# Patient Record
Sex: FEMALE | Race: WHITE | NOT HISPANIC OR LATINO | Employment: OTHER | URBAN - METROPOLITAN AREA
[De-identification: names, ages, dates, MRNs, and addresses within clinical notes are randomized per-mention and may not be internally consistent; named-entity substitution may affect disease eponyms.]

---

## 2023-04-24 ENCOUNTER — NURSING HOME VISIT (OUTPATIENT)
Dept: POST ACUTE CARE | Facility: EXTERNAL LOCATION | Age: 88
End: 2023-04-24
Payer: MEDICARE

## 2023-04-24 VITALS — TEMPERATURE: 98 F | SYSTOLIC BLOOD PRESSURE: 132 MMHG | DIASTOLIC BLOOD PRESSURE: 79 MMHG | HEART RATE: 80 BPM

## 2023-04-24 DIAGNOSIS — I10 PRIMARY HYPERTENSION: ICD-10-CM

## 2023-04-24 DIAGNOSIS — I26.99 OTHER ACUTE PULMONARY EMBOLISM WITHOUT ACUTE COR PULMONALE (MULTI): ICD-10-CM

## 2023-04-24 DIAGNOSIS — D50.0 IRON DEFICIENCY ANEMIA DUE TO CHRONIC BLOOD LOSS: Primary | ICD-10-CM

## 2023-04-24 DIAGNOSIS — R26.2 DIFFICULTY WALKING: ICD-10-CM

## 2023-04-24 DIAGNOSIS — S72.91XD UNSPECIFIED FRACTURE OF RIGHT FEMUR, SUBSEQUENT ENCOUNTER FOR CLOSED FRACTURE WITH ROUTINE HEALING: ICD-10-CM
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
## 2023-04-24 PROBLEM — H35.3131 EARLY DRY STAGE NONEXUDATIVE AGE-RELATED MACULAR DEGENERATION OF BOTH EYES: Status: ACTIVE | Noted: 2023-04-24

## 2023-04-24 PROBLEM — M62.81 MUSCLE WEAKNESS (GENERALIZED): Status: ACTIVE | Noted: 2023-04-24

## 2023-04-24 PROBLEM — K21.9 GASTROESOPHAGEAL REFLUX DISEASE WITHOUT ESOPHAGITIS: Status: ACTIVE | Noted: 2023-04-24

## 2023-04-24 PROCEDURE — 99342 HOME/RES VST NEW LOW MDM 30: CPT | Performed by: NURSE PRACTITIONER

## 2023-04-24 NOTE — PROGRESS NOTES
Emelyn Lundberg is a 87 y.o. female who is assisted living/ home patient being seen and evaluated for multiple medical problems.    50% of time was spent on preparing to see the patient, performing exam or evaluation, coordination of care, ordering medications,tests and labs, referring and communicating with other health care providers , interpreting results, obtaining and reviewing history, counseling and educating the family/patient explanation of tests, medications and documenting clinical information  EMR. Time spent >35 minutes    87y old female from [place] moved to [facility] with her  to be closer to family. She fell this past February and suffered a fractured femur, she had surgery for repair of fracture, post op suffered a pulmonary embolism, PEA/Arrest and acute metabolic encephalopathy. She is on Eliquis now, she used to take labetalol but was not discharged on the medication. Medications discussed with her, she would like the nurse to give her the medication because of her macular degeneration.  Current medications are Eliquis, omeprazole,tylenol PM and Vitamin D             Objective   /79   Pulse 80   Temp 36.7 °C (98 °F)     Physical Exam  Vitals and nursing note reviewed.   Constitutional:       General: She is not in acute distress.  HENT:      Head: Normocephalic and atraumatic.   Cardiovascular:      Rate and Rhythm: Normal rate and regular rhythm.   Pulmonary:      Effort: Pulmonary effort is normal.      Breath sounds: Normal breath sounds.   Musculoskeletal:      Right lower leg: No edema.      Left lower leg: No edema.   Skin:     General: Skin is warm and dry.   Neurological:      General: No focal deficit present.      Mental Status: She is alert and oriented to person, place, and time.   Psychiatric:         Mood and Affect: Mood normal.         Assessment/Plan   Problem List Items Addressed This Visit       Iron deficiency anemia due to chronic  blood loss - Primary     Check CBC 1 week         Other pulmonary embolism without acute cor pulmonale (CMS/HCC)     Acute PE following surgery for hip repair. She is on Eliquis 5mg po BID         Unspecified fracture of right femur, subsequent encounter for closed fracture with routine healing     Fractured femur after a accidental fall on 2/22/23  PWS on ly for transfers  Follow up orhtopedics  Referred to [facility]opedic associates for further orders  PT/OT evaluate and treat         Difficulty walking     Current only partial weight bearing status for transfers only  Using a wheelchair         Primary hypertension     Controlled  Was on labetalol but was not discharged from hospital on medication  Monitor VS daily x 1 month        Check CBC,BMP,Vit D, B12 and Mag level 1 week

## 2023-04-24 NOTE — ASSESSMENT & PLAN NOTE
Controlled  Was on labetalol but was not discharged from hospital on medication  Monitor VS daily x 1 month

## 2023-04-24 NOTE — ASSESSMENT & PLAN NOTE
Fractured femur after a accidental fall on 2/22/23  PWS on ly for transfers  Follow up orhtopedics  Referred to [facility]opedic associates for further orders  PT/OT evaluate and treat

## 2023-08-21 ASSESSMENT — ENCOUNTER SYMPTOMS
CARDIOVASCULAR NEGATIVE: 1
CONSTITUTIONAL NEGATIVE: 1
PSYCHIATRIC NEGATIVE: 1
MUSCULOSKELETAL NEGATIVE: 1
RESPIRATORY NEGATIVE: 1
GASTROINTESTINAL NEGATIVE: 1
NEUROLOGICAL NEGATIVE: 1

## 2023-08-22 NOTE — PROGRESS NOTES
Subjective   Patient ID: Natasha Lundberg is a 87 y.o. female.    Pt is an 87 year old female with afib, GERD who resides in [place] at [facility]. Patient states that she is overall doing well without any complaints. She regards that she tolerates his medications and states no concerns. States no issues or concerns at this time. Feels overall well without any concerns        Review of Systems   Constitutional: Negative.    HENT: Negative.     Respiratory: Negative.     Cardiovascular: Negative.    Gastrointestinal: Negative.    Musculoskeletal: Negative.    Neurological: Negative.    Psychiatric/Behavioral: Negative.         Objective Vitals Reviewed via facility EMR   Physical Exam  Constitutional:       General: She is not in acute distress.     Appearance: She is not ill-appearing.      Comments: Elderly female   HENT:      Mouth/Throat:      Mouth: Mucous membranes are moist.      Pharynx: Oropharynx is clear. No oropharyngeal exudate or posterior oropharyngeal erythema.   Eyes:      Pupils: Pupils are equal, round, and reactive to light.   Cardiovascular:      Rate and Rhythm: Normal rate and regular rhythm.      Heart sounds: No murmur heard.  Pulmonary:      Effort: Pulmonary effort is normal. No respiratory distress.      Breath sounds: No wheezing.   Abdominal:      General: Abdomen is flat. Bowel sounds are normal. There is no distension.      Palpations: Abdomen is soft.      Tenderness: There is no abdominal tenderness.   Musculoskeletal:         General: No tenderness. Normal range of motion.   Skin:     General: Skin is warm and dry.   Neurological:      General: No focal deficit present.      Mental Status: She is alert and oriented to person, place, and time. Mental status is at baseline.   Psychiatric:         Mood and Affect: Mood normal.         Assessment/Plan   Diagnoses and all orders for this visit:  Primary hypertension  Gastroesophageal reflux disease without esophagitis  Iron deficiency  anemia due to chronic blood loss  Muscle weakness (generalized)  Fall, sequela  Unspecified fracture of right femur, subsequent encounter for closed fracture with routine healing    Pt seen and examined, overall medically stable. Medications reviewed and no issues noted. Continue supportive care, labs at appropriate time

## 2023-08-23 ENCOUNTER — NURSING HOME VISIT (OUTPATIENT)
Dept: POST ACUTE CARE | Facility: EXTERNAL LOCATION | Age: 88
End: 2023-08-23
Payer: MEDICARE

## 2023-08-23 DIAGNOSIS — M62.81 MUSCLE WEAKNESS (GENERALIZED): ICD-10-CM
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
## 2023-08-23 DIAGNOSIS — S72.91XD UNSPECIFIED FRACTURE OF RIGHT FEMUR, SUBSEQUENT ENCOUNTER FOR CLOSED FRACTURE WITH ROUTINE HEALING: ICD-10-CM

## 2023-08-23 DIAGNOSIS — I10 PRIMARY HYPERTENSION: Primary | ICD-10-CM

## 2023-08-23 DIAGNOSIS — K21.9 GASTROESOPHAGEAL REFLUX DISEASE WITHOUT ESOPHAGITIS: ICD-10-CM

## 2023-08-23 DIAGNOSIS — D50.0 IRON DEFICIENCY ANEMIA DUE TO CHRONIC BLOOD LOSS: ICD-10-CM

## 2023-08-23 PROCEDURE — 99342 HOME/RES VST NEW LOW MDM 30: CPT | Performed by: STUDENT IN AN ORGANIZED HEALTH CARE EDUCATION/TRAINING PROGRAM

## 2023-09-06 ENCOUNTER — NURSING HOME VISIT (OUTPATIENT)
Dept: POST ACUTE CARE | Facility: EXTERNAL LOCATION | Age: 88
End: 2023-09-06
Payer: MEDICARE

## 2023-09-06 DIAGNOSIS — R26.2 DIFFICULTY WALKING: ICD-10-CM

## 2023-09-06 DIAGNOSIS — S42.296A OTHER CLOSED NONDISPLACED FRACTURE OF PROXIMAL END OF HUMERUS, UNSPECIFIED LATERALITY, INITIAL ENCOUNTER: ICD-10-CM

## 2023-09-06 DIAGNOSIS — I10 PRIMARY HYPERTENSION: Primary | ICD-10-CM

## 2023-09-06 DIAGNOSIS — K21.9 GASTROESOPHAGEAL REFLUX DISEASE WITHOUT ESOPHAGITIS: ICD-10-CM

## 2023-09-06 DIAGNOSIS — M62.81 MUSCLE WEAKNESS (GENERALIZED): ICD-10-CM

## 2023-09-06 PROCEDURE — 99349 HOME/RES VST EST MOD MDM 40: CPT | Performed by: STUDENT IN AN ORGANIZED HEALTH CARE EDUCATION/TRAINING PROGRAM

## 2023-09-06 NOTE — LETTER
September 10, 2023     RAMONA baca    Patient: Natasha Lundberg   YOB: 1935   Date of Visit: 9/6/2023       Dear Dr. RAMONA baca:    Thank you for referring Natasha Lundberg to me for evaluation. Below are my notes for this consultation.  If you have questions, please do not hesitate to call me. I look forward to following your patient along with you.       Sincerely,     Matthew Jones,       CC: No Recipients  ______________________________________________________________________________________    Subjective  Patient ID: Natasha Lundberg is a 87 y.o. female.    Pt seen in patients apartment. Patient recently tested positive for covid 19. In addition, during a recent trip, patient also reports that she while she was traveling she banged her shoulder on the side of the bus. Was found to have a new onset fracture. She regards that she is having some arthalgias and low grade fever but is overall feeling well otherwise. States no complaints.        Review of Systems   Constitutional: Negative.    HENT: Negative.     Respiratory:  Positive for cough.    Cardiovascular: Negative.    Gastrointestinal: Negative.    Musculoskeletal:  Positive for arthralgias.   Neurological: Negative.    Psychiatric/Behavioral: Negative.         ObjectiveVitals Reviewed via facility EMR   Physical Exam  Constitutional:       General: She is not in acute distress.     Appearance: She is not ill-appearing.   HENT:      Mouth/Throat:      Mouth: Mucous membranes are moist.      Pharynx: Oropharynx is clear. No oropharyngeal exudate or posterior oropharyngeal erythema.   Eyes:      Pupils: Pupils are equal, round, and reactive to light.   Cardiovascular:      Rate and Rhythm: Normal rate and regular rhythm.      Heart sounds: No murmur heard.  Pulmonary:      Effort: Pulmonary effort is normal. No respiratory distress.      Breath sounds: No wheezing.   Abdominal:      General: Abdomen is flat. Bowel sounds are normal. There is no distension.       Palpations: Abdomen is soft.      Tenderness: There is no abdominal tenderness.   Musculoskeletal:         General: No tenderness. Normal range of motion.   Skin:     General: Skin is warm and dry.   Neurological:      General: No focal deficit present.      Mental Status: She is alert and oriented to person, place, and time. Mental status is at baseline.   Psychiatric:         Mood and Affect: Mood normal.         Assessment/Plan  Diagnoses and all orders for this visit:  Primary hypertension  Gastroesophageal reflux disease without esophagitis  Muscle weakness (generalized)  Difficulty walking  Other closed nondisplaced fracture of proximal end of humerus, unspecified laterality, initial encounter      Pt seen and examined, within window for paxlovid. Start anti-virals. Continue supportive care, breathing treatments prn. Following with ortho for her shoulder, appreciate recommendations. Continue supportive care.    >40 minutes spent in managemnt of pt

## 2023-09-10 PROBLEM — U07.1 COVID-19: Status: ACTIVE | Noted: 2023-09-10

## 2023-09-10 PROBLEM — S42.309A HUMERAL FRACTURE: Status: ACTIVE | Noted: 2023-09-10

## 2023-09-10 ASSESSMENT — ENCOUNTER SYMPTOMS
GASTROINTESTINAL NEGATIVE: 1
NEUROLOGICAL NEGATIVE: 1
CONSTITUTIONAL NEGATIVE: 1
ARTHRALGIAS: 1
COUGH: 1
PSYCHIATRIC NEGATIVE: 1
CARDIOVASCULAR NEGATIVE: 1

## 2023-09-10 NOTE — PROGRESS NOTES
Subjective   Patient ID: Natasha Lundberg is a 87 y.o. female.    Pt seen in patients apartment. Patient recently tested positive for covid 19. In addition, during a recent trip, patient also reports that she while she was traveling she banged her shoulder on the side of the bus. Was found to have a new onset fracture. She regards that she is having some arthalgias and low grade fever but is overall feeling well otherwise. States no complaints.        Review of Systems   Constitutional: Negative.    HENT: Negative.     Respiratory:  Positive for cough.    Cardiovascular: Negative.    Gastrointestinal: Negative.    Musculoskeletal:  Positive for arthralgias.   Neurological: Negative.    Psychiatric/Behavioral: Negative.         Objective Vitals Reviewed via facility EMR   Physical Exam  Constitutional:       General: She is not in acute distress.     Appearance: She is not ill-appearing.   HENT:      Mouth/Throat:      Mouth: Mucous membranes are moist.      Pharynx: Oropharynx is clear. No oropharyngeal exudate or posterior oropharyngeal erythema.   Eyes:      Pupils: Pupils are equal, round, and reactive to light.   Cardiovascular:      Rate and Rhythm: Normal rate and regular rhythm.      Heart sounds: No murmur heard.  Pulmonary:      Effort: Pulmonary effort is normal. No respiratory distress.      Breath sounds: No wheezing.   Abdominal:      General: Abdomen is flat. Bowel sounds are normal. There is no distension.      Palpations: Abdomen is soft.      Tenderness: There is no abdominal tenderness.   Musculoskeletal:         General: No tenderness. Normal range of motion.   Skin:     General: Skin is warm and dry.   Neurological:      General: No focal deficit present.      Mental Status: She is alert and oriented to person, place, and time. Mental status is at baseline.   Psychiatric:         Mood and Affect: Mood normal.         Assessment/Plan   Diagnoses and all orders for this visit:  Primary  hypertension  Gastroesophageal reflux disease without esophagitis  Muscle weakness (generalized)  Difficulty walking  Other closed nondisplaced fracture of proximal end of humerus, unspecified laterality, initial encounter      Pt seen and examined, within window for paxlovid. Start anti-virals. Continue supportive care, breathing treatments prn. Following with ortho for her shoulder, appreciate recommendations. Continue supportive care.    >40 minutes spent in managemnt of pt

## 2023-09-21 ENCOUNTER — HOSPITAL ENCOUNTER (INPATIENT)
Dept: DATA CONVERSION | Facility: HOSPITAL | Age: 88
LOS: 8 days | Discharge: HOME | End: 2023-09-29
Attending: INTERNAL MEDICINE | Admitting: INTERNAL MEDICINE
Payer: MEDICARE

## 2023-09-21 DIAGNOSIS — K92.2 GASTROINTESTINAL HEMORRHAGE, UNSPECIFIED: ICD-10-CM

## 2023-09-21 LAB
ABO GROUP (TYPE) IN BLOOD: NORMAL
ABO GROUP (TYPE) IN BLOOD: NORMAL
ACTIVATED PARTIAL THROMBOPLASTIN TIME IN PPP BY COAGULATION ASSAY: 52 SEC (ref 27–38)
ALANINE AMINOTRANSFERASE (SGPT) (U/L) IN SER/PLAS: 6 U/L (ref 7–45)
ALBUMIN (G/DL) IN SER/PLAS: 3.7 G/DL (ref 3.4–5)
ALKALINE PHOSPHATASE (U/L) IN SER/PLAS: 85 U/L (ref 33–136)
ANION GAP IN SER/PLAS: 15 MMOL/L (ref 10–20)
ANTIBODY SCREEN: NORMAL
ASPARTATE AMINOTRANSFERASE (SGOT) (U/L) IN SER/PLAS: 10 U/L (ref 9–39)
BASOPHILS (10*3/UL) IN BLOOD BY AUTOMATED COUNT: 0.15 X10E9/L (ref 0–0.1)
BASOPHILS/100 LEUKOCYTES IN BLOOD BY AUTOMATED COUNT: 0.7 % (ref 0–2)
BILIRUBIN TOTAL (MG/DL) IN SER/PLAS: 0.4 MG/DL (ref 0–1.2)
CALCIUM (MG/DL) IN SER/PLAS: 9 MG/DL (ref 8.6–10.3)
CARBON DIOXIDE, TOTAL (MMOL/L) IN SER/PLAS: 21 MMOL/L (ref 21–32)
CHLORIDE (MMOL/L) IN SER/PLAS: 99 MMOL/L (ref 98–107)
CREATININE (MG/DL) IN SER/PLAS: 1.01 MG/DL (ref 0.5–1.05)
EOSINOPHILS (10*3/UL) IN BLOOD BY AUTOMATED COUNT: 0.37 X10E9/L (ref 0–0.4)
EOSINOPHILS/100 LEUKOCYTES IN BLOOD BY AUTOMATED COUNT: 1.8 % (ref 0–6)
ERYTHROCYTE DISTRIBUTION WIDTH (RATIO) BY AUTOMATED COUNT: 13.3 % (ref 11.5–14.5)
ERYTHROCYTE MEAN CORPUSCULAR HEMOGLOBIN CONCENTRATION (G/DL) BY AUTOMATED: 31.2 G/DL (ref 32–36)
ERYTHROCYTE MEAN CORPUSCULAR VOLUME (FL) BY AUTOMATED COUNT: 92 FL (ref 80–100)
ERYTHROCYTES (10*6/UL) IN BLOOD BY AUTOMATED COUNT: 3.05 X10E12/L (ref 4–5.2)
GFR FEMALE: 54 ML/MIN/1.73M2
GLUCOSE (MG/DL) IN SER/PLAS: 134 MG/DL (ref 74–99)
HEMATOCRIT (%) IN BLOOD BY AUTOMATED COUNT: 21.9 % (ref 36–46)
HEMATOCRIT (%) IN BLOOD BY AUTOMATED COUNT: 28.2 % (ref 36–46)
HEMOGLOBIN (G/DL) IN BLOOD: 7 G/DL (ref 12–16)
HEMOGLOBIN (G/DL) IN BLOOD: 8.8 G/DL (ref 12–16)
IMMATURE GRANULOCYTES/100 LEUKOCYTES IN BLOOD BY AUTOMATED COUNT: 0.8 % (ref 0–0.9)
INR IN PPP BY COAGULATION ASSAY: 2.1 (ref 0.9–1.1)
LACTATE (MMOL/L) IN SER/PLAS: 1.1 MMOL/L (ref 0.4–2)
LEUKOCYTES (10*3/UL) IN BLOOD BY AUTOMATED COUNT: 20.3 X10E9/L (ref 4.4–11.3)
LYMPHOCYTES (10*3/UL) IN BLOOD BY AUTOMATED COUNT: 1.91 X10E9/L (ref 0.8–3)
LYMPHOCYTES/100 LEUKOCYTES IN BLOOD BY AUTOMATED COUNT: 9.4 % (ref 13–44)
MONOCYTES (10*3/UL) IN BLOOD BY AUTOMATED COUNT: 1.49 X10E9/L (ref 0.05–0.8)
MONOCYTES/100 LEUKOCYTES IN BLOOD BY AUTOMATED COUNT: 7.4 % (ref 2–10)
NEUTROPHILS (10*3/UL) IN BLOOD BY AUTOMATED COUNT: 16.18 X10E9/L (ref 1.6–5.5)
NEUTROPHILS/100 LEUKOCYTES IN BLOOD BY AUTOMATED COUNT: 79.9 % (ref 40–80)
NRBC (PER 100 WBCS) BY AUTOMATED COUNT: 0 /100 WBC (ref 0–0)
PLATELETS (10*3/UL) IN BLOOD AUTOMATED COUNT: 522 X10E9/L (ref 150–450)
POTASSIUM (MMOL/L) IN SER/PLAS: 4.1 MMOL/L (ref 3.5–5.3)
PROTEIN TOTAL: 7.5 G/DL (ref 6.4–8.2)
PROTHROMBIN TIME (PT) IN PPP BY COAGULATION ASSAY: 23.3 SEC (ref 9.8–12.8)
RH FACTOR: NORMAL
RH FACTOR: NORMAL
SODIUM (MMOL/L) IN SER/PLAS: 131 MMOL/L (ref 136–145)
TROPONIN I, HIGH SENSITIVITY: 6 NG/L (ref 0–13)
UREA NITROGEN (MG/DL) IN SER/PLAS: 23 MG/DL (ref 6–23)

## 2023-09-22 LAB
ACTIVATED PARTIAL THROMBOPLASTIN TIME IN PPP BY COAGULATION ASSAY: 39 SEC (ref 27–38)
ALANINE AMINOTRANSFERASE (SGPT) (U/L) IN SER/PLAS: 4 U/L (ref 7–45)
ALANINE AMINOTRANSFERASE (SGPT) (U/L) IN SER/PLAS: NORMAL
ALBUMIN (G/DL) IN SER/PLAS: 2.6 G/DL (ref 3.4–5)
ALBUMIN (G/DL) IN SER/PLAS: NORMAL
ALKALINE PHOSPHATASE (U/L) IN SER/PLAS: 55 U/L (ref 33–136)
ALKALINE PHOSPHATASE (U/L) IN SER/PLAS: NORMAL
ANION GAP IN SER/PLAS: 11 MMOL/L (ref 10–20)
ANION GAP IN SER/PLAS: NORMAL
APPEARANCE, URINE: NORMAL
ASPARTATE AMINOTRANSFERASE (SGOT) (U/L) IN SER/PLAS: 7 U/L (ref 9–39)
ASPARTATE AMINOTRANSFERASE (SGOT) (U/L) IN SER/PLAS: NORMAL
BILIRUBIN TOTAL (MG/DL) IN SER/PLAS: 1 MG/DL (ref 0–1.2)
BILIRUBIN TOTAL (MG/DL) IN SER/PLAS: NORMAL
BILIRUBIN, URINE: NEGATIVE
BLOOD CULTURE: NORMAL
BLOOD CULTURE: NORMAL
BLOOD, URINE: NEGATIVE
C. DIFFICILE TOXIN, PCR: NOT DETECTED
CALCIUM (MG/DL) IN SER/PLAS: 7.9 MG/DL (ref 8.6–10.3)
CALCIUM (MG/DL) IN SER/PLAS: NORMAL
CAMPYLOBACTER GP: NOT DETECTED
CARBON DIOXIDE, TOTAL (MMOL/L) IN SER/PLAS: 22 MMOL/L (ref 21–32)
CARBON DIOXIDE, TOTAL (MMOL/L) IN SER/PLAS: NORMAL
CHLORIDE (MMOL/L) IN SER/PLAS: 107 MMOL/L (ref 98–107)
CHLORIDE (MMOL/L) IN SER/PLAS: NORMAL
COLOR, URINE: YELLOW
CREATININE (MG/DL) IN SER/PLAS: 0.93 MG/DL (ref 0.5–1.05)
CREATININE (MG/DL) IN SER/PLAS: NORMAL
ERYTHROCYTE DISTRIBUTION WIDTH (RATIO) BY AUTOMATED COUNT: 14.4 % (ref 11.5–14.5)
ERYTHROCYTE DISTRIBUTION WIDTH (RATIO) BY AUTOMATED COUNT: NORMAL
ERYTHROCYTE DISTRIBUTION WIDTH (RATIO) BY AUTOMATED COUNT: NORMAL
ERYTHROCYTE MEAN CORPUSCULAR HEMOGLOBIN CONCENTRATION (G/DL) BY AUTOMATED: 34.3 G/DL (ref 32–36)
ERYTHROCYTE MEAN CORPUSCULAR HEMOGLOBIN CONCENTRATION (G/DL) BY AUTOMATED: NORMAL
ERYTHROCYTE MEAN CORPUSCULAR HEMOGLOBIN CONCENTRATION (G/DL) BY AUTOMATED: NORMAL
ERYTHROCYTE MEAN CORPUSCULAR VOLUME (FL) BY AUTOMATED COUNT: 87 FL (ref 80–100)
ERYTHROCYTE MEAN CORPUSCULAR VOLUME (FL) BY AUTOMATED COUNT: NORMAL
ERYTHROCYTE MEAN CORPUSCULAR VOLUME (FL) BY AUTOMATED COUNT: NORMAL
ERYTHROCYTES (10*6/UL) IN BLOOD BY AUTOMATED COUNT: 3.17 X10E12/L (ref 4–5.2)
ERYTHROCYTES (10*6/UL) IN BLOOD BY AUTOMATED COUNT: NORMAL
ERYTHROCYTES (10*6/UL) IN BLOOD BY AUTOMATED COUNT: NORMAL
GFR FEMALE: 60 ML/MIN/1.73M2
GFR FEMALE: NORMAL
GFR MALE: NORMAL
GLUCOSE (MG/DL) IN SER/PLAS: 136 MG/DL (ref 74–99)
GLUCOSE (MG/DL) IN SER/PLAS: NORMAL
GLUCOSE, URINE: NEGATIVE MG/DL
HEMATOCRIT (%) IN BLOOD BY AUTOMATED COUNT: 25.5 % (ref 36–46)
HEMATOCRIT (%) IN BLOOD BY AUTOMATED COUNT: 27.7 % (ref 36–46)
HEMATOCRIT (%) IN BLOOD BY AUTOMATED COUNT: NORMAL
HEMATOCRIT (%) IN BLOOD BY AUTOMATED COUNT: NORMAL
HEMOGLOBIN (G/DL) IN BLOOD: 8.2 G/DL (ref 12–16)
HEMOGLOBIN (G/DL) IN BLOOD: 9.5 G/DL (ref 12–16)
HEMOGLOBIN (G/DL) IN BLOOD: NORMAL
HEMOGLOBIN (G/DL) IN BLOOD: NORMAL
INR IN PPP BY COAGULATION ASSAY: 1.6 (ref 0.9–1.1)
INR IN PPP BY COAGULATION ASSAY: NORMAL
KETONES, URINE: NEGATIVE MG/DL
LACTATE (MMOL/L) IN SER/PLAS: 0.6 MMOL/L (ref 0.4–2)
LEUKOCYTE ESTERASE, URINE: NEGATIVE
LEUKOCYTES (10*3/UL) IN BLOOD BY AUTOMATED COUNT: 13.4 X10E9/L (ref 4.4–11.3)
LEUKOCYTES (10*3/UL) IN BLOOD BY AUTOMATED COUNT: NORMAL
LEUKOCYTES (10*3/UL) IN BLOOD BY AUTOMATED COUNT: NORMAL
MAGNESIUM (MG/DL) IN SER/PLAS: NORMAL
NATRIURETIC PEPTIDE B (PG/ML) IN SER/PLAS: 33 PG/ML (ref 0–99)
NITRITE, URINE: NEGATIVE
NOROVIRUS GI/GII: NOT DETECTED
NRBC (PER 100 WBCS) BY AUTOMATED COUNT: 0 /100 WBC (ref 0–0)
NRBC (PER 100 WBCS) BY AUTOMATED COUNT: NORMAL
NRBC (PER 100 WBCS) BY AUTOMATED COUNT: NORMAL
PH, URINE: 5 (ref 5–8)
PLATELETS (10*3/UL) IN BLOOD AUTOMATED COUNT: 363 X10E9/L (ref 150–450)
PLATELETS (10*3/UL) IN BLOOD AUTOMATED COUNT: NORMAL
PLATELETS (10*3/UL) IN BLOOD AUTOMATED COUNT: NORMAL
POTASSIUM (MMOL/L) IN SER/PLAS: 4.5 MMOL/L (ref 3.5–5.3)
POTASSIUM (MMOL/L) IN SER/PLAS: NORMAL
PROTEIN TOTAL: 5.2 G/DL (ref 6.4–8.2)
PROTEIN TOTAL: NORMAL
PROTEIN, URINE: NEGATIVE MG/DL
PROTHROMBIN TIME (PT) IN PPP BY COAGULATION ASSAY: 17.9 SEC (ref 9.8–12.8)
PROTHROMBIN TIME (PT) IN PPP BY COAGULATION ASSAY: NORMAL
ROTAVIRUS A: NOT DETECTED
SALMONELLA SP.: NOT DETECTED
SHIGA TOXIN 1: NOT DETECTED
SHIGA TOXIN 2: NOT DETECTED
SHIGELLA SP.: NOT DETECTED
SODIUM (MMOL/L) IN SER/PLAS: 135 MMOL/L (ref 136–145)
SODIUM (MMOL/L) IN SER/PLAS: NORMAL
SPECIFIC GRAVITY, URINE: 1.02 (ref 1–1.03)
STOOL CULTURE, TEST OF CURE: NORMAL
UREA NITROGEN (MG/DL) IN SER/PLAS: 21 MG/DL (ref 6–23)
UREA NITROGEN (MG/DL) IN SER/PLAS: NORMAL
UROBILINOGEN, URINE: <2 MG/DL (ref 0–1.9)
VIBRIO GRP.: NOT DETECTED
YERSINIA ENTEROCOLITICA: NOT DETECTED

## 2023-09-23 LAB
ANION GAP IN SER/PLAS: 11 MMOL/L (ref 10–20)
CALCIUM (MG/DL) IN SER/PLAS: 7.6 MG/DL (ref 8.6–10.3)
CARBON DIOXIDE, TOTAL (MMOL/L) IN SER/PLAS: 21 MMOL/L (ref 21–32)
CHLORIDE (MMOL/L) IN SER/PLAS: 109 MMOL/L (ref 98–107)
CREATININE (MG/DL) IN SER/PLAS: 0.96 MG/DL (ref 0.5–1.05)
ERYTHROCYTE DISTRIBUTION WIDTH (RATIO) BY AUTOMATED COUNT: 15 % (ref 11.5–14.5)
ERYTHROCYTE MEAN CORPUSCULAR HEMOGLOBIN CONCENTRATION (G/DL) BY AUTOMATED: 33.2 G/DL (ref 32–36)
ERYTHROCYTE MEAN CORPUSCULAR VOLUME (FL) BY AUTOMATED COUNT: 90 FL (ref 80–100)
ERYTHROCYTES (10*6/UL) IN BLOOD BY AUTOMATED COUNT: 2.69 X10E12/L (ref 4–5.2)
GFR FEMALE: 58 ML/MIN/1.73M2
GLUCOSE (MG/DL) IN SER/PLAS: 117 MG/DL (ref 74–99)
HEMATOCRIT (%) IN BLOOD BY AUTOMATED COUNT: 22.9 % (ref 36–46)
HEMATOCRIT (%) IN BLOOD BY AUTOMATED COUNT: 24.1 % (ref 36–46)
HEMATOCRIT (%) IN BLOOD BY AUTOMATED COUNT: 30.3 % (ref 36–46)
HEMOGLOBIN (G/DL) IN BLOOD: 10.2 G/DL (ref 12–16)
HEMOGLOBIN (G/DL) IN BLOOD: 7.6 G/DL (ref 12–16)
HEMOGLOBIN (G/DL) IN BLOOD: 8 G/DL (ref 12–16)
LEUKOCYTES (10*3/UL) IN BLOOD BY AUTOMATED COUNT: 12.1 X10E9/L (ref 4.4–11.3)
MAGNESIUM (MG/DL) IN SER/PLAS: 1.51 MG/DL (ref 1.6–2.4)
NRBC (PER 100 WBCS) BY AUTOMATED COUNT: 0 /100 WBC (ref 0–0)
PHOSPHATE (MG/DL) IN SER/PLAS: 3.5 MG/DL (ref 2.5–4.9)
PLATELETS (10*3/UL) IN BLOOD AUTOMATED COUNT: 327 X10E9/L (ref 150–450)
POTASSIUM (MMOL/L) IN SER/PLAS: 4.2 MMOL/L (ref 3.5–5.3)
SODIUM (MMOL/L) IN SER/PLAS: 137 MMOL/L (ref 136–145)
UREA NITROGEN (MG/DL) IN SER/PLAS: 25 MG/DL (ref 6–23)

## 2023-09-24 LAB
ALANINE AMINOTRANSFERASE (SGPT) (U/L) IN SER/PLAS: 3 U/L (ref 7–45)
ALBUMIN (G/DL) IN SER/PLAS: 2.6 G/DL (ref 3.4–5)
ALKALINE PHOSPHATASE (U/L) IN SER/PLAS: 48 U/L (ref 33–136)
ANION GAP IN SER/PLAS: 14 MMOL/L (ref 10–20)
ASPARTATE AMINOTRANSFERASE (SGOT) (U/L) IN SER/PLAS: 8 U/L (ref 9–39)
BILIRUBIN TOTAL (MG/DL) IN SER/PLAS: 0.7 MG/DL (ref 0–1.2)
CALCIUM (MG/DL) IN SER/PLAS: 7.6 MG/DL (ref 8.6–10.3)
CARBON DIOXIDE, TOTAL (MMOL/L) IN SER/PLAS: 20 MMOL/L (ref 21–32)
CHLORIDE (MMOL/L) IN SER/PLAS: 108 MMOL/L (ref 98–107)
CREATININE (MG/DL) IN SER/PLAS: 0.95 MG/DL (ref 0.5–1.05)
ERYTHROCYTE DISTRIBUTION WIDTH (RATIO) BY AUTOMATED COUNT: 16.9 % (ref 11.5–14.5)
ERYTHROCYTE MEAN CORPUSCULAR HEMOGLOBIN CONCENTRATION (G/DL) BY AUTOMATED: 32.8 G/DL (ref 32–36)
ERYTHROCYTE MEAN CORPUSCULAR VOLUME (FL) BY AUTOMATED COUNT: 88 FL (ref 80–100)
ERYTHROCYTES (10*6/UL) IN BLOOD BY AUTOMATED COUNT: 2.91 X10E12/L (ref 4–5.2)
GFR FEMALE: 58 ML/MIN/1.73M2
GLUCOSE (MG/DL) IN SER/PLAS: 97 MG/DL (ref 74–99)
HEMATOCRIT (%) IN BLOOD BY AUTOMATED COUNT: 23.4 % (ref 36–46)
HEMATOCRIT (%) IN BLOOD BY AUTOMATED COUNT: 25.6 % (ref 36–46)
HEMOGLOBIN (G/DL) IN BLOOD: 7.7 G/DL (ref 12–16)
HEMOGLOBIN (G/DL) IN BLOOD: 8.4 G/DL (ref 12–16)
INR IN PPP BY COAGULATION ASSAY: 1.2 (ref 0.9–1.1)
LEUKOCYTES (10*3/UL) IN BLOOD BY AUTOMATED COUNT: 16.1 X10E9/L (ref 4.4–11.3)
MAGNESIUM (MG/DL) IN SER/PLAS: 2.25 MG/DL (ref 1.6–2.4)
NRBC (PER 100 WBCS) BY AUTOMATED COUNT: 0 /100 WBC (ref 0–0)
PHOSPHATE (MG/DL) IN SER/PLAS: 3.7 MG/DL (ref 2.5–4.9)
PLATELETS (10*3/UL) IN BLOOD AUTOMATED COUNT: 323 X10E9/L (ref 150–450)
POTASSIUM (MMOL/L) IN SER/PLAS: 4.1 MMOL/L (ref 3.5–5.3)
PROTEIN TOTAL: 5 G/DL (ref 6.4–8.2)
PROTHROMBIN TIME (PT) IN PPP BY COAGULATION ASSAY: 13.9 SEC (ref 9.8–12.8)
SODIUM (MMOL/L) IN SER/PLAS: 138 MMOL/L (ref 136–145)
UREA NITROGEN (MG/DL) IN SER/PLAS: 21 MG/DL (ref 6–23)

## 2023-09-25 LAB
ABO GROUP (TYPE) IN BLOOD: NORMAL
ALANINE AMINOTRANSFERASE (SGPT) (U/L) IN SER/PLAS: 3 U/L (ref 7–45)
ALBUMIN (G/DL) IN SER/PLAS: 2.5 G/DL (ref 3.4–5)
ALKALINE PHOSPHATASE (U/L) IN SER/PLAS: 45 U/L (ref 33–136)
ANION GAP IN SER/PLAS: 13 MMOL/L (ref 10–20)
ANTIBODY SCREEN: NORMAL
ASPARTATE AMINOTRANSFERASE (SGOT) (U/L) IN SER/PLAS: 7 U/L (ref 9–39)
BILIRUBIN TOTAL (MG/DL) IN SER/PLAS: 0.5 MG/DL (ref 0–1.2)
CALCIUM (MG/DL) IN SER/PLAS: 7.5 MG/DL (ref 8.6–10.3)
CARBON DIOXIDE, TOTAL (MMOL/L) IN SER/PLAS: 22 MMOL/L (ref 21–32)
CHLORIDE (MMOL/L) IN SER/PLAS: 105 MMOL/L (ref 98–107)
CREATININE (MG/DL) IN SER/PLAS: 0.82 MG/DL (ref 0.5–1.05)
ERYTHROCYTE DISTRIBUTION WIDTH (RATIO) BY AUTOMATED COUNT: 15.8 % (ref 11.5–14.5)
ERYTHROCYTE DISTRIBUTION WIDTH (RATIO) BY AUTOMATED COUNT: 15.8 % (ref 11.5–14.5)
ERYTHROCYTE DISTRIBUTION WIDTH (RATIO) BY AUTOMATED COUNT: 16.4 % (ref 11.5–14.5)
ERYTHROCYTE MEAN CORPUSCULAR HEMOGLOBIN CONCENTRATION (G/DL) BY AUTOMATED: 31.9 G/DL (ref 32–36)
ERYTHROCYTE MEAN CORPUSCULAR HEMOGLOBIN CONCENTRATION (G/DL) BY AUTOMATED: 32.1 G/DL (ref 32–36)
ERYTHROCYTE MEAN CORPUSCULAR HEMOGLOBIN CONCENTRATION (G/DL) BY AUTOMATED: 32.3 G/DL (ref 32–36)
ERYTHROCYTE MEAN CORPUSCULAR VOLUME (FL) BY AUTOMATED COUNT: 89 FL (ref 80–100)
ERYTHROCYTE MEAN CORPUSCULAR VOLUME (FL) BY AUTOMATED COUNT: 90 FL (ref 80–100)
ERYTHROCYTE MEAN CORPUSCULAR VOLUME (FL) BY AUTOMATED COUNT: 91 FL (ref 80–100)
ERYTHROCYTES (10*6/UL) IN BLOOD BY AUTOMATED COUNT: 2.4 X10E12/L (ref 4–5.2)
ERYTHROCYTES (10*6/UL) IN BLOOD BY AUTOMATED COUNT: 2.84 X10E12/L (ref 4–5.2)
ERYTHROCYTES (10*6/UL) IN BLOOD BY AUTOMATED COUNT: 2.87 X10E12/L (ref 4–5.2)
GFR FEMALE: 70 ML/MIN/1.73M2
GLUCOSE (MG/DL) IN SER/PLAS: 77 MG/DL (ref 74–99)
HEMATOCRIT (%) IN BLOOD BY AUTOMATED COUNT: 21.5 % (ref 36–46)
HEMATOCRIT (%) IN BLOOD BY AUTOMATED COUNT: 25.4 % (ref 36–46)
HEMATOCRIT (%) IN BLOOD BY AUTOMATED COUNT: 26 % (ref 36–46)
HEMATOCRIT (%) IN BLOOD BY AUTOMATED COUNT: NORMAL
HEMOGLOBIN (G/DL) IN BLOOD: 6.9 G/DL (ref 12–16)
HEMOGLOBIN (G/DL) IN BLOOD: 8.2 G/DL (ref 12–16)
HEMOGLOBIN (G/DL) IN BLOOD: 8.3 G/DL (ref 12–16)
HEMOGLOBIN (G/DL) IN BLOOD: NORMAL
INR IN PPP BY COAGULATION ASSAY: 1.2 (ref 0.9–1.1)
LEUKOCYTES (10*3/UL) IN BLOOD BY AUTOMATED COUNT: 10.9 X10E9/L (ref 4.4–11.3)
LEUKOCYTES (10*3/UL) IN BLOOD BY AUTOMATED COUNT: 12.7 X10E9/L (ref 4.4–11.3)
LEUKOCYTES (10*3/UL) IN BLOOD BY AUTOMATED COUNT: 9.4 X10E9/L (ref 4.4–11.3)
MAGNESIUM (MG/DL) IN SER/PLAS: 1.66 MG/DL (ref 1.6–2.4)
NRBC (PER 100 WBCS) BY AUTOMATED COUNT: 0 /100 WBC (ref 0–0)
PHOSPHATE (MG/DL) IN SER/PLAS: 3.6 MG/DL (ref 2.5–4.9)
PLATELETS (10*3/UL) IN BLOOD AUTOMATED COUNT: 251 X10E9/L (ref 150–450)
PLATELETS (10*3/UL) IN BLOOD AUTOMATED COUNT: 277 X10E9/L (ref 150–450)
PLATELETS (10*3/UL) IN BLOOD AUTOMATED COUNT: 285 X10E9/L (ref 150–450)
POTASSIUM (MMOL/L) IN SER/PLAS: 3.6 MMOL/L (ref 3.5–5.3)
PROTEIN TOTAL: 4.7 G/DL (ref 6.4–8.2)
PROTHROMBIN TIME (PT) IN PPP BY COAGULATION ASSAY: 13.4 SEC (ref 9.8–12.8)
RH FACTOR: NORMAL
SODIUM (MMOL/L) IN SER/PLAS: 136 MMOL/L (ref 136–145)
UREA NITROGEN (MG/DL) IN SER/PLAS: 13 MG/DL (ref 6–23)

## 2023-09-26 LAB
ALBUMIN (G/DL) IN SER/PLAS: 2.6 G/DL (ref 3.4–5)
ANION GAP IN SER/PLAS: 10 MMOL/L (ref 10–20)
BLOOD CULTURE: NORMAL
BLOOD CULTURE: NORMAL
CALCIUM (MG/DL) IN SER/PLAS: 7.7 MG/DL (ref 8.6–10.3)
CARBON DIOXIDE, TOTAL (MMOL/L) IN SER/PLAS: 26 MMOL/L (ref 21–32)
CHLORIDE (MMOL/L) IN SER/PLAS: 104 MMOL/L (ref 98–107)
CREATININE (MG/DL) IN SER/PLAS: 0.81 MG/DL (ref 0.5–1.05)
ERYTHROCYTE DISTRIBUTION WIDTH (RATIO) BY AUTOMATED COUNT: 15.8 % (ref 11.5–14.5)
ERYTHROCYTE DISTRIBUTION WIDTH (RATIO) BY AUTOMATED COUNT: 16 % (ref 11.5–14.5)
ERYTHROCYTE MEAN CORPUSCULAR HEMOGLOBIN CONCENTRATION (G/DL) BY AUTOMATED: 31.7 G/DL (ref 32–36)
ERYTHROCYTE MEAN CORPUSCULAR HEMOGLOBIN CONCENTRATION (G/DL) BY AUTOMATED: 33.5 G/DL (ref 32–36)
ERYTHROCYTE MEAN CORPUSCULAR VOLUME (FL) BY AUTOMATED COUNT: 88 FL (ref 80–100)
ERYTHROCYTE MEAN CORPUSCULAR VOLUME (FL) BY AUTOMATED COUNT: 91 FL (ref 80–100)
ERYTHROCYTES (10*6/UL) IN BLOOD BY AUTOMATED COUNT: 2.77 X10E12/L (ref 4–5.2)
ERYTHROCYTES (10*6/UL) IN BLOOD BY AUTOMATED COUNT: 3.14 X10E12/L (ref 4–5.2)
GFR FEMALE: 71 ML/MIN/1.73M2
GLUCOSE (MG/DL) IN SER/PLAS: 99 MG/DL (ref 74–99)
HEMATOCRIT (%) IN BLOOD BY AUTOMATED COUNT: 24.5 % (ref 36–46)
HEMATOCRIT (%) IN BLOOD BY AUTOMATED COUNT: 28.7 % (ref 36–46)
HEMOGLOBIN (G/DL) IN BLOOD: 8.2 G/DL (ref 12–16)
HEMOGLOBIN (G/DL) IN BLOOD: 9.1 G/DL (ref 12–16)
LEUKOCYTES (10*3/UL) IN BLOOD BY AUTOMATED COUNT: 10.2 X10E9/L (ref 4.4–11.3)
LEUKOCYTES (10*3/UL) IN BLOOD BY AUTOMATED COUNT: 13.2 X10E9/L (ref 4.4–11.3)
MAGNESIUM (MG/DL) IN SER/PLAS: 1.49 MG/DL (ref 1.6–2.4)
NRBC (PER 100 WBCS) BY AUTOMATED COUNT: 0 /100 WBC (ref 0–0)
NRBC (PER 100 WBCS) BY AUTOMATED COUNT: 0 /100 WBC (ref 0–0)
PHOSPHATE (MG/DL) IN SER/PLAS: 3.4 MG/DL (ref 2.5–4.9)
PLATELETS (10*3/UL) IN BLOOD AUTOMATED COUNT: 291 X10E9/L (ref 150–450)
PLATELETS (10*3/UL) IN BLOOD AUTOMATED COUNT: 311 X10E9/L (ref 150–450)
POTASSIUM (MMOL/L) IN SER/PLAS: 3.5 MMOL/L (ref 3.5–5.3)
SODIUM (MMOL/L) IN SER/PLAS: 136 MMOL/L (ref 136–145)
UREA NITROGEN (MG/DL) IN SER/PLAS: 10 MG/DL (ref 6–23)

## 2023-09-27 LAB
ANION GAP IN SER/PLAS: 12 MMOL/L (ref 10–20)
CALCIUM (MG/DL) IN SER/PLAS: 7.8 MG/DL (ref 8.6–10.3)
CARBON DIOXIDE, TOTAL (MMOL/L) IN SER/PLAS: 25 MMOL/L (ref 21–32)
CHLORIDE (MMOL/L) IN SER/PLAS: 103 MMOL/L (ref 98–107)
CREATININE (MG/DL) IN SER/PLAS: 0.88 MG/DL (ref 0.5–1.05)
ERYTHROCYTE DISTRIBUTION WIDTH (RATIO) BY AUTOMATED COUNT: 15.8 % (ref 11.5–14.5)
ERYTHROCYTE MEAN CORPUSCULAR HEMOGLOBIN CONCENTRATION (G/DL) BY AUTOMATED: 31.3 G/DL (ref 32–36)
ERYTHROCYTE MEAN CORPUSCULAR VOLUME (FL) BY AUTOMATED COUNT: 94 FL (ref 80–100)
ERYTHROCYTES (10*6/UL) IN BLOOD BY AUTOMATED COUNT: 2.8 X10E12/L (ref 4–5.2)
GFR FEMALE: 64 ML/MIN/1.73M2
GLUCOSE (MG/DL) IN SER/PLAS: 103 MG/DL (ref 74–99)
HEMATOCRIT (%) IN BLOOD BY AUTOMATED COUNT: 26.2 % (ref 36–46)
HEMOGLOBIN (G/DL) IN BLOOD: 8.2 G/DL (ref 12–16)
LEUKOCYTES (10*3/UL) IN BLOOD BY AUTOMATED COUNT: 9.7 X10E9/L (ref 4.4–11.3)
MAGNESIUM (MG/DL) IN SER/PLAS: 3.27 MG/DL (ref 1.6–2.4)
NRBC (PER 100 WBCS) BY AUTOMATED COUNT: 0 /100 WBC (ref 0–0)
PHOSPHATE (MG/DL) IN SER/PLAS: 3.7 MG/DL (ref 2.5–4.9)
PLATELETS (10*3/UL) IN BLOOD AUTOMATED COUNT: 297 X10E9/L (ref 150–450)
POTASSIUM (MMOL/L) IN SER/PLAS: 3.7 MMOL/L (ref 3.5–5.3)
SODIUM (MMOL/L) IN SER/PLAS: 136 MMOL/L (ref 136–145)
UREA NITROGEN (MG/DL) IN SER/PLAS: 8 MG/DL (ref 6–23)

## 2023-09-28 VITALS — HEIGHT: 62 IN | BODY MASS INDEX: 26.98 KG/M2 | WEIGHT: 146.61 LBS

## 2023-09-28 LAB
ANION GAP IN SER/PLAS: 10 MMOL/L (ref 10–20)
CALCIUM (MG/DL) IN SER/PLAS: 7.7 MG/DL (ref 8.6–10.3)
CARBON DIOXIDE, TOTAL (MMOL/L) IN SER/PLAS: 27 MMOL/L (ref 21–32)
CHLORIDE (MMOL/L) IN SER/PLAS: 103 MMOL/L (ref 98–107)
CREATININE (MG/DL) IN SER/PLAS: 0.93 MG/DL (ref 0.5–1.05)
ERYTHROCYTE DISTRIBUTION WIDTH (RATIO) BY AUTOMATED COUNT: 15.7 % (ref 11.5–14.5)
ERYTHROCYTE MEAN CORPUSCULAR HEMOGLOBIN CONCENTRATION (G/DL) BY AUTOMATED: 31.3 G/DL (ref 32–36)
ERYTHROCYTE MEAN CORPUSCULAR VOLUME (FL) BY AUTOMATED COUNT: 92 FL (ref 80–100)
ERYTHROCYTES (10*6/UL) IN BLOOD BY AUTOMATED COUNT: 2.75 X10E12/L (ref 4–5.2)
GFR FEMALE: 60 ML/MIN/1.73M2
GLUCOSE (MG/DL) IN SER/PLAS: 102 MG/DL (ref 74–99)
HEMATOCRIT (%) IN BLOOD BY AUTOMATED COUNT: 25.2 % (ref 36–46)
HEMOGLOBIN (G/DL) IN BLOOD: 7.9 G/DL (ref 12–16)
LEUKOCYTES (10*3/UL) IN BLOOD BY AUTOMATED COUNT: 10.9 X10E9/L (ref 4.4–11.3)
MAGNESIUM (MG/DL) IN SER/PLAS: 2.2 MG/DL (ref 1.6–2.4)
NRBC (PER 100 WBCS) BY AUTOMATED COUNT: 0 /100 WBC (ref 0–0)
PHOSPHATE (MG/DL) IN SER/PLAS: 3.4 MG/DL (ref 2.5–4.9)
PLATELETS (10*3/UL) IN BLOOD AUTOMATED COUNT: 375 X10E9/L (ref 150–450)
POTASSIUM (MMOL/L) IN SER/PLAS: 3.6 MMOL/L (ref 3.5–5.3)
SODIUM (MMOL/L) IN SER/PLAS: 136 MMOL/L (ref 136–145)
UREA NITROGEN (MG/DL) IN SER/PLAS: 7 MG/DL (ref 6–23)

## 2023-09-28 RX ORDER — SODIUM CHLORIDE 0.9 % (FLUSH) 0.9 %
10 SYRINGE (ML) INJECTION EVERY 8 HOURS PRN
Status: DISCONTINUED | OUTPATIENT
Start: 2023-09-30 | End: 2023-09-29 | Stop reason: HOSPADM

## 2023-09-28 RX ORDER — PANTOPRAZOLE SODIUM 40 MG/1
40 TABLET, DELAYED RELEASE ORAL
Status: DISCONTINUED | OUTPATIENT
Start: 2023-09-30 | End: 2023-09-29 | Stop reason: HOSPADM

## 2023-09-28 RX ORDER — ACETAMINOPHEN 325 MG/1
650 TABLET ORAL EVERY 6 HOURS PRN
Status: DISCONTINUED | OUTPATIENT
Start: 2023-09-30 | End: 2023-09-29 | Stop reason: HOSPADM

## 2023-09-28 RX ORDER — AMMONIUM LACTATE 12 G/100G
CREAM TOPICAL 2 TIMES DAILY PRN
Status: DISCONTINUED | OUTPATIENT
Start: 2023-09-30 | End: 2023-09-29 | Stop reason: HOSPADM

## 2023-09-28 RX ORDER — HYDROXYZINE HYDROCHLORIDE 50 MG/1
50 TABLET, FILM COATED ORAL EVERY 6 HOURS PRN
Status: DISCONTINUED | OUTPATIENT
Start: 2023-09-30 | End: 2023-09-29 | Stop reason: HOSPADM

## 2023-09-28 RX ORDER — ALBUTEROL SULFATE 0.83 MG/ML
3 SOLUTION RESPIRATORY (INHALATION) EVERY 4 HOURS PRN
Status: DISCONTINUED | OUTPATIENT
Start: 2023-09-30 | End: 2023-09-29 | Stop reason: HOSPADM

## 2023-09-29 LAB
ANION GAP IN SER/PLAS: NORMAL
CALCIUM (MG/DL) IN SER/PLAS: NORMAL
CARBON DIOXIDE, TOTAL (MMOL/L) IN SER/PLAS: NORMAL
CHLORIDE (MMOL/L) IN SER/PLAS: NORMAL
COBALAMIN (VITAMIN B12) (PG/ML) IN SER/PLAS: NORMAL
CREATININE (MG/DL) IN SER/PLAS: NORMAL
ERYTHROCYTE DISTRIBUTION WIDTH (RATIO) BY AUTOMATED COUNT: NORMAL
ERYTHROCYTE DISTRIBUTION WIDTH (RATIO) BY AUTOMATED COUNT: NORMAL
ERYTHROCYTE MEAN CORPUSCULAR HEMOGLOBIN CONCENTRATION (G/DL) BY AUTOMATED: NORMAL
ERYTHROCYTE MEAN CORPUSCULAR HEMOGLOBIN CONCENTRATION (G/DL) BY AUTOMATED: NORMAL
ERYTHROCYTE MEAN CORPUSCULAR VOLUME (FL) BY AUTOMATED COUNT: NORMAL
ERYTHROCYTE MEAN CORPUSCULAR VOLUME (FL) BY AUTOMATED COUNT: NORMAL
ERYTHROCYTES (10*6/UL) IN BLOOD BY AUTOMATED COUNT: NORMAL
ERYTHROCYTES (10*6/UL) IN BLOOD BY AUTOMATED COUNT: NORMAL
FERRITIN (UG/LL) IN SER/PLAS: NORMAL
FOLATE (NG/ML) IN SER/PLAS: NORMAL
GFR FEMALE: NORMAL
GFR MALE: NORMAL
GLUCOSE (MG/DL) IN SER/PLAS: NORMAL
HEMATOCRIT (%) IN BLOOD BY AUTOMATED COUNT: NORMAL
HEMATOCRIT (%) IN BLOOD BY AUTOMATED COUNT: NORMAL
HEMOGLOBIN (G/DL) IN BLOOD: NORMAL
HEMOGLOBIN (G/DL) IN BLOOD: NORMAL
IRON (UG/DL) IN SER/PLAS: NORMAL
IRON BINDING CAPACITY (UG/DL) IN SER/PLAS: NORMAL
IRON SATURATION (%) IN SER/PLAS: NORMAL
LEUKOCYTES (10*3/UL) IN BLOOD BY AUTOMATED COUNT: NORMAL
LEUKOCYTES (10*3/UL) IN BLOOD BY AUTOMATED COUNT: NORMAL
MAGNESIUM (MG/DL) IN SER/PLAS: NORMAL
NRBC (PER 100 WBCS) BY AUTOMATED COUNT: NORMAL
NRBC (PER 100 WBCS) BY AUTOMATED COUNT: NORMAL
PHOSPHATE (MG/DL) IN SER/PLAS: NORMAL
PLATELETS (10*3/UL) IN BLOOD AUTOMATED COUNT: NORMAL
PLATELETS (10*3/UL) IN BLOOD AUTOMATED COUNT: NORMAL
POTASSIUM (MMOL/L) IN SER/PLAS: NORMAL
SODIUM (MMOL/L) IN SER/PLAS: NORMAL
UREA NITROGEN (MG/DL) IN SER/PLAS: NORMAL

## 2023-09-30 NOTE — PROGRESS NOTES
Service: Medicine     Subjective Data:   MORGAN FERREIRA is a 87 year old Female who is Hospital Day # 3.     This the patient is resting comfortably.  No significant changes.  She is transferred to the ICU and GI is on board.  We will check her H&H today.    Objective Data:     Objective Information:      T   P  R  BP   MAP  SpO2   Value  37  82  19  95/60   73  97%  Date/Time 9/23 8:00 9/23 9:00 9/23 9:00 9/22 10:15  9/22 10:15 9/23 9:00  Range  (36.1C - 37C )  (70 - 111 )  (15 - 27 )  (81 - 109 )/ (41 - 64 )  (63 - 81 )  (92% - 99% )   As of 22-Sep-2023 04:03:00, patient is on 2 L/min of oxygen via room air.  Highest temp of 37 C was recorded at 9/23 8:00      Pain reported at 9/23 8:00: 0 = None    ---- Intake and Output  -----  Mn/Dy/Year Time  Intake   Output  Net  Sep 23, 2023 6:00 am  525   350  175  Sep 22, 2023 10:00 pm  525   50  475  Sep 22, 2023 2:00 pm  1220   0  1220    The Intake and Output Totals for the last 24 hours are:      Intake   Output  Net      2270   400  1870      T   P  R  BP   MAP  SpO2   Value  37  82  19  95/60   73  97%  Date/Time 9/23 8:00 9/23 9:00 9/23 9:00 9/22 10:15  9/22 10:15 9/23 9:00  Range  (36.1C - 37C )  (70 - 111 )  (15 - 27 )  (81 - 109 )/ (41 - 64 )  (63 - 81 )  (92% - 99% )   As of 22-Sep-2023 04:03:00, patient is on 2 L/min of oxygen via room air.  Highest temp of 37 C was recorded at 9/23 8:00    Physical Exam by System:    Constitutional: Well developed, awake/alert/oriented  x3, no distress, cooperative   Eyes: PERRL, EOMI, clear sclera   ENMT: Mucous membranes moist, no apparent injury,  no lesions seen   Head/Neck: Neck supple, no JVD, trachea midline   Respiratory/Thorax: Patent airways, normal breath  sounds with good chest expansion, thorax symmetric   Cardiovascular: Regular, rate and rhythm, no murmurs,  2+ equal pulses of the extremities, normal S 1and S 2   Gastrointestinal: Nondistended, soft, non-tender,  no rebound tenderness or guarding, +BS    Musculoskeletal: ROM intact, no joint swelling, normal  strength   Extremities: Normal extremities, no cyanosis, edema  or wounds   Neurological: Alert and oriented x3, no focal deficits,  sensation intact   Psychological: Appropriate mood and behavior   Skin: Warm and dry, no lesions, no rashes     Medication:    Medications:          Continuous Medications       --------------------------------    1. Lactated Ringers Infusion:  1000  mL  IntraVenous  <Continuous>         Scheduled Medications       --------------------------------    1. Ammonium Lactate 12% Topical:  1  application(s)  Topical  2 Times a Day    2. Iohexol (Omnipaque 350-Radiology Contrast):  96.75  mL  IntraVenous Push  Once    3. Pantoprazole Injectable:  40  mg  IntraVenous Push  Every 12 Hours    4. Pneumococcal 23-Valent (PNEUMOVAX) Vaccine:  0.5  mL  IntraMuscular  Once         PRN Medications       --------------------------------    1. Albuterol 2.5 mg/ 3 mL Nebulizer Soln:  3  mL  Inhalation  Every 4 Hours    2. diphenhydrAMINE Injectable:  25  mg  IntraVenous Push  Every 4 Hours    3. Magnesium Sulfate 2 gram/Sterile Water 50 mL Premix Soln:  2  gram(s)  IntraVenous Piggyback  Every 6 Hours    4. Magnesium Sulfate 4 gram/Sterile Water 100 mL Premix Soln:  4  gram(s)  IntraVenous Piggyback  Every 6 Hours    5. Potassium Chloride 20 mEq/Sterile Water 100 mL Premix IVPB:  20  mEq  IntraVenous Piggyback  Every 6 Hours    6. Potassium Chloride Powder Packet:  20  mEq  Oral  Every 6 Hours    7. Potassium Chloride Powder Packet:  40  mEq  Oral  Every 6 Hours    8. Sodium Chloride 0.9% Injectable Flush:  10  mL  IntraVenous Flush  Every 8 Hours and as Needed        Recent Lab Results:    Results:    CBC: 9/23/2023 04:00              \     Hgb     /                              \     8.0 L    /  WBC  ----------------  Plt               12.1 H    ----------------    327              /     Hct     \                              /     24.1 L    \             RBC: 2.69 L    MCV: 90           BMP: 9/23/2023 04:00  NA+        Cl-     BUN  /                         137    109 H   25 H  /  --------------------------------  Glucose                ---------------------------  117 H    K+     HCO3-   Creat \                         4.2  21    0.96  \  Calcium : 7.6 L    Anion Gap : 11      CMP: 9/22/2023 11:00  NA+        Cl-     BUN  /                         135 L   107    21  /  --------------------------------  Glucose                ---------------------------  136 H    K+     HCO3-   Creat \                         4.5    22    0.93  \           \  T Bili  /                    \  1.0  /  AST  x ---- x ALT        7 Lx ---- x 4 L         /  Alk P   \               /  55  \  Calcium : 7.9 L    Anion Gap : 11     Albumin : 2.6 L     T Protein : 5.2 L          Coagulation: 9/22/2023 11:00  PT  /                    17.9 H /  -------<    INR          ----------<      1.6 H  PTT\                    39 H \                       Assessment and Plan:        Admitting Dx:   GI bleed: Entered Date: 21-Sep-2023 22:38       Additional Dx:   Leukocytosis: Entered Date: 21-Sep-2023 22:38       Medical History:   Fracture of left shoulder: Entered Date: 21-Sep-2023 22:22   History of COVID-19: Onset Date: 04-Sep-2023, Entered Date:  21-Sep-2023 22:21   Hypertension: Entered Date: 21-Sep-2023 22:21   GERD (gastroesophageal reflux disease): Entered Date: 21-Sep-2023  21:22   Encephalopathy: Entered Date: 21-Sep-2023 21:22   Iron deficiency anemia: Entered Date: 21-Sep-2023 21:21   Pulmonary embolism: Onset Date: Feb 2023, Entered Date: 21-Sep-2023  21:21   History of femur fracture: Entered Date: 21-Sep-2023 21:20   History of fall: Entered Date: 21-Sep-2023 21:20    Comorbidities:  ·  Comorbidity anemia   ·  Anemia chronic blood loss anemia     Code Status:  ·  Code Status Full Code     Advance Care Planning:  Advance Care Planning: I evaluated the patient  and  determined the patient's capacity to understand the risks, benefits and alternatives to treatment. I elicited the patient's goals for treatment and reviewed advance directives and medical orders for life sustaining treatment. The patient was given  an opportunity to review a blank advance directive as appropriate.     Assessment:    GI bleed  Weakness  Deconditioning  Anemia    Continue ICU care  Multiple transfusions  Plan for endoscopic evaluation from GI  Recheck electrolytes  Look for any evidence of active bleeding      Electronic Signatures:  Cedrick Mehta)  (Signed 23-Sep-2023 09:48)   Authored: Service, Subjective Data, Objective Data, Assessment  and Plan, Note Completion      Last Updated: 23-Sep-2023 09:48 by Cedrick Mehta)

## 2023-09-30 NOTE — PROGRESS NOTES
Service: Medicine     Subjective Data:   MORGAN FERREIRA is a 87 year old Female who is Hospital Day # 7.     Patient seen today resting comfortably still continues to have some swelling of her right hand.    Objective Data:     Objective Information:      T   P  R  BP   MAP  SpO2   Value  36  85  20  154/80   111  99%  Date/Time 9/27 0:00 9/27 0:00 9/27 0:00 9/27 0:00  9/27 0:00 9/27 0:00  Range  (36C - 37.5C )  (76 - 101 )  (17 - 28 )  (98 - 154 )/ (50 - 80 )  (64 - 111 )  (90% - 99% )  Highest temp of 37.5 C was recorded at 9/26 20:00      Pain reported at 9/26 20:22: 0 = None    ---- Intake and Output  -----  Mn/Dy/Year Time  Intake   Output  Net  Sep 26, 2023 10:00 pm  0   500  -500  Sep 26, 2023 2:00 pm  420   300  120    The Intake and Output Totals for the last 24 hours are:      Intake   Output  Net      420   800  -380    Physical Exam by System:    Constitutional: Well developed, awake/alert/oriented  x3, no distress, cooperative   Eyes: PERRL, EOMI, clear sclera   ENMT: Mucous membranes moist, no apparent injury,  no lesions seen   Head/Neck: Neck supple, no JVD, trachea midline   Respiratory/Thorax: Patent airways, normal breath  sounds with good chest expansion, thorax symmetric   Cardiovascular: Regular, rate and rhythm, no murmurs,  2+ equal pulses of the extremities, normal S 1and S 2   Gastrointestinal: Nondistended, soft, non-tender,  no rebound tenderness or guarding, +BS   Musculoskeletal: ROM intact, no joint swelling, normal  strength   Extremities: Normal extremities, no cyanosis, edema  or wounds   Neurological: Alert and oriented x3, no focal deficits,  sensation intact   Psychological: Appropriate mood and behavior   Skin: Warm and dry, no lesions, no rashes     Medication:    Medications:          Continuous Medications       --------------------------------  No continuous medications are active       Scheduled Medications       --------------------------------    1. Ammonium Lactate 12%  Topical:  1  application(s)  Topical  2 Times a Day    2. Pantoprazole:  40  mg  Oral  Every 12 Hours    3. Technetium Tc 99m Labeled Red Blood Cells (ULTRATAG - Radiology Contrast):  25  milliCurie  IntraVenous Push  Once         PRN Medications       --------------------------------    1. Acetaminophen:  650  mg  Oral  Every 6 Hours    2. Albuterol 2.5 mg/ 3 mL Nebulizer Soln:  3  mL  Inhalation  Every 4 Hours    3. hydrOXYzine Hydrochloride (ATARAX):  50  mg  Oral  Every 6 Hours    4. Magnesium Sulfate 2 gram/Sterile Water 50 mL Premix Soln:  2  gram(s)  IntraVenous Piggyback  Every 6 Hours    5. Magnesium Sulfate 4 gram/Sterile Water 100 mL Premix Soln:  4  gram(s)  IntraVenous Piggyback  Every 6 Hours    6. Potassium Chloride 20 mEq/Sterile Water 100 mL Premix IVPB:  20  mEq  IntraVenous Piggyback  Every 6 Hours    7. Potassium Chloride Powder Packet:  20  mEq  Oral  Every 6 Hours    8. Potassium Chloride Powder Packet:  40  mEq  Oral  Every 6 Hours    9. Sodium Chloride 0.9% Injectable Flush:  10  mL  IntraVenous Flush  Every 8 Hours and as Needed        Recent Lab Results:    Results:    CBC: 9/26/2023 17:14              \     Hgb     /                              \     9.1 L    /  WBC  ----------------  Plt               13.2 H    ----------------    311              /     Hct     \                              /     28.7 L    \            RBC: 3.14 L    MCV: 91         Assessment and Plan:        Admitting Dx:   GI bleed: Entered Date: 21-Sep-2023 22:38       Additional Dx:   Hemodynamic instability: Entered Date: 24-Sep-2023 20:26   Acute anemia: Entered Date: 24-Sep-2023 20:26   Iron deficiency anemia due to chronic blood loss: Entered  Date: 23-Sep-2023 09:48   Leukocytosis: Entered Date: 21-Sep-2023 22:38       Medical History:   Swelling of right extremity: Onset Date: 26-Sep-2023, Entered  Date: 26-Sep-2023 11:29   Right arm pain: Onset Date: 26-Sep-2023, Entered Date: 26-Sep-2023   11:29   Anticoagulation management encounter: Onset Date: 25-Sep-2023,  Entered Date: 25-Sep-2023 12:02   Fracture of left shoulder: Entered Date: 21-Sep-2023 22:22   History of COVID-19: Onset Date: 04-Sep-2023, Entered Date:  21-Sep-2023 22:21   Hypertension: Entered Date: 21-Sep-2023 22:21   GERD (gastroesophageal reflux disease): Entered Date: 21-Sep-2023  21:22   Encephalopathy: Entered Date: 21-Sep-2023 21:22   Iron deficiency anemia: Entered Date: 21-Sep-2023 21:21   Pulmonary embolism: Onset Date: Feb 2023, Entered Date: 21-Sep-2023  21:21   History of femur fracture: Entered Date: 21-Sep-2023 21:20   History of fall: Entered Date: 21-Sep-2023 21:20    Code Status:  ·  Code Status Full Code     Assessment:    Continue current medication management  Recheck blood work  Monitor H&H  Transfuse as necessary      Electronic Signatures:  Cedrick Mehta)  (Signed 27-Sep-2023 09:22)   Authored: Service, Subjective Data, Objective Data, Assessment  and Plan, Note Completion      Last Updated: 27-Sep-2023 09:22 by Cedrick Mehta)

## 2023-09-30 NOTE — PROGRESS NOTES
Service: Medicine     Subjective Data:   MORGAN FERREIRA is a 87 year old Female who is Hospital Day # 8.     Patient seen today resting comfortably was seen by endovascular.  She is continued on her DVT prophylaxis.  Full anticoagulation can be discontinued.    Objective Data:     Objective Information:      T   P  R  BP   MAP  SpO2   Value  36.6  82  20  118/58   95  97%  Date/Time 9/28 8:00 9/28 8:00 9/28 8:00 9/28 8:00  9/27 16:00 9/28 8:00  Range  (36.5C - 36.8C )  (82 - 89 )  (16 - 20 )  (118 - 151 )/ (58 - 66 )  (91 - 95 )  (96% - 98% )      Pain reported at 9/27 8:00: 0 = None    Physical Exam by System:    Constitutional: Well developed, awake/alert/oriented  x3, no distress, cooperative   Eyes: PERRL, EOMI, clear sclera   ENMT: Mucous membranes moist, no apparent injury,  no lesions seen   Head/Neck: Neck supple, no JVD, trachea midline   Respiratory/Thorax: Patent airways, normal breath  sounds with good chest expansion, thorax symmetric   Cardiovascular: Regular, rate and rhythm, no murmurs,  2+ equal pulses of the extremities, normal S 1and S 2   Gastrointestinal: Nondistended, soft, non-tender,  no rebound tenderness or guarding, +BS   Musculoskeletal: ROM intact, no joint swelling, normal  strength   Extremities: Normal extremities, no cyanosis, edema  or wounds   Neurological: Alert and oriented x3, no focal deficits,  sensation intact   Psychological: Appropriate mood and behavior   Skin: Warm and dry, no lesions, no rashes     Medication:    Medications:          Continuous Medications       --------------------------------  No continuous medications are active       Scheduled Medications       --------------------------------    1. Ammonium Lactate 12% Topical:  1  application(s)  Topical  2 Times a Day    2. Pantoprazole:  40  mg  Oral  Every 12 Hours         PRN Medications       --------------------------------    1. Acetaminophen:  650  mg  Oral  Every 6 Hours    2. Albuterol 2.5 mg/ 3 mL  Nebulizer Soln:  3  mL  Inhalation  Every 4 Hours    3. hydrOXYzine Hydrochloride (ATARAX):  50  mg  Oral  Every 6 Hours    4. Sodium Chloride 0.9% Injectable Flush:  10  mL  IntraVenous Flush  Every 8 Hours and as Needed        Recent Lab Results:    Results:    CBC: 9/28/2023 08:12              \     Hgb     /                              \     7.9 L    /  WBC  ----------------  Plt               10.9       ----------------    375              /     Hct     \                              /     25.2 L    \            RBC: 2.75 L    MCV: 92           BMP: 9/28/2023 08:12  NA+        Cl-     BUN  /                         136    103    7  /  --------------------------------  Glucose                ---------------------------  102 H    K+     HCO3-   Creat \                         3.6  27    0.93  \  Calcium : 7.7 L    Anion Gap : 10      Assessment and Plan:        Admitting Dx:   GI bleed: Entered Date: 21-Sep-2023 22:38       Additional Dx:   Superficial thrombophlebitis of upper extremity: Entered  Date: 27-Sep-2023 20:42   Superficial thrombophlebitis: Entered Date: 27-Sep-2023 20:42   Hemodynamic instability: Entered Date: 24-Sep-2023 20:26   Acute anemia: Entered Date: 24-Sep-2023 20:26   Iron deficiency anemia due to chronic blood loss: Entered  Date: 23-Sep-2023 09:48   Leukocytosis: Entered Date: 21-Sep-2023 22:38       Medical History:   Swelling of right extremity: Onset Date: 26-Sep-2023, Entered  Date: 26-Sep-2023 11:29   Right arm pain: Onset Date: 26-Sep-2023, Entered Date: 26-Sep-2023  11:29   Anticoagulation management encounter: Onset Date: 25-Sep-2023,  Entered Date: 25-Sep-2023 12:02   Fracture of left shoulder: Entered Date: 21-Sep-2023 22:22   History of COVID-19: Onset Date: 04-Sep-2023, Entered Date:  21-Sep-2023 22:21   Hypertension: Entered Date: 21-Sep-2023 22:21   GERD (gastroesophageal reflux disease): Entered Date: 21-Sep-2023  21:22   Encephalopathy: Entered Date: 21-Sep-2023  21:22   Iron deficiency anemia: Entered Date: 21-Sep-2023 21:21   Pulmonary embolism: Onset Date: Feb 2023, Entered Date: 21-Sep-2023  21:21   History of femur fracture: Entered Date: 21-Sep-2023 21:20   History of fall: Entered Date: 21-Sep-2023 21:20    Code Status:  ·  Code Status Full Code     Assessment:         Continue current medications  History of provoked PE on DOAC  Femur fracture  History of GI bleed  Superficial thrombophlebitis    Continue current medications  Monitor H&H  Increase activity as tolerated  Should be able to discharge soon      Electronic Signatures:  Cedrick Mehta)  (Signed 28-Sep-2023 09:48)   Authored: Service, Subjective Data, Objective Data, Assessment  and Plan, Note Completion      Last Updated: 28-Sep-2023 09:48 by Cedrick Mehta)

## 2023-09-30 NOTE — PROGRESS NOTES
Subjective Data:   ID Statement:  MORGAN FERREIRA is a 87 year old Female who is Hospital Day # 6 and ICU Day #5.     Patient has been downgraded.  Patient is excepted by medicine, at this time currently waiting placement   on telemetry floor.  Medicine has agreed  To accept patient.  Patient had magnesium of 1.49,  hypomagnesemic, replaced per ICU protocol.Patient does have a complaint of pain in her right hand, she states this is from having too many needle pokes.  She states that a ultrasound is to be performed.  Medicine will follow.    Objective Data:     ·  Objective Information      T   P  R  BP   MAP  SpO2   Value  36.6  81  18  129/64   99  98%  Date/Time 9/26 8:00 9/26 8:00 9/26 8:00 9/26 8:00  9/26 8:00 9/26 8:00  Range  (36.6C - 37.4C )  (76 - 115 )  (17 - 38 )  (98 - 142 )/ (50 - 79 )  (64 - 111 )  (96% - 99% )  Highest temp of 37.4 C was recorded at 9/25 13:30      Pain reported at 9/26 8:00: 4 = Moderate      Direct Arterial Blood Pressure  Systolic 116 (94 - 116) 9/25 5:00  Diastolic (mm Hg) 48 (38 - 48) 9/25 5:00  Mean (mm Hg) 76 (58 - 76) 9/25 5:00  Pulse Pressure (mm Hg) 68 (56 - 68) 9/25 5:00      ---- Intake and Output  -----  Mn/Dy/Year Time  Intake   Output  Net  Sep 26, 2023 6:00 am  240   500  -260  Sep 25, 2023 10:00 pm  240   901  -661  Sep 25, 2023 2:00 pm  240   0  240    The Intake and Output Totals for the last 24 hours are:      Intake   Output  Net      [phone]    Date:            Weight/Scale Type:  25-Sep-2023 20:00  66.5  kg / bed      T   P  R  BP   MAP  SpO2   Value  36.6  81  18  129/64   99  98%  Date/Time 9/26 8:00 9/26 8:00 9/26 8:00 9/26 8:00 9/26 8:00 9/26 8:00  Range  (36.6C - 37.4C )  (76 - 115 )  (17 - 38 )  (98 - 142 )/ (50 - 79 )  (64 - 111 )  (96% - 99% )  Highest temp of 37.4 C was recorded at 9/25 13:30    Physical Exam Narrative:  ·  Physical Exam:    Constitutional: Well appearing, no acute distress, oriented to person/place/time  Psych: Age appropriate  insight, judgement, no psychomotor agitation  Neuro: GCS 15, spontaneously moves all extremities  Head: Atraumatic, no silva sign, no raccoon eyes  Eyes: Spontaneously open, PERRL, EOMI, no scleral icterus or conjunctival injection  ENT: No gross deformation, mucous membranes moist, trachea midline, no uvular deviation  Neck:   Trachea midline, no JVD  Respiratory:  respirations nonlabored, equal chest rise, no wheezes rales or rhonchi  Cardiovascular: Regular rate and rhythm,   distal pulses 2+ symmetric  Gastrointestinal: No gross deformation, normal bowel sounds, soft, nontender, nondistended  Musculoskeletal: Normal Appearance, Full ROM of extremities, no tenderness, no edema  Integumentary: Warm, dry, rash is still in place, no bullae or sloughing of skin      Allergies:       Allergies:  ·  No Known Allergies :     Medications:    Medications:          Continuous Medications       --------------------------------  No continuous medications are active       Scheduled Medications       --------------------------------    1. Ammonium Lactate 12% Topical:  1  application(s)  Topical  2 Times a Day    2. Pantoprazole:  40  mg  Oral  Every 12 Hours    3. Pneumococcal 23-Valent (PNEUMOVAX) Vaccine:  0.5  mL  IntraMuscular  Once    4. Technetium Tc 99m Labeled Red Blood Cells (ULTRATAG - Radiology Contrast):  25  milliCurie  IntraVenous Push  Once         PRN Medications       --------------------------------    1. Albuterol 2.5 mg/ 3 mL Nebulizer Soln:  3  mL  Inhalation  Every 4 Hours    2. hydrOXYzine Hydrochloride (ATARAX):  50  mg  Oral  Every 6 Hours    3. Magnesium Sulfate 2 gram/Sterile Water 50 mL Premix Soln:  2  gram(s)  IntraVenous Piggyback  Every 6 Hours    4. Magnesium Sulfate 4 gram/Sterile Water 100 mL Premix Soln:  4  gram(s)  IntraVenous Piggyback  Every 6 Hours    5. Potassium Chloride 20 mEq/Sterile Water 100 mL Premix IVPB:  20  mEq  IntraVenous Piggyback  Every 6 Hours    6. Potassium Chloride  Powder Packet:  20  mEq  Oral  Every 6 Hours    7. Potassium Chloride Powder Packet:  40  mEq  Oral  Every 6 Hours    8. Sodium Chloride 0.9% Injectable Flush:  10  mL  IntraVenous Flush  Every 8 Hours and as Needed        Recent Lab Results:    Results:    CBC: 9/25/2023 23:05              \     Hgb     /                              \     8.2 L    /  WBC  ----------------  Plt               10.9       ----------------    277              /     Hct     \                              /     25.4 L    \            RBC: 2.84 L    MCV: 89           RFP: 9/26/2023 04:20  NA+        Cl-     BUN  /                         136    104    10  /  --------------------------------  Glucose                ---------------------------  99    K+     HCO3-   Creat \                         3.5    26    0.81  \  Calcium : 7.7 LAnion Gap : 10          Albumin : 2.6 L     Phos : 3.4          Assessment and Plan:   Daily Risk Screen:  ·  Does patient have a central line? no   ·  Does patient have an indwelling urinary catheter? no   ·  Is the patient intubated? no     Assessment/Plan:  ·  Assessment/Plan:    Ms. Lundberg is a 87F PMH PE on DOAC, HTN, KORI, admitted for GIB. colonoscopy was performed 3 days ago, patient was downgraded to telemetry floor.  Patient awaiting bed  placement.    - Lower GI bleed  - Diarrhea  - Hemorrhagic shock, resolved  - blood loss anemia  - maculopapular Rash    Assessment and plan  Neurology  -ICU Cam protocol  -ABCDEF bundle    Cardiology  -Monitor blood pressure MAP greater than or equal to 65  -Continuous cardiovascular monitoring    Respiratory  -No concerns, on room air    GI  #GIB, suspect diverticular  -Monitor I's and O's and bowel movements check for blood in the stool and urine  -Continue with Protonix 40 mg BID, PO.  -Continue supportive care  -Clear liquid diet- advance if hg increments appropriately  -Continue to trend hemoglobin  -Transfuse for hemoglobin less than 7  -Consider stopping  anticoagulation indefinitely- further information about PE needed from 7 months ago  -No repeat colonoscopy per GI, signed off (9/24/2023)  -Fluid replenishment if more bouts of diarrhea.  - Diarrhea PCR panel negative for infectious invasive agent  - No active hemorrhage previously on  CTA, consider NM scan in future if needed.    Heme  #History of PE  -No anticoagulation  -Patient has refused use of SCDs  -seek out PE history and etiology  -Monitor hemoglobin possible transfusion of another unit of blood.  - 1U PRBC 9/25 after Hg 6.9.  -  GI is performed patient, cleared patient, stated no signs of active bleeding on colonoscopy    Renal  -Monitor I's and O's  -Monitor electrolytes     Skin  #Blanching erythematous rash  -Monitor rash, hydroxyzine PRN  - lac-hydrin cream    Infectious disease  -No concerns  - continue to monitor for signs of infection    Dispo:  patient is downgraded, awaiting bed on telemetry floor     reviewed and discussed with attending physician      Sánchez Everett DO  PGY-2 Emergency Medicine        Code Status:  ·  Code Status Full Code     Attestation:   Note Completion:  I am a:  Resident/Fellow   Attending Attestation I saw and evaluated the patient.  I personally obtained the key and critical portions of the history and physical exam or was physically present for key and  critical portions performed by the resident/fellow. I reviewed the resident/fellow?s documentation and discussed the patient with the resident/fellow.  I agree with the resident/fellow?s medical decision making as documented in the note.     I personally evaluated the patient on 26-Sep-2023   Comments/ Additional Findings    Patient stable for floor with stable hemoglobin          Electronic Signatures:  Renee Henry)  (Signed 27-Sep-2023 00:28)   Authored: Note Completion   Co-Signer: Subjective Data, Objective Data, Assessment and Plan, Note Completion  Sánchez Everett ( (Resident))  (Signed  26-Sep-2023 09:03)   Authored: Subjective Data, Objective Data, Assessment  and Plan, Note Completion      Last Updated: 27-Sep-2023 00:28 by Renee Henry)

## 2023-09-30 NOTE — PROGRESS NOTES
Service:   Critical Care Service:  ·  Service MICU     Subjective Data:   ID Statement:  MORGAN FERREIRA is a 87 year old Female who is Hospital Day # 5 and ICU Day #4.     Patient seen and evaluated.  No acute complaints at this time.  No bowel movement overnight.  Last bowel movement yesterday was still maroon in color.  Hemodynamics remained stable.  No acute overnight  events otherwise.  Morning hemoglobin of 6.9 with 1 unit PRBC ordered by the overnight team.  GI performed colonoscopy yesterday and is signed off with no active sign of bleeding but there was bright red blood in the transverse colon.    Objective Data:     ·  Objective Information      T   P  R  BP   MAP  SpO2   Value  37.2  80  17  142/62   101  97%  Date/Time 9/25 4:00 9/25 5:00 9/25 5:00 9/25 5:00  9/25 5:00 9/25 5:00  Range  (36.6C - 37.4C )  (80 - 104 )  (15 - 29 )  (115 - 142 )/ (55 - 70 )  (84 - 109 )  (91% - 98% )  Highest temp of 37.4 C was recorded at 9/25 0:00      Pain reported at 9/25 0:00: 0 = None      Direct Arterial Blood Pressure  Systolic 116 (84 - 136) 9/25 5:00  Diastolic (mm Hg) 48 (28 - 60) 9/25 5:00  Mean (mm Hg) 76 (50 - 88) 9/25 5:00  Pulse Pressure (mm Hg) 68 (50 - 78) 9/25 5:00      ---- Intake and Output  -----  Mn/Dy/Year Time  Intake   Output  Net  Sep 25, 2023 6:00 am  360   0  360  Sep 24, 2023 10:00 pm  480   200  280  Sep 24, 2023 2:00 pm  480   0  480    The Intake and Output Totals for the last 24 hours are:      Intake   Output  Net      1320   200  1120    Date:            Weight/Scale Type:  23-Sep-2023 06:00  64.5  kg             T   P  R  BP   MAP  SpO2   Value  37.2  80  17  142/62   101  97%  Date/Time 9/25 4:00 9/25 5:00 9/25 5:00 9/25 5:00  9/25 5:00 9/25 5:00  Range  (36.6C - 37.4C )  (80 - 104 )  (15 - 29 )  (115 - 142 )/ (55 - 70 )  (84 - 109 )  (91% - 98% )  Highest temp of 37.4 C was recorded at 9/25 0:00    Physical Exam Narrative:  ·  Physical Exam:    Constitutional: Well appearing, no acute  distress, oriented to person/place/time  Psych: Age appropriate insight, judgement, no psychomotor agitation  Neuro: GCS 15, spontaneously moves all extremities  Head: Atraumatic, no silva sign, no raccoon eyes  Eyes: Spontaneously open, PERRL, EOMI, no scleral icterus or conjunctival injection  ENT: No gross deformation, mucous membranes moist, trachea midline, no uvular deviation  Neck: Supple, nontender, no ROM restriction, no JVD  Respiratory:  respirations nonlabored, equal chest rise, no wheezes rales or rhonchi  Cardiovascular: Regular rate and rhythm,   distal pulses 2+ symmetric  Gastrointestinal: No gross deformation, normal bowel sounds, soft, nontender, nondistended  Musculoskeletal: Normal Appearance, Full ROM of extremities, no tenderness, no edema  Integumentary: Warm, dry, rash is still in place, no bullae or sloughing of skin      Allergies:       Allergies:  ·  No Known Allergies :     Medications:    Medications:          Continuous Medications       --------------------------------    1. Lactated Ringers Infusion:  1000  mL  IntraVenous  <Continuous>    2. Phenylephrine 10 mg/ NaCL 0.9% 250 mL Infusion:  0.5  mcg/kg/min  IntraVenous  <Continuous>         Scheduled Medications       --------------------------------    1. Ammonium Lactate 12% Topical:  1  application(s)  Topical  2 Times a Day    2. Iohexol (Omnipaque 350-Radiology Contrast):  96.75  mL  IntraVenous Push  Once    3. Pantoprazole Injectable:  40  mg  IntraVenous Push  Every 12 Hours    4. Pneumococcal 23-Valent (PNEUMOVAX) Vaccine:  0.5  mL  IntraMuscular  Once         PRN Medications       --------------------------------    1. Albuterol 2.5 mg/ 3 mL Nebulizer Soln:  3  mL  Inhalation  Every 4 Hours    2. hydrOXYzine Hydrochloride (ATARAX):  50  mg  Oral  Every 6 Hours    3. Magnesium Sulfate 2 gram/Sterile Water 50 mL Premix Soln:  2  gram(s)  IntraVenous Piggyback  Every 6 Hours    4. Magnesium Sulfate 4 gram/Sterile Water 100  mL Premix Soln:  4  gram(s)  IntraVenous Piggyback  Every 6 Hours    5. Potassium Chloride 20 mEq/Sterile Water 100 mL Premix IVPB:  20  mEq  IntraVenous Piggyback  Every 6 Hours    6. Potassium Chloride Powder Packet:  20  mEq  Oral  Every 6 Hours    7. Potassium Chloride Powder Packet:  40  mEq  Oral  Every 6 Hours    8. Sodium Chloride 0.9% Injectable Flush:  10  mL  IntraVenous Flush  Every 8 Hours and as Needed        Recent Lab Results:    Results:    CBC: 9/25/2023 04:58              \     Hgb     /                              \     6.9 L    /  WBC  ----------------  Plt               9.4       ----------------    251              /     Hct     \                              /     21.5 L    \            RBC: 2.40 L    MCV: 90           CMP: 9/25/2023 04:58  NA+        Cl-     BUN  /                         136    105    13  /  --------------------------------  Glucose                ---------------------------  77    K+     HCO3-   Creat \                         3.6    22    0.82  \           \  T Bili  /                    \  0.5  /  AST  x ---- x ALT        7 Lx ---- x 3 L         /  Alk P   \               /  45  \  Calcium : 7.5 L    Anion Gap : 13     Albumin : 2.5 L     T Protein : 4.7 L          Coagulation: 9/25/2023 04:58  PT  /                    13.4 H /  -------<    INR          ----------<      1.2 H  PTT\                              \                       Assessment and Plan:   Daily Risk Screen:  ·  Does patient have a central line? no   ·  Does patient have an indwelling urinary catheter? no   ·  Is the patient intubated? no     Assessment/Plan:  ·  Assessment/Plan:    Ms. Lundberg is a 87F PMH PE on DOAC, HTN, KORI, admitted for GIB.    - Lower GI bleed  - Diarrhea  - Hemorrhagic shock, resolved  - blood loss anemia  - maculopapular Rash    Assessment and plan  Neurology  -ICU Cam protocol  -ABCDEF bundle    Cardiology  -Monitor blood pressure MAP greater than or equal to  65  -Continuous cardiovascular monitoring    Respiratory  -No concerns, on room air    GI  #GIB, suspect diverticular  -Monitor I's and O's and bowel movements check for blood in the stool and urine  -Continue with Protonix 40 mg BID, PO.  -Continue supportive care  -Clear liquid diet- advance if hg increments appropriately  -Continue to trend hemoglobin  -Transfuse for hemoglobin less than 7  -Consider stopping anticoagulation indefinitely- further information about PE needed from 7 months ago  -No repeat colonoscopy per GI, signed off (9/24/2023)  -Fluid replenishment if more bouts of diarrhea.  - Diarrhea PCR panel negative for infectious invasive agent  - No active hemorrhage previously on  CTA, consider NM scan in future if needed.    Heme  #History of PE  -No anticoagulation  -Patient has refused use of SCDs  -seek out PE history and etiology  -Monitor hemoglobin possible transfusion of another unit of blood.  - 1U PRBC 9/25 after Hg 6.9.    Renal  -Monitor I's and O's  -Monitor electrolytes     Skin  #Blanching erythematous rash  -Monitor rash, hydroxyzine PRN  - lac-hydrin cream    Infectious disease  -No concerns  - continue to monitor for signs of infection    Dispo: plan for downgrade out of ICU today if Hg increments and vitals remain stable.    Guanako Vega DO, PGY4  Emergency Medicine Resident        Code Status:  ·  Code Status Full Code     Attestation:   Note Completion:  I am a:  Resident/Fellow   Attending Attestation I saw and evaluated the patient.  I personally obtained the key and critical portions of the history and physical exam or was physically present for key and  critical portions performed by the resident/fellow. I reviewed the resident/fellow?s documentation and discussed the patient with the resident/fellow.  I agree with the resident/fellow?s medical decision making as documented in their note  with the exception/addition of the following:   I personally evaluated the patient on  25-Sep-2023   Comments/ Additional Findings    Ms. Lundberg is a 87F PMH PE on DOAC, HTN, KORI admitted for GIB suspected to be from diverticular bleed.   Colonoscopy 9/24 showed blood in transverse colon without noted area of active bleed. Work up has shown diverticulosis, hepatic steatosis, fibrosis in lungs?    Patient HDS overnight  Gen: alert, cooperative  HENT: no rhinorrhea  CV: RRR, no murmur  Pulm: CTAB, no wheeze  GI: soft, nontender  Ext: no edema  Neuro: axox3, moving all extremities    Patient being treated for lower GIB. Patient is not in shock.   N: JUAN F, delirium precautions  CV: Hemorrhage/GIB - MAP>65, holding home meds  P: JUAN F  GI: LGIB, diverticular bleed - q8 CBC, PO PPI but likely can transition off, hold DOAC, 1 u pRBC today (total 6)  H/O: PE history - need to find circumstances around this PE to find if needs chronic DOAC or can stop, Leukocytosis down trending  R: JUAN F  ID: JUAN F  Critical Care Patient I have reviewed and evaluated the most recent data and results, personally examined the patient, and formulated the plan of care as presented above.  This patient  was critically ill and required continued critical care treatment. Teaching and any separately billable procedures are not included in the time calculation.   Billing Provider Critical Care Time 50 minute(s)   Primary Critical Care Issue/Treatment (See Assessment and Plan for greater detail) -- For the nature of the critical condition and treatment, this documentation has been prepared by the attending physician/JAJA- billing provider of these critical  care services.; -- This patient is believed to have, or have the potential for, a life-threatening gastrointestinal hemorrhage. We are treating with appropriate blood products, fluids and/or pressors, as indicated, as well as intensive monitoring. Please  see assessment and plan above for greater detail.         Electronic Signatures:  Guanako Vega (Resident))   (Signed 25-Sep-2023  14:15)   Authored: Service, Subjective Data, Objective Data, Assessment and Plan, Note Completion  Renee Henry)   (Signed 25-Sep-2023 21:40)   Authored: Assessment and Plan, Note Completion   Co-Signer: Service, Subjective Data, Objective Data, Assessment and Plan, Note Completion    Last Updated: 29-Sep-2023 09:58 by Kenna Sauceda (C [facility])

## 2023-09-30 NOTE — H&P
History of Present Illness:   HPI:    MORGAN FERREIRA is a 87 year old FemaleHPI:  this is a patient who lives in an assisted living facility.  Patient has a history of multiple medical problems.  Patient apparently  did have a femur fracture repair a few weeks ago.  She also did have recent left shoulder fracture.  Patient is admitted with appears to be get a GI bleed patient was in her normal state of health and then she had some diarrhea.  Then she started end  of going to the bathroom and did have some dizziness fatigue.  She called for help brought into the hospital and found to have a GI bleed.  She has been on apixaban for pulmonary embolism.       Comorbidities:   Comorbidites:  ·  Comorbid Conditions As above     Past Medical/Surgical History:        Medical History:   Fracture of left shoulder:    History of COVID-19: Onset Date: 04-Sep-2023   Hypertension:    GERD (gastroesophageal reflux disease):    Encephalopathy:    Iron deficiency anemia:    Pulmonary embolism: Description: s/p femur fracture, Onset  Date: Feb 2023   History of femur fracture:    History of fall:        Surg History:   History of colonoscopy with polypectomy: Description: 2020  in [facility]   History of parathyroidectomy:    History of repair of hip fracture:    History of total knee replacement:     Family History:   ·   Family history of chronic obstructive pulmonary disease: Relationship to Patient: Mother (Age at Diagnosis: Age Unknown), Living: No, Adopted: No, Twin/Multiple: No  ·   Family history of malignant neoplasm of esophagus: Relationship to Patient: Sister (Age at Diagnosis: Age Unknown), Living: No, Adopted: No, Twin/Multiple: No  ·   Family history of lung cancer: Relationship to Patient: Father (Age at Diagnosis: Age Unknown), Living: No, Adopted: No, Twin/Multiple: No    Family History: reviewed and not pertinent to presenting  problem     Social History:   Social History:  Smoking Status never smoker (1)   Alcohol  Use denies(1)   Drug Use denies (1)              Allergies:  ·  No Known Allergies :     Medications Prior to Admission:     diphenhydrAMINE-acetaminophen 25 mg-500 mg oral tablet: 2 tab(s) orally once a day (at bedtime)  apixaban 5 mg oral tablet: 1 tab(s) orally 2 times a day  calcium citrate 950 mg (200 mg elemental calcium) oral tablet: 1 tab(s) orally 2 times a day  cholecalciferol 50 mcg (2000 intl units) oral tablet: 1 tab(s) orally once a day  MiraLax oral powder for reconstitution: 17 gram(s) orally every 24 hours, As Needed - for constipation  oxyCODONE 5 mg oral tablet: 1 tab(s) orally every 6 hours, As Needed - for breakthrough pain  ProAir HFA 90 mcg/inh inhalation aerosol: 2 puff(s) inhaled every 6 hours, As Needed - for shortness of breath  Protonix 40 mg oral delayed release tablet: 1 tab(s) orally once a day  Tessalon Perles 100 mg oral capsule: 1 cap(s) orally 3 times a day, As Needed - for cough  Tylenol Extra Strength 500 mg oral tablet: 2 tab(s) orally every 8 hours, As Needed - for pain.    Objective:     Objective Information:      T   P  R  BP   MAP  SpO2   Value  36.4  84  18  103/51   63  95%  Date/Time 9/22 9:08 9/22 9:08 9/22 6:50 9/22 9:08  9/22 9:08 9/22 9:08  Range  (36C - 36.8C )  (84 - 120 )  (16 - 24 )  (81 - 120 )/ (41 - 82 )  (63 - 97 )  (93% - 100% )   As of 22-Sep-2023 04:03:00, patient is on 2 L/min of oxygen via nasal cannula.      Pain reported at 9/22 4:03: 0 = None    Physical Exam by System:    Constitutional: Well developed, awake/alert/oriented  x3, no distress, cooperative   Eyes: PERRL, EOMI, clear sclera   ENMT: Mucous membranes moist, no apparent injury,  no lesions seen   Head/Neck: Neck supple, no JVD, trachea midline   Respiratory/Thorax: Patent airways, normal breath  sounds with good chest expansion, thorax symmetric   Cardiovascular: Regular, rate and rhythm, no murmurs,  2+ equal pulses of the extremities, normal S 1and S 2   Gastrointestinal: Nondistended,  soft, non-tender,  no rebound tenderness or guarding, +BS   Musculoskeletal: ROM intact, no joint swelling, normal  strength   Extremities: Normal extremities, no cyanosis, edema  or wounds   Neurological: Alert and oriented x3, no focal deficits,  sensation intact   Psychological: Appropriate mood and behavior   Skin: Warm and dry, no lesions, no rashes     Medications:    Medications:          Continuous Medications       --------------------------------    1. Octreotide 500 microgram/ NaCL 0.9% 100 mL Infusion:  50  mcg/hr  IntraVenous  <Continuous>         Scheduled Medications       --------------------------------    1. Iohexol (Omnipaque 350-Radiology Contrast):  96.75  mL  IntraVenous Push  Once    2. Pantoprazole Injectable:  40  mg  IntraVenous Push  Every 12 Hours    3. Pneumococcal 23-Valent (PNEUMOVAX) Vaccine:  0.5  mL  IntraMuscular  Once         PRN Medications       --------------------------------    1. Albuterol 2.5 mg/ 3 mL Nebulizer Soln:  3  mL  Inhalation  Every 4 Hours    2. Sodium Chloride 0.9% Injectable Flush:  10  mL  IntraVenous Flush  Every 8 Hours and as Needed        Recent Lab Results:    Results:    CBC: 9/21/2023 18:25              \     Hgb     /                              \     8.8 L    /  WBC  ----------------  Plt               20.3 H    ----------------    522 H            /     Hct     \                              /     28.2 L    \            RBC: 3.05 L    MCV: 92     Neutrophil %: 79.9      CMP: 9/21/2023 18:25  NA+        Cl-     BUN  /                         131 L   99    23  /  --------------------------------  Glucose                ---------------------------  134 H    K+     HCO3-   Creat \                         4.1    21    1.01  \           \  T Bili  /                    \  0.4  /  AST  x ---- x ALT        10 x ---- x 6 L         /  Alk P   \               /  85  \  Calcium : 9.0     Anion Gap : 15     Albumin : 3.7     T Protein : 7.5      "      Coagulation: 9/21/2023 18:25  PT  /                    23.3 H /  -------<    INR          ----------<      2.1 H  PTT\                    52 H \                       Assessment and Plan:   Problem List:       Admitting Dx:   GI bleed:        Additional Dx:   Leukocytosis:        Medical History:   Fracture of left shoulder:    History of COVID-19: Onset Date: 04-Sep-2023   Hypertension:    GERD (gastroesophageal reflux disease):    Encephalopathy:    Iron deficiency anemia:    Pulmonary embolism: Description: s/p femur fracture, Onset  Date: Feb 2023   History of femur fracture:    History of fall:     Assessment:      Continue current medications  Hold anticoagulants  Transfuse necessary  Transfer to the ICU  Continue current care      Electronic Signatures:  Cedrick Mehta)  (Signed 22-Sep-2023 09:12)   Authored: History of Present Illness, Comorbidities,  Past Medical/Surgical History, Family History, Social History, Allergies, Medications Prior to Admission, Objective, Assessment and Plan, Note Completion      Last Updated: 22-Sep-2023 09:12 by Cedrick Mehta)    References:  1.  Data Referenced From \"Consult-Gastroenterology\" 22-Sep-2023 08:28   "

## 2023-09-30 NOTE — PROGRESS NOTES
Service: Medicine     Subjective Data:   MORGAN FERREIRA is a 87 year old Female who is Hospital Day # 5.     Patient seen today resting comfortably.  He still continues to have a drop in her hemoglobin.  Hemodynamically stable but I think she will require endoscopic evaluation.    Objective Data:     Objective Information:      T   P  R  BP   MAP  SpO2   Value  36.6  80  17  116/61   87  96%  Date/Time 9/25 8:00 9/25 9:00 9/25 9:00 9/25 9:00 9/25 9:00 9/25 9:00  Range  (36.6C - 37.4C )  (80 - 104 )  (15 - 29 )  (115 - 142 )/ (55 - 70 )  (84 - 109 )  (91% - 99% )  Highest temp of 37.4 C was recorded at 9/25 0:00      Pain reported at 9/25 8:00: 0 = None    ---- Intake and Output  -----  Mn/Dy/Year Time  Intake   Output  Net  Sep 25, 2023 6:00 am  360   750  -390  Sep 24, 2023 10:00 pm  480   200  280  Sep 24, 2023 2:00 pm  480   0  480    The Intake and Output Totals for the last 24 hours are:      Intake   Output  Net      1320   950  370    Physical Exam by System:    Constitutional: Well developed, awake/alert/oriented  x3, no distress, cooperative   Eyes: PERRL, EOMI, clear sclera   ENMT: Mucous membranes moist, no apparent injury,  no lesions seen   Head/Neck: Neck supple, no JVD, trachea midline   Respiratory/Thorax: Patent airways, normal breath  sounds with good chest expansion, thorax symmetric   Cardiovascular: Regular, rate and rhythm, no murmurs,  2+ equal pulses of the extremities, normal S 1and S 2   Gastrointestinal: Nondistended, soft, non-tender,  no rebound tenderness or guarding, +BS   Musculoskeletal: ROM intact, no joint swelling, normal  strength   Extremities: Normal extremities, no cyanosis, edema  or wounds   Neurological: Alert and oriented x3, no focal deficits,  sensation intact   Psychological: Appropriate mood and behavior   Skin: Warm and dry, no lesions, no rashes     Medication:    Medications:          Continuous Medications       --------------------------------    1. Lactated  Ringers Infusion:  1000  mL  IntraVenous  <Continuous>    2. Phenylephrine 10 mg/ NaCL 0.9% 250 mL Infusion:  0.5  mcg/kg/min  IntraVenous  <Continuous>         Scheduled Medications       --------------------------------    1. Ammonium Lactate 12% Topical:  1  application(s)  Topical  2 Times a Day    2. Iohexol (Omnipaque 350-Radiology Contrast):  96.75  mL  IntraVenous Push  Once    3. Pantoprazole Injectable:  40  mg  IntraVenous Push  Every 12 Hours    4. Pneumococcal 23-Valent (PNEUMOVAX) Vaccine:  0.5  mL  IntraMuscular  Once    5. Technetium Tc 99m Labeled Red Blood Cells (ULTRATAG - Radiology Contrast):  25  milliCurie  IntraVenous Push  Once         PRN Medications       --------------------------------    1. Albuterol 2.5 mg/ 3 mL Nebulizer Soln:  3  mL  Inhalation  Every 4 Hours    2. hydrOXYzine Hydrochloride (ATARAX):  50  mg  Oral  Every 6 Hours    3. Magnesium Sulfate 2 gram/Sterile Water 50 mL Premix Soln:  2  gram(s)  IntraVenous Piggyback  Every 6 Hours    4. Magnesium Sulfate 4 gram/Sterile Water 100 mL Premix Soln:  4  gram(s)  IntraVenous Piggyback  Every 6 Hours    5. Potassium Chloride 20 mEq/Sterile Water 100 mL Premix IVPB:  20  mEq  IntraVenous Piggyback  Every 6 Hours    6. Potassium Chloride Powder Packet:  20  mEq  Oral  Every 6 Hours    7. Potassium Chloride Powder Packet:  40  mEq  Oral  Every 6 Hours    8. Sodium Chloride 0.9% Injectable Flush:  10  mL  IntraVenous Flush  Every 8 Hours and as Needed        Recent Lab Results:    Results:    CBC: 9/25/2023 04:58              \     Hgb     /                              \     6.9 L    /  WBC  ----------------  Plt               9.4       ----------------    251              /     Hct     \                              /     21.5 L    \            RBC: 2.40 L    MCV: 90           CMP: 9/25/2023 04:58  NA+        Cl-     BUN  /                         136    105    13  /  --------------------------------  Glucose                 ---------------------------  77    K+     HCO3-   Creat \                         3.6    22    0.82  \           \  T Bili  /                    \  0.5  /  AST  x ---- x ALT        7 Lx ---- x 3 L         /  Alk P   \               /  45  \  Calcium : 7.5 L    Anion Gap : 13     Albumin : 2.5 L     T Protein : 4.7 L          Coagulation: 9/25/2023 04:58  PT  /                    13.4 H /  -------<    INR          ----------<      1.2 H  PTT\                              \                       Assessment and Plan:        Admitting Dx:   GI bleed: Entered Date: 21-Sep-2023 22:38       Additional Dx:   Hemodynamic instability: Entered Date: 24-Sep-2023 20:26   Acute anemia: Entered Date: 24-Sep-2023 20:26   Iron deficiency anemia due to chronic blood loss: Entered  Date: 23-Sep-2023 09:48   Leukocytosis: Entered Date: 21-Sep-2023 22:38       Medical History:   Fracture of left shoulder: Entered Date: 21-Sep-2023 22:22   History of COVID-19: Onset Date: 04-Sep-2023, Entered Date:  21-Sep-2023 22:21   Hypertension: Entered Date: 21-Sep-2023 22:21   GERD (gastroesophageal reflux disease): Entered Date: 21-Sep-2023  21:22   Encephalopathy: Entered Date: 21-Sep-2023 21:22   Iron deficiency anemia: Entered Date: 21-Sep-2023 21:21   Pulmonary embolism: Onset Date: Feb 2023, Entered Date: 21-Sep-2023  21:21   History of femur fracture: Entered Date: 21-Sep-2023 21:20   History of fall: Entered Date: 21-Sep-2023 21:20    Code Status:  ·  Code Status Full Code     Assessment:    Continue current medications  May require 1 more transfusion  I think at this point time she will need endoscopic evaluation to rule out some type of bleeding  Check BUN and creatinine ratio  Continue current management      Electronic Signatures:  Cedrick Mehta)  (Signed 25-Sep-2023 09:15)   Authored: Service, Subjective Data, Objective Data, Assessment  and Plan, Note Completion      Last Updated: 25-Sep-2023 09:15 by Cedrick Mehta)

## 2023-09-30 NOTE — H&P
History of Present Illness:   Admission Reason: GI Bleed   HPI:    MORGAN FERREIRA is a 87 year old Female resident of [facility] with history of pulmonary embolism on apixaban since February 2023 s/p femur fracture,  daughter reports cardiac arrest during first femur fracture repair surgery so surgery was abandoned and repair was completed 2 weeks later, iron deficiency anemia, GERD, hypertension and recent left shoulder fracture without surgical repair presented  to the emergency department today with complaints of GI bleed.  Patient reports she was in her normal state of health this morning other than having an episode of diarrhea.  She did not notice any blood in her stool at that time.  After dinner she reports  having another episode of diarrhea and thought she was done however had to quickly go back to the bathroom and have another episode of diarrhea and began having dizziness, weakness, fatigue, feeling like she was going to pass into both eyest.  At that  time she called for help at her assisted living and they noticed maroon color diarrhea.  Patient has never had a GI bleed before.  She had a colonoscopy with polypectomy approximately 3 years ago in [facility].  Patient has been on apixaban for 7 months  for pulmonary embolism with no other previous history of thromboembolism.  Patient denies fever, chills, cough, cold symptoms, chest pain, shortness of breath, abdominal pain, nausea or vomiting.    Evaluation in the ED  Vitals: 36.6, 111, 20, 101/64, 96% room air  Labs: WBC 20.3, H&H 8.8/28.2, plt 522, neutrophil 16.18, Na 131, BUN 23, creatinine 1.01, GFR 54, liver function WNL, INR 2.1/PT 23.3/aPTT 52 on apixaban, lactate 1.1, high sensitivity troponin 6  Imaging:  Medications: 1 unit PRBC, Protonix 80mg IV x 1  Disposition: Admit to stepdown with GI on consult for further evaluation and management of acute GIB    PMHx: PE on apixaban (Feb 2023 after femur fracture), KORI, GERD,  hypertension, encephalopathy, dysphagia, right femur fracture s/p fall  PSHx: Femur fracture repair, knee replacement, parathyroidectomy  SHx: Denies tobacco, alcohol or illicit drug use  FMHx: Mother with history of COPD, sister with history of esophageal cancer, father with history of lung cancer    Code status: Full code      Comorbidities:   Comorbidites:  ·  Comorbid Conditions hypertension     Past Medical/Surgical History:           Medical History:   Fracture of left shoulder:    History of COVID-19: Onset Date: 04-Sep-2023   Hypertension:    GERD (gastroesophageal reflux disease):    Encephalopathy:    Iron deficiency anemia:    Pulmonary embolism: Description: s/p femur fracture, Onset  Date: Feb 2023   History of femur fracture:    History of fall:        Surg History:   History of colonoscopy with polypectomy: Description: 2020  in [facility]   History of parathyroidectomy:    History of repair of hip fracture:    History of total knee replacement:     Family History:      ·   Family history of chronic obstructive pulmonary disease: Relationship to Patient: Mother (Age at Diagnosis: Age Unknown), Living: No, Adopted: No, Twin/Multiple: No  ·   Family history of malignant neoplasm of esophagus: Relationship to Patient: Sister (Age at Diagnosis: Age Unknown), Living: No, Adopted: No, Twin/Multiple: No  ·   Family history of lung cancer: Relationship to Patient: Father (Age at Diagnosis: Age Unknown), Living: No, Adopted: No, Twin/Multiple: No    Social History:   Social History:  Smoking Status never smoker (1)   Alcohol Use denies(1)   Drug Use denies (1)              Allergies:  ·  No Known Allergies :     Medications Prior to Admission:     diphenhydrAMINE-acetaminophen 25 mg-500 mg oral tablet: 2 tab(s) orally once a day (at bedtime)  apixaban 5 mg oral tablet: 1 tab(s) orally 2 times a day  calcium citrate 950 mg (200 mg elemental calcium) oral tablet: 1 tab(s) orally 2 times a day  cholecalciferol 50  mcg (2000 intl units) oral tablet: 1 tab(s) orally once a day  MiraLax oral powder for reconstitution: 17 gram(s) orally every 24 hours, As Needed - for constipation  oxyCODONE 5 mg oral tablet: 1 tab(s) orally every 6 hours, As Needed - for breakthrough pain  ProAir HFA 90 mcg/inh inhalation aerosol: 2 puff(s) inhaled every 6 hours, As Needed - for shortness of breath  Protonix 40 mg oral delayed release tablet: 1 tab(s) orally once a day  Tessalon Perles 100 mg oral capsule: 1 cap(s) orally 3 times a day, As Needed - for cough  Tylenol Extra Strength 500 mg oral tablet: 2 tab(s) orally every 8 hours, As Needed - for pain.     Review of Systems:   Constitutional: NEGATIVE: Fever, Chills, Anorexia,  Malaise     Eyes: NEGATIVE: Blurry Vision, Diploplia, Vision  Loss/ Change     ENMT: NEGATIVE: Nasal Discharge, Nasal Congestion,  Ear Pain, Mouth Pain, Throat Pain     Respiratory: NEGATIVE: Dry Cough, Productive Cough,  Hemoptysis, Wheezing, Shortness of Breath     Cardiac: NEGATIVE: Chest Pain, Dyspnea on Exertion,  Palpitations, Syncope     Gastrointestinal: POSITIVE: Diarrhea; NEGATIVE: Nausea,  Vomiting, Constipation, Abdominal Pain     Genitourinary: NEGATIVE: Dysuria, Flank Pain, Frequency     Musculoskeletal: POSITIVE: Decreased ROM, Pain, Weakness ; COMMENTS: Recent left shoulder fracture     Neurological: POSITIVE: Dizziness; NEGATIVE: Confusion,  Headache, Syncope     Skin: NEGATIVE: Pain, Pruritus, Rash     Hematologic/Lymph: NEGATIVE: Anemia, Bruising, Easy  Bleeding       Objective:     Objective Information:      T   P  R  BP   MAP  SpO2   Value  36.6  111  20  101/64   79  96%  Date/Time 9/21 18:00 9/21 20:44 9/21 18:00 9/21 20:44  9/21 20:44 9/21 20:44  Range  (36.6C - 36.6C )  (103 - 120 )  (20 - 20 )  (101 - 116 )/ (57 - 64 )  (79 - 82 )  (96% - 98% )      Pain reported at 9/21 18:31: 0 = None       Weights   9/21 18:31: Weight in lbs ((lbs))  142.1  9/21 18:31: Weight in kg (Weight (kg))  64.5  9/21  18:31: BMI (kg/m2) (BMI (kg/m2))  26.034      EKG:   ST, ventricular rate 120, , QRS 92, , no acute ischemic changes     Physical Exam by System:    Constitutional: Well developed, awake/alert/oriented  x3, no distress, cooperative   Eyes: PERRL, EOMI, clear sclera   ENMT: Mucous membranes moist, no apparent injury,  no lesions seen   Head/Neck: Neck supple, no JVD, trachea midline   Respiratory/Thorax: Patent airways, normal breath  sounds with good chest expansion, thorax symmetric   Cardiovascular: Regular, rate and rhythm, no murmurs,  2+ equal pulses of the extremities, normal S 1and S 2   Gastrointestinal: Nondistended, soft, non-tender,  no rebound tenderness or guarding, +BS   Musculoskeletal: ROM intact, no joint swelling, normal  strength   Extremities: Normal extremities, no cyanosis, edema  or wounds   Neurological: Alert and oriented x3, no focal deficits,  sensation intact   Psychological: Appropriate mood and behavior   Skin: Warm and dry, no lesions, no rashes     Medications:    Medications:          Continuous Medications       --------------------------------  No continuous medications are active       Scheduled Medications       --------------------------------    1. Iohexol (Omnipaque 350-Radiology Contrast):  96.75  mL  IntraVenous Push  Once    2. Pantoprazole Injectable:  40  mg  IntraVenous Push  Every 12 Hours         PRN Medications       --------------------------------    1. Albuterol 2.5 mg/ 3 mL Nebulizer Soln:  3  mL  Inhalation  Every 4 Hours    2. Sodium Chloride 0.9% Injectable Flush:  10  mL  IntraVenous Flush  Every 8 Hours and as Needed          Recent Lab Results:    Results:        I have reviewed these laboratory results:    Complete Blood Count + Differential  21-Sep-2023 18:25:00      Result Value    White Blood Cell Count  20.3   H   Nucleated Erythrocyte Count  0.0    Red Blood Cell Count  3.05   L   HGB  8.8   L   HCT  28.2   L   MCV  92    MCHC  31.2   L    PLT  522   H   RDW-CV  13.3    Neutrophil %  79.9    Immature Granulocytes %  0.8    Lymphocyte %  9.4    Monocyte %  7.4    Eosinophil %  1.8    Basophil %  0.7    Neutrophil Count  16.18   H   Lymphocyte Count  1.91    Monocyte Count  1.49   H   Eosinophil Count  0.37    Basophil Count  0.15   H     Comprehensive Metabolic Panel  21-Sep-2023 18:25:00      Result Value    Glucose, Serum  134   H   NA  131   L   K  4.1    CL  99    Bicarbonate, Serum  21    Anion Gap, Serum  15    BUN  23    CREAT  1.01    GFR Female  54   A   Calcium, Serum  9.0    ALB  3.7    ALKP  85    T Pro  7.5    T Bili  0.4    Alanine Aminotransferase, Serum  6   L   Aspartate Transaminase, Serum  10      PT + INR, Plasma  21-Sep-2023 18:25:00      Result Value    Prothrombin Time, Plasma  23.3   H   International Normalized Ratio, Plasma  2.1   H     Lactate, Level  21-Sep-2023 18:25:00      Result Value    Lactate, Level  1.1      Activated Partial Thromboplastin Time  21-Sep-2023 18:25:00      Result Value    Activated Partial Thromboplastin Time  52   H     Troponin I, High Sensitivity  21-Sep-2023 18:25:00      Result Value    Troponin I, High Sensitivity  6        Radiology Results:    Results:        Impression:    1. Colonic diverticulosis is present without evidence of acute  diverticulitis.     2. Hepatic steatosis is present.  No suspicious hepatic lesion is  demonstrated.  No biliary dilatation is demonstrated.  The gallbladder  appears within normal limits.     3. Fibrotic changes in the lungs.     4. Mild cardiomegaly.  Atherosclerosis of the coronary arteries is  present.     5. Mild to moderate size sliding-type hiatal hernia is present.     6. Severe scoliosis of the thoracolumbar spine.  Multilevel  degenerative change of the lumbar spine is present.  Severe  degenerative change of the hips demonstrated bilaterally.       Signed by Ambrosio Donaldson II, MD     CT Abdomen and Pelvis with IV Contrast [Sep 21 2023 10:19PM]     "    Assessment and Plan:   Assessment:    GIB  Diarrhea  Leukocytosis  -Sudden onset of diarrhea and maroon stool today  -Last colonoscopy with polypectomy 3 years ago in [facility]  -H&H on arrival 8.8/20.3 down to 7.0/21.9 after 3 hours  -Monitor H&H closely  -WBC 20.3, likely reactive  -CT abdomen reporting colonic diverticulosis without acute diverticulitis, hepatic steatosis, mild to moderate hiatal hernia with no other acute findings  -NPO  -Hold apixaban  -1 unit PRBC now  -Protonix 80 mg IV x1 given in ED, continue 40 mg IV twice daily  -GI consult  -Stool for cdiff and pathogens  -UA    Recent L shoulder fracture  -Sling to arm for comfort while out of bed    Further evaluation and management per attending and consulting physicians.              Plan of Care Reviewed With:  Plan of Care Reviewed With: patient; daughter       Electronic Signatures:  Fern Arguelles (APRN-CNP)  (Signed 21-Sep-2023 22:55)   Authored: History of Present Illness, Comorbidities,  Past Medical/Surgical History, Family History, Social History, Allergies, Medications Prior to Admission, Review of Systems, Objective, Assessment and Plan, Note Completion      Last Updated: 21-Sep-2023 22:55 by Fern Arguelles (APRN-CNP)    References:  1.  Data Referenced From \"Risk Screen - Adult Emergency\" 21-Sep-2023 18:33   "

## 2023-09-30 NOTE — PROGRESS NOTES
Service: Gastroenterology     Subjective Data:   MORGAN FERREIRA is a 87 year old Female who is Hospital Day # 3.     Pt sitting up in bed this am with 2 more burgundy stools since yesterday am with drop in hgb again to 7.6.  CTA noting diverticulosis, mainly of sigmoid colon with no definitive extravasation or hemorrhage.   Plan to prep for colonoscopy tomorrow am.    Objective Data:     Objective Information:      T   P  R  BP   MAP  SpO2   Value  36.9  84  20  95/60   73  96%  Date/Time 9/23 12:00 9/23 12:00 9/23 12:00 9/22 10:15  9/22 10:15 9/23 12:00  Range  (36.1C - 37C )  (70 - 111 )  (15 - 28 )  (81 - 109 )/ (41 - 64 )  (63 - 81 )  (92% - 99% )   As of 22-Sep-2023 04:03:00, patient is on 2 L/min of oxygen via room air.  Highest temp of 37 C was recorded at 9/23 8:00      Pain reported at 9/23 12:00: 0 = None    ---- Intake and Output  -----  Mn/Dy/Year Time  Intake   Output  Net  Sep 23, 2023 6:00 am  525   350  175  Sep 22, 2023 10:00 pm  525   50  475  Sep 22, 2023 2:00 pm  1220   0  1220    The Intake and Output Totals for the last 24 hours are:      Intake   Output  Net      2270   400  1870     Weights   9/23 6:00: Weight in kg (Weight (kg))  64.5  9/23 6:00: Weight in lbs ((lbs))  142.1    Physical Exam by System:    Constitutional: Alert, pleasant and interactive elderly  female, in NAD   Eyes: PERRL, sclera clear, no conjunctival injection   ENMT: Mucous membranes moist, no lesions noted   Respiratory/Thorax: CTAB, even and unlabored   Cardiovascular: Rhythm irregular, normal S1, S2,  no m,r,g   Gastrointestinal: +BS, soft, round, NT, no rebound  tenderness or guarding, no palpable masses or organomegaly   Musculoskeletal: 5/5 strength, ROM intact, no joint  swelling   Extremities: Extremities warm, no edema, contusions,  wounds or cyanosis   Neurological: Alert and oriented x3   Psychological: Appropriate mood and behavior   Skin: Warm and dry, no rash or ecchymosis     Medication:    Medications:           Continuous Medications       --------------------------------    1. Lactated Ringers Infusion:  1000  mL  IntraVenous  <Continuous>    2. Phenylephrine 10 mg/ NaCL 0.9% 250 mL Infusion:  0.5  mcg/kg/min  IntraVenous  <Continuous>         Scheduled Medications       --------------------------------    1. Ammonium Lactate 12% Topical:  1  application(s)  Topical  2 Times a Day    2. Iohexol (Omnipaque 350-Radiology Contrast):  96.75  mL  IntraVenous Push  Once    3. Iohexol (Omnipaque 350-Radiology Contrast):  96.75  mL  IntraVenous Push  Once    4. Pantoprazole Injectable:  40  mg  IntraVenous Push  Every 12 Hours    5. Pneumococcal 23-Valent (PNEUMOVAX) Vaccine:  0.5  mL  IntraMuscular  Once         PRN Medications       --------------------------------    1. Albuterol 2.5 mg/ 3 mL Nebulizer Soln:  3  mL  Inhalation  Every 4 Hours    2. diphenhydrAMINE Injectable:  25  mg  IntraVenous Push  Every 4 Hours    3. Magnesium Sulfate 2 gram/Sterile Water 50 mL Premix Soln:  2  gram(s)  IntraVenous Piggyback  Every 6 Hours    4. Magnesium Sulfate 4 gram/Sterile Water 100 mL Premix Soln:  4  gram(s)  IntraVenous Piggyback  Every 6 Hours    5. Potassium Chloride 20 mEq/Sterile Water 100 mL Premix IVPB:  20  mEq  IntraVenous Piggyback  Every 6 Hours    6. Potassium Chloride Powder Packet:  20  mEq  Oral  Every 6 Hours    7. Potassium Chloride Powder Packet:  40  mEq  Oral  Every 6 Hours    8. Sodium Chloride 0.9% Injectable Flush:  10  mL  IntraVenous Flush  Every 8 Hours and as Needed        Recent Lab Results:    Results:        I have reviewed these laboratory results:    Hemoglobin + Hematocrit  23-Sep-2023 07:59:00      Result Value    HCT  22.9   L   HGB  7.6   L     Basic Metabolic Panel  23-Sep-2023 04:00:00      Result Value    Glucose, Serum  117   H   NA  137    K  4.2    CL  109   H   Bicarbonate, Serum  21    Anion Gap, Serum  11    BUN  25   H   CREAT  0.96    GFR Female  58   A   Calcium, Serum  7.6   L      Complete Blood Count  Trending View      Result 23-Sep-2023 04:00:00  22-Sep-2023 11:00:00    White Blood Cell Count 12.1   H   13.4   H    Nucleated Erythrocyte Count 0.0   0.0    Red Blood Cell Count 2.69   L   3.17   L    HGB 8.0   L   9.5   L    HCT 24.1   L   27.7   L    MCV 90   87    MCHC 33.2   34.3       363    RDW-CV 15.0   H   14.4        Magnesium, Serum  23-Sep-2023 04:00:00      Result Value    Magnesium, Serum  1.51   L     Phosphorus, Serum  23-Sep-2023 04:00:00      Result Value    Phosphorus, Serum  3.5      Culture, Blood  Trending View      Result 22-Sep-2023 11:01:00  22-Sep-2023 11:00:00    Culture, Blood NEGATIVE TO DATE, CULTURE IN PROGRESS.   NEGATIVE TO DATE, CULTURE IN PROGRESS.        Coagulation Screen  22-Sep-2023 11:00:00      Result Value    Prothrombin Time, Plasma  17.9   H   International Normalized Ratio, Plasma  1.6   H   Activated Partial Thromboplastin Time  39   H     Comprehensive Metabolic Panel  22-Sep-2023 11:00:00      Result Value    Glucose, Serum  136   H   NA  135   L   K  4.5    CL  107    Bicarbonate, Serum  22    Anion Gap, Serum  11    BUN  21    CREAT  0.93    GFR Female  60    Calcium, Serum  7.9   L   ALB  2.6   L   ALKP  55    T Pro  5.2   L   T Bili  1.0    Alanine Aminotransferase, Serum  4   L   Aspartate Transaminase, Serum  7   L     Brain Natriuretic Peptide  22-Sep-2023 11:00:00      Result Value    Brain Natriuretic Peptide  33      Lactate, Level  22-Sep-2023 11:00:00      Result Value    Lactate, Level  0.6      Clost Diff. Toxin, PCR  22-Sep-2023 02:38:00      Result Value    Clostridium Difficile Toxin, PCR  NOT DETECTED  Reference Range: Not Detected This assay detects the presence of the tcdB (toxin B) gene via DNA amplification, and results should be interpreted  in the context of the patients history and clinical findings. This test cannot be p    Fluid Source  Stool      Stool Pathogen PCR Panel  22-Sep-2023 02:38:00      Result  Value    Campylobacter Group  NOT DETECTED  Reference Range: NOT DETECTED    Salmonella Species  NOT DETECTED  Reference Range: NOT DETECTED    Shigella Species  NOT DETECTED  Reference Range: NOT DETECTED    Vibrio Group  NOT DETECTED  Reference Range: NOT DETECTED    Yersinia Enterocolitica  NOT DETECTED  Reference Range: NOT DETECTED    Shiga Toxin 1  NOT DETECTED  Reference Range: NOT DETECTED    Shiga Toxin 2  NOT DETECTED  Reference Range: NOT DETECTED    Norovirus GI/GII  NOT DETECTED  Reference Range: NOT DETECTED    Rotavirus A  NOT DETECTED  Reference Range: NOT DETECTED The enteric PCR panel is a panel of sensitive and specific amplified nucleic acid tests indicated as an aid in the  diagnosis of specific bacterial and viral agents of gastrointestinal illness, in con        Radiology Results:    Results:        Impression:  CT Angio Abdomen [Sep 23 2023 10:11AM]      Impression:    1. Colonic diverticulosis is present without evidence of acute  diverticulitis.     2. Hepatic steatosis is present.  No suspicious hepatic lesion is  demonstrated.  No biliary dilatation is demonstrated.  The gallbladder  appears within normal limits.     3. Fibrotic changes in the lungs.     4. Mild cardiomegaly.  Atherosclerosis of the coronary arteries is  present.     5. Mild to moderate size sliding-type hiatal hernia is present.     6. Severe scoliosis of the thoracolumbar spine.  Multilevel  degenerative change of the lumbar spine is present.  Severe  degenerative change of the hips demonstrated bilaterally.       Signed by Ambrosio Donaldson II, MD     CT Abdomen and Pelvis with IV Contrast [Sep 21 2023 10:19PM]      Assessment and Plan:   Code Status:  ·  Code Status Full Code     Assessment:    MORGAN FERREIRA is a 87 year old Female with GI consult for GI bleed.  PMH significant for GERD, HTN, KORI, PE on Eliquis, cardiac arrest during surgery 2/2023.  Patient  presented from nursing facility with reports of diarrhea that  initially was brown and eventually became more maroon yesterday.  No previous GI bleeds.  Hemoglobin dropped from 8.8-7.0 since arrival for which she received 3 units PRBCs.  Hemoglobin noted  to be 12 8/30/2023.  Last episode of bloody stool at 2:30 AM yesterday.  Last dose of Eliquis yesterday AM.  Patient is on a PPI daily.  Last colonoscopy 3 years ago in [place] with polyps removed.  CT noting no acute findings, mild to moderate hiatal hernia,  diverticulosis without diverticulitis.      Patient noted to have 1 large liquid maroon stool again early this a.m. with drop in systolic BP to 80s, the first stool since 0230 yesterday.  Hemoglobin dropped from 9.5-7.6.  CTA done with no extravasation or hemorrhage noted with diverticulosis throughout  mainly at sigmoid colon.  Stool studies and C. difficile negative yesterday.    # acute blood loss anemia- diverticular v hemorrhoidal v infectious v ischemic (less likely)  # burgundy liquid stool- no further episodes  # hypotension  # diverticulosis, mainly in sigmoid colon  # GERD  # supratherapeutic INR-improved  # KORI    Plan:  - continue supportive care  - pt ok for clear liquids from GI standpoint  - NPO after MN for colonosocpy  - begin colon prep as soon as ready  - please notify endoscopy in am if stool is not clear  - continue to optimize hemodynamics for procedure tomorrow  - continue to trend hemoglobin  - transfuse for hgb <7  - continue to monitor for and document  bleeding  - ok to continue PPI BID for now  - avoid NSAIDS    Plan has been discussed with Dr. Sawyer. GI will continue to follow.    JACQUI Pinzon                Electronic Signatures:  Alma Delaney (APRN-CNP)  (Signed 23-Sep-2023 13:03)   Authored: Service, Subjective Data, Objective Data, Assessment  and Plan, Note Completion      Last Updated: 23-Sep-2023 13:03 by Alma Delaney (APRN-CNP)

## 2023-09-30 NOTE — H&P
Service:   Critical Care Service:  ·  Service MICU     History of Present Illness:   HPI:    87-year-old female presents the ICU for GI bleed.  Patient has a past medical history of pulmonary embolism, hypertension, iron deficiency anemia, fracture of the  left shoulder and femur fracture.  Recently had COVID on 4 September.  ED course patient came to the emergency room with chief complaint of blood in his stools.  She had been feeling weak, fatigued and having shortness of breath on exertion during the day.  She had a bowel movement at her nursing facility and noticed it  was bloody.  She was given a liter of fluids and 1 unit of blood in the emergency room.  CT scan was ordered which showed diverticulosis, hepatic steatosis, fibrotic changes in the lungs, cardiomegaly and mild sclerosis of the coronary arteries, mild  sliding-type hiatal hernia and scoliosis.  Vital signs in the emergency room include a temp 97.8, heart rate of 120, respirations of 20, SPO2 percentage of 97, blood pressure of 101/58.  Patient was moved to the CCU where she was given 2 more units of  blood and fluids, pressure increased to 110/50 and labs were drawn.  Patient's hemoglobin continued to drop and was transferred to the ICU in the ICU and art line was placed the patient was given a liter of fluids and hemoglobin was trended.    Comorbidities:   Comorbidites:  ·  Comorbid Conditions diabetes, hypertension   ·  Diabetes Type unknown   ·  Insulin Dependent unknown   ·  DM Acuity or Status unknown     Family History:   Family History:  ·   Family history of chronic obstructive pulmonary disease: Relationship to Patient: Mother (Age at Diagnosis: Age Unknown), Living: No, Adopted: No, Twin/Multiple: No  ·   Family history of malignant neoplasm of esophagus: Relationship to Patient: Sister (Age at Diagnosis: Age Unknown), Living: No, Adopted: No, Twin/Multiple: No  ·   Family history of lung cancer: Relationship to Patient: Father (Age  at Diagnosis: Age Unknown), Living: No, Adopted: No, Twin/Multiple: No    Social History:   Social History:  Smoking Status never smoker (1)   Alcohol Use denies(1)   Drug Use denies (1)              Allergies:  ·  No Known Allergies :     Medications Prior to Admission:     diphenhydrAMINE-acetaminophen 25 mg-500 mg oral tablet: 2 tab(s) orally once a day (at bedtime)  apixaban 5 mg oral tablet: 1 tab(s) orally 2 times a day  calcium citrate 950 mg (200 mg elemental calcium) oral tablet: 1 tab(s) orally 2 times a day  cholecalciferol 50 mcg (2000 intl units) oral tablet: 1 tab(s) orally once a day  MiraLax oral powder for reconstitution: 17 gram(s) orally every 24 hours, As Needed - for constipation  oxyCODONE 5 mg oral tablet: 1 tab(s) orally every 6 hours, As Needed - for breakthrough pain  ProAir HFA 90 mcg/inh inhalation aerosol: 2 puff(s) inhaled every 6 hours, As Needed - for shortness of breath  Protonix 40 mg oral delayed release tablet: 1 tab(s) orally once a day  Tessalon Perles 100 mg oral capsule: 1 cap(s) orally 3 times a day, As Needed - for cough  Tylenol Extra Strength 500 mg oral tablet: 2 tab(s) orally every 8 hours, As Needed - for pain.    Review of Systems:   Constitutional: NEGATIVE: Fever, Chills, Anorexia,  Weight Loss, Malaise     Eyes: NEGATIVE: Blurry Vision, Drainage, Diploplia,  Redness, Vision Loss/ Change     ENMT: NEGATIVE: Nasal Discharge, Nasal Congestion,  Ear Pain, Mouth Pain, Throat Pain     Respiratory: NEGATIVE: Dry Cough, Productive Cough,  Hemoptysis, Wheezing, Shortness of Breath     Cardiac: NEGATIVE: Chest Pain, Dyspnea on Exertion,  Orthopnea, Palpitations, Syncope     Gastrointestinal: POSITIVE: Diarrhea; NEGATIVE: Nausea,  Vomiting, Constipation, Abdominal Pain     Genitourinary: NEGATIVE: Discharge, Dysuria, Flank  Pain, Frequency, Hematuria     Musculoskeletal: NEGATIVE: Decreased ROM, Pain, Swelling,  Stiffness, Weakness     Neurological: NEGATIVE: Dizziness,  Confusion, Headache,  Seizures, Syncope     Psychiatric: NEGATIVE: Mood Changes, Anxiety, Hallucinations,  Sleep Changes, Suicidal Ideas     Skin: NEGATIVE: Mass, Pain, Pruritus, Rash, Ulcer     Endocrine: NEGATIVE: Heat Intolerance, Cold Intolerance,  Sweat, Polyuria, Thirst     Hematologic/Lymph: NEGATIVE: Anemia, Bruising, Easy  Bleeding, Night Sweats, Petechiae     Allergic/Immunologic: NEGATIVE: Anaphylaxis, Itchy/  Teary Eyes, Itching, Sneezing, Swelling     Breast: NEGATIVE: Pain, Mass, Discharge, Nipple Itching,  Gynecomastia       Objective:   Objective Information:    ·  Objective Information      T   P  R  BP   MAP  SpO2   Value  36.8  74  22  95/60   73  94%  Date/Time 9/22 12:00 9/22 15:00 9/22 15:00 9/22 10:15  9/22 10:15 9/22 15:00  Range  (36C - 36.8C )  (74 - 120 )  (16 - 27 )  (81 - 120 )/ (41 - 82 )  (63 - 97 )  (92% - 100% )   As of 22-Sep-2023 04:03:00, patient is on 2 L/min of oxygen via room air.      Pain reported at 9/22 12:00: 0 = None      Direct Arterial Blood Pressure  Systolic 100 (88 - 104) 9/22 15:00  Diastolic (mm Hg) 46 (41 - 48) 9/22 15:00  Mean (mm Hg) 66 (60 - 70) 9/22 15:00  Pulse Pressure (mm Hg) 54 (46 - 58) 9/22 15:00      ---- Intake and Output  -----  Mn/Dy/Year Time  Intake   Output  Net  Sep 22, 2023 2:00 pm  1145   0  1145  Sep 22, 2023 6:00 am  0   0  0        Date:            Weight/Scale Type:  22-Sep-2023 05:00  63.5  kg / bed  21-Sep-2023 22:10  64.4  kg / bed  21-Sep-2023 18:00  64.5  kg           Physical Exam Narrative:  ·  Physical Exam:    PHYSICAL EXAM  Constitutional: Well appearing, no acute distress, oriented to person/place/time  Psych: Age appropriate insight, judgement, no psychomotor agitation  Neuro: GCS 15, spontaneously moves all extremities  Head: Atraumatic, no silva sign, no raccoon eyes  Eyes: Spontaneously open, PERRL, EOMI, no scleral icterus or conjunctival injection  ENT: No gross deformation, mucous membranes moist, trachea midline, no  uvular deviation  Neck: Supple, nontender, no ROM restriction, no JVD  Respiratory:  respirations nonlabored, equal chest rise, no wheezes rales or rhonchi  Cardiovascular: Regular rate and rhythm,   distal pulses 2+ symmetric  Gastrointestinal: No gross deformation, normal bowel sounds, soft, nontender, nondistended  Musculoskeletal: Normal Appearance, Full ROM of extremities, no tenderness, no edema  Integumentary: Warm, dry, no rashes    Medications:    Medications:          Continuous Medications       --------------------------------  No continuous medications are active       Scheduled Medications       --------------------------------    1. Iohexol (Omnipaque 350-Radiology Contrast):  96.75  mL  IntraVenous Push  Once    2. Pantoprazole Injectable:  40  mg  IntraVenous Push  Every 12 Hours    3. Pneumococcal 23-Valent (PNEUMOVAX) Vaccine:  0.5  mL  IntraMuscular  Once         PRN Medications       --------------------------------    1. Albuterol 2.5 mg/ 3 mL Nebulizer Soln:  3  mL  Inhalation  Every 4 Hours    2. Calcium Chloride IVPB:  0.5  gram(s)  IntraVenous Piggyback  Every 8 Hours    3. Calcium Chloride IVPB:  1  gram(s)  IntraVenous Piggyback  Every 8 Hours    4. Magnesium Sulfate 2 gram/Sterile Water 50 mL Premix Soln:  2  gram(s)  IntraVenous Piggyback  Every 6 Hours    5. Magnesium Sulfate 4 gram/Sterile Water 100 mL Premix Soln:  4  gram(s)  IntraVenous Piggyback  Every 6 Hours    6. Potassium Chloride 20 mEq/Sterile Water 100 mL Premix IVPB:  20  mEq  IntraVenous Piggyback  Every 6 Hours    7. Potassium Chloride Powder Packet:  20  mEq  Oral  Every 6 Hours    8. Potassium Chloride Powder Packet:  40  mEq  Oral  Every 6 Hours    9. Sodium Chloride 0.9% Injectable Flush:  10  mL  IntraVenous Flush  Every 8 Hours and as Needed        Recent Lab Results:    Results:    CBC: 9/22/2023 11:00              \     Hgb     /                              \     9.5 L    /  WBC  ----------------  Plt                13.4 H    ----------------    363              /     Hct     \                              /     27.7 L    \            RBC: 3.17 L    MCV: 87     Neutrophil %: 79.9      BMP: 9/22/2023 10:43  NA+        Cl-     BUN  /                         Canceled    Canceled    Canceled  /  --------------------------------  Glucose                ---------------------------  Canceled    K+     HCO3-   Creat \                         Canceled  Canceled    Canceled  \  Calcium : Canceled     Anion Gap : Canceled      CMP: 9/22/2023 11:00  NA+        Cl-     BUN  /                         135 L   107    21  /  --------------------------------  Glucose                ---------------------------  136 H    K+     HCO3-   Creat \                         4.5    22    0.93  \           \  T Bili  /                    \  1.0  /  AST  x ---- x ALT        7 Lx ---- x 4 L         /  Alk P   \               /  55  \  Calcium : 7.9 L    Anion Gap : 11     Albumin : 2.6 L     T Protein : 5.2 L          Coagulation: 9/22/2023 11:00  PT  /                    17.9 H /  -------<    INR          ----------<      1.6 H  PTT\                    39 H \                         I have reviewed these laboratory results:    Coagulation Screen  22-Sep-2023 11:00:00      Result Value    Prothrombin Time, Plasma  17.9   H   International Normalized Ratio, Plasma  1.6   H   Activated Partial Thromboplastin Time  39   H     Complete Blood Count  22-Sep-2023 11:00:00      Result Value    White Blood Cell Count  13.4   H   Nucleated Erythrocyte Count  0.0    Red Blood Cell Count  3.17   L   HGB  9.5   L   HCT  27.7   L   MCV  87    MCHC  34.3    PLT  363    RDW-CV  14.4      Comprehensive Metabolic Panel  22-Sep-2023 11:00:00      Result Value    Glucose, Serum  136   H   NA  135   L   K  4.5    CL  107    Bicarbonate, Serum  22    Anion Gap, Serum  11    BUN  21    CREAT  0.93    GFR Female  60    Calcium, Serum  7.9   L   ALB  2.6   L    ALKP  55    T Pro  5.2   L   T Bili  1.0    Alanine Aminotransferase, Serum  4   L   Aspartate Transaminase, Serum  7   L     Brain Natriuretic Peptide  22-Sep-2023 11:00:00      Result Value    Brain Natriuretic Peptide  33      Lactate, Level  22-Sep-2023 11:00:00      Result Value    Lactate, Level  0.6      Clost Diff. Toxin, PCR  22-Sep-2023 02:38:00      Result Value    Clostridium Difficile Toxin, PCR  NOT DETECTED    Reference Range: Not Detected This assay detects the presence of the tcdB (toxin B)   gene via DNA amplification, and results should be   interpreted in the context of the patients history   and clinical findings. This test cannot be p    Fluid Source  Stool        Assessment and Plan:   Other:  Diagnosis:    -GI bleed  -Diarrhea  -Hemorrhagic shock    Assessment and plan  Neurology  -ICU i Cam protocol  -ABCDEF bundle    Cardiology  -Monitor blood pressure MAP greater than or equal to 65  -Continuous cardiovascular monitoring    Respiratory  -No concerns    GI  -Monitor I's and O's and bowel movements check for blood in the stool and urine  -Continue with Protonix  -Possible upper and lower endoscope tomorrow  -Fluid replenishment if more bouts of diarrhea.    Heme  -No anticoagulation  -Patient has refused use of SCDs  -Monitor hemoglobin possible transfusion of another unit of blood.    Renal  -Monitor I's and O's  -Monitor electrolytes    Infectious disease  -No concerns    Plan was discussed with attending physician  Patrick Gambino MD PGY-1 ER  -    Code Status:  ·  Code Status Full Code     Attestation:   Note Completion:  I am a:  Resident/Fellow   Attending Attestation I saw and evaluated the patient.  I personally obtained the key and critical portions of the history and physical exam or was physically present for key and  critical portions performed by the resident/fellow. I reviewed the resident/fellow?s documentation and discussed the patient with the resident/fellow.  I agree with  the resident/fellow?s medical decision making as documented in the note.     I personally evaluated the patient on 22-Sep-2023   Comments/ Additional Findings      Ada presented with BRBPR likely lower GI bleed. Hemodynamic instability. S/p PRBCs. On PPI BID. We will stop Octreotide gtt.   GI consulted. Plan is for Colonoscopy.     Monitor H/H every 8-12 hours. Eliquis is on hold.    Critical Care Patient I have reviewed and evaluated the most recent data and results, personally examined the patient, and formulated the plan of care as presented above.  This patient  was critically ill and required continued critical care treatment. Teaching and any separately billable procedures are not included in the time calculation.    Billing Provider Critical Care Time 40 minute(s)   Primary Critical Care Issue/Treatment (See Assessment and Plan for greater detail) -- This patient is believed to have, or have the potential for, a life-threatening gastrointestinal hemorrhage. We are treating with appropriate blood products,  fluids and/or pressors, as indicated, as well as intensive monitoring. Please see assessment and plan above for greater detail.; -- This patient is, or has been, hemodynamically unstable.  We are treating with appropriate medications, as well as doing  intensive diagnostic evaluation and monitoring. Please see assessment and plan above for greater detail.         Electronic Signatures:  Elina Jolly)  (Signed 24-Sep-2023 20:20)   Authored: Note Completion   Co-Signer: Service, History of Present Illness, Comorbidities, Family History, Social History, Allergies, Medications Prior to Admission,  Review of Systems, Objective, Assessment and Plan, Note Completion  Patrick Gambino (Resident))  (Signed 22-Sep-2023 15:57)   Authored: Service, History of Present Illness, Comorbidities,  Family History, Social History, Allergies, Medications Prior to Admission, Review of Systems, Objective,  "Assessment and Plan, Note Completion      Last Updated: 24-Sep-2023 20:20 by Elina Jolly)    References:  1.  Data Referenced From \"History and Physical\" 22-Sep-2023 09:09   "

## 2023-09-30 NOTE — PROGRESS NOTES
Service:   Critical Care Service:  ·  Service MICU     Subjective Data:   ID Statement:  MORGAN FERREIRA is a 87 year old Female who is Hospital Day # 3 and ICU Day #2.     Overnight events patient had dark stools with clots.  Bright red blood was mixed in with the stool.  Blood draw at approximately 4 AM showed a hemoglobin of 8.0 down from 9.5.Continuous monitoring of  blood pressure and hemoglobin will continue throughout the day.  Discussion will be had with either GI or surgery in regards to possible intervention of this patient's bleed. Patient has a diffuse rash throughout the body last night administered Benadryl  to which she indicated some relief.  The rash is still present today on physical exam Benadryl as needed is in place.    Objective Data:     ·  Objective Information      T   P  R  BP   MAP  SpO2   Value  37  84  15  95/60   73  95%  Date/Time 9/23 8:00 9/23 8:00 9/23 8:00 9/22 10:15  9/22 10:15 9/23 8:00  Range  (36.1C - 37C )  (70 - 111 )  (15 - 27 )  (81 - 109 )/ (41 - 64 )  (63 - 81 )  (92% - 99% )   As of 22-Sep-2023 04:03:00, patient is on 2 L/min of oxygen via room air.  Highest temp of 37 C was recorded at 9/23 8:00      Pain reported at 9/23 8:00: 0 = None      Direct Arterial Blood Pressure  Systolic 106 (86 - 130) 9/23 8:00  Diastolic (mm Hg) 44 (36 - 68) 9/23 8:00  Mean (mm Hg) 68 (54 - 92) 9/23 8:00  Pulse Pressure (mm Hg) 62 (46 - 76) 9/23 8:00      ---- Intake and Output  -----  Mn/Dy/Year Time  Intake   Output  Net  Sep 23, 2023 6:00 am  525   350  175  Sep 22, 2023 10:00 pm  525   50  475  Sep 22, 2023 2:00 pm  1220   0  1220    The Intake and Output Totals for the last 24 hours are:      Intake   Output  Net      7010   400  1870    Date:            Weight/Scale Type:  23-Sep-2023 06:00  64.5  kg         22-Sep-2023 05:00  63.5  kg / bed  21-Sep-2023 22:10  64.4  kg / bed  21-Sep-2023 18:00  64.5  kg           Physical Exam Narrative:  ·  Physical Exam:    Constitutional: Well  appearing, no acute distress, oriented to person/place/time  Psych: Age appropriate insight, judgement, no psychomotor agitation  Neuro: GCS 15, spontaneously moves all extremities  Head: Atraumatic, no silva sign, no raccoon eyes  Eyes: Spontaneously open, PERRL, EOMI, no scleral icterus or conjunctival injection  ENT: No gross deformation, mucous membranes moist, trachea midline, no uvular deviation  Neck: Supple, nontender, no ROM restriction, no JVD  Respiratory:  respirations nonlabored, equal chest rise, no wheezes rales or rhonchi  Cardiovascular: Regular rate and rhythm,   distal pulses 2+ symmetric  Gastrointestinal: No gross deformation, normal bowel sounds, soft, nontender, nondistended  Musculoskeletal: Normal Appearance, Full ROM of extremities, no tenderness, no edema  Integumentary: Warm, dry, Patient developed an itchy rash last night primarily on her back.  She indicates she may have an allergic reaction to the bed layer Benadryl was given to which she claims she had some relief.  Currently Benadryl is in place as  needed as needed      Allergies:       Allergies:  ·  No Known Allergies :     Medications:    Medications:          Continuous Medications       --------------------------------    1. Lactated Ringers Infusion:  1000  mL  IntraVenous  <Continuous>         Scheduled Medications       --------------------------------    1. Ammonium Lactate 12% Topical:  1  application(s)  Topical  2 Times a Day    2. Iohexol (Omnipaque 350-Radiology Contrast):  96.75  mL  IntraVenous Push  Once    3. Pantoprazole Injectable:  40  mg  IntraVenous Push  Every 12 Hours    4. Pneumococcal 23-Valent (PNEUMOVAX) Vaccine:  0.5  mL  IntraMuscular  Once         PRN Medications       --------------------------------    1. Albuterol 2.5 mg/ 3 mL Nebulizer Soln:  3  mL  Inhalation  Every 4 Hours    2. diphenhydrAMINE Injectable:  25  mg  IntraVenous Push  Every 4 Hours    3. Magnesium Sulfate 2 gram/Sterile Water 50  mL Premix Soln:  2  gram(s)  IntraVenous Piggyback  Every 6 Hours    4. Magnesium Sulfate 4 gram/Sterile Water 100 mL Premix Soln:  4  gram(s)  IntraVenous Piggyback  Every 6 Hours    5. Potassium Chloride 20 mEq/Sterile Water 100 mL Premix IVPB:  20  mEq  IntraVenous Piggyback  Every 6 Hours    6. Potassium Chloride Powder Packet:  20  mEq  Oral  Every 6 Hours    7. Potassium Chloride Powder Packet:  40  mEq  Oral  Every 6 Hours    8. Sodium Chloride 0.9% Injectable Flush:  10  mL  IntraVenous Flush  Every 8 Hours and as Needed        Recent Lab Results:    Results:    CBC: 9/23/2023 04:00              \     Hgb     /                              \     8.0 L    /  WBC  ----------------  Plt               12.1 H    ----------------    327              /     Hct     \                              /     24.1 L    \            RBC: 2.69 L    MCV: 90           BMP: 9/23/2023 04:00  NA+        Cl-     BUN  /                         137    109 H   25 H  /  --------------------------------  Glucose                ---------------------------  117 H    K+     HCO3-   Creat \                         4.2  21    0.96  \  Calcium : 7.6 L    Anion Gap : 11      CMP: 9/22/2023 11:00  NA+        Cl-     BUN  /                         135 L   107    21  /  --------------------------------  Glucose                ---------------------------  136 H    K+     HCO3-   Creat \                         4.5    22    0.93  \           \  T Bili  /                    \  1.0  /  AST  x ---- x ALT        7 Lx ---- x 4 L         /  Alk P   \               /  55  \  Calcium : 7.9 L    Anion Gap : 11     Albumin : 2.6 L     T Protein : 5.2 L          Coagulation: 9/22/2023 11:00  PT  /                    17.9 H /  -------<    INR          ----------<      1.6 H  PTT\                    39 H \                         I have reviewed these laboratory results:    Hemoglobin + Hematocrit  23-Sep-2023 07:59:00      Result Value    HCT   22.9   L   HGB  7.6   L     Basic Metabolic Panel  23-Sep-2023 04:00:00      Result Value    Glucose, Serum  117   H   NA  137    K  4.2    CL  109   H   Bicarbonate, Serum  21    Anion Gap, Serum  11    BUN  25   H   CREAT  0.96    GFR Female  58   A   Calcium, Serum  7.6   L     Complete Blood Count  23-Sep-2023 04:00:00      Result Value    White Blood Cell Count  12.1   H   Nucleated Erythrocyte Count  0.0    Red Blood Cell Count  2.69   L   HGB  8.0   L   HCT  24.1   L   MCV  90    MCHC  33.2    PLT  327    RDW-CV  15.0   H     Magnesium, Serum  23-Sep-2023 04:00:00      Result Value    Magnesium, Serum  1.51   L     Phosphorus, Serum  23-Sep-2023 04:00:00      Result Value    Phosphorus, Serum  3.5      Culture, Blood  22-Sep-2023 11:01:00      Result Value    Culture, Blood  NEGATIVE TO DATE, CULTURE IN PROGRESS.      Coagulation Screen  22-Sep-2023 11:00:00      Result Value    Prothrombin Time, Plasma  17.9   H   International Normalized Ratio, Plasma  1.6   H   Activated Partial Thromboplastin Time  39   H       Assessment and Plan:   Daily Risk Screen:  ·  Does patient have a central line? no   ·  Does patient have an indwelling urinary catheter? no   ·  Is the patient intubated? no     Assessment/Plan:  ·  Assessment/Plan:    -GI bleed  -Diarrhea  -Hemorrhagic shock  -Rash    Assessment and plan  Neurology  -ICU i Cam protocol  -ABCDEF bundle    Cardiology  -Monitor blood pressure MAP greater than or equal to 65  -Continuous cardiovascular monitoring    Respiratory  -No concerns    GI  -Monitor I's and O's and bowel movements check for blood in the stool and urine  -Continue with Protonix  -Possible upper and lower endoscope tomorrow  -Fluid replenishment if more bouts of diarrhea.  -ANY BLOODY BOWEL MOVEMENT SEND FOR STAT CTA ABDOMIN**    Heme  -No anticoagulation  -Patient has refused use of SCDs  -Monitor hemoglobin possible transfusion of another unit of blood.    Renal  -Monitor I's and  O's  -Monitor electrolytes     Skin  -Monitor rash Benadryl prn    Infectious disease  -No concerns    Plan was discussed with attending physician  Patrick Gambino MD PGY-1 ER      Code Status:  ·  Code Status Full Code     Comorbidities:  ·  Comorbidity Other     Attestation:   Note Completion:  I am a:  Resident/Fellow   Attending Attestation I saw and evaluated the patient.  I personally obtained the key and critical portions of the history and physical exam or was physically present for key and  critical portions performed by the resident/fellow. I reviewed the resident/fellow?s documentation and discussed the patient with the resident/fellow.  I agree with the resident/fellow?s medical decision making as documented in the note.     I personally evaluated the patient on 23-Sep-2023   Critical Care Patient I have reviewed and evaluated the most recent data and results, personally examined the patient, and formulated the plan of care as presented above.  This patient  was critically ill and required continued critical care treatment. Teaching and any separately billable procedures are not included in the time calculation.    Billing Provider Critical Care Time 35 minute(s)   Primary Critical Care Issue/Treatment (See Assessment and Plan for greater detail) -- This patient is, or has been, hemodynamically unstable.  We are treating with appropriate medications, as well as doing intensive diagnostic evaluation and  monitoring. Please see assessment and plan above for greater detail.; -- This patient is believed to have, or have the potential for, a life-threatening gastrointestinal hemorrhage. We are treating with appropriate blood products, fluids and/or pressors,  as indicated, as well as intensive monitoring. Please see assessment and plan above for greater detail.         Electronic Signatures:  Elina Jolly)  (Signed 24-Sep-2023 20:27)   Authored: Note Completion   Co-Signer: Service, Subjective  Data, Objective Data, Assessment and Plan, Note Completion  Patrick Gambino (Resident))  (Signed 23-Sep-2023 14:26)   Authored: Service, Subjective Data, Objective Data, Assessment  and Plan, Note Completion      Last Updated: 24-Sep-2023 20:27 by Elina Jolly)

## 2023-09-30 NOTE — PROGRESS NOTES
Service: Medicine     Subjective Data:   MORGAN FERREIRA is a 87 year old Female who is Hospital Day # 6.     Patient seen today resting comfortably has a clot.    Objective Data:     Objective Information:      T   P  R  BP   MAP  SpO2   Value  37  78  28  136/72   93  96%  Date/Time 9/26 11:51 9/26 11:51 9/26 11:51 9/26 11:51  9/26 11:51 9/26 11:51  Range  (36.6C - 37.4C )  (76 - 115 )  (17 - 38 )  (98 - 142 )/ (50 - 79 )  (64 - 111 )  (96% - 99% )  Highest temp of 37.4 C was recorded at 9/25 13:30      Pain reported at 9/26 11:51: 4 = Moderate    ---- Intake and Output  -----  Mn/Dy/Year Time  Intake   Output  Net  Sep 26, 2023 6:00 am  240   500  -260  Sep 25, 2023 10:00 pm  240   901  -661  Sep 25, 2023 2:00 pm  240   0  240    The Intake and Output Totals for the last 24 hours are:      Intake   Output  Net      720   1401  -681      T   P  R  BP   MAP  SpO2   Value  37  78  28  136/72   93  96%  Date/Time 9/26 11:51 9/26 11:51 9/26 11:51 9/26 11:51  9/26 11:51 9/26 11:51  Range  (36.6C - 37.4C )  (76 - 115 )  (17 - 38 )  (98 - 142 )/ (50 - 79 )  (64 - 111 )  (96% - 99% )  Highest temp of 37.4 C was recorded at 9/25 13:30    Physical Exam by System:    Constitutional: Well developed, awake/alert/oriented  x3, no distress, cooperative   Eyes: PERRL, EOMI, clear sclera   ENMT: Mucous membranes moist, no apparent injury,  no lesions seen   Head/Neck: Neck supple, no JVD, trachea midline   Respiratory/Thorax: Patent airways, normal breath  sounds with good chest expansion, thorax symmetric   Cardiovascular: Regular, rate and rhythm, no murmurs,  2+ equal pulses of the extremities, normal S 1and S 2   Gastrointestinal: Nondistended, soft, non-tender,  no rebound tenderness or guarding, +BS   Musculoskeletal: ROM intact, no joint swelling, normal  strength   Extremities: Normal extremities, no cyanosis, edema  or wounds   Neurological: Alert and oriented x3, no focal deficits,  sensation intact   Psychological:  Appropriate mood and behavior   Skin: Warm and dry, no lesions, no rashes     Medication:    Medications:          Continuous Medications       --------------------------------  No continuous medications are active       Scheduled Medications       --------------------------------    1. Ammonium Lactate 12% Topical:  1  application(s)  Topical  2 Times a Day    2. Pantoprazole:  40  mg  Oral  Every 12 Hours    3. Pneumococcal 23-Valent (PNEUMOVAX) Vaccine:  0.5  mL  IntraMuscular  Once    4. Technetium Tc 99m Labeled Red Blood Cells (ULTRATAG - Radiology Contrast):  25  milliCurie  IntraVenous Push  Once         PRN Medications       --------------------------------    1. Acetaminophen:  650  mg  Oral  Every 6 Hours    2. Albuterol 2.5 mg/ 3 mL Nebulizer Soln:  3  mL  Inhalation  Every 4 Hours    3. hydrOXYzine Hydrochloride (ATARAX):  50  mg  Oral  Every 6 Hours    4. Magnesium Sulfate 2 gram/Sterile Water 50 mL Premix Soln:  2  gram(s)  IntraVenous Piggyback  Every 6 Hours    5. Magnesium Sulfate 4 gram/Sterile Water 100 mL Premix Soln:  4  gram(s)  IntraVenous Piggyback  Every 6 Hours    6. Potassium Chloride 20 mEq/Sterile Water 100 mL Premix IVPB:  20  mEq  IntraVenous Piggyback  Every 6 Hours    7. Potassium Chloride Powder Packet:  20  mEq  Oral  Every 6 Hours    8. Potassium Chloride Powder Packet:  40  mEq  Oral  Every 6 Hours    9. Sodium Chloride 0.9% Injectable Flush:  10  mL  IntraVenous Flush  Every 8 Hours and as Needed        Recent Lab Results:    Results:    CBC: 9/26/2023 08:31              \     Hgb     /                              \     8.2 L    /  WBC  ----------------  Plt               10.2       ----------------    291              /     Hct     \                              /     24.5 L    \            RBC: 2.77 L    MCV: 88           RFP: 9/26/2023 04:20  NA+        Cl-     BUN  /                         136    104    10  /  --------------------------------  Glucose                 ---------------------------  99    K+     HCO3-   Creat \                         3.5    26    0.81  \  Calcium : 7.7 LAnion Gap : 10          Albumin : 2.6 L     Phos : 3.4      Assessment and Plan:        Admitting Dx:   GI bleed: Entered Date: 21-Sep-2023 22:38       Additional Dx:   Hemodynamic instability: Entered Date: 24-Sep-2023 20:26   Acute anemia: Entered Date: 24-Sep-2023 20:26   Iron deficiency anemia due to chronic blood loss: Entered  Date: 23-Sep-2023 09:48   Leukocytosis: Entered Date: 21-Sep-2023 22:38       Medical History:   Swelling of right extremity: Onset Date: 26-Sep-2023, Entered  Date: 26-Sep-2023 11:29   Right arm pain: Onset Date: 26-Sep-2023, Entered Date: 26-Sep-2023  11:29   Anticoagulation management encounter: Onset Date: 25-Sep-2023,  Entered Date: 25-Sep-2023 12:02   Fracture of left shoulder: Entered Date: 21-Sep-2023 22:22   History of COVID-19: Onset Date: 04-Sep-2023, Entered Date:  21-Sep-2023 22:21   Hypertension: Entered Date: 21-Sep-2023 22:21   GERD (gastroesophageal reflux disease): Entered Date: 21-Sep-2023  21:22   Encephalopathy: Entered Date: 21-Sep-2023 21:22   Iron deficiency anemia: Entered Date: 21-Sep-2023 21:21   Pulmonary embolism: Onset Date: Feb 2023, Entered Date: 21-Sep-2023  21:21   History of femur fracture: Entered Date: 21-Sep-2023 21:20   History of fall: Entered Date: 21-Sep-2023 21:20    Code Status:  ·  Code Status Full Code     Assessment:    Continue current medications  continue IV  Anticoagulation  Vascular consult  Continue ICU care    Case discussed with nurse practitioner      Electronic Signatures:  Cedrick Mehta)  (Signed 26-Sep-2023 13:31)   Authored: Service, Subjective Data, Objective Data, Assessment  and Plan, Note Completion      Last Updated: 26-Sep-2023 13:31 by Cedrick Mehta)

## 2023-09-30 NOTE — PROGRESS NOTES
Service:   Critical Care Service:  ·  Service MICU     Subjective Data:   ID Statement:  MORGAN FERREIRA is a 87 year old Female who is Hospital Day # 4 and ICU Day #3.     Overnight patient had dark stools with clots.  Bright red blood mixed in with stool positive for injury hemoglobin of 8.4 down from 10.2.  Patient denies any discomfort or pain claims she feels very  well.  For colonoscopy later on today to evaluate source of bleeding.  Colonoscopy was unable to identify the source of bleeding however numerous medium mouth diverticula were noted throughout the colon with narrowing of the colon in association with  diverticular opening with impacted diverticulum noted as well as an evidence of past bleeding from the opening.  Red blood noted in the transverse colon despite aggressive lavage no bleeding source identified.  External hemorrhoids were noted.  Per GI  continue supportive care.  Clear liquid diet today.  Continue to trend hemoglobin.  Transfuse for hemoglobin less than 7.  Consider stopping anticoagulation indefinitely no need for repeat colonoscopy at this time.    Objective Data:     ·  Objective Information      T   P  R  BP   MAP  SpO2   Value  36.8  94  29         97%  Date/Time 9/24 8:00 9/24 8:00 9/24 8:00      9/24 8:00  Range  (36.2C - 37.3C )  (70 - 96 )  (14 - 29 )      (92% - 99% )  Highest temp of 37.3 C was recorded at 9/23 14:45      Pain reported at 9/24 8:00: 0 = None      Direct Arterial Blood Pressure  Systolic 130 (84 - 130) 9/24 8:00  Diastolic (mm Hg) 60 (22 - 60) 9/24 8:00  Mean (mm Hg) 86 (46 - 86) 9/24 8:00  Pulse Pressure (mm Hg) 70 (50 - 84) 9/24 8:00      ---- Intake and Output  -----  Mn/Dy/Year Time  Intake   Output  Net  Sep 23, 2023 10:00 pm  650   350  300  Sep 23, 2023 2:00 pm  930   0  930    The Intake and Output Totals for the last 24 hours are:      Intake   Output  Net      1580   350  1230    Date:            Weight/Scale Type:  23-Sep-2023 06:00  64.5  kg          22-Sep-2023 05:00  63.5  kg / bed  21-Sep-2023 22:10  64.4  kg / bed  21-Sep-2023 18:00  64.5  kg             T   P  R  BP   MAP  SpO2   Value  36.8  94  29         97%  Date/Time 9/24 8:00 9/24 8:00 9/24 8:00      9/24 8:00  Range  (36.2C - 37.3C )  (70 - 96 )  (14 - 29 )      (92% - 99% )  Highest temp of 37.3 C was recorded at 9/23 14:45    Physical Exam Narrative:  ·  Physical Exam:    Constitutional: Well appearing, no acute distress, oriented to person/place/time  Psych: Age appropriate insight, judgement, no psychomotor agitation  Neuro: GCS 15, spontaneously moves all extremities  Head: Atraumatic, no silva sign, no raccoon eyes  Eyes: Spontaneously open, PERRL, EOMI, no scleral icterus or conjunctival injection  ENT: No gross deformation, mucous membranes moist, trachea midline, no uvular deviation  Neck: Supple, nontender, no ROM restriction, no JVD  Respiratory:  respirations nonlabored, equal chest rise, no wheezes rales or rhonchi  Cardiovascular: Regular rate and rhythm,   distal pulses 2+ symmetric  Gastrointestinal: No gross deformation, normal bowel sounds, soft, nontender, nondistended  Musculoskeletal: Normal Appearance, Full ROM of extremities, no tenderness, no edema  Integumentary: Warm, dry, rash is still in place from the previous day.  Patient claims that Benadryl has been helping but not lasting the duration of 4 hours.  Plan to switch to Precedex later on today and attempt to relieve her itching      Allergies:       Allergies:  ·  No Known Allergies :     Medications:    Medications:          Continuous Medications       --------------------------------    1. Lactated Ringers Infusion:  1000  mL  IntraVenous  <Continuous>    2. Phenylephrine 10 mg/ NaCL 0.9% 250 mL Infusion:  0.5  mcg/kg/min  IntraVenous  <Continuous>         Scheduled Medications       --------------------------------    1. Ammonium Lactate 12% Topical:  1  application(s)  Topical  2 Times a Day    2. Iohexol  (Omnipaque 350-Radiology Contrast):  96.75  mL  IntraVenous Push  Once    3. Pantoprazole Injectable:  40  mg  IntraVenous Push  Every 12 Hours    4. Pneumococcal 23-Valent (PNEUMOVAX) Vaccine:  0.5  mL  IntraMuscular  Once         PRN Medications       --------------------------------    1. Albuterol 2.5 mg/ 3 mL Nebulizer Soln:  3  mL  Inhalation  Every 4 Hours    2. diphenhydrAMINE Injectable:  25  mg  IntraVenous Push  Every 4 Hours    3. Magnesium Sulfate 2 gram/Sterile Water 50 mL Premix Soln:  2  gram(s)  IntraVenous Piggyback  Every 6 Hours    4. Magnesium Sulfate 4 gram/Sterile Water 100 mL Premix Soln:  4  gram(s)  IntraVenous Piggyback  Every 6 Hours    5. Potassium Chloride 20 mEq/Sterile Water 100 mL Premix IVPB:  20  mEq  IntraVenous Piggyback  Every 6 Hours    6. Potassium Chloride Powder Packet:  20  mEq  Oral  Every 6 Hours    7. Potassium Chloride Powder Packet:  40  mEq  Oral  Every 6 Hours    8. Sodium Chloride 0.9% Injectable Flush:  10  mL  IntraVenous Flush  Every 8 Hours and as Needed        Recent Lab Results:    Results:    CBC: 9/24/2023 03:20              \     Hgb     /                              \     8.4 L    /  WBC  ----------------  Plt               16.1 H    ----------------    323              /     Hct     \                              /     25.6 L    \            RBC: 2.91 L    MCV: 88           CMP: 9/24/2023 03:20  NA+        Cl-     BUN  /                         138    108 H   21  /  --------------------------------  Glucose                ---------------------------  97    K+     HCO3-   Creat \                         4.1    20 L   0.95  \           \  T Bili  /                    \  0.7  /  AST  x ---- x ALT        8 Lx ---- x 3 L         /  Alk P   \               /  48  \  Calcium : 7.6 L    Anion Gap : 14     Albumin : 2.6 L     T Protein : 5.0 L          Coagulation: 9/24/2023 03:20  PT  /                    13.9 H /  -------<    INR          ----------<       1.2 H  PTT\                              \                         I have reviewed these laboratory results:    Complete Blood Count  24-Sep-2023 03:20:00      Result Value    White Blood Cell Count  16.1   H   Nucleated Erythrocyte Count  0.0    Red Blood Cell Count  2.91   L   HGB  8.4   L   HCT  25.6   L   MCV  88    MCHC  32.8    PLT  323    RDW-CV  16.9   H     Comprehensive Metabolic Panel  24-Sep-2023 03:20:00      Result Value    Glucose, Serum  97    NA  138    K  4.1    CL  108   H   Bicarbonate, Serum  20   L   Anion Gap, Serum  14    BUN  21    CREAT  0.95    GFR Female  58   A   Calcium, Serum  7.6   L   ALB  2.6   L   ALKP  48    T Pro  5.0   L   T Bili  0.7    Alanine Aminotransferase, Serum  3   L   Aspartate Transaminase, Serum  8   L     PT + INR, Plasma  24-Sep-2023 03:20:00      Result Value    Prothrombin Time, Plasma  13.9   H   International Normalized Ratio, Plasma  1.2   H     Magnesium, Serum  24-Sep-2023 03:20:00      Result Value    Magnesium, Serum  2.25      Phosphorus, Serum  24-Sep-2023 03:20:00      Result Value    Phosphorus, Serum  3.7        Assessment and Plan:   Daily Risk Screen:  ·  Does patient have a central line? no   ·  Does patient have an indwelling urinary catheter? no   ·  Is the patient intubated? no     Assessment/Plan:  ·  Assessment/Plan:    -GI bleed  -Diarrhea  -Hemorrhagic shock  -Rash    Assessment and plan  Neurology  -ICU i Cam protocol  -ABCDEF bundle    Cardiology  -Monitor blood pressure MAP greater than or equal to 65  -Continuous cardiovascular monitoring    Respiratory  -No concerns    GI  -Monitor I's and O's and bowel movements check for blood in the stool and urine  -Continue with Protonix  -Continue supportive care  -Clear liquid diet  -Continue to trend hemoglobin  -Transfuse for hemoglobin less than 7  -Consider stopping anticoagulation indefinitely  -No repeat colonoscopy as today's was unable to find bleeding source.(9/24/2023)  -Fluid  replenishment if more bouts of diarrhea.    Heme  -No anticoagulation  -Patient has refused use of SCDs  -Monitor hemoglobin possible transfusion of another unit of blood.    Renal  -Monitor I's and O's  -Monitor electrolytes     Skin  -Monitor rash Benadryl prn switching to atarax    Infectious disease  -No concerns    Plan was discussed with attending physician  Patrick Gambino MD PGY-1 ER      Code Status:  ·  Code Status Full Code     Attestation:   Note Completion:  I am a:  Resident/Fellow   Attending Attestation I saw and evaluated the patient.  I personally obtained the key and critical portions of the history and physical exam or was physically present for key and  critical portions performed by the resident/fellow. I reviewed the resident/fellow?s documentation and discussed the patient with the resident/fellow.  I agree with the resident/fellow?s medical decision making as documented in the note.     I personally evaluated the patient on 24-Sep-2023   Comments/ Additional Findings      Lower GI bleed - likely 2/2 diverticulosis. Has been on Eliquis for provoked PE.   Stop Eliquis for now (last dose was 72 hours ago). Check US doppler of LEs.   Continue to monitor in the ICU given hemodynamic instability.      Critical Care Patient I have reviewed and evaluated the most recent data and results, personally examined the patient, and formulated the plan of care as presented above.  This patient  was critically ill and required continued critical care treatment. Teaching and any separately billable procedures are not included in the time calculation.    Billing Provider Critical Care Time 35 minute(s)   Primary Critical Care Issue/Treatment (See Assessment and Plan for greater detail) -- This patient is believed to have, or have the potential for, a life-threatening gastrointestinal hemorrhage. We are treating with appropriate blood products,  fluids and/or pressors, as indicated, as well as intensive  monitoring. Please see assessment and plan above for greater detail.; -- This patient is, or has been, hemodynamically unstable.  We are treating with appropriate medications, as well as doing  intensive diagnostic evaluation and monitoring. Please see assessment and plan above for greater detail.         Electronic Signatures:  Elina Jolly)  (Signed 24-Sep-2023 20:31)   Authored: Note Completion   Co-Signer: Service, Subjective Data, Objective Data, Assessment and Plan, Note Completion  Patrick Gambino (Resident))  (Signed 24-Sep-2023 12:39)   Authored: Service, Subjective Data, Objective Data, Assessment  and Plan, Note Completion      Last Updated: 24-Sep-2023 20:31 by Elina Jolly)

## 2023-10-06 LAB
ATRIAL RATE: 120 BPM
P AXIS: 14 DEGREES
P OFFSET: 174 MS
P ONSET: 123 MS
PR INTERVAL: 196 MS
Q ONSET: 221 MS
QRS COUNT: 20 BEATS
QRS DURATION: 92 MS
QT INTERVAL: 290 MS
QTC CALCULATION(BAZETT): 409 MS
QTC FREDERICIA: 365 MS
R AXIS: -27 DEGREES
T AXIS: 111 DEGREES
T OFFSET: 366 MS
VENTRICULAR RATE: 120 BPM

## 2023-10-10 ENCOUNTER — NURSING HOME VISIT (OUTPATIENT)
Dept: POST ACUTE CARE | Facility: EXTERNAL LOCATION | Age: 88
End: 2023-10-10
Payer: MEDICARE

## 2023-10-10 DIAGNOSIS — S42.296A OTHER CLOSED NONDISPLACED FRACTURE OF PROXIMAL END OF HUMERUS, UNSPECIFIED LATERALITY, INITIAL ENCOUNTER: Primary | ICD-10-CM

## 2023-10-10 DIAGNOSIS — M62.81 MUSCLE WEAKNESS (GENERALIZED): ICD-10-CM

## 2023-10-10 DIAGNOSIS — D50.0 IRON DEFICIENCY ANEMIA DUE TO CHRONIC BLOOD LOSS: ICD-10-CM

## 2023-10-10 DIAGNOSIS — I10 PRIMARY HYPERTENSION: ICD-10-CM

## 2023-10-10 DIAGNOSIS — R60.0 BILATERAL LEG EDEMA: ICD-10-CM

## 2023-10-10 PROBLEM — U07.1 COVID-19: Status: RESOLVED | Noted: 2023-09-10 | Resolved: 2023-10-10

## 2023-10-10 PROCEDURE — 99348 HOME/RES VST EST LOW MDM 30: CPT

## 2023-10-10 NOTE — ASSESSMENT & PLAN NOTE
-Blood loss in setting of GI bleed with unknown source  -No further episodes of hematochezia   -Recheck CBC

## 2023-10-10 NOTE — PROGRESS NOTES
Visit  Note   Subjective   Natasha Lundberg is a 88 y.o. female who is being seen and evaluated for multiple medical problems. Nursing notes, vital signs, and labs were reviewed in the local facility chart.    Candis is an 88-year-old female who is being seen this afternoon in her facility.  She was recently discharged from the hospital for a gastrointestinal bleed, and states there is no identified source of that bleed.  Since then she has not been on her anticoagulation (Eliquis) and she has also not seen any further episodes of hematochezia.  She has noticed some severe leg swelling in her lower extremities.  She states that sometimes her legs can swell but this is worse with never been, and she noticed this after being hospitalized.  She denies cough, shortness of breath, or any difficulty breathing.  She is in a left shoulder sling for a left arm fracture, and will be seeing the Ortho doctor on Friday.  She denies other complaints at this time.       Objective   There were no vitals taken for this visit.; Vitals reviewed in charts at nursing home  Physical Exam  Vitals reviewed.   Constitutional:       Appearance: Normal appearance.   HENT:      Head: Normocephalic and atraumatic.   Eyes:      Conjunctiva/sclera: Conjunctivae normal.   Cardiovascular:      Rate and Rhythm: Normal rate and regular rhythm.      Pulses: Normal pulses.      Heart sounds: Normal heart sounds.   Pulmonary:      Effort: Pulmonary effort is normal.      Breath sounds: Normal breath sounds.   Musculoskeletal:      Cervical back: Normal range of motion.      Right lower leg: 3+ Pitting Edema present.      Left lower le+ Pitting Edema present.   Skin:     General: Skin is warm and dry.   Neurological:      Mental Status: She is alert and oriented to person, place, and time.   Psychiatric:         Mood and Affect: Mood normal.         Behavior: Behavior normal.       Patient requires a wheelchair for use within the home due to limited mobility  and lower extremity weakness.  Able to safely operate a wheelchair     Assessment/Plan   Problem List Items Addressed This Visit       Iron deficiency anemia due to chronic blood loss     -Blood loss in setting of GI bleed with unknown source  -No further episodes of hematochezia   -Recheck CBC         Muscle weakness (generalized)    Primary hypertension    Humeral fracture - Primary     -Has Left shoulder sling  -Ortho to see her on Friday for follow up         Bilateral leg edema     -BL pitting edema 2/3+  -Most likely in setting of volume overload from recent hospitalization  -She has never received lasix before, so she will start 20 mg lasix BID for 5 days  -After 5 days 20 mg PRN for leg swelling  -Reassess electrolytes with RFP and Magnesium level after starting completion of 5 days lasix           Meds, orders, nursing notes reviewed.  Nurse will monitor and update physician of any change: Discussed with patient and provided information.  Supportive care will be given.  Follow-up in 1 month or earlier as needed.     Perla Nelson, DO  Family Medicine Resident, PGY-1

## 2023-10-10 NOTE — ASSESSMENT & PLAN NOTE
-BL pitting edema 2/3+  -Most likely in setting of volume overload from recent hospitalization  -She has never received lasix before, so she will start 20 mg lasix BID for 5 days  -After 5 days 20 mg PRN for leg swelling  -Reassess electrolytes with RFP and Magnesium level after starting completion of 5 days lasix

## 2023-11-07 ENCOUNTER — NURSING HOME VISIT (OUTPATIENT)
Dept: POST ACUTE CARE | Facility: EXTERNAL LOCATION | Age: 88
End: 2023-11-07
Payer: MEDICARE

## 2023-11-07 DIAGNOSIS — I10 PRIMARY HYPERTENSION: ICD-10-CM

## 2023-11-07 DIAGNOSIS — S42.295A OTHER CLOSED NONDISPLACED FRACTURE OF PROXIMAL END OF LEFT HUMERUS, INITIAL ENCOUNTER: ICD-10-CM

## 2023-11-07 DIAGNOSIS — M62.81 MUSCLE WEAKNESS (GENERALIZED): ICD-10-CM

## 2023-11-07 DIAGNOSIS — R26.2 DIFFICULTY WALKING: Primary | ICD-10-CM

## 2023-11-07 PROBLEM — R60.0 BILATERAL LEG EDEMA: Status: RESOLVED | Noted: 2023-10-10 | Resolved: 2023-11-07

## 2023-11-07 PROBLEM — S42.209A CLOSED FRACTURE OF UPPER END OF HUMERUS: Status: ACTIVE | Noted: 2023-09-01

## 2023-11-07 PROCEDURE — 99348 HOME/RES VST EST LOW MDM 30: CPT

## 2023-11-07 NOTE — ASSESSMENT & PLAN NOTE
-Continues to use wheelchair for majority of the day  -Only partial weight bearing status for transfers only  -Continue current PT/OT plans for continue strength training

## 2023-11-07 NOTE — ASSESSMENT & PLAN NOTE
-Doing well since seeing ortho  -Is no longer in sling and has no pain with movement or decrease in ROM  -Continue working with PT/OT for continued strength exercises

## 2023-11-09 NOTE — PROGRESS NOTES
I saw and evaluated the patient. I personally obtained the key and critical portions of the history and physical exam or was physically present for key and critical portions performed by the resident/fellow. I reviewed the resident/fellow's documentation and discussed the patient with the resident/fellow. I agree with the resident/fellow's medical decision making as documented in the note.    Patient seen with resident physician.  Continue on current medications continue supportive care.  Follow-up with orthopedics  Matthew Jones, DO

## 2023-12-20 ENCOUNTER — HOSPITAL ENCOUNTER (INPATIENT)
Facility: HOSPITAL | Age: 88
LOS: 3 days | Discharge: HOME | DRG: 194 | End: 2023-12-23
Attending: EMERGENCY MEDICINE | Admitting: INTERNAL MEDICINE
Payer: MEDICARE

## 2023-12-20 ENCOUNTER — APPOINTMENT (OUTPATIENT)
Dept: CARDIOLOGY | Facility: HOSPITAL | Age: 88
DRG: 194 | End: 2023-12-20
Payer: MEDICARE

## 2023-12-20 ENCOUNTER — APPOINTMENT (OUTPATIENT)
Dept: RADIOLOGY | Facility: HOSPITAL | Age: 88
DRG: 194 | End: 2023-12-20
Payer: MEDICARE

## 2023-12-20 DIAGNOSIS — J18.9 PNEUMONIA DUE TO INFECTIOUS ORGANISM, UNSPECIFIED LATERALITY, UNSPECIFIED PART OF LUNG: ICD-10-CM

## 2023-12-20 DIAGNOSIS — R06.02 SHORTNESS OF BREATH: Primary | ICD-10-CM

## 2023-12-20 PROBLEM — S42.209A CLOSED FRACTURE OF UPPER END OF HUMERUS: Status: RESOLVED | Noted: 2023-09-01 | Resolved: 2023-12-20

## 2023-12-20 PROBLEM — H35.3131 EARLY DRY STAGE NONEXUDATIVE AGE-RELATED MACULAR DEGENERATION OF BOTH EYES: Status: RESOLVED | Noted: 2023-04-24 | Resolved: 2023-12-20

## 2023-12-20 PROBLEM — K21.9 GASTROESOPHAGEAL REFLUX DISEASE WITHOUT ESOPHAGITIS: Status: RESOLVED | Noted: 2023-04-24 | Resolved: 2023-12-20

## 2023-12-20 PROBLEM — I10 PRIMARY HYPERTENSION: Status: RESOLVED | Noted: 2023-04-24 | Resolved: 2023-12-20

## 2023-12-20 PROBLEM — D72.829 LEUKOCYTOSIS: Status: ACTIVE | Noted: 2023-12-20

## 2023-12-20 PROBLEM — S42.309A HUMERAL FRACTURE: Status: RESOLVED | Noted: 2023-09-10 | Resolved: 2023-12-20

## 2023-12-20 PROBLEM — A41.9 SIRS DUE TO INFECTIOUS PROCESS WITH ACUTE ORGAN DYSFUNCTION (MULTI): Status: ACTIVE | Noted: 2023-12-20

## 2023-12-20 PROBLEM — R65.20 SIRS DUE TO INFECTIOUS PROCESS WITH ACUTE ORGAN DYSFUNCTION (MULTI): Status: ACTIVE | Noted: 2023-12-20

## 2023-12-20 PROBLEM — S72.91XD UNSPECIFIED FRACTURE OF RIGHT FEMUR, SUBSEQUENT ENCOUNTER FOR CLOSED FRACTURE WITH ROUTINE HEALING: Status: RESOLVED | Noted: 2023-04-24 | Resolved: 2023-12-20

## 2023-12-20 PROBLEM — M62.81 MUSCLE WEAKNESS (GENERALIZED): Status: RESOLVED | Noted: 2023-04-24 | Resolved: 2023-12-20

## 2023-12-20 PROBLEM — D50.0 IRON DEFICIENCY ANEMIA DUE TO CHRONIC BLOOD LOSS: Status: RESOLVED | Noted: 2023-04-24 | Resolved: 2023-12-20
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
## 2023-12-20 PROBLEM — I26.99 OTHER PULMONARY EMBOLISM WITHOUT ACUTE COR PULMONALE (MULTI): Status: RESOLVED | Noted: 2023-04-24 | Resolved: 2023-12-20

## 2023-12-20 PROBLEM — R26.2 DIFFICULTY WALKING: Status: RESOLVED | Noted: 2023-04-24 | Resolved: 2023-12-20

## 2023-12-20 LAB
ALBUMIN SERPL BCP-MCNC: 3.6 G/DL (ref 3.4–5)
ALP SERPL-CCNC: 84 U/L (ref 33–136)
ALT SERPL W P-5'-P-CCNC: 7 U/L (ref 7–45)
ANION GAP BLDV CALCULATED.4IONS-SCNC: 14 MMOL/L (ref 10–25)
ANION GAP SERPL CALC-SCNC: 17 MMOL/L (ref 10–20)
APPEARANCE UR: ABNORMAL
AST SERPL W P-5'-P-CCNC: 9 U/L (ref 9–39)
BACTERIA #/AREA URNS AUTO: ABNORMAL /HPF
BASE EXCESS BLDV CALC-SCNC: -1.9 MMOL/L (ref -2–3)
BASOPHILS # BLD AUTO: 0.11 X10*3/UL (ref 0–0.1)
BASOPHILS NFR BLD AUTO: 0.4 %
BILIRUB SERPL-MCNC: 0.5 MG/DL (ref 0–1.2)
BILIRUB UR STRIP.AUTO-MCNC: NEGATIVE MG/DL
BNP SERPL-MCNC: 76 PG/ML (ref 0–99)
BODY TEMPERATURE: ABNORMAL
BUN SERPL-MCNC: 24 MG/DL (ref 6–23)
CA-I BLDV-SCNC: 1.16 MMOL/L (ref 1.1–1.33)
CALCIUM SERPL-MCNC: 8.8 MG/DL (ref 8.6–10.3)
CARDIAC TROPONIN I PNL SERPL HS: 11 NG/L (ref 0–13)
CARDIAC TROPONIN I PNL SERPL HS: 12 NG/L (ref 0–13)
CHLORIDE BLDV-SCNC: 98 MMOL/L (ref 98–107)
CHLORIDE SERPL-SCNC: 96 MMOL/L (ref 98–107)
CO2 SERPL-SCNC: 20 MMOL/L (ref 21–32)
COLOR UR: YELLOW
CREAT SERPL-MCNC: 1.12 MG/DL (ref 0.5–1.05)
D DIMER PPP FEU-MCNC: 471 NG/ML FEU
EOSINOPHIL # BLD AUTO: 0.02 X10*3/UL (ref 0–0.4)
EOSINOPHIL NFR BLD AUTO: 0.1 %
ERYTHROCYTE [DISTWIDTH] IN BLOOD BY AUTOMATED COUNT: 14.4 % (ref 11.5–14.5)
FLUAV RNA RESP QL NAA+PROBE: NOT DETECTED
FLUBV RNA RESP QL NAA+PROBE: NOT DETECTED
GFR SERPL CREATININE-BSD FRML MDRD: 48 ML/MIN/1.73M*2
GLUCOSE BLDV-MCNC: 123 MG/DL (ref 74–99)
GLUCOSE SERPL-MCNC: 104 MG/DL (ref 74–99)
GLUCOSE UR STRIP.AUTO-MCNC: NEGATIVE MG/DL
HCO3 BLDV-SCNC: 22.2 MMOL/L (ref 22–26)
HCT VFR BLD AUTO: 33.9 % (ref 36–46)
HCT VFR BLD EST: 37 % (ref 36–46)
HGB BLD-MCNC: 10.8 G/DL (ref 12–16)
HGB BLDV-MCNC: 12.4 G/DL (ref 12–16)
IMM GRANULOCYTES # BLD AUTO: 0.29 X10*3/UL (ref 0–0.5)
IMM GRANULOCYTES NFR BLD AUTO: 1 % (ref 0–0.9)
INHALED O2 CONCENTRATION: 21 %
INR PPP: 2.9 (ref 0.9–1.1)
KETONES UR STRIP.AUTO-MCNC: NEGATIVE MG/DL
LACTATE BLDV-SCNC: 1.2 MMOL/L (ref 0.4–2)
LACTATE SERPL-SCNC: 0.7 MMOL/L (ref 0.4–2)
LEUKOCYTE ESTERASE UR QL STRIP.AUTO: ABNORMAL
LYMPHOCYTES # BLD AUTO: 1.43 X10*3/UL (ref 0.8–3)
LYMPHOCYTES NFR BLD AUTO: 4.9 %
MAGNESIUM SERPL-MCNC: 1.76 MG/DL (ref 1.6–2.4)
MCH RBC QN AUTO: 29.4 PG (ref 26–34)
MCHC RBC AUTO-ENTMCNC: 31.9 G/DL (ref 32–36)
MCV RBC AUTO: 92 FL (ref 80–100)
MONOCYTES # BLD AUTO: 2.28 X10*3/UL (ref 0.05–0.8)
MONOCYTES NFR BLD AUTO: 7.8 %
MRSA DNA SPEC QL NAA+PROBE: NOT DETECTED
NEUTROPHILS # BLD AUTO: 25.04 X10*3/UL (ref 1.6–5.5)
NEUTROPHILS NFR BLD AUTO: 85.8 %
NITRITE UR QL STRIP.AUTO: NEGATIVE
NRBC BLD-RTO: 0 /100 WBCS (ref 0–0)
OXYHGB MFR BLDV: 59.3 % (ref 45–75)
PCO2 BLDV: 35 MM HG (ref 41–51)
PH BLDV: 7.41 PH (ref 7.33–7.43)
PH UR STRIP.AUTO: 5 [PH]
PLATELET # BLD AUTO: 359 X10*3/UL (ref 150–450)
PO2 BLDV: 39 MM HG (ref 35–45)
POTASSIUM BLDV-SCNC: 4.5 MMOL/L (ref 3.5–5.3)
POTASSIUM SERPL-SCNC: 3.9 MMOL/L (ref 3.5–5.3)
PROT SERPL-MCNC: 7.7 G/DL (ref 6.4–8.2)
PROT UR STRIP.AUTO-MCNC: ABNORMAL MG/DL
PROTHROMBIN TIME: 33.1 SECONDS (ref 9.8–12.8)
RBC # BLD AUTO: 3.67 X10*6/UL (ref 4–5.2)
RBC # UR STRIP.AUTO: NEGATIVE /UL
RBC #/AREA URNS AUTO: ABNORMAL /HPF
RSV RNA RESP QL NAA+PROBE: NOT DETECTED
SAO2 % BLDV: 62 % (ref 45–75)
SARS-COV-2 RNA RESP QL NAA+PROBE: NOT DETECTED
SODIUM BLDV-SCNC: 130 MMOL/L (ref 136–145)
SODIUM SERPL-SCNC: 129 MMOL/L (ref 136–145)
SP GR UR STRIP.AUTO: 1.04
SQUAMOUS #/AREA URNS AUTO: ABNORMAL /HPF
UROBILINOGEN UR STRIP.AUTO-MCNC: <2 MG/DL
WBC # BLD AUTO: 29.2 X10*3/UL (ref 4.4–11.3)
WBC #/AREA URNS AUTO: ABNORMAL /HPF

## 2023-12-20 PROCEDURE — 87086 URINE CULTURE/COLONY COUNT: CPT | Mod: STJLAB

## 2023-12-20 PROCEDURE — 93005 ELECTROCARDIOGRAM TRACING: CPT

## 2023-12-20 PROCEDURE — 96367 TX/PROPH/DG ADDL SEQ IV INF: CPT

## 2023-12-20 PROCEDURE — 84145 PROCALCITONIN (PCT): CPT | Mod: STJLAB | Performed by: NURSE PRACTITIONER

## 2023-12-20 PROCEDURE — 81001 URINALYSIS AUTO W/SCOPE: CPT

## 2023-12-20 PROCEDURE — 85610 PROTHROMBIN TIME: CPT

## 2023-12-20 PROCEDURE — 87641 MR-STAPH DNA AMP PROBE: CPT

## 2023-12-20 PROCEDURE — 2500000001 HC RX 250 WO HCPCS SELF ADMINISTERED DRUGS (ALT 637 FOR MEDICARE OP): Performed by: NURSE PRACTITIONER

## 2023-12-20 PROCEDURE — 87449 NOS EACH ORGANISM AG IA: CPT | Mod: STJLAB | Performed by: NURSE PRACTITIONER

## 2023-12-20 PROCEDURE — 71045 X-RAY EXAM CHEST 1 VIEW: CPT | Mod: FR

## 2023-12-20 PROCEDURE — 83605 ASSAY OF LACTIC ACID: CPT

## 2023-12-20 PROCEDURE — 87636 SARSCOV2 & INF A&B AMP PRB: CPT

## 2023-12-20 PROCEDURE — 84484 ASSAY OF TROPONIN QUANT: CPT

## 2023-12-20 PROCEDURE — 96365 THER/PROPH/DIAG IV INF INIT: CPT

## 2023-12-20 PROCEDURE — 87899 AGENT NOS ASSAY W/OPTIC: CPT | Mod: STJLAB | Performed by: NURSE PRACTITIONER

## 2023-12-20 PROCEDURE — 85379 FIBRIN DEGRADATION QUANT: CPT

## 2023-12-20 PROCEDURE — 71275 CT ANGIOGRAPHY CHEST: CPT | Mod: FR

## 2023-12-20 PROCEDURE — 83880 ASSAY OF NATRIURETIC PEPTIDE: CPT

## 2023-12-20 PROCEDURE — 83735 ASSAY OF MAGNESIUM: CPT

## 2023-12-20 PROCEDURE — 36415 COLL VENOUS BLD VENIPUNCTURE: CPT

## 2023-12-20 PROCEDURE — 99285 EMERGENCY DEPT VISIT HI MDM: CPT | Performed by: EMERGENCY MEDICINE

## 2023-12-20 PROCEDURE — 84132 ASSAY OF SERUM POTASSIUM: CPT

## 2023-12-20 PROCEDURE — 85025 COMPLETE CBC W/AUTO DIFF WBC: CPT

## 2023-12-20 PROCEDURE — 1100000001 HC PRIVATE ROOM DAILY

## 2023-12-20 PROCEDURE — 71275 CT ANGIOGRAPHY CHEST: CPT | Mod: FOREIGN READ | Performed by: RADIOLOGY

## 2023-12-20 PROCEDURE — 93010 ELECTROCARDIOGRAM REPORT: CPT | Performed by: INTERNAL MEDICINE

## 2023-12-20 PROCEDURE — 80053 COMPREHEN METABOLIC PANEL: CPT

## 2023-12-20 PROCEDURE — 2550000001 HC RX 255 CONTRASTS: Performed by: EMERGENCY MEDICINE

## 2023-12-20 PROCEDURE — 2500000004 HC RX 250 GENERAL PHARMACY W/ HCPCS (ALT 636 FOR OP/ED): Performed by: NURSE PRACTITIONER

## 2023-12-20 PROCEDURE — 2500000004 HC RX 250 GENERAL PHARMACY W/ HCPCS (ALT 636 FOR OP/ED)

## 2023-12-20 PROCEDURE — 71045 X-RAY EXAM CHEST 1 VIEW: CPT | Mod: FOREIGN READ | Performed by: RADIOLOGY

## 2023-12-20 PROCEDURE — 99222 1ST HOSP IP/OBS MODERATE 55: CPT | Performed by: NURSE PRACTITIONER

## 2023-12-20 PROCEDURE — 96375 TX/PRO/DX INJ NEW DRUG ADDON: CPT

## 2023-12-20 PROCEDURE — 87040 BLOOD CULTURE FOR BACTERIA: CPT | Mod: STJLAB

## 2023-12-20 RX ORDER — POLYETHYLENE GLYCOL 3350 17 G/17G
17 POWDER, FOR SOLUTION ORAL DAILY PRN
COMMUNITY

## 2023-12-20 RX ORDER — POLYETHYLENE GLYCOL 3350 17 G/17G
17 POWDER, FOR SOLUTION ORAL DAILY PRN
Status: DISCONTINUED | OUTPATIENT
Start: 2023-12-20 | End: 2023-12-23 | Stop reason: HOSPADM

## 2023-12-20 RX ORDER — FUROSEMIDE 20 MG/1
20 TABLET ORAL DAILY PRN
COMMUNITY

## 2023-12-20 RX ORDER — TALC
3 POWDER (GRAM) TOPICAL NIGHTLY PRN
Status: DISCONTINUED | OUTPATIENT
Start: 2023-12-20 | End: 2023-12-23 | Stop reason: HOSPADM

## 2023-12-20 RX ORDER — OXYCODONE HYDROCHLORIDE 5 MG/1
5 TABLET ORAL EVERY 6 HOURS PRN
Status: DISCONTINUED | OUTPATIENT
Start: 2023-12-20 | End: 2023-12-23 | Stop reason: HOSPADM

## 2023-12-20 RX ORDER — OXYCODONE HYDROCHLORIDE 5 MG/1
5 TABLET ORAL EVERY 6 HOURS PRN
Status: DISCONTINUED | OUTPATIENT
Start: 2023-12-20 | End: 2023-12-20

## 2023-12-20 RX ORDER — BENZONATATE 100 MG/1
100 CAPSULE ORAL 3 TIMES DAILY PRN
COMMUNITY

## 2023-12-20 RX ORDER — ALBUTEROL SULFATE 0.83 MG/ML
2.5 SOLUTION RESPIRATORY (INHALATION) EVERY 6 HOURS PRN
Status: DISCONTINUED | OUTPATIENT
Start: 2023-12-20 | End: 2023-12-23 | Stop reason: HOSPADM

## 2023-12-20 RX ORDER — GUAIFENESIN 600 MG/1
600 TABLET, EXTENDED RELEASE ORAL 2 TIMES DAILY
COMMUNITY
Start: 2023-12-16 | End: 2023-12-23 | Stop reason: HOSPADM

## 2023-12-20 RX ORDER — PANTOPRAZOLE SODIUM 40 MG/1
40 TABLET, DELAYED RELEASE ORAL 2 TIMES DAILY
COMMUNITY

## 2023-12-20 RX ORDER — ALBUTEROL SULFATE 90 UG/1
2 AEROSOL, METERED RESPIRATORY (INHALATION) EVERY 6 HOURS PRN
COMMUNITY
End: 2024-03-10 | Stop reason: ENTERED-IN-ERROR

## 2023-12-20 RX ORDER — ACETAMINOPHEN 325 MG/1
650 TABLET ORAL ONCE
Status: COMPLETED | OUTPATIENT
Start: 2023-12-20 | End: 2023-12-20

## 2023-12-20 RX ORDER — PANTOPRAZOLE SODIUM 40 MG/1
40 TABLET, DELAYED RELEASE ORAL 2 TIMES DAILY
Status: DISCONTINUED | OUTPATIENT
Start: 2023-12-20 | End: 2023-12-23 | Stop reason: HOSPADM

## 2023-12-20 RX ORDER — ACETAMINOPHEN 325 MG/1
650 TABLET ORAL EVERY 4 HOURS PRN
Status: DISCONTINUED | OUTPATIENT
Start: 2023-12-20 | End: 2023-12-23 | Stop reason: HOSPADM

## 2023-12-20 RX ORDER — OXYCODONE HYDROCHLORIDE 5 MG/1
5 TABLET ORAL EVERY 6 HOURS PRN
COMMUNITY

## 2023-12-20 RX ORDER — GUAIFENESIN 100 MG/5ML
200 SOLUTION ORAL EVERY 4 HOURS PRN
Status: DISCONTINUED | OUTPATIENT
Start: 2023-12-20 | End: 2023-12-23 | Stop reason: HOSPADM

## 2023-12-20 RX ORDER — GUAIFENESIN 600 MG/1
600 TABLET, EXTENDED RELEASE ORAL 2 TIMES DAILY
Status: DISCONTINUED | OUTPATIENT
Start: 2023-12-20 | End: 2023-12-23 | Stop reason: HOSPADM

## 2023-12-20 RX ORDER — ACETAMINOPHEN 650 MG/1
650 SUPPOSITORY RECTAL EVERY 4 HOURS PRN
Status: DISCONTINUED | OUTPATIENT
Start: 2023-12-20 | End: 2023-12-23 | Stop reason: HOSPADM

## 2023-12-20 RX ORDER — CEFTRIAXONE 2 G/50ML
2 INJECTION, SOLUTION INTRAVENOUS EVERY 24 HOURS
Status: DISCONTINUED | OUTPATIENT
Start: 2023-12-20 | End: 2023-12-23 | Stop reason: HOSPADM

## 2023-12-20 RX ORDER — SODIUM CHLORIDE 9 MG/ML
100 INJECTION, SOLUTION INTRAVENOUS CONTINUOUS
Status: ACTIVE | OUTPATIENT
Start: 2023-12-20 | End: 2023-12-21

## 2023-12-20 RX ORDER — ACETAMINOPHEN 160 MG/5ML
650 SOLUTION ORAL EVERY 4 HOURS PRN
Status: DISCONTINUED | OUTPATIENT
Start: 2023-12-20 | End: 2023-12-23 | Stop reason: HOSPADM

## 2023-12-20 RX ORDER — CALCIUM CARBONATE 500(1250)
625 TABLET ORAL 2 TIMES DAILY
Status: DISCONTINUED | OUTPATIENT
Start: 2023-12-20 | End: 2023-12-23 | Stop reason: HOSPADM

## 2023-12-20 RX ORDER — FUROSEMIDE 20 MG/1
20 TABLET ORAL DAILY PRN
Status: DISCONTINUED | OUTPATIENT
Start: 2023-12-21 | End: 2023-12-23 | Stop reason: HOSPADM

## 2023-12-20 RX ORDER — BENZONATATE 100 MG/1
100 CAPSULE ORAL 3 TIMES DAILY PRN
Status: DISCONTINUED | OUTPATIENT
Start: 2023-12-20 | End: 2023-12-23 | Stop reason: HOSPADM

## 2023-12-20 RX ORDER — ACETAMINOPHEN 500 MG
1000 TABLET ORAL EVERY 8 HOURS PRN
COMMUNITY

## 2023-12-20 RX ORDER — IBUPROFEN 200 MG
1 CAPSULE ORAL 2 TIMES DAILY
COMMUNITY

## 2023-12-20 RX ORDER — CHOLECALCIFEROL (VITAMIN D3) 50 MCG
50 TABLET ORAL DAILY
COMMUNITY

## 2023-12-20 RX ADMIN — AZITHROMYCIN MONOHYDRATE 500 MG: 500 INJECTION, POWDER, LYOPHILIZED, FOR SOLUTION INTRAVENOUS at 13:01

## 2023-12-20 RX ADMIN — OXYCODONE HYDROCHLORIDE 5 MG: 5 TABLET ORAL at 22:20

## 2023-12-20 RX ADMIN — BENZONATATE 100 MG: 100 CAPSULE ORAL at 17:54

## 2023-12-20 RX ADMIN — GUAIFENESIN 600 MG: 600 TABLET ORAL at 21:02

## 2023-12-20 RX ADMIN — GUAIFENESIN 200 MG: 200 SOLUTION ORAL at 22:20

## 2023-12-20 RX ADMIN — CEFTRIAXONE SODIUM 2 G: 2 INJECTION, SOLUTION INTRAVENOUS at 21:00

## 2023-12-20 RX ADMIN — ACETAMINOPHEN 650 MG: 325 TABLET ORAL at 17:54

## 2023-12-20 RX ADMIN — CALCIUM 625 MG: 500 TABLET ORAL at 21:02

## 2023-12-20 RX ADMIN — PANTOPRAZOLE SODIUM 40 MG: 40 TABLET, DELAYED RELEASE ORAL at 21:02

## 2023-12-20 RX ADMIN — IOHEXOL 50 ML: 350 INJECTION, SOLUTION INTRAVENOUS at 13:32

## 2023-12-20 RX ADMIN — APIXABAN 5 MG: 5 TABLET, FILM COATED ORAL at 21:02

## 2023-12-20 RX ADMIN — Medication 2 G: at 12:25

## 2023-12-20 RX ADMIN — Medication 3 MG: at 22:20

## 2023-12-20 RX ADMIN — ACETAMINOPHEN 650 MG: 325 TABLET ORAL at 11:38

## 2023-12-20 RX ADMIN — SODIUM CHLORIDE 100 ML/HR: 9 INJECTION, SOLUTION INTRAVENOUS at 17:54

## 2023-12-20 RX ADMIN — SODIUM CHLORIDE 1000 ML: 9 INJECTION, SOLUTION INTRAVENOUS at 12:35

## 2023-12-20 RX ADMIN — PIPERACILLIN SODIUM AND TAZOBACTAM SODIUM 4.5 G: 4; .5 INJECTION, SOLUTION INTRAVENOUS at 12:35

## 2023-12-20 SDOH — SOCIAL STABILITY: SOCIAL INSECURITY: DO YOU FEEL UNSAFE GOING BACK TO THE PLACE WHERE YOU ARE LIVING?: NO

## 2023-12-20 SDOH — SOCIAL STABILITY: SOCIAL INSECURITY: ABUSE: ADULT

## 2023-12-20 SDOH — SOCIAL STABILITY: SOCIAL INSECURITY: ARE THERE ANY APPARENT SIGNS OF INJURIES/BEHAVIORS THAT COULD BE RELATED TO ABUSE/NEGLECT?: NO

## 2023-12-20 SDOH — SOCIAL STABILITY: SOCIAL INSECURITY: DO YOU FEEL ANYONE HAS EXPLOITED OR TAKEN ADVANTAGE OF YOU FINANCIALLY OR OF YOUR PERSONAL PROPERTY?: NO

## 2023-12-20 SDOH — SOCIAL STABILITY: SOCIAL INSECURITY: HAS ANYONE EVER THREATENED TO HURT YOUR FAMILY OR YOUR PETS?: NO

## 2023-12-20 SDOH — SOCIAL STABILITY: SOCIAL INSECURITY: DOES ANYONE TRY TO KEEP YOU FROM HAVING/CONTACTING OTHER FRIENDS OR DOING THINGS OUTSIDE YOUR HOME?: NO

## 2023-12-20 SDOH — SOCIAL STABILITY: SOCIAL INSECURITY: WERE YOU ABLE TO COMPLETE ALL THE BEHAVIORAL HEALTH SCREENINGS?: YES

## 2023-12-20 SDOH — SOCIAL STABILITY: SOCIAL INSECURITY: ARE YOU OR HAVE YOU BEEN THREATENED OR ABUSED PHYSICALLY, EMOTIONALLY, OR SEXUALLY BY ANYONE?: NO

## 2023-12-20 SDOH — SOCIAL STABILITY: SOCIAL INSECURITY: HAVE YOU HAD THOUGHTS OF HARMING ANYONE ELSE?: NO

## 2023-12-20 ASSESSMENT — ENCOUNTER SYMPTOMS
PSYCHIATRIC NEGATIVE: 1
DYSURIA: 1
BLOOD IN STOOL: 0
CHILLS: 1
FEVER: 1
ABDOMINAL PAIN: 0
NEUROLOGICAL NEGATIVE: 1
ACTIVITY CHANGE: 1
DIARRHEA: 0
DIAPHORESIS: 1
CONSTIPATION: 0
ABDOMINAL DISTENTION: 0
WHEEZING: 1
SHORTNESS OF BREATH: 1
NAUSEA: 0
COUGH: 1
CHEST TIGHTNESS: 1
PALPITATIONS: 0
FLANK PAIN: 0
HEMATURIA: 0
VOMITING: 0
FATIGUE: 1

## 2023-12-20 ASSESSMENT — ACTIVITIES OF DAILY LIVING (ADL)
GROOMING: NEEDS ASSISTANCE
DRESSING YOURSELF: NEEDS ASSISTANCE
HEARING - LEFT EAR: HEARING AID
ASSISTIVE_DEVICE: WHEELCHAIR;WALKER
PATIENT'S MEMORY ADEQUATE TO SAFELY COMPLETE DAILY ACTIVITIES?: YES
BATHING: NEEDS ASSISTANCE
FEEDING YOURSELF: NEEDS ASSISTANCE
ADEQUATE_TO_COMPLETE_ADL: NO
WALKS IN HOME: NEEDS ASSISTANCE
HEARING - RIGHT EAR: HEARING AID
TOILETING: NEEDS ASSISTANCE
LACK_OF_TRANSPORTATION: NO
JUDGMENT_ADEQUATE_SAFELY_COMPLETE_DAILY_ACTIVITIES: YES

## 2023-12-20 ASSESSMENT — PAIN - FUNCTIONAL ASSESSMENT: PAIN_FUNCTIONAL_ASSESSMENT: 0-10

## 2023-12-20 ASSESSMENT — LIFESTYLE VARIABLES
EVER HAD A DRINK FIRST THING IN THE MORNING TO STEADY YOUR NERVES TO GET RID OF A HANGOVER: NO
HAVE PEOPLE ANNOYED YOU BY CRITICIZING YOUR DRINKING: NO
HOW MANY STANDARD DRINKS CONTAINING ALCOHOL DO YOU HAVE ON A TYPICAL DAY: PATIENT DOES NOT DRINK
HOW OFTEN DO YOU HAVE A DRINK CONTAINING ALCOHOL: NEVER
HOW OFTEN DO YOU HAVE 6 OR MORE DRINKS ON ONE OCCASION: NEVER
HOW OFTEN DO YOU HAVE 6 OR MORE DRINKS ON ONE OCCASION: NEVER
SKIP TO QUESTIONS 9-10: 1
HOW OFTEN DO YOU HAVE A DRINK CONTAINING ALCOHOL: NEVER
AUDIT-C TOTAL SCORE: 0
HAVE YOU EVER FELT YOU SHOULD CUT DOWN ON YOUR DRINKING: NO
REASON UNABLE TO ASSESS: NO
AUDIT-C TOTAL SCORE: 0
EVER FELT BAD OR GUILTY ABOUT YOUR DRINKING: NO
HOW MANY STANDARD DRINKS CONTAINING ALCOHOL DO YOU HAVE ON A TYPICAL DAY: PATIENT DOES NOT DRINK
SKIP TO QUESTIONS 9-10: 1

## 2023-12-20 ASSESSMENT — COGNITIVE AND FUNCTIONAL STATUS - GENERAL
TURNING FROM BACK TO SIDE WHILE IN FLAT BAD: A LITTLE
DAILY ACTIVITIY SCORE: 18
DRESSING REGULAR LOWER BODY CLOTHING: A LITTLE
EATING MEALS: A LITTLE
MOBILITY SCORE: 16
PERSONAL GROOMING: A LITTLE
MOVING FROM LYING ON BACK TO SITTING ON SIDE OF FLAT BED WITH BEDRAILS: A LITTLE
MOVING TO AND FROM BED TO CHAIR: A LITTLE
TOILETING: A LITTLE
HELP NEEDED FOR BATHING: A LITTLE
WALKING IN HOSPITAL ROOM: A LOT
PATIENT BASELINE BEDBOUND: NO
CLIMB 3 TO 5 STEPS WITH RAILING: A LOT
STANDING UP FROM CHAIR USING ARMS: A LITTLE
DRESSING REGULAR UPPER BODY CLOTHING: A LITTLE

## 2023-12-20 ASSESSMENT — PAIN SCALES - GENERAL
PAINLEVEL_OUTOF10: 4
PAINLEVEL_OUTOF10: 2
PAINLEVEL_OUTOF10: 9
PAINLEVEL_OUTOF10: 4
PAINLEVEL_OUTOF10: 4

## 2023-12-20 ASSESSMENT — COLUMBIA-SUICIDE SEVERITY RATING SCALE - C-SSRS
1. IN THE PAST MONTH, HAVE YOU WISHED YOU WERE DEAD OR WISHED YOU COULD GO TO SLEEP AND NOT WAKE UP?: NO
2. HAVE YOU ACTUALLY HAD ANY THOUGHTS OF KILLING YOURSELF?: NO
6. HAVE YOU EVER DONE ANYTHING, STARTED TO DO ANYTHING, OR PREPARED TO DO ANYTHING TO END YOUR LIFE?: NO

## 2023-12-20 ASSESSMENT — PATIENT HEALTH QUESTIONNAIRE - PHQ9
2. FEELING DOWN, DEPRESSED OR HOPELESS: NOT AT ALL
2. FEELING DOWN, DEPRESSED OR HOPELESS: NOT AT ALL
1. LITTLE INTEREST OR PLEASURE IN DOING THINGS: NOT AT ALL
SUM OF ALL RESPONSES TO PHQ9 QUESTIONS 1 & 2: 0
SUM OF ALL RESPONSES TO PHQ9 QUESTIONS 1 & 2: 0
1. LITTLE INTEREST OR PLEASURE IN DOING THINGS: NOT AT ALL

## 2023-12-20 ASSESSMENT — PAIN DESCRIPTION - LOCATION
LOCATION: ABDOMEN
LOCATION: CHEST

## 2023-12-20 NOTE — H&P
History Of Present Illness  Natasha Lundberg is a 88 y.o. female with medical history of cardiac arrest s/p femur fracture with PEs, GERD, iron deficiency anemia, and HTN presented to [facility] on 12/20/2023 with complaint of cough and shortness of breath.  Patient endorses having a cough for the last 2 weeks.  Patient has been febrile and having diaphoresis.  Cough is dry, hacking, nonproductive.  Patient has been taking Tylenol and Mucinex at [facility].  Patient endorses bilateral pleuritic chest pain causing sharp pain with cough.  Pain ceases when she is not coughing.  Upon arrival to the emergency room patient was tachycardic, tachypneic, and febrile a sepsis workup was initiated. Patient denies chest pain, palpitations, lightheadedness/dizziness, shortness of breath, abdominal pain, N/V/D, urinary symptoms or leg swelling.    Patient to be admitted to medical floor with telemetry for SIRS secondary to pneumonia.     Past Medical History  Past Medical History:   Diagnosis Date    Bilateral leg edema 10/10/2023    Cardiac arrest (CMS/[facility])     2/2 PE    COVID-19 09/10/2023    Gastroesophageal reflux disease without esophagitis 04/24/2023    Humeral fracture 09/10/2023    Iron deficiency anemia due to chronic blood loss 04/24/2023    Other pulmonary embolism without acute cor pulmonale (CMS/[facility]) 04/24/2023    PE (pulmonary thromboembolism) (CMS/[facility])     Primary hypertension 04/24/2023    Unspecified fracture of right femur, subsequent encounter for closed fracture with routine healing 04/24/2023       Surgical History  Past Surgical History:   Procedure Laterality Date    CT ABDOMEN PELVIS ANGIOGRAM W AND/OR WO IV CONTRAST  09/23/2023    CT ABDOMEN PELVIS ANGIOGRAM W AND/OR WO IV CONTRAST 9/23/2023 [facility] CT    FEMUR FRACTURE SURGERY      PARATHYROIDECTOMY      TOTAL KNEE ARTHROPLASTY          Social History  Social History     Tobacco Use    Smoking status: Never    Smokeless tobacco: Never   Substance Use Topics    Drug use: Never         Family History  Family History   Problem Relation Name Age of Onset    COPD Mother      Lung cancer Father      Esophageal cancer Sister          Allergies  Patient has no known allergies.    Review of Systems   Constitutional:  Positive for activity change, chills, diaphoresis, fatigue and fever.   Respiratory:  Positive for cough, chest tightness, shortness of breath and wheezing.    Cardiovascular:  Positive for chest pain (pleuric chest pain). Negative for palpitations and leg swelling.   Gastrointestinal:  Negative for abdominal distention, abdominal pain, blood in stool, constipation, diarrhea, nausea and vomiting.   Genitourinary:  Positive for dysuria. Negative for flank pain and hematuria.   Skin: Negative.    Neurological: Negative.    Psychiatric/Behavioral: Negative.         Physical Exam  Constitutional:       General: She is not in acute distress.     Appearance: Normal appearance. She is ill-appearing.   Eyes:      Extraocular Movements: Extraocular movements intact.      Pupils: Pupils are equal, round, and reactive to light.   Cardiovascular:      Rate and Rhythm: Regular rhythm. Tachycardia present.      Pulses: Normal pulses.      Heart sounds: Normal heart sounds.   Pulmonary:      Comments: Diminished breath sounds in all fields.  Dry harsh raspy cough.   Chest:      Chest wall: Tenderness present.   Abdominal:      General: Abdomen is flat. Bowel sounds are normal.      Palpations: Abdomen is soft.   Musculoskeletal:         General: Normal range of motion.      Cervical back: Normal range of motion.   Skin:     General: Skin is warm and dry.      Capillary Refill: Capillary refill takes less than 2 seconds.   Neurological:      Mental Status: She is alert. Mental status is at baseline. She is disoriented.   Psychiatric:         Mood and Affect: Mood normal.         Behavior: Behavior normal.         Judgment: Judgment normal.         Last Recorded Vitals  Visit Vitals  /63   Pulse  "92   Temp 37.1 °C (98.8 °F)   Resp 21   Ht 1.575 m (5' 2\")   Wt 65.8 kg (145 lb)   SpO2 93%   BMI 26.52 kg/m²   Smoking Status Never   BSA 1.7 m²        Relevant Results  Results for orders placed or performed during the hospital encounter of 12/20/23 (from the past 24 hour(s))   BLOOD GAS VENOUS FULL PANEL   Result Value Ref Range    POCT pH, Venous 7.41 7.33 - 7.43 pH    POCT pCO2, Venous 35 (L) 41 - 51 mm Hg    POCT pO2, Venous 39 35 - 45 mm Hg    POCT SO2, Venous 62 45 - 75 %    POCT Oxy Hemoglobin, Venous 59.3 45.0 - 75.0 %    POCT Hematocrit Calculated, Venous 37.0 36.0 - 46.0 %    POCT Sodium, Venous 130 (L) 136 - 145 mmol/L    POCT Potassium, Venous 4.5 3.5 - 5.3 mmol/L    POCT Chloride, Venous 98 98 - 107 mmol/L    POCT Ionized Calicum, Venous 1.16 1.10 - 1.33 mmol/L    POCT Glucose, Venous 123 (H) 74 - 99 mg/dL    POCT Lactate, Venous 1.2 0.4 - 2.0 mmol/L    POCT Base Excess, Venous -1.9 -2.0 - 3.0 mmol/L    POCT HCO3 Calculated, Venous 22.2 22.0 - 26.0 mmol/L    POCT Hemoglobin, Venous 12.4 12.0 - 16.0 g/dL    POCT Anion Gap, Venous 14.0 10.0 - 25.0 mmol/L    Patient Temperature      FiO2 21 %   CBC and Auto Differential   Result Value Ref Range    WBC 29.2 (H) 4.4 - 11.3 x10*3/uL    nRBC 0.0 0.0 - 0.0 /100 WBCs    RBC 3.67 (L) 4.00 - 5.20 x10*6/uL    Hemoglobin 10.8 (L) 12.0 - 16.0 g/dL    Hematocrit 33.9 (L) 36.0 - 46.0 %    MCV 92 80 - 100 fL    MCH 29.4 26.0 - 34.0 pg    MCHC 31.9 (L) 32.0 - 36.0 g/dL    RDW 14.4 11.5 - 14.5 %    Platelets 359 150 - 450 x10*3/uL    Neutrophils % 85.8 40.0 - 80.0 %    Immature Granulocytes %, Automated 1.0 (H) 0.0 - 0.9 %    Lymphocytes % 4.9 13.0 - 44.0 %    Monocytes % 7.8 2.0 - 10.0 %    Eosinophils % 0.1 0.0 - 6.0 %    Basophils % 0.4 0.0 - 2.0 %    Neutrophils Absolute 25.04 (H) 1.60 - 5.50 x10*3/uL    Immature Granulocytes Absolute, Automated 0.29 0.00 - 0.50 x10*3/uL    Lymphocytes Absolute 1.43 0.80 - 3.00 x10*3/uL    Monocytes Absolute 2.28 (H) 0.05 - 0.80 " x10*3/uL    Eosinophils Absolute 0.02 0.00 - 0.40 x10*3/uL    Basophils Absolute 0.11 (H) 0.00 - 0.10 x10*3/uL   Comprehensive Metabolic Panel   Result Value Ref Range    Glucose 104 (H) 74 - 99 mg/dL    Sodium 129 (L) 136 - 145 mmol/L    Potassium 3.9 3.5 - 5.3 mmol/L    Chloride 96 (L) 98 - 107 mmol/L    Bicarbonate 20 (L) 21 - 32 mmol/L    Anion Gap 17 10 - 20 mmol/L    Urea Nitrogen 24 (H) 6 - 23 mg/dL    Creatinine 1.12 (H) 0.50 - 1.05 mg/dL    eGFR 48 (L) >60 mL/min/1.73m*2    Calcium 8.8 8.6 - 10.3 mg/dL    Albumin 3.6 3.4 - 5.0 g/dL    Alkaline Phosphatase 84 33 - 136 U/L    Total Protein 7.7 6.4 - 8.2 g/dL    AST 9 9 - 39 U/L    Bilirubin, Total 0.5 0.0 - 1.2 mg/dL    ALT 7 7 - 45 U/L   Magnesium   Result Value Ref Range    Magnesium 1.76 1.60 - 2.40 mg/dL   Protime-INR   Result Value Ref Range    Protime 33.1 (H) 9.8 - 12.8 seconds    INR 2.9 (H) 0.9 - 1.1   D-dimer, VTE Exclusion   Result Value Ref Range    D-Dimer, Quantitative VTE Exclusion 471 <=500 ng/mL FEU   Troponin I, High Sensitivity, Initial   Result Value Ref Range    Troponin I, High Sensitivity 11 0 - 13 ng/L   B-type natriuretic peptide   Result Value Ref Range    BNP 76 0 - 99 pg/mL   Troponin, High Sensitivity, 1 Hour   Result Value Ref Range    Troponin I, High Sensitivity 12 0 - 13 ng/L   Lactate   Result Value Ref Range    Lactate 0.7 0.4 - 2.0 mmol/L   Sars-CoV-2 and Influenza A/B PCR   Result Value Ref Range    Flu A Result Not Detected Not Detected    Flu B Result Not Detected Not Detected    Coronavirus 2019, PCR Not Detected Not Detected   MRSA Surveillance for Vancomycin De-escalation, PCR    Specimen: Anterior Nares; Swab   Result Value Ref Range    MRSA PCR Not Detected Not Detected   Urinalysis with Reflex Culture and Microscopic   Result Value Ref Range    Color, Urine Yellow Straw, Yellow    Appearance, Urine Hazy (N) Clear    Specific Gravity, Urine 1.039 (N) 1.005 - 1.035    pH, Urine 5.0 5.0, 5.5, 6.0, 6.5, 7.0, 7.5, 8.0     Protein, Urine 30 (1+) (N) NEGATIVE mg/dL    Glucose, Urine NEGATIVE NEGATIVE mg/dL    Blood, Urine NEGATIVE NEGATIVE    Ketones, Urine NEGATIVE NEGATIVE mg/dL    Bilirubin, Urine NEGATIVE NEGATIVE    Urobilinogen, Urine <2.0 <2.0 mg/dL    Nitrite, Urine NEGATIVE NEGATIVE    Leukocyte Esterase, Urine TRACE (A) NEGATIVE   Microscopic Only, Urine   Result Value Ref Range    WBC, Urine 6-10 (A) 1-5, NONE /HPF    RBC, Urine NONE NONE, 1-2, 3-5 /HPF    Squamous Epithelial Cells, Urine 10-25 (FEW) Reference range not established. /HPF    Bacteria, Urine 1+ (A) NONE SEEN /HPF      Imaging:  CT angio chest for pulmonary embolism   Final Result   No evidence of pulmonary embolism.   Diffuse bilateral interstitial infiltrates most pronounced in the   periphery of the lungs.  This most likely represents fibrosis.  There   is mediastinal and hilar adenopathy which is most likely reactive.   Enlarged thyroid with nodules.  This is best evaluated with   ultrasound.   Signed by Alex Henry MD      XR chest 1 view   Final Result   Questionable left lower lobe infiltrate.   Signed by Alex Henry MD        EKG:  No results found for this or any previous visit (from the past 4464 hour(s)).  Echo:  No results found for this or any previous visit.    Home Medications  Prior to Admission medications    Medication Sig Start Date End Date Taking? Authorizing Provider   acetaminophen (Tylenol) 500 mg tablet Take 2 tablets (1,000 mg) by mouth every 8 hours if needed for mild pain (1 - 3).    Historical Provider, MD   albuterol (ProAir HFA) 90 mcg/actuation inhaler Inhale 2 puffs every 6 hours if needed for shortness of breath or wheezing.    Historical Provider, MD   apixaban (Eliquis) 5 mg tablet Take 1 tablet (5 mg) by mouth 2 times a day.    Historical Provider, MD   benzonatate (Tessalon) 100 mg capsule Take 1 capsule (100 mg) by mouth 3 times a day as needed for cough.    Historical Provider, MD   calcium citrate (Calcitrate) 200 mg  (950 mg) tablet Take 1 tablet (200 mg) by mouth 2 times a day.    Historical Provider, MD   cholecalciferol (Vitamin D-3) 50 MCG (2000 UT) tablet Take 1 tablet (50 mcg) by mouth once daily.    Historical Provider, MD   diphenhydrAMINE-acetaminophen (Tylenol PM)  mg per tablet Take 2 tablets by mouth once daily at bedtime.    Historical Provider, MD   furosemide (Lasix) 20 mg tablet Take 1 tablet (20 mg) by mouth once daily as needed (swelling).    Historical Provider, MD   guaiFENesin (Mucinex) 600 mg 12 hr tablet Take 1 tablet (600 mg) by mouth 2 times a day. 12/16/23 12/22/23  Historical Provider, MD   oxyCODONE (Roxicodone) 5 mg immediate release tablet Take 1 tablet (5 mg) by mouth every 6 hours if needed for severe pain (7 - 10).    Historical Provider, MD   pantoprazole (ProtoNix) 40 mg EC tablet Take 1 tablet (40 mg) by mouth 2 times a day.    Historical Provider, MD   polyethylene glycol (Glycolax, Miralax) 17 gram packet Take 17 g by mouth once daily as needed (swelling).    Historical Provider, MD       Medications  Scheduled medications  guaiFENesin, 600 mg, oral, BID      Continuous medications     PRN medications       Assessment/Plan   Active Problems:    SIRS due to infectious process with acute organ dysfunction (CMS/HCC)    Pneumonia    Leukocytosis  Slight bump in creat from baseline      Plan:  Admit to medical floor with every 4 vital signs and telemetry.   Received 1 L of IV fluid in the emergency room will continue with gentle IV rehydration  Received vancomycin, Zosyn, and azithromycin in the emergency room.  MRSA swab negative no need to continue with vancomycin  Rocephin and azithromycin initiated  Flu & COVID negative.  RSV pending.  Mucinex and DuoNebs ordered.  Urine antigens and procal ordered pending.    VTE prophylaxis:   DVT prophylaxis: Resumed home anticoagulation    Medication reconciliation completed  See additional orders for further plan of care.   Further evaluation and  management per attending and consulting physicians.      Code Status  Full code    I spent 45 minutes in the professional and overall care of this patient.    CT scan with incidental finding of thyroid nodule and some reactive lymph nodes in chest daughter aware of these will require follow-up.    Sury Anderson, [facility]  Pager [phone]

## 2023-12-20 NOTE — ED PROVIDER NOTES
EMERGENCY DEPARTMENT ENCOUNTER      Pt Name: Natasha Lundberg  MRN: [id number]  Birthdate 1935  Date of evaluation: 12/20/2023  Provider: Refugio Fernandez MD    CHIEF COMPLAINT       Chief Complaint   Patient presents with    Shortness of Breath         HISTORY OF PRESENT ILLNESS    HPI  Patient is an 88-year-old female with history of PE on Eliquis, hypertension presenting with shortness of breath.  Has been getting progressively worse for the past 3 days.  Shortness of breath is worse with exertion and when lying flat.  There is no associated dylan chest pain, but she does note tender ribs on the right side from excessive coughing.  She states that she feels like she has a lot of mucus stuck in her chest that she has been unable to cough up.  She has malaise, fatigue, fevers.  She has had little appetite but is not nauseous or vomited.  No diarrhea or urinary complaints.    Nursing Notes were reviewed.    PAST MEDICAL HISTORY     Past Medical History:   Diagnosis Date    Bilateral leg edema 10/10/2023    COVID-19 09/10/2023         SURGICAL HISTORY       Past Surgical History:   Procedure Laterality Date    CT ABDOMEN PELVIS ANGIOGRAM W AND/OR WO IV CONTRAST  9/23/2023    CT ABDOMEN PELVIS ANGIOGRAM W AND/OR WO IV CONTRAST 9/23/2023 [facility]         CURRENT MEDICATIONS       Previous Medications    No medications on file       ALLERGIES     Patient has no known allergies.    FAMILY HISTORY     No family history on file.       SOCIAL HISTORY       Social History     Socioeconomic History    Marital status: Single     Spouse name: Not on file    Number of children: Not on file    Years of education: Not on file    Highest education level: Not on file   Occupational History    Not on file   Tobacco Use    Smoking status: Not on file    Smokeless tobacco: Not on file   Substance and Sexual Activity    Alcohol use: Not on file    Drug use: Not on file    Sexual activity: Not on file   Other Topics Concern    Not on file    Social History Narrative    Not on file     Social Determinants of Health     Financial Resource Strain: Not on file   Food Insecurity: Not on file   Transportation Needs: Not on file   Physical Activity: Not on file   Stress: Not on file   Social Connections: Not on file   Intimate Partner Violence: Not on file   Housing Stability: Not on file       SCREENINGS                        PHYSICAL EXAM    (up to 7 for level 4, 8 or more for level 5)     ED Triage Vitals [12/20/23 1003]   Temp Heart Rate Resp BP   38.1 °C (100.6 °F) (!) 126 18 149/67      SpO2 Temp Source Heart Rate Source Patient Position   94 % Temporal Monitor --      BP Location FiO2 (%)     -- --       Physical Exam  Vitals and nursing note reviewed.   Constitutional:       General: She is not in acute distress.     Appearance: She is ill-appearing. She is not toxic-appearing.   HENT:      Head: Normocephalic and atraumatic.      Mouth/Throat:      Comments: Dry mucous membranes  Eyes:      Extraocular Movements: Extraocular movements intact.      Pupils: Pupils are equal, round, and reactive to light.   Cardiovascular:      Rate and Rhythm: Regular rhythm. Tachycardia present.      Heart sounds: Normal heart sounds.   Pulmonary:      Comments: Scattered rhonchi in the left lung fields.  Increased work of breathing.  Abdominal:      Palpations: Abdomen is soft.      Tenderness: There is no abdominal tenderness.   Musculoskeletal:      Cervical back: Normal range of motion and neck supple.      Right lower leg: No edema.      Left lower leg: No edema.   Skin:     General: Skin is warm and dry.   Neurological:      General: No focal deficit present.      Mental Status: She is oriented to person, place, and time.          DIAGNOSTIC RESULTS     LABS:  Labs Reviewed   BLOOD GAS VENOUS FULL PANEL - Abnormal       Result Value    POCT pH, Venous 7.41      POCT pCO2, Venous 35 (*)     POCT pO2, Venous 39      POCT SO2, Venous 62      POCT Oxy Hemoglobin,  Venous 59.3      POCT Hematocrit Calculated, Venous 37.0      POCT Sodium, Venous 130 (*)     POCT Potassium, Venous 4.5      POCT Chloride, Venous 98      POCT Ionized Calicum, Venous 1.16      POCT Glucose, Venous 123 (*)     POCT Lactate, Venous 1.2      POCT Base Excess, Venous -1.9      POCT HCO3 Calculated, Venous 22.2      POCT Hemoglobin, Venous 12.4      POCT Anion Gap, Venous 14.0      Patient Temperature        FiO2 21     CBC WITH AUTO DIFFERENTIAL - Abnormal    WBC 29.2 (*)     nRBC 0.0      RBC 3.67 (*)     Hemoglobin 10.8 (*)     Hematocrit 33.9 (*)     MCV 92      MCH 29.4      MCHC 31.9 (*)     RDW 14.4      Platelets 359      Neutrophils % 85.8      Immature Granulocytes %, Automated 1.0 (*)     Lymphocytes % 4.9      Monocytes % 7.8      Eosinophils % 0.1      Basophils % 0.4      Neutrophils Absolute 25.04 (*)     Immature Granulocytes Absolute, Automated 0.29      Lymphocytes Absolute 1.43      Monocytes Absolute 2.28 (*)     Eosinophils Absolute 0.02      Basophils Absolute 0.11 (*)    COMPREHENSIVE METABOLIC PANEL - Abnormal    Glucose 104 (*)     Sodium 129 (*)     Potassium 3.9      Chloride 96 (*)     Bicarbonate 20 (*)     Anion Gap 17      Urea Nitrogen 24 (*)     Creatinine 1.12 (*)     eGFR 48 (*)     Calcium 8.8      Albumin 3.6      Alkaline Phosphatase 84      Total Protein 7.7      AST 9      Bilirubin, Total 0.5      ALT 7     PROTIME-INR - Abnormal    Protime 33.1 (*)     INR 2.9 (*)    URINALYSIS WITH REFLEX CULTURE AND MICROSCOPIC - Abnormal    Color, Urine Yellow      Appearance, Urine Hazy (*)     Specific Gravity, Urine 1.039 (*)     pH, Urine 5.0      Protein, Urine 30 (1+) (*)     Glucose, Urine NEGATIVE      Blood, Urine NEGATIVE      Ketones, Urine NEGATIVE      Bilirubin, Urine NEGATIVE      Urobilinogen, Urine <2.0      Nitrite, Urine NEGATIVE      Leukocyte Esterase, Urine TRACE (*)    MICROSCOPIC ONLY, URINE - Abnormal    WBC, Urine 6-10 (*)     RBC, Urine NONE       Squamous Epithelial Cells, Urine 10-25 (FEW)      Bacteria, Urine 1+ (*)    PROCALCITONIN - Abnormal    Procalcitonin 1.39 (*)     Narrative:     Procalcitonin (PCT) results measured serially can  aid in decision-making for antibiotic discontinuation in  patients with suspected or confirmed sepsis in conjunction  with additional clinical information. Antibiotic  discontinuation may be considered with a change in PCT of  >80% from the peak result or when PCT falls below 0.50 ng/mL.    Procalcitonin results should not be used in isolation but  should be interpreted in conjunction with additional clinical  and laboratory findings. Procalcitonin results should not be  used to guide the initiation of antibiotic therapy.    Falsely low PCT values in the presence of bacterial infection  may occur in early infection, with atypical pathogens,  localized infections, and subacute infectious endocarditis.    Falsely elevated results outside of severe bacterial  infection/sepsis may be seen in patients with renal failure  or insufficiency, severe trauma or burns, recent major  abdominal/cardiac surgery, acute multi-organ failure, rarely  in patients with medullary thyroid carcinoma and rare  neuroendocrine tumors, and non-specific interfering antibodies  (heterophile antibodies, rheumatoid factor, human anti-mouse  antibodies (HAMA), etc).    Performance of the PCT test in pediatric patients (<19yo),  pregnant women, immunocompromised patients, and patients on  immunomodulatory medications has not been evaluated.   CBC - Abnormal    WBC 22.2 (*)     nRBC 0.0      RBC 3.44 (*)     Hemoglobin 10.1 (*)     Hematocrit 34.5 (*)           MCH 29.4      MCHC 29.3 (*)     RDW 14.6 (*)     Platelets 293     BASIC METABOLIC PANEL - Abnormal    Glucose 76      Sodium 131 (*)     Potassium 3.9      Chloride 101      Bicarbonate 17 (*)     Anion Gap 17      Urea Nitrogen 20      Creatinine 0.94      eGFR 59 (*)     Calcium 8.3 (*)     BLOOD CULTURE - Normal    Blood Culture Loaded on Instrument - Culture in progress     BLOOD CULTURE - Normal    Blood Culture Loaded on Instrument - Culture in progress     MRSA SURVEILLANCE FOR VANCOMYCIN DE-ESCALATION, PCR - Normal    MRSA PCR Not Detected      Narrative:     This assay is an FDA-approved in vitro diagnostic nucleic acid amplification test for the detection of methicillin-resistant Staphylococcus aureus (MRSA) DNA directly from nasal swabs in patients at risk for nasal colonization. MRSA NxG is intended to aid in the prevention and control of MRSA infections in healthcare settings. This assay is NOT intended to diagnose, guide, or monitor treatment for MRSA infections, or provide results of susceptibility to methicillin. A negative result does not preclude MRSA nasal colonization. Test performance has not been evaluated in patients less than two years of age.   LEGIONELLA ANTIGEN, URINE - Normal    L. pneumophila Urine Ag Negative     STREPTOCOCCUS PNEUMONIAE ANTIGEN, URINE - Normal    Streptococcus pneumoniae Ag, Urine Negative     MAGNESIUM - Normal    Magnesium 1.76     SARS-COV-2 AND INFLUENZA A/B PCR - Normal    Flu A Result Not Detected      Flu B Result Not Detected      Coronavirus 2019, PCR Not Detected      Narrative:     This assay has received FDA Emergency Use Authorization (EUA) and  is only authorized for the duration of time that circumstances exist to justify the authorization of the emergency use of in vitro diagnostic tests for the detection of SARS-CoV-2 virus and/or diagnosis of COVID-19 infection under section 564(b)(1) of the Act, 21 U.S.C. 360bbb-3(b)(1). Testing for SARS-CoV-2 is only recommended for patients who meet current clinical and/or epidemiological criteria as defined by federal, state, or local public health directives. This assay is an in vitro diagnostic nucleic acid amplification test for the qualitative detection of SARS-CoV-2, Influenza A, and Influenza B  from nasopharyngeal specimens and has been validated for use at [facility]. Negative results do not preclude COVID-19 infections or Influenza A/B infections, and should not be used as the sole basis for diagnosis, treatment, or other management decisions. If Influenza A/B and RSV PCR results are negative, testing for Parainfluenza virus, Adenovirus and Metapneumovirus is routinely performed for [facility] pediatric oncology and intensive care inpatients, and is available on other patients by placing an add-on request.    D-DIMER, VTE EXCLUSION - Normal    D-Dimer, Quantitative VTE Exclusion 471      Narrative:     The VTE Exclusion D-Dimer assay is reported in ng/mL Fibrinogen Equivalent Units (FEU).    Per 's instructions for use, a value of less than 500 ng/mL (FEU) may help to exclude DVT or PE in outpatients when the assay is used with a clinical pretest probability assessment.(AE must utilize and document eCalc 'Wells Score Deep Vein Thrombosis Risk' for DVT exclusion only. Emergency Department should utilize  Guidelines for Emergency Department Use of the VTE Exclusion D-Dimer and Clinical Pretest probability assessment model for DVT or PE exclusion.)   SERIAL TROPONIN-INITIAL - Normal    Troponin I, High Sensitivity 11      Narrative:     Less than 99th percentile of normal range cutoff-  Female and children under 18 years old <14 ng/L; Male <21 ng/L: Negative  Repeat testing should be performed if clinically indicated.     Female and children under 18 years old 14-50 ng/L; Male 21-50 ng/L:  Consistent with possible cardiac damage and possible increased clinical   risk. Serial measurements may help to assess extent of myocardial damage.     >50 ng/L: Consistent with cardiac damage, increased clinical risk and  myocardial infarction. Serial measurements may help assess extent of   myocardial damage.      NOTE: Children less than 1 year old may have higher baseline troponin    levels and results should be interpreted in conjunction with the overall   clinical context.     NOTE: Troponin I testing is performed using a different   testing methodology at [facility] [place] than at other   [facility]. Direct result comparisons should only   be made within the same method.   SERIAL TROPONIN, 1 HOUR - Normal    Troponin I, High Sensitivity 12      Narrative:     Less than 99th percentile of normal range cutoff-  Female and children under 18 years old <14 ng/L; Male <21 ng/L: Negative  Repeat testing should be performed if clinically indicated.     Female and children under 18 years old 14-50 ng/L; Male 21-50 ng/L:  Consistent with possible cardiac damage and possible increased clinical   risk. Serial measurements may help to assess extent of myocardial damage.     >50 ng/L: Consistent with cardiac damage, increased clinical risk and  myocardial infarction. Serial measurements may help assess extent of   myocardial damage.      NOTE: Children less than 1 year old may have higher baseline troponin   levels and results should be interpreted in conjunction with the overall   clinical context.     NOTE: Troponin I testing is performed using a different   testing methodology at [facility] [place] than at other   [facility]. Direct result comparisons should only   be made within the same method.   LACTATE - Normal    Lactate 0.7      Narrative:     Venipuncture immediately after or during the administration of Metamizole may lead to falsely low results. Testing should be performed immediately  prior to Metamizole dosing.   B-TYPE NATRIURETIC PEPTIDE - Normal    BNP 76      Narrative:        <100 pg/mL - Heart failure unlikely  100-299 pg/mL - Intermediate probability of acute heart                  failure exacerbation. Correlate with clinical                  context and patient history.    >=300 pg/mL - Heart Failure likely. Correlate with clinical                  context  and patient history.    BNP testing is performed using different testing methodology at [facility] than at other [facility]. Direct result comparisons should only be made within the same method.      RSV PCR - Normal    RSV PCR Not Detected      Narrative:     This assay is an FDA-cleared, in vitro diagnostic nucleic acid amplification test for the detection of RSV from nasopharyngeal specimens, and has been validated for use at [facility]. Negative results do not preclude RSV infections, and should not be used as the sole basis for diagnosis, treatment, or other management decisions. If Influenza A/B and RSV PCR results are negative, testing for Parainfluenza virus, Adenovirus and Metapneumovirus is routinely performed for pediatric oncology and intensive care inpatients at [facility], and is available on other patients by placing an add-on request.       URINE CULTURE   TROPONIN SERIES- (INITIAL, 1 HR)    Narrative:     The following orders were created for panel order Troponin I Series, High Sensitivity (0, 1 HR).  Procedure                               Abnormality         Status                     ---------                               -----------         ------                     Troponin I, High Sensiti...[[id number]]  Normal              Final result               Troponin, High Sensitivi...[[id number]]  Normal              Final result                 Please view results for these tests on the individual orders.   URINALYSIS WITH REFLEX CULTURE AND MICROSCOPIC    Narrative:     The following orders were created for panel order Urinalysis with Reflex Culture and Microscopic.  Procedure                               Abnormality         Status                     ---------                               -----------         ------                     Urinalysis with Reflex C...[[id number]]  Abnormal            Final result               Extra Urine Gray Tube[[id number]]                             Final result                 Please view results for these tests on the individual orders.   EXTRA URINE GRAY TUBE    Extra Tube Hold for add-ons.         All other labs were within normal range or not returned as of this dictation.    Imaging  XR chest 1 view    (Results Pending)        Procedures  Procedures     EMERGENCY DEPARTMENT COURSE/MDM:     Diagnoses as of 12/24/23 1334   Shortness of breath        Medical Decision Making  History obtained from the patient and her daughter.  Records including labs, imaging, notes reviewed.  Patient meeting SIRS criteria with tachypnea, fever, tachycardia.  Septic alert was called and patient was worked up for possible pneumonia.  Blood cultures were drawn and she was started on empiric broad-spectrum antibiotics to cover for hospital-acquired pneumonia.  Given the possibility of underlying heart failure, she was given 1 L of IV fluids only.  Resuscitation exam later showed improved capillary refill and moist mucous membranes.  Urinalysis was unremarkable.  COVID swabs are negative.  Lactate within normal limits hematology notable for leukocytosis of 29.2 and hemoglobin of 10.8.  The latter appears to be consistent with prior findings.  Metabolic panel demonstrated hyponatremia of 129, decreased bicarb of 20, BUN elevated at 24, GFR stable at 48.  Chest x-ray demonstrated a possible left lower lobe infiltrate.  CT of the chest was obtained for further clarification which demonstrated multifocal pneumonia with mucous plugging.  Patient remained hemodynamically stable in the emergency department and did not require oxygen.  Tylenol effectively treated for fever.  Given her septic criteria in the setting of pneumonia, patient was subsequently admitted to the medicine service for further evaluation and treatment.  All parties involved amenable to this plan.    EKG demonstrated sinus tachycardia at a rate of 115 bpm.  QTc within normal limits.  No acute  injury pattern seen.    Patient and or family in agreement and understanding of treatment plan.  All questions answered.      I reviewed the case with the attending ED physician. The attending ED physician agrees with the plan. Patient and/or patient´s representative was counseled regarding labs, imaging, likely diagnosis, and plan. All questions were answered.    ED Medications administered this visit:    Medications   acetaminophen (Tylenol) tablet 650 mg (has no administration in time range)       New Prescriptions from this visit:    New Prescriptions    No medications on file       Follow-up:  No follow-up provider specified.      Final Impression:   1. Shortness of breath          (Please note that portions of this note were completed with a voice recognition program.  Efforts were made to edit the dictations but occasionally words are mis-transcribed.)     Refugio Fernandez MD  Resident  12/21/23 1830      The patient was seen by the resident/fellow.  I have personally performed a substantive portion of the encounter.  I have seen and examined the patient; agree with the workup, evaluation, MDM, management and diagnosis.  The care plan has been discussed with the resident; I have reviewed the resident’s note and agree with the documented findings.         Shola Irene, DO  12/24/23 3204

## 2023-12-21 ENCOUNTER — APPOINTMENT (OUTPATIENT)
Dept: RADIOLOGY | Facility: HOSPITAL | Age: 88
DRG: 194 | End: 2023-12-21
Payer: MEDICARE

## 2023-12-21 LAB
ANION GAP SERPL CALC-SCNC: 17 MMOL/L (ref 10–20)
BACTERIA UR CULT: NO GROWTH
BUN SERPL-MCNC: 20 MG/DL (ref 6–23)
CALCIUM SERPL-MCNC: 8.3 MG/DL (ref 8.6–10.3)
CHLORIDE SERPL-SCNC: 101 MMOL/L (ref 98–107)
CO2 SERPL-SCNC: 17 MMOL/L (ref 21–32)
CREAT SERPL-MCNC: 0.94 MG/DL (ref 0.5–1.05)
ERYTHROCYTE [DISTWIDTH] IN BLOOD BY AUTOMATED COUNT: 14.6 % (ref 11.5–14.5)
GFR SERPL CREATININE-BSD FRML MDRD: 59 ML/MIN/1.73M*2
GLUCOSE SERPL-MCNC: 76 MG/DL (ref 74–99)
HCT VFR BLD AUTO: 34.5 % (ref 36–46)
HGB BLD-MCNC: 10.1 G/DL (ref 12–16)
HOLD SPECIMEN: NORMAL
LEGIONELLA AG UR QL: NEGATIVE
MCH RBC QN AUTO: 29.4 PG (ref 26–34)
MCHC RBC AUTO-ENTMCNC: 29.3 G/DL (ref 32–36)
MCV RBC AUTO: 100 FL (ref 80–100)
NRBC BLD-RTO: 0 /100 WBCS (ref 0–0)
PLATELET # BLD AUTO: 293 X10*3/UL (ref 150–450)
POTASSIUM SERPL-SCNC: 3.9 MMOL/L (ref 3.5–5.3)
PROCALCITONIN SERPL-MCNC: 1.39 NG/ML
RBC # BLD AUTO: 3.44 X10*6/UL (ref 4–5.2)
S PNEUM AG UR QL: NEGATIVE
SODIUM SERPL-SCNC: 131 MMOL/L (ref 136–145)
WBC # BLD AUTO: 22.2 X10*3/UL (ref 4.4–11.3)

## 2023-12-21 PROCEDURE — 1100000001 HC PRIVATE ROOM DAILY

## 2023-12-21 PROCEDURE — 97112 NEUROMUSCULAR REEDUCATION: CPT | Mod: GO

## 2023-12-21 PROCEDURE — 74177 CT ABD & PELVIS W/CONTRAST: CPT

## 2023-12-21 PROCEDURE — 2500000004 HC RX 250 GENERAL PHARMACY W/ HCPCS (ALT 636 FOR OP/ED): Performed by: NURSE PRACTITIONER

## 2023-12-21 PROCEDURE — 2500000001 HC RX 250 WO HCPCS SELF ADMINISTERED DRUGS (ALT 637 FOR MEDICARE OP): Performed by: NURSE PRACTITIONER

## 2023-12-21 PROCEDURE — 2550000001 HC RX 255 CONTRASTS: Performed by: INTERNAL MEDICINE

## 2023-12-21 PROCEDURE — 85027 COMPLETE CBC AUTOMATED: CPT | Performed by: NURSE PRACTITIONER

## 2023-12-21 PROCEDURE — 74177 CT ABD & PELVIS W/CONTRAST: CPT | Performed by: RADIOLOGY

## 2023-12-21 PROCEDURE — 97165 OT EVAL LOW COMPLEX 30 MIN: CPT | Mod: GO

## 2023-12-21 PROCEDURE — 80048 BASIC METABOLIC PNL TOTAL CA: CPT | Performed by: NURSE PRACTITIONER

## 2023-12-21 PROCEDURE — 97530 THERAPEUTIC ACTIVITIES: CPT | Mod: GP

## 2023-12-21 PROCEDURE — 36415 COLL VENOUS BLD VENIPUNCTURE: CPT | Performed by: NURSE PRACTITIONER

## 2023-12-21 PROCEDURE — 94760 N-INVAS EAR/PLS OXIMETRY 1: CPT

## 2023-12-21 PROCEDURE — 97161 PT EVAL LOW COMPLEX 20 MIN: CPT | Mod: GP

## 2023-12-21 RX ORDER — CYCLOBENZAPRINE HCL 5 MG
5 TABLET ORAL ONCE
Status: COMPLETED | OUTPATIENT
Start: 2023-12-21 | End: 2023-12-22

## 2023-12-21 RX ADMIN — OXYCODONE HYDROCHLORIDE 5 MG: 5 TABLET ORAL at 17:46

## 2023-12-21 RX ADMIN — ACETAMINOPHEN 650 MG: 325 TABLET ORAL at 08:49

## 2023-12-21 RX ADMIN — GUAIFENESIN 200 MG: 200 SOLUTION ORAL at 16:31

## 2023-12-21 RX ADMIN — CALCIUM 625 MG: 500 TABLET ORAL at 20:00

## 2023-12-21 RX ADMIN — ACETAMINOPHEN 650 MG: 325 TABLET ORAL at 16:31

## 2023-12-21 RX ADMIN — GUAIFENESIN 600 MG: 600 TABLET ORAL at 20:00

## 2023-12-21 RX ADMIN — IOHEXOL 75 ML: 350 INJECTION, SOLUTION INTRAVENOUS at 15:05

## 2023-12-21 RX ADMIN — Medication 3 MG: at 20:00

## 2023-12-21 RX ADMIN — BENZONATATE 100 MG: 100 CAPSULE ORAL at 21:44

## 2023-12-21 RX ADMIN — BENZONATATE 100 MG: 100 CAPSULE ORAL at 14:07

## 2023-12-21 RX ADMIN — PANTOPRAZOLE SODIUM 40 MG: 40 TABLET, DELAYED RELEASE ORAL at 08:40

## 2023-12-21 RX ADMIN — CEFTRIAXONE SODIUM 2 G: 2 INJECTION, SOLUTION INTRAVENOUS at 20:00

## 2023-12-21 RX ADMIN — APIXABAN 5 MG: 5 TABLET, FILM COATED ORAL at 08:40

## 2023-12-21 RX ADMIN — GUAIFENESIN 200 MG: 200 SOLUTION ORAL at 19:59

## 2023-12-21 RX ADMIN — AZITHROMYCIN MONOHYDRATE 500 MG: 500 INJECTION, POWDER, LYOPHILIZED, FOR SOLUTION INTRAVENOUS at 16:22

## 2023-12-21 RX ADMIN — GUAIFENESIN 200 MG: 200 SOLUTION ORAL at 04:46

## 2023-12-21 RX ADMIN — BENZONATATE 100 MG: 100 CAPSULE ORAL at 01:49

## 2023-12-21 RX ADMIN — APIXABAN 5 MG: 5 TABLET, FILM COATED ORAL at 20:00

## 2023-12-21 RX ADMIN — OXYCODONE HYDROCHLORIDE 5 MG: 5 TABLET ORAL at 23:47

## 2023-12-21 RX ADMIN — PANTOPRAZOLE SODIUM 40 MG: 40 TABLET, DELAYED RELEASE ORAL at 20:00

## 2023-12-21 RX ADMIN — CALCIUM 625 MG: 500 TABLET ORAL at 08:40

## 2023-12-21 RX ADMIN — GUAIFENESIN 600 MG: 600 TABLET ORAL at 08:40

## 2023-12-21 ASSESSMENT — COGNITIVE AND FUNCTIONAL STATUS - GENERAL
DRESSING REGULAR UPPER BODY CLOTHING: A LITTLE
CLIMB 3 TO 5 STEPS WITH RAILING: A LITTLE
TOILETING: A LITTLE
MOVING FROM LYING ON BACK TO SITTING ON SIDE OF FLAT BED WITH BEDRAILS: A LITTLE
HELP NEEDED FOR BATHING: A LITTLE
PERSONAL GROOMING: A LITTLE
TURNING FROM BACK TO SIDE WHILE IN FLAT BAD: A LITTLE
MOBILITY SCORE: 18
DAILY ACTIVITIY SCORE: 22
HELP NEEDED FOR BATHING: A LITTLE
DAILY ACTIVITIY SCORE: 19
DRESSING REGULAR LOWER BODY CLOTHING: A LITTLE
TURNING FROM BACK TO SIDE WHILE IN FLAT BAD: A LITTLE
STANDING UP FROM CHAIR USING ARMS: A LITTLE
STANDING UP FROM CHAIR USING ARMS: A LITTLE
DRESSING REGULAR LOWER BODY CLOTHING: A LITTLE
WALKING IN HOSPITAL ROOM: A LITTLE
CLIMB 3 TO 5 STEPS WITH RAILING: A LOT
WALKING IN HOSPITAL ROOM: A LOT
MOVING TO AND FROM BED TO CHAIR: A LITTLE
MOVING TO AND FROM BED TO CHAIR: A LITTLE
MOBILITY SCORE: 16
MOVING FROM LYING ON BACK TO SITTING ON SIDE OF FLAT BED WITH BEDRAILS: A LITTLE

## 2023-12-21 ASSESSMENT — ACTIVITIES OF DAILY LIVING (ADL)
BATHING_ASSISTANCE: MINIMAL
LACK_OF_TRANSPORTATION: NO

## 2023-12-21 ASSESSMENT — PAIN SCALES - GENERAL
PAINLEVEL_OUTOF10: 6
PAINLEVEL_OUTOF10: 3
PAINLEVEL_OUTOF10: 5 - MODERATE PAIN
PAINLEVEL_OUTOF10: 0 - NO PAIN
PAINLEVEL_OUTOF10: 0 - NO PAIN

## 2023-12-21 ASSESSMENT — PAIN - FUNCTIONAL ASSESSMENT
PAIN_FUNCTIONAL_ASSESSMENT: 0-10

## 2023-12-21 ASSESSMENT — PAIN DESCRIPTION - LOCATION
LOCATION: HEAD
LOCATION: ABDOMEN

## 2023-12-21 NOTE — CARE PLAN
The patient's goals for the shift include      The clinical goals for the shift include pt will remain free from injury/fall    Problem: Pain  Goal: My pain/discomfort is manageable  Outcome: Progressing     Problem: Safety  Goal: Patient will be injury free during hospitalization  Outcome: Progressing  Goal: I will remain free of falls  Outcome: Progressing     Problem: Daily Care  Goal: Daily care needs are met  Outcome: Progressing     Problem: Psychosocial Needs  Goal: Demonstrates ability to cope with hospitalization/illness  Outcome: Progressing  Goal: Collaborate with me, my family, and caregiver to identify my specific goals  Outcome: Progressing     Problem: Discharge Barriers  Goal: My discharge needs are met  Outcome: Progressing     Problem: Fall/Injury  Goal: Not fall by end of shift  Outcome: Progressing  Goal: Be free from injury by end of the shift  Outcome: Progressing  Goal: Verbalize understanding of personal risk factors for fall in the hospital  Outcome: Progressing  Goal: Verbalize understanding of risk factor reduction measures to prevent injury from fall in the home  Outcome: Progressing  Goal: Use assistive devices by end of the shift  Outcome: Progressing  Goal: Pace activities to prevent fatigue by end of the shift  Outcome: Progressing

## 2023-12-21 NOTE — PROGRESS NOTES
Occupational Therapy    Occupational Therapy    Evaluation    Patient Name: Natasha Lundberg  MRN: [id number]  Today's Date: 12/21/2023  Time Calculation  Start Time: 1217  Stop Time: 1235  Time Calculation (min): 18 min  Rm: 3112    Assessment  IP OT Assessment  OT Assessment: Pt pleasant and cooperative. Pt appears close to abseline level of function. Recomend home with [facility] OT to ensure safety and independence with ADL's.  Prognosis: Good  End of Session Communication: Bedside nurse  End of Session Patient Position: Up in chair, Alarm on    Plan:  OT Frequency: 3 times per week  OT Discharge Recommendations: Low intensity level of continued care ([facility] OT)  Equipment Recommended upon Discharge: Wheeled walker  OT Recommended Transfer Status: Stand by assist  OT - OK to Discharge: Yes (to next level of care)    Subjective     Current Problem:  1. Shortness of breath            General:  General  Reason for Referral: ADL's; Safety Assessment  Referred By: Dr Schultz  Co-Treatment: PT  Co-Treatment Reason:  (safety)  Prior to Session Communication: Bedside nurse  Patient Position Received: Up in chair, Alarm on    General Visit Information:  Per EMR: pt presented to [facility] on 12/20/2023 with complaint of cough and shortness of breath.  Patient endorses having a cough for the last 2 weeks.  Patient has been febrile and having diaphoresis.  Cough is dry, hacking, nonproductive.  Patient has been taking Tylenol and Mucinex at SNF.  Patient endorses bilateral pleuritic chest pain causing sharp pain with cough.  Pain ceases when she is not coughing.  Upon arrival to the emergency room patient was tachycardic, tachypneic, and febrile a sepsis workup was initiated. Patient denies chest pain, palpitations, lightheadedness/dizziness, shortness of breath, abdominal pain, N/V/D, urinary symptoms or leg swelling.     Precautions:  Medical Precautions: Fall precautions    Pain:  Pain Assessment  Pain Assessment: 0-10  Pain Score: 0 - No  pain    Objective     Cognition:  Overall Cognitive Status: Within Functional Limits    Home Living/PLOF:  Pt from [facility] [place] with .  Mod I for mobility with FWW.  Occasionally needs assist for bed mobility and LB dressing.  Assist with bathing, has WIS with chair and Gbs.  Ambulates to dining room for meals.  Family provides transportation as needs.  Denies any falls.     ADL:  Eating Assistance: Independent  Grooming Assistance: Independent  Bathing Assistance: Minimal  UE Dressing Assistance: Stand by  LE Dressing Assistance: Minimal  Toileting Assistance with Device: Minimal    Activity Tolerance:  Endurance: Tolerates 10 - 20 min exercise with multiple rests    Functional Assessments:  Bed Mobility   NT secondary to sitting in chair on arrival  Transfers  Sit to stand: CGA  Stand to sit: CGA     Ambulation/Gait Training  Pt ambulated 20 ft with FWW and CGA; no LOB noted     Sitting Balance:  Static Sitting Balance  Static Sitting-Comment/Number of Minutes: good  Dynamic Sitting Balance  Dynamic Sitting-Comments: good    Standing Balance:  Static Standing Balance  Static Standing-Comment/Number of Minutes: fair  Dynamic Standing Balance  Dynamic Standing-Comments: fair- (decreased stand endurance)    Sensation:  Sensation Comment: Pt with complaints of numbness L foot    Strength:  Strength Comments: B UE's grossly 3/5    Extremities: RUE   RUE :  (limited AROM shoulder flexion (about 90 degrees)) and LUE   LUE:  (limited AROM shoulder flexion about 90 degrees)    Outcome Measures: [facility] Daily Activity  Putting on and taking off regular lower body clothing: A little  Bathing (including washing, rinsing, drying): A little  Putting on and taking off regular upper body clothing: None  Toileting, which includes using toilet, bedpan or urinal: None  Taking care of personal grooming such as brushing teeth: None  Eating Meals: None  Daily Activity - Total Score:  "22    EDUCATION:  Education  Individual(s) Educated: Patient  Education Provided:  (safety)    Goals:   Encounter Problems       Encounter Problems (Active)       OT Goals       OT Goal 1       Start:  12/21/23    Expected End:  01/04/24       Pt will complete all bed mobility independently            OT Goal 2       Start:  12/21/23    Expected End:  01/04/24       Pt will complete ADL's and mobility with good stand balance           OT Goal 3       Start:  12/21/23    Expected End:  01/04/24       Pt with complete all transfers safely independently          OT Goal 4       Start:  12/21/23    Expected End:  01/04/24       Pt will complete UB dressing ADL's with modified independence using adaptive device(s) as needed          OT Goal 5       Start:  12/21/23    Expected End:  01/04/24       Pt with complete grooming ADL's with supervision after setup                  Treatment: Pt was  up in bedside chair. Pt completed all transfers with CGA. Pt ambulated in room with CGA using wheeled walker. Pt did have complaints of feeling \"lightheaded\" while ambulating (pt relates to vision deficits). Pt returned to and remained in bedside chair with chair alarm on and call light within reach.        "

## 2023-12-21 NOTE — PROGRESS NOTES
Met with patient at the bedside. Patient lives at [facility] with her . States she uses walker. They provide meals and manage mediations. She gets therapy there at her AL and state she does not anticipate any needs when she returns at [place]. States her niece will pick her up at discharge.

## 2023-12-21 NOTE — CARE PLAN
Problem: Pain  Goal: My pain/discomfort is manageable  Outcome: Progressing     Problem: Safety  Goal: Patient will be injury free during hospitalization  Outcome: Progressing  Goal: I will remain free of falls  Outcome: Progressing     Problem: Daily Care  Goal: Daily care needs are met  Outcome: Progressing     Problem: Psychosocial Needs  Goal: Demonstrates ability to cope with hospitalization/illness  Outcome: Progressing  Goal: Collaborate with me, my family, and caregiver to identify my specific goals  Outcome: Progressing     Problem: Discharge Barriers  Goal: My discharge needs are met  Outcome: Progressing     Problem: Fall/Injury  Goal: Not fall by end of shift  Outcome: Progressing  Goal: Be free from injury by end of the shift  Outcome: Progressing  Goal: Verbalize understanding of personal risk factors for fall in the hospital  Outcome: Progressing  Goal: Verbalize understanding of risk factor reduction measures to prevent injury from fall in the home  Outcome: Progressing  Goal: Use assistive devices by end of the shift  Outcome: Progressing  Goal: Pace activities to prevent fatigue by end of the shift  Outcome: Progressing   The patient's goals for the shift include      The clinical goals for the shift include pt will remain free from injury/fall

## 2023-12-21 NOTE — PROGRESS NOTES
Physical Therapy    Physical Therapy Evaluation    Patient Name: Natasha Lundberg  MRN: [id number]  Today's Date: 12/21/2023   Time Calculation  Start Time: 1218  Stop Time: 1233  Time Calculation (min): 15 min  3112     Assessment/Plan   PT Assessment: Pt demonstrates decreased strength and impaired balance/mobility.  Based on current level of function, pt would benefit from continued skilled therapy while in the hospital to ensure safety, decrease risk of falls, and regains strength/mobility back to baseline.  Once stable enough for discharge, pt would benefit from low intensity therapy and returning to [facility].     PT Assessment Results: Decreased strength, Impaired balance, Decreased mobility  Rehab Prognosis: Good  Evaluation/Treatment Tolerance: Patient tolerated treatment well  Medical Staff Made Aware: Yes  End of Session Communication: Bedside nurse  End of Session Patient Position: Up in chair, Alarm on  IP OR SWING BED PT PLAN  Inpatient or Swing Bed: Inpatient  PT Plan  Treatment/Interventions: Bed mobility, Transfer training, Gait training, Balance training, Neuromuscular re-education, Strengthening, Therapeutic exercise, Therapeutic activity  PT Plan: Skilled PT  PT Frequency: 3 times per week  PT Discharge Recommendations: Low intensity level of continued care  Equipment Recommended upon Discharge: Wheeled walker  PT Recommended Transfer Status: Contact guard  PT - OK to Discharge: Yes - To next level of care when cleared by medical team    Subjective     Current Problem:  Patient Active Problem List   Diagnosis    SIRS due to infectious process with acute organ dysfunction (CMS/HCC)    Pneumonia    Leukocytosis       General Visit Information:  Per EMR: pt presented to [facility] on 12/20/2023 with complaint of cough and shortness of breath.  Patient endorses having a cough for the last 2 weeks.  Patient has been febrile and having diaphoresis.  Cough is dry, hacking, nonproductive.  Patient has been taking Tylenol  and Mucinex at SNF.  Patient endorses bilateral pleuritic chest pain causing sharp pain with cough.  Pain ceases when she is not coughing.  Upon arrival to the emergency room patient was tachycardic, tachypneic, and febrile a sepsis workup was initiated. Patient denies chest pain, palpitations, lightheadedness/dizziness, shortness of breath, abdominal pain, N/V/D, urinary symptoms or leg swelling.   General: On arrival, pt sitting in chair.  Pt in no apparent distress and agreeable to therapy.  Reason for Referral: impaired mobility  Referred By: Dr Schultz  Co-Treatment: OT  Prior to Session Communication: Bedside nurse  Patient Position Received: Up in chair, Alarm on    Home Living/PLOF:  Pt from [facility] with .  Mod I for mobility with FWW.  Occasionally needs assist for bed mobility and LB dressing.  Assist with bathing, has WIS with chair and Gbs.  Ambulates to dining room for meals.  Family provides transportation as needs.  Denies any falls.     Precautions:  Precautions  Medical Precautions: Fall precautions     Objective     Pain:  Pain Assessment  Pain Assessment: 0-10  Pain Score: 0 - No pain    Cognition:  Cognition  Overall Cognitive Status: Within Functional Limits    General Assessments:    Static Sitting Balance  Static Sitting-Comment/Number of Minutes: good  Dynamic Sitting Balance  Dynamic Sitting-Comments: good  Static Standing Balance  Static Standing-Comment/Number of Minutes: good  Dynamic Standing Balance  Dynamic Standing-Comments: fair plus    Functional Assessments:  Bed Mobility   NT secondary to sitting in chair on arrival  Transfers  Sit to stand: CGA  Stand to sit: CGA    Ambulation/Gait Training  Pt ambulated 20 ft with FWW and CGA; no LOB noted     Extremity/Trunk Assessments:  BLE strength: WFL    Outcome Measures:  [facility] Basic Mobility  Turning from your back to your side while in a flat bed without using bedrails: A little  Moving from lying on your back to  sitting on the side of a flat bed without using bedrails: A little  Moving to and from bed to chair (including a wheelchair): A little  Standing up from a chair using your arms (e.g. wheelchair or bedside chair): A little  To walk in hospital room: A little  Climbing 3-5 steps with railing: A little  Basic Mobility - Total Score: 18    Goals:  Encounter Problems       Encounter Problems (Active)       PT Problem       Pt will be able to perform all bed mobility tasks with Mod I.  (Progressing)       Start:  12/21/23    Expected End:  01/04/24            Pt will perform all transfers with Mod I and FWW with proper safety mechanics.   (Progressing)       Start:  12/21/23    Expected End:  01/04/24            Pt will ambulate 100 ft with Mod I using FWW for improved functional independence.  (Progressing)       Start:  12/21/23    Expected End:  01/04/24            Pt will demonstrate good dynamic stand balance for completion of therapeutic exercises and functional tasks.  (Progressing)       Start:  12/21/23    Expected End:  01/04/24                 Education Documentation  Home Exercise Program, taught by Sosa Toure PT at 12/21/2023  3:32 PM.  Learner: Patient  Readiness: Acceptance  Method: Explanation  Response: Verbalizes Understanding    Mobility Training, taught by Sosa Toure PT at 12/21/2023  3:32 PM.  Learner: Patient  Readiness: Acceptance  Method: Explanation  Response: Verbalizes Understanding    Education Comments  No comments found.

## 2023-12-21 NOTE — H&P
History Of Present Illness  Natasha Lundberg is a 88 y.o. female presenting with past medical history of PE iron deficiency anemia hypertension admitted to the hospital secondary to shortness of breath and cough for the past 2 weeks patient seen in the emergency room she had a white count of 29,000 patient is complaining of abdominal pain for the past few days.     Past Medical History  She has a past medical history of Bilateral leg edema (10/10/2023), Cardiac arrest (CMS/[facility]), COVID-19 (09/10/2023), Gastroesophageal reflux disease without esophagitis (04/24/2023), Humeral fracture (09/10/2023), Iron deficiency anemia due to chronic blood loss (04/24/2023), Other pulmonary embolism without acute cor pulmonale (CMS/[facility]) (04/24/2023), PE (pulmonary thromboembolism) (CMS/HCC), Primary hypertension (04/24/2023), and Unspecified fracture of right femur, subsequent encounter for closed fracture with routine healing (04/24/2023).    Surgical History  She has a past surgical history that includes CT angio abdomen pelvis w and or wo IV IV contrast (09/23/2023); Total knee arthroplasty; Femur fracture surgery; and Parathyroidectomy.     Social History  She reports that she has never smoked. She has never used smokeless tobacco. She reports that she does not use drugs. No history on file for alcohol use.    Family History  Family History   Problem Relation Name Age of Onset    COPD Mother      Lung cancer Father      Esophageal cancer Sister          Allergies  Patient has no known allergies.    Review of Systems   Constitutional:  Negative for diaphoresis and fatigue.   HENT:  Negative for ear pain, facial swelling, tinnitus and trouble swallowing.    Eyes:  Negative for photophobia and visual disturbance.   Respiratory:  Negative for choking and stridor.    Cardiovascular:  Negative for chest pain and palpitations.   Gastrointestinal: Positive abdominal pain endocrine: Negative for cold intolerance, heat intolerance, polydipsia and  "polyuria.   Musculoskeletal:  Negative for back pain and joint swelling.   Skin:  Negative for color change and rash.   Allergic/Immunologic: Negative for food allergies.   Neurological:  Negative for tremors, facial asymmetry and weakness.   Psychiatric/Behavioral:  The patient is not hyperactive.       Physical Exam  HENT:      Right Ear: External ear normal.      Left Ear: External ear normal.      Mouth/Throat:      Mouth: Mucous membranes are moist.   Cardiovascular:      Rate and Rhythm: Normal rate and regular rhythm.      Heart sounds: No murmur heard.     No friction rub. No gallop.   Pulmonary:      Effort: No accessory muscle usage or respiratory distress.      Breath sounds: No stridor. No wheezing or rhonchi.   Chest:      Chest wall: No tenderness.   Abdominal:      General: There is no distension.      Palpations: There is no mass.      Tenderness: There is no abdominal tenderness. There is no guarding or rebound.   Musculoskeletal:         General: No deformity or signs of injury.      Cervical back: No rigidity or tenderness. Normal range of motion.      Right lower leg: No edema.      Left lower leg: No edema.   Skin:     Coloration: Skin is not jaundiced or pale.      Findings: No lesion.   Neurological:      General: No focal deficit present.      Mental Status: He is alert, oriented to person, place, and time and easily aroused.      Cranial Nerves: No cranial nerve deficit.      Sensory: No sensory deficit.      Motor: No weakness.        Last Recorded Vitals  Blood pressure 122/62, pulse 98, temperature 37.4 °C (99.3 °F), resp. rate 20, height 1.575 m (5' 2\"), weight 63.8 kg (140 lb 10.5 oz), SpO2 95 %.    Labs    Admission on 12/20/2023   Component Date Value Ref Range Status    POCT pH, Venous 12/20/2023 7.41  7.33 - 7.43 pH Final    POCT pCO2, Venous 12/20/2023 35 (L)  41 - 51 mm Hg Final    POCT pO2, Venous 12/20/2023 39  35 - 45 mm Hg Final    POCT SO2, Venous 12/20/2023 62  45 - 75 % " Final    POCT Oxy Hemoglobin, Venous 12/20/2023 59.3  45.0 - 75.0 % Final    POCT Hematocrit Calculated, Venous 12/20/2023 37.0  36.0 - 46.0 % Final    POCT Sodium, Venous 12/20/2023 130 (L)  136 - 145 mmol/L Final    POCT Potassium, Venous 12/20/2023 4.5  3.5 - 5.3 mmol/L Final    POCT Chloride, Venous 12/20/2023 98  98 - 107 mmol/L Final    POCT Ionized Calicum, Venous 12/20/2023 1.16  1.10 - 1.33 mmol/L Final    POCT Glucose, Venous 12/20/2023 123 (H)  74 - 99 mg/dL Final    POCT Lactate, Venous 12/20/2023 1.2  0.4 - 2.0 mmol/L Final    POCT Base Excess, Venous 12/20/2023 -1.9  -2.0 - 3.0 mmol/L Final    POCT HCO3 Calculated, Venous 12/20/2023 22.2  22.0 - 26.0 mmol/L Final    POCT Hemoglobin, Venous 12/20/2023 12.4  12.0 - 16.0 g/dL Final    POCT Anion Gap, Venous 12/20/2023 14.0  10.0 - 25.0 mmol/L Final    Patient Temperature 12/20/2023    Final    NOTE: Patient Results are Not Corrected for Temperature    FiO2 12/20/2023 21  % Final    WBC 12/20/2023 29.2 (H)  4.4 - 11.3 x10*3/uL Final    nRBC 12/20/2023 0.0  0.0 - 0.0 /100 WBCs Final    RBC 12/20/2023 3.67 (L)  4.00 - 5.20 x10*6/uL Final    Hemoglobin 12/20/2023 10.8 (L)  12.0 - 16.0 g/dL Final    Hematocrit 12/20/2023 33.9 (L)  36.0 - 46.0 % Final    MCV 12/20/2023 92  80 - 100 fL Final    MCH 12/20/2023 29.4  26.0 - 34.0 pg Final    MCHC 12/20/2023 31.9 (L)  32.0 - 36.0 g/dL Final    RDW 12/20/2023 14.4  11.5 - 14.5 % Final    Platelets 12/20/2023 359  150 - 450 x10*3/uL Final    Neutrophils % 12/20/2023 85.8  40.0 - 80.0 % Final    Immature Granulocytes %, Automated 12/20/2023 1.0 (H)  0.0 - 0.9 % Final    Immature Granulocyte Count (IG) includes promyelocytes, myelocytes and metamyelocytes but does not include bands. Percent differential counts (%) should be interpreted in the context of the absolute cell counts (cells/UL).    Lymphocytes % 12/20/2023 4.9  13.0 - 44.0 % Final    Monocytes % 12/20/2023 7.8  2.0 - 10.0 % Final    Eosinophils % 12/20/2023  0.1  0.0 - 6.0 % Final    Basophils % 12/20/2023 0.4  0.0 - 2.0 % Final    Neutrophils Absolute 12/20/2023 25.04 (H)  1.60 - 5.50 x10*3/uL Final    Percent differential counts (%) should be interpreted in the context of the absolute cell counts (cells/uL).    Immature Granulocytes Absolute, Au* 12/20/2023 0.29  0.00 - 0.50 x10*3/uL Final    Lymphocytes Absolute 12/20/2023 1.43  0.80 - 3.00 x10*3/uL Final    Monocytes Absolute 12/20/2023 2.28 (H)  0.05 - 0.80 x10*3/uL Final    Eosinophils Absolute 12/20/2023 0.02  0.00 - 0.40 x10*3/uL Final    Basophils Absolute 12/20/2023 0.11 (H)  0.00 - 0.10 x10*3/uL Final    Glucose 12/20/2023 104 (H)  74 - 99 mg/dL Final    Sodium 12/20/2023 129 (L)  136 - 145 mmol/L Final    Potassium 12/20/2023 3.9  3.5 - 5.3 mmol/L Final    Chloride 12/20/2023 96 (L)  98 - 107 mmol/L Final    Bicarbonate 12/20/2023 20 (L)  21 - 32 mmol/L Final    Anion Gap 12/20/2023 17  10 - 20 mmol/L Final    Urea Nitrogen 12/20/2023 24 (H)  6 - 23 mg/dL Final    Creatinine 12/20/2023 1.12 (H)  0.50 - 1.05 mg/dL Final    eGFR 12/20/2023 48 (L)  >60 mL/min/1.73m*2 Final    Calculations of estimated GFR are performed using the 2021 CKD-EPI Study Refit equation without the race variable for the IDMS-Traceable creatinine methods.  https://jasn.asnjournals.org/content/early/2021/09/22/ASN.1643838072    Calcium 12/20/2023 8.8  8.6 - 10.3 mg/dL Final    Albumin 12/20/2023 3.6  3.4 - 5.0 g/dL Final    Alkaline Phosphatase 12/20/2023 84  33 - 136 U/L Final    Total Protein 12/20/2023 7.7  6.4 - 8.2 g/dL Final    AST 12/20/2023 9  9 - 39 U/L Final    Bilirubin, Total 12/20/2023 0.5  0.0 - 1.2 mg/dL Final    ALT 12/20/2023 7  7 - 45 U/L Final    Patients treated with Sulfasalazine may generate falsely decreased results for ALT.    Magnesium 12/20/2023 1.76  1.60 - 2.40 mg/dL Final    Protime 12/20/2023 33.1 (H)  9.8 - 12.8 seconds Final    INR 12/20/2023 2.9 (H)  0.9 - 1.1 Final    Flu A Result 12/20/2023 Not Detected   Not Detected Final    Flu B Result 12/20/2023 Not Detected  Not Detected Final    Coronavirus 2019, PCR 12/20/2023 Not Detected  Not Detected Final    Blood Culture 12/20/2023 Loaded on Instrument - Culture in progress   Preliminary    Blood Culture 12/20/2023 Loaded on Instrument - Culture in progress   Preliminary    D-Dimer, Quantitative VTE Exclusion 12/20/2023 471  <=500 ng/mL FEU Final    Troponin I, High Sensitivity 12/20/2023 11  0 - 13 ng/L Final    Troponin I, High Sensitivity 12/20/2023 12  0 - 13 ng/L Final    Lactate 12/20/2023 0.7  0.4 - 2.0 mmol/L Final    MRSA PCR 12/20/2023 Not Detected  Not Detected Final    BNP 12/20/2023 76  0 - 99 pg/mL Final    Color, Urine 12/20/2023 Yellow  Straw, Yellow Final    Appearance, Urine 12/20/2023 Hazy (N)  Clear Final    Specific Gravity, Urine 12/20/2023 1.039 (N)  1.005 - 1.035 Final    pH, Urine 12/20/2023 5.0  5.0, 5.5, 6.0, 6.5, 7.0, 7.5, 8.0 Final    Protein, Urine 12/20/2023 30 (1+) (N)  NEGATIVE mg/dL Final    Glucose, Urine 12/20/2023 NEGATIVE  NEGATIVE mg/dL Final    Blood, Urine 12/20/2023 NEGATIVE  NEGATIVE Final    Ketones, Urine 12/20/2023 NEGATIVE  NEGATIVE mg/dL Final    Bilirubin, Urine 12/20/2023 NEGATIVE  NEGATIVE Final    Urobilinogen, Urine 12/20/2023 <2.0  <2.0 mg/dL Final    Nitrite, Urine 12/20/2023 NEGATIVE  NEGATIVE Final    Leukocyte Esterase, Urine 12/20/2023 TRACE (A)  NEGATIVE Final    Extra Tube 12/20/2023 Hold for add-ons.   Final    Auto resulted.    WBC, Urine 12/20/2023 6-10 (A)  1-5, NONE /HPF Final    RBC, Urine 12/20/2023 NONE  NONE, 1-2, 3-5 /HPF Final    Squamous Epithelial Cells, Urine 12/20/2023 10-25 (FEW)  Reference range not established. /HPF Final    Bacteria, Urine 12/20/2023 1+ (A)  NONE SEEN /HPF Final    RSV PCR 12/20/2023 Not Detected  Not Detected Final    WBC 12/21/2023 22.2 (H)  4.4 - 11.3 x10*3/uL Final    nRBC 12/21/2023 0.0  0.0 - 0.0 /100 WBCs Final    RBC 12/21/2023 3.44 (L)  4.00 - 5.20 x10*6/uL Final     Hemoglobin 12/21/2023 10.1 (L)  12.0 - 16.0 g/dL Final    Hematocrit 12/21/2023 34.5 (L)  36.0 - 46.0 % Final    MCV 12/21/2023 100  80 - 100 fL Final    MCH 12/21/2023 29.4  26.0 - 34.0 pg Final    MCHC 12/21/2023 29.3 (L)  32.0 - 36.0 g/dL Final    RDW 12/21/2023 14.6 (H)  11.5 - 14.5 % Final    Platelets 12/21/2023 293  150 - 450 x10*3/uL Final    Glucose 12/21/2023 76  74 - 99 mg/dL Final    Sodium 12/21/2023 131 (L)  136 - 145 mmol/L Final    Potassium 12/21/2023 3.9  3.5 - 5.3 mmol/L Final    Chloride 12/21/2023 101  98 - 107 mmol/L Final    Bicarbonate 12/21/2023 17 (L)  21 - 32 mmol/L Final    Anion Gap 12/21/2023 17  10 - 20 mmol/L Final    Urea Nitrogen 12/21/2023 20  6 - 23 mg/dL Final    Creatinine 12/21/2023 0.94  0.50 - 1.05 mg/dL Final    eGFR 12/21/2023 59 (L)  >60 mL/min/1.73m*2 Final    Calculations of estimated GFR are performed using the 2021 CKD-EPI Study Refit equation without the race variable for the IDMS-Traceable creatinine methods.  https://jasn.asnjournals.org/content/early/2021/09/22/ASN.4242135088    Calcium 12/21/2023 8.3 (L)  8.6 - 10.3 mg/dL Final         Imaging     CT angio chest for pulmonary embolism  Narrative: STUDY:  CT Angiogram of the Chest; 12/20/23 at 1:33 PM  INDICATION:  Shortness of breath, chest pain.  COMPARISON:  Chest XR same date.  ACCESSION NUMBER(S):  [id number]  ORDERING CLINICIAN:  JIMY DILLARD  TECHNIQUE:  CTA of the chest was performed with intravenous contrast.   Images are reviewed and processed at a workstation according to the CT  angiogram protocol with 3-D and/or MIP post processing imaging  generated.  Omnipaque 350 50 mL was administered intravenously.  Automated mA/kV exposure control was utilized and patient examination  was performed in strict accordance with principles of ALARA.  FINDINGS:  There is a 12 mm nodule in the left lobe of the thyroid.  There is  enlargement of the right lobe of the thyroid.  These would best be  evaluated with  thyroid ultrasound.  Pulmonary arteries are adequately opacified without acute or chronic  filling defects.  The thoracic aorta is normal in course and caliber  without dissection or aneurysm.  The heart is normal in size without pericardial effusion.  There is  mediastinal and hilar adenopathy.  There is no pleural effusion, pleural thickening, or pneumothorax.   The airways are patent.  There are diffuse interstitial changes in the lungs.  These are  primarily in the periphery most likely related to fibrosis.   Superimposed infection cannot be excluded.  There is a hiatal hernia.  There are no acute fractures.  No suspicious bony lesions.  Impression: No evidence of pulmonary embolism.  Diffuse bilateral interstitial infiltrates most pronounced in the  periphery of the lungs.  This most likely represents fibrosis.  There  is mediastinal and hilar adenopathy which is most likely reactive.  Enlarged thyroid with nodules.  This is best evaluated with  ultrasound.  Signed by Alex Henry MD  XR chest 1 view  Narrative: STUDY:  Chest Radiograph;  12/20/2023, 11:07AM.  INDICATION:  Chest pain.  COMPARISON:  None.  ACCESSION NUMBER(S):  [id number]  TECHNIQUE:  Frontal chest was obtained at 11:06 hours.  FINDINGS:  CARDIOMEDIASTINAL SILHOUETTE:  Cardiomediastinal silhouette is normal in size and configuration.     LUNGS:  There is increased density along the lateral lower left chest wall.   There are no old films available for comparison and this may represent  chronic pleural thickening but an acute infiltrate cannot be excluded.     ABDOMEN:  No remarkable upper abdominal findings.     BONES:  There is no fracture of the proximal left humerus with degenerative  changes of both shoulders.  No acute osseous changes.  Impression: Questionable left lower lobe infiltrate.  Signed by Alex Henry MD       Patient Active Problem List   Diagnosis    SIRS due to infectious process with acute organ dysfunction (CMS/HCC)     Pneumonia    Leukocytosis         Assessment/Plan   Active Problems:    SIRS due to infectious process with acute organ dysfunction (CMS/HCC)    Pneumonia    Leukocytosis      X-ray consistent with pneumonia patient started on antibiotics we will get a CAT scan of the abdomen specially with abdominal pain rule out possibility of diverticulitis or colitis           KILEY Schultz MD

## 2023-12-22 LAB
ANION GAP SERPL CALC-SCNC: 12 MMOL/L (ref 10–20)
BUN SERPL-MCNC: 13 MG/DL (ref 6–23)
CALCIUM SERPL-MCNC: 8.6 MG/DL (ref 8.6–10.3)
CHLORIDE SERPL-SCNC: 98 MMOL/L (ref 98–107)
CO2 SERPL-SCNC: 22 MMOL/L (ref 21–32)
CREAT SERPL-MCNC: 0.76 MG/DL (ref 0.5–1.05)
ERYTHROCYTE [DISTWIDTH] IN BLOOD BY AUTOMATED COUNT: 14.6 % (ref 11.5–14.5)
GFR SERPL CREATININE-BSD FRML MDRD: 76 ML/MIN/1.73M*2
GLUCOSE SERPL-MCNC: 116 MG/DL (ref 74–99)
HCT VFR BLD AUTO: 31.7 % (ref 36–46)
HGB BLD-MCNC: 10.2 G/DL (ref 12–16)
MCH RBC QN AUTO: 29.9 PG (ref 26–34)
MCHC RBC AUTO-ENTMCNC: 32.2 G/DL (ref 32–36)
MCV RBC AUTO: 93 FL (ref 80–100)
NRBC BLD-RTO: 0 /100 WBCS (ref 0–0)
PLATELET # BLD AUTO: 387 X10*3/UL (ref 150–450)
POTASSIUM SERPL-SCNC: 3.6 MMOL/L (ref 3.5–5.3)
RBC # BLD AUTO: 3.41 X10*6/UL (ref 4–5.2)
SODIUM SERPL-SCNC: 128 MMOL/L (ref 136–145)
WBC # BLD AUTO: 18.7 X10*3/UL (ref 4.4–11.3)

## 2023-12-22 PROCEDURE — 2500000001 HC RX 250 WO HCPCS SELF ADMINISTERED DRUGS (ALT 637 FOR MEDICARE OP): Performed by: NURSE PRACTITIONER

## 2023-12-22 PROCEDURE — 36415 COLL VENOUS BLD VENIPUNCTURE: CPT | Performed by: NURSE PRACTITIONER

## 2023-12-22 PROCEDURE — 85027 COMPLETE CBC AUTOMATED: CPT | Performed by: NURSE PRACTITIONER

## 2023-12-22 PROCEDURE — 1100000001 HC PRIVATE ROOM DAILY

## 2023-12-22 PROCEDURE — 80048 BASIC METABOLIC PNL TOTAL CA: CPT | Performed by: NURSE PRACTITIONER

## 2023-12-22 PROCEDURE — 2500000004 HC RX 250 GENERAL PHARMACY W/ HCPCS (ALT 636 FOR OP/ED): Performed by: NURSE PRACTITIONER

## 2023-12-22 RX ADMIN — GUAIFENESIN 200 MG: 200 SOLUTION ORAL at 05:10

## 2023-12-22 RX ADMIN — OXYCODONE HYDROCHLORIDE 5 MG: 5 TABLET ORAL at 18:51

## 2023-12-22 RX ADMIN — APIXABAN 5 MG: 5 TABLET, FILM COATED ORAL at 08:28

## 2023-12-22 RX ADMIN — CYCLOBENZAPRINE HYDROCHLORIDE 5 MG: 5 TABLET, FILM COATED ORAL at 00:38

## 2023-12-22 RX ADMIN — GUAIFENESIN 600 MG: 600 TABLET ORAL at 20:59

## 2023-12-22 RX ADMIN — GUAIFENESIN 600 MG: 600 TABLET ORAL at 08:27

## 2023-12-22 RX ADMIN — PANTOPRAZOLE SODIUM 40 MG: 40 TABLET, DELAYED RELEASE ORAL at 20:59

## 2023-12-22 RX ADMIN — CALCIUM 625 MG: 500 TABLET ORAL at 20:59

## 2023-12-22 RX ADMIN — OXYCODONE HYDROCHLORIDE 5 MG: 5 TABLET ORAL at 12:02

## 2023-12-22 RX ADMIN — OXYCODONE HYDROCHLORIDE 5 MG: 5 TABLET ORAL at 06:14

## 2023-12-22 RX ADMIN — ACETAMINOPHEN 650 MG: 325 TABLET ORAL at 21:04

## 2023-12-22 RX ADMIN — PANTOPRAZOLE SODIUM 40 MG: 40 TABLET, DELAYED RELEASE ORAL at 08:28

## 2023-12-22 RX ADMIN — APIXABAN 5 MG: 5 TABLET, FILM COATED ORAL at 20:59

## 2023-12-22 RX ADMIN — CALCIUM 625 MG: 500 TABLET ORAL at 08:27

## 2023-12-22 RX ADMIN — GUAIFENESIN 200 MG: 200 SOLUTION ORAL at 01:13

## 2023-12-22 RX ADMIN — CEFTRIAXONE SODIUM 2 G: 2 INJECTION, SOLUTION INTRAVENOUS at 21:04

## 2023-12-22 RX ADMIN — AZITHROMYCIN MONOHYDRATE 500 MG: 500 INJECTION, POWDER, LYOPHILIZED, FOR SOLUTION INTRAVENOUS at 12:03

## 2023-12-22 RX ADMIN — GUAIFENESIN 200 MG: 200 SOLUTION ORAL at 12:03

## 2023-12-22 RX ADMIN — GUAIFENESIN 200 MG: 200 SOLUTION ORAL at 18:51

## 2023-12-22 ASSESSMENT — COGNITIVE AND FUNCTIONAL STATUS - GENERAL
DRESSING REGULAR LOWER BODY CLOTHING: A LITTLE
MOBILITY SCORE: 18
DAILY ACTIVITIY SCORE: 22
CLIMB 3 TO 5 STEPS WITH RAILING: A LITTLE
TURNING FROM BACK TO SIDE WHILE IN FLAT BAD: A LITTLE
WALKING IN HOSPITAL ROOM: A LITTLE
HELP NEEDED FOR BATHING: A LITTLE
STANDING UP FROM CHAIR USING ARMS: A LITTLE
MOVING TO AND FROM BED TO CHAIR: A LITTLE
MOVING FROM LYING ON BACK TO SITTING ON SIDE OF FLAT BED WITH BEDRAILS: A LITTLE

## 2023-12-22 ASSESSMENT — PAIN - FUNCTIONAL ASSESSMENT
PAIN_FUNCTIONAL_ASSESSMENT: 0-10

## 2023-12-22 ASSESSMENT — PAIN SCALES - GENERAL
PAINLEVEL_OUTOF10: 4
PAINLEVEL_OUTOF10: 6
PAINLEVEL_OUTOF10: 6
PAINLEVEL_OUTOF10: 10 - WORST POSSIBLE PAIN
PAINLEVEL_OUTOF10: 4
PAINLEVEL_OUTOF10: 10 - WORST POSSIBLE PAIN

## 2023-12-22 ASSESSMENT — PAIN DESCRIPTION - LOCATION
LOCATION: ABDOMEN
LOCATION: ABDOMEN

## 2023-12-22 ASSESSMENT — PAIN DESCRIPTION - ORIENTATION
ORIENTATION: RIGHT
ORIENTATION: RIGHT

## 2023-12-22 NOTE — PROGRESS NOTES
"Subjective  Patient had uneventful night does not complain about any chest pain shortness of breath still having right lower quadrant pain  Nursing staff was interviewed  Objectives    Last Recorded Vitals  Blood pressure 170/79, pulse 88, temperature 36.2 °C (97.2 °F), resp. rate 20, height 1.575 m (5' 2\"), weight 63.8 kg (140 lb 10.5 oz), SpO2 96 %.    Physical Exam  HENT:      Right Ear: External ear normal.      Left Ear: External ear normal.      Mouth/Throat:      Mouth: Mucous membranes are moist.   Cardiovascular:      Rate and Rhythm: Normal rate and regular rhythm.      Heart sounds: No murmur heard.     No friction rub. No gallop.   Pulmonary:      Effort: No accessory muscle usage or respiratory distress.      Breath sounds: No stridor. No wheezing or rhonchi.   Chest:      Chest wall: No tenderness.   Abdominal:   Significant tenderness over the right lower quadrant.   Musculoskeletal:         General: No deformity or signs of injury.      Cervical back: No rigidity or tenderness. Normal range of motion.      Right lower leg: No edema.      Left lower leg: No edema.   Skin:     Coloration: Skin is not jaundiced or pale.      Findings: No lesion.   Neurological:      General: No focal deficit present.      Mental Status: He is alert, oriented to person, place, and time and easily aroused.      Cranial Nerves: No cranial nerve deficit.      Sensory: No sensory deficit.      Motor: No weakness.        Labs    Admission on 12/20/2023   Component Date Value Ref Range Status    Ventricular Rate 12/20/2023 115  BPM Preliminary    Atrial Rate 12/20/2023 115  BPM Preliminary    LA Interval 12/20/2023 206  ms Preliminary    QRS Duration 12/20/2023 94  ms Preliminary    QT Interval 12/20/2023 308  ms Preliminary    QTC Calculation(Bazett) 12/20/2023 426  ms Preliminary    P Axis 12/20/2023 45  degrees Preliminary    R Axis 12/20/2023 -25  degrees Preliminary    T Axis 12/20/2023 70  degrees Preliminary    QRS " Count 12/20/2023 19  beats Preliminary    Q Onset 12/20/2023 222  ms Preliminary    P Onset 12/20/2023 119  ms Preliminary    P Offset 12/20/2023 176  ms Preliminary    T Offset 12/20/2023 376  ms Preliminary    QTC Fredericia 12/20/2023 382  ms Preliminary    Ventricular Rate 12/20/2023 115  BPM Preliminary    Atrial Rate 12/20/2023 115  BPM Preliminary    TN Interval 12/20/2023 200  ms Preliminary    QRS Duration 12/20/2023 94  ms Preliminary    QT Interval 12/20/2023 302  ms Preliminary    QTC Calculation(Bazett) 12/20/2023 417  ms Preliminary    P Axis 12/20/2023 35  degrees Preliminary    R Axis 12/20/2023 -25  degrees Preliminary    T Axis 12/20/2023 60  degrees Preliminary    QRS Count 12/20/2023 19  beats Preliminary    Q Onset 12/20/2023 223  ms Preliminary    P Onset 12/20/2023 123  ms Preliminary    P Offset 12/20/2023 180  ms Preliminary    T Offset 12/20/2023 374  ms Preliminary    QTC Fredericia 12/20/2023 374  ms Preliminary    POCT pH, Venous 12/20/2023 7.41  7.33 - 7.43 pH Final    POCT pCO2, Venous 12/20/2023 35 (L)  41 - 51 mm Hg Final    POCT pO2, Venous 12/20/2023 39  35 - 45 mm Hg Final    POCT SO2, Venous 12/20/2023 62  45 - 75 % Final    POCT Oxy Hemoglobin, Venous 12/20/2023 59.3  45.0 - 75.0 % Final    POCT Hematocrit Calculated, Venous 12/20/2023 37.0  36.0 - 46.0 % Final    POCT Sodium, Venous 12/20/2023 130 (L)  136 - 145 mmol/L Final    POCT Potassium, Venous 12/20/2023 4.5  3.5 - 5.3 mmol/L Final    POCT Chloride, Venous 12/20/2023 98  98 - 107 mmol/L Final    POCT Ionized Calicum, Venous 12/20/2023 1.16  1.10 - 1.33 mmol/L Final    POCT Glucose, Venous 12/20/2023 123 (H)  74 - 99 mg/dL Final    POCT Lactate, Venous 12/20/2023 1.2  0.4 - 2.0 mmol/L Final    POCT Base Excess, Venous 12/20/2023 -1.9  -2.0 - 3.0 mmol/L Final    POCT HCO3 Calculated, Venous 12/20/2023 22.2  22.0 - 26.0 mmol/L Final    POCT Hemoglobin, Venous 12/20/2023 12.4  12.0 - 16.0 g/dL Final    POCT Anion Gap, Venous  12/20/2023 14.0  10.0 - 25.0 mmol/L Final    Patient Temperature 12/20/2023    Final    NOTE: Patient Results are Not Corrected for Temperature    FiO2 12/20/2023 21  % Final    WBC 12/20/2023 29.2 (H)  4.4 - 11.3 x10*3/uL Final    nRBC 12/20/2023 0.0  0.0 - 0.0 /100 WBCs Final    RBC 12/20/2023 3.67 (L)  4.00 - 5.20 x10*6/uL Final    Hemoglobin 12/20/2023 10.8 (L)  12.0 - 16.0 g/dL Final    Hematocrit 12/20/2023 33.9 (L)  36.0 - 46.0 % Final    MCV 12/20/2023 92  80 - 100 fL Final    MCH 12/20/2023 29.4  26.0 - 34.0 pg Final    MCHC 12/20/2023 31.9 (L)  32.0 - 36.0 g/dL Final    RDW 12/20/2023 14.4  11.5 - 14.5 % Final    Platelets 12/20/2023 359  150 - 450 x10*3/uL Final    Neutrophils % 12/20/2023 85.8  40.0 - 80.0 % Final    Immature Granulocytes %, Automated 12/20/2023 1.0 (H)  0.0 - 0.9 % Final    Immature Granulocyte Count (IG) includes promyelocytes, myelocytes and metamyelocytes but does not include bands. Percent differential counts (%) should be interpreted in the context of the absolute cell counts (cells/UL).    Lymphocytes % 12/20/2023 4.9  13.0 - 44.0 % Final    Monocytes % 12/20/2023 7.8  2.0 - 10.0 % Final    Eosinophils % 12/20/2023 0.1  0.0 - 6.0 % Final    Basophils % 12/20/2023 0.4  0.0 - 2.0 % Final    Neutrophils Absolute 12/20/2023 25.04 (H)  1.60 - 5.50 x10*3/uL Final    Percent differential counts (%) should be interpreted in the context of the absolute cell counts (cells/uL).    Immature Granulocytes Absolute, Au* 12/20/2023 0.29  0.00 - 0.50 x10*3/uL Final    Lymphocytes Absolute 12/20/2023 1.43  0.80 - 3.00 x10*3/uL Final    Monocytes Absolute 12/20/2023 2.28 (H)  0.05 - 0.80 x10*3/uL Final    Eosinophils Absolute 12/20/2023 0.02  0.00 - 0.40 x10*3/uL Final    Basophils Absolute 12/20/2023 0.11 (H)  0.00 - 0.10 x10*3/uL Final    Glucose 12/20/2023 104 (H)  74 - 99 mg/dL Final    Sodium 12/20/2023 129 (L)  136 - 145 mmol/L Final    Potassium 12/20/2023 3.9  3.5 - 5.3 mmol/L Final     Chloride 12/20/2023 96 (L)  98 - 107 mmol/L Final    Bicarbonate 12/20/2023 20 (L)  21 - 32 mmol/L Final    Anion Gap 12/20/2023 17  10 - 20 mmol/L Final    Urea Nitrogen 12/20/2023 24 (H)  6 - 23 mg/dL Final    Creatinine 12/20/2023 1.12 (H)  0.50 - 1.05 mg/dL Final    eGFR 12/20/2023 48 (L)  >60 mL/min/1.73m*2 Final    Calculations of estimated GFR are performed using the 2021 CKD-EPI Study Refit equation without the race variable for the IDMS-Traceable creatinine methods.  https://jasn.asnjournals.org/content/early/2021/09/22/ASN.0187699568    Calcium 12/20/2023 8.8  8.6 - 10.3 mg/dL Final    Albumin 12/20/2023 3.6  3.4 - 5.0 g/dL Final    Alkaline Phosphatase 12/20/2023 84  33 - 136 U/L Final    Total Protein 12/20/2023 7.7  6.4 - 8.2 g/dL Final    AST 12/20/2023 9  9 - 39 U/L Final    Bilirubin, Total 12/20/2023 0.5  0.0 - 1.2 mg/dL Final    ALT 12/20/2023 7  7 - 45 U/L Final    Patients treated with Sulfasalazine may generate falsely decreased results for ALT.    Magnesium 12/20/2023 1.76  1.60 - 2.40 mg/dL Final    Protime 12/20/2023 33.1 (H)  9.8 - 12.8 seconds Final    INR 12/20/2023 2.9 (H)  0.9 - 1.1 Final    Flu A Result 12/20/2023 Not Detected  Not Detected Final    Flu B Result 12/20/2023 Not Detected  Not Detected Final    Coronavirus 2019, PCR 12/20/2023 Not Detected  Not Detected Final    Blood Culture 12/20/2023 No growth at 1 day   Preliminary    Blood Culture 12/20/2023 No growth at 1 day   Preliminary    D-Dimer, Quantitative VTE Exclusion 12/20/2023 471  <=500 ng/mL FEU Final    Troponin I, High Sensitivity 12/20/2023 11  0 - 13 ng/L Final    Troponin I, High Sensitivity 12/20/2023 12  0 - 13 ng/L Final    Lactate 12/20/2023 0.7  0.4 - 2.0 mmol/L Final    MRSA PCR 12/20/2023 Not Detected  Not Detected Final    BNP 12/20/2023 76  0 - 99 pg/mL Final    Color, Urine 12/20/2023 Yellow  Straw, Yellow Final    Appearance, Urine 12/20/2023 Hazy (N)  Clear Final    Specific Gravity, Urine 12/20/2023  1.039 (N)  1.005 - 1.035 Final    pH, Urine 12/20/2023 5.0  5.0, 5.5, 6.0, 6.5, 7.0, 7.5, 8.0 Final    Protein, Urine 12/20/2023 30 (1+) (N)  NEGATIVE mg/dL Final    Glucose, Urine 12/20/2023 NEGATIVE  NEGATIVE mg/dL Final    Blood, Urine 12/20/2023 NEGATIVE  NEGATIVE Final    Ketones, Urine 12/20/2023 NEGATIVE  NEGATIVE mg/dL Final    Bilirubin, Urine 12/20/2023 NEGATIVE  NEGATIVE Final    Urobilinogen, Urine 12/20/2023 <2.0  <2.0 mg/dL Final    Nitrite, Urine 12/20/2023 NEGATIVE  NEGATIVE Final    Leukocyte Esterase, Urine 12/20/2023 TRACE (A)  NEGATIVE Final    Extra Tube 12/20/2023 Hold for add-ons.   Final    Auto resulted.    WBC, Urine 12/20/2023 6-10 (A)  1-5, NONE /HPF Final    RBC, Urine 12/20/2023 NONE  NONE, 1-2, 3-5 /HPF Final    Squamous Epithelial Cells, Urine 12/20/2023 10-25 (FEW)  Reference range not established. /HPF Final    Bacteria, Urine 12/20/2023 1+ (A)  NONE SEEN /HPF Final    Urine Culture 12/20/2023 No growth   Final    Procalcitonin 12/20/2023 1.39 (H)  <=0.07 ng/mL Final    L. pneumophila Urine Ag 12/20/2023 Negative  Negative Final    Streptococcus pneumoniae Ag, Urine 12/20/2023 Negative  Negative Final    RSV PCR 12/20/2023 Not Detected  Not Detected Final    WBC 12/21/2023 22.2 (H)  4.4 - 11.3 x10*3/uL Final    nRBC 12/21/2023 0.0  0.0 - 0.0 /100 WBCs Final    RBC 12/21/2023 3.44 (L)  4.00 - 5.20 x10*6/uL Final    Hemoglobin 12/21/2023 10.1 (L)  12.0 - 16.0 g/dL Final    Hematocrit 12/21/2023 34.5 (L)  36.0 - 46.0 % Final    MCV 12/21/2023 100  80 - 100 fL Final    MCH 12/21/2023 29.4  26.0 - 34.0 pg Final    MCHC 12/21/2023 29.3 (L)  32.0 - 36.0 g/dL Final    RDW 12/21/2023 14.6 (H)  11.5 - 14.5 % Final    Platelets 12/21/2023 293  150 - 450 x10*3/uL Final    Glucose 12/21/2023 76  74 - 99 mg/dL Final    Sodium 12/21/2023 131 (L)  136 - 145 mmol/L Final    Potassium 12/21/2023 3.9  3.5 - 5.3 mmol/L Final    Chloride 12/21/2023 101  98 - 107 mmol/L Final    Bicarbonate 12/21/2023 17  (L)  21 - 32 mmol/L Final    Anion Gap 12/21/2023 17  10 - 20 mmol/L Final    Urea Nitrogen 12/21/2023 20  6 - 23 mg/dL Final    Creatinine 12/21/2023 0.94  0.50 - 1.05 mg/dL Final    eGFR 12/21/2023 59 (L)  >60 mL/min/1.73m*2 Final    Calculations of estimated GFR are performed using the 2021 CKD-EPI Study Refit equation without the race variable for the IDMS-Traceable creatinine methods.  https://jasn.asnjournals.org/content/early/2021/09/22/ASN.5481432134    Calcium 12/21/2023 8.3 (L)  8.6 - 10.3 mg/dL Final         Imaging     CT abdomen pelvis w IV contrast  Narrative: Interpreted By:  Taiwo Marshall,   STUDY:  CT ABDOMEN PELVIS W IV CONTRAST;  12/21/2023 3:04 pm      INDICATION:  Signs/Symptoms:abd distention and tender in RUQ and epigastrium. WBC  of 29..      COMPARISON:  CTA chest dated 12/20/2023. CTA abdomen and pelvis dated 09/23/2023.      ACCESSION NUMBER(S):  [id number]      ORDERING CLINICIAN:  KADEEM KUO      TECHNIQUE:  CT of the abdomen and pelvis was performed following administration  of intravenous contrast medium. Standard contiguous axial images were  obtained at 3 mm slice thickness through the abdomen and pelvis.  Coronal and sagittal reconstructions at 3 mm slice thickness were  performed.      A total of 75 mL Omnipaque 350 intravenous contrast was administered.      FINDINGS:  LOWER CHEST:  Small right and trace left pleural effusions. Similar appearance of  diffuse bronchial wall thickening with elements of peripheral  traction bronchiectasis and subpleural consolidative opacity  involving the mid and lower lung zones, likely reflecting an element  of pulmonary fibrosis. Additional thoracic findings as detailed 1 day  prior.      ABDOMEN:      LIVER:  The liver is normal in size without evidence of focal liver lesions.      BILE DUCTS:  The intrahepatic and extrahepatic ducts are not dilated.      GALLBLADDER:  Dependent hyperdensity within the gallbladder lumen may  reflect  biliary sludge, calculi, or vicarious excretion of contrast medium.  No apparent gallbladder wall thickening or pericholecystic fluid.      PANCREAS:  The pancreas appears unremarkable without evidence of ductal  dilatation or masses.      SPLEEN:  Calcification within the splenic parenchyma may reflect sequela of  healed granulomatous disease. The spleen is normal in size without  focal lesions.      ADRENAL GLANDS:  Bilateral adrenal glands appear normal.      KIDNEYS AND URETERS:  The kidneys are normal in size and enhance symmetrically. Fluid  density lesions within the renal cortex bilaterally measuring up to  1.5 cm in diameter on the right, likely representing renal cysts.  Slightly lobular renal contours bilaterally. No hydroureteronephrosis  or nephroureterolithiasis is identified.      PELVIS:      BLADDER:  Contrast medium suggested within the bladder lumen. The bladder is  unremarkable.      REPRODUCTIVE ORGANS:  Calcified uterine fibroid suggested with atrophic appearance of the  uterus. No suspicious adnexal mass lesion.      BOWEL:  Small hiatal hernia. Stomach is otherwise unremarkable in CT  assessment. Small and large bowel are normal in caliber with no focal  wall thickening. No evidence to suggest obstruction. Extensive  colonic diverticulosis throughout the colon. No definite CT evidence  of acute diverticulitis. The appendix is not definitely visualized.  There is however no pericecal stranding or fluid.      VESSELS:  Mild-to-moderate atherosclerotic vascular calcification. Normal  caliber abdominal aorta.      PERITONEUM/RETROPERITONEUM/LYMPH NODES:  No ascites or free air, no fluid collection.  No lymphadenopathy.      BONES AND ABDOMINAL WALL:  No suspicious osseous lesions are identified. Diffusely decreased  bone mineralization. No acute osseous abnormality. Moderate,  left-greater-than-right, osteoarthrosis of the bilateral hips.  S-shaped scoliosis of the thoracolumbar spine.  Small fat containing  umbilical hernia.      Impression: No CT apparent acute process within the abdomen and pelvis.  Hyperdensity within the dependent aspect of the gallbladder could  reflect sludge, stones, or vicarious excretion of contrast medium. No  CT evidence to suggest acute cholecystitis.      Diffuse colonic diverticulosis. No definite CT evidence of acute  diverticulitis.      Small hiatal hernia.      Similar chronic/incidental findings as detailed above.      MACRO:  None      Signed by: Taiwo Marshall 12/21/2023 3:52 PM  Dictation workstation:   [id number]  ECG 12 lead  Sinus tachycardia  Possible Left atrial enlargement  Left ventricular hypertrophy  Abnormal ECG  When compared with ECG of 20-DEC-2023 10:39, (unconfirmed)  No significant change was found  ECG 12 lead  Sinus tachycardia  Possible Left atrial enlargement  Left ventricular hypertrophy  Cannot rule out Septal infarct , age undetermined  Abnormal ECG  When compared with ECG of 21-SEP-2023 18:15,  Minimal criteria for Septal infarct are now Present  Criteria for Inferior infarct are no longer Present  T wave inversion less evident in Lateral leads       Patient Active Problem List   Diagnosis    SIRS due to infectious process with acute organ dysfunction (CMS/HCC)    Pneumonia    Leukocytosis         Assessment/Plan   Active Problems:    SIRS due to infectious process with acute organ dysfunction (CMS/HCC)    Pneumonia    Leukocytosis      Patient is doing okay white count is down continue with antibiotic related to her pneumonia  Patient continued to have right lower quadrant pain CAT scan of the abdomen was reviewed and was normal  Consult surgery rule out any possibility of acute appendicitis specially with right lower quadrant pain           KILEY Schultz MD

## 2023-12-22 NOTE — PROGRESS NOTES
Physical Therapy                 Therapy Communication Note    Patient Name: Natasha Lundberg  MRN: [id number]  Today's Date: 12/22/2023     Discipline: Physical Therapy    Room 3112    Missed Time: 1335  Missed Visit Reason:   Comment:   Pt. Was eating lunch at this time. Attempted to see at 1200 but pt c/o having high pain. She was medicated for pain but it had not taken effect. Will see the next available time.

## 2023-12-23 VITALS
BODY MASS INDEX: 25.88 KG/M2 | HEART RATE: 91 BPM | SYSTOLIC BLOOD PRESSURE: 143 MMHG | HEIGHT: 62 IN | TEMPERATURE: 98.4 F | WEIGHT: 140.65 LBS | DIASTOLIC BLOOD PRESSURE: 76 MMHG | RESPIRATION RATE: 16 BRPM | OXYGEN SATURATION: 96 %

## 2023-12-23 LAB
ANION GAP SERPL CALC-SCNC: 13 MMOL/L (ref 10–20)
BUN SERPL-MCNC: 9 MG/DL (ref 6–23)
CALCIUM SERPL-MCNC: 8.7 MG/DL (ref 8.6–10.3)
CHLORIDE SERPL-SCNC: 99 MMOL/L (ref 98–107)
CO2 SERPL-SCNC: 22 MMOL/L (ref 21–32)
CREAT SERPL-MCNC: 0.8 MG/DL (ref 0.5–1.05)
ERYTHROCYTE [DISTWIDTH] IN BLOOD BY AUTOMATED COUNT: 14.4 % (ref 11.5–14.5)
GFR SERPL CREATININE-BSD FRML MDRD: 72 ML/MIN/1.73M*2
GLUCOSE SERPL-MCNC: 114 MG/DL (ref 74–99)
HCT VFR BLD AUTO: 32.9 % (ref 36–46)
HGB BLD-MCNC: 10.3 G/DL (ref 12–16)
MCH RBC QN AUTO: 29.2 PG (ref 26–34)
MCHC RBC AUTO-ENTMCNC: 31.3 G/DL (ref 32–36)
MCV RBC AUTO: 93 FL (ref 80–100)
NRBC BLD-RTO: 0 /100 WBCS (ref 0–0)
PLATELET # BLD AUTO: 419 X10*3/UL (ref 150–450)
POTASSIUM SERPL-SCNC: 3.5 MMOL/L (ref 3.5–5.3)
RBC # BLD AUTO: 3.53 X10*6/UL (ref 4–5.2)
SODIUM SERPL-SCNC: 130 MMOL/L (ref 136–145)
WBC # BLD AUTO: 18 X10*3/UL (ref 4.4–11.3)

## 2023-12-23 PROCEDURE — 85027 COMPLETE CBC AUTOMATED: CPT | Performed by: NURSE PRACTITIONER

## 2023-12-23 PROCEDURE — 36415 COLL VENOUS BLD VENIPUNCTURE: CPT | Performed by: NURSE PRACTITIONER

## 2023-12-23 PROCEDURE — 2500000001 HC RX 250 WO HCPCS SELF ADMINISTERED DRUGS (ALT 637 FOR MEDICARE OP): Performed by: NURSE PRACTITIONER

## 2023-12-23 PROCEDURE — 94760 N-INVAS EAR/PLS OXIMETRY 1: CPT

## 2023-12-23 PROCEDURE — 2500000002 HC RX 250 W HCPCS SELF ADMINISTERED DRUGS (ALT 637 FOR MEDICARE OP, ALT 636 FOR OP/ED): Performed by: NURSE PRACTITIONER

## 2023-12-23 PROCEDURE — 2500000004 HC RX 250 GENERAL PHARMACY W/ HCPCS (ALT 636 FOR OP/ED): Performed by: NURSE PRACTITIONER

## 2023-12-23 PROCEDURE — 94640 AIRWAY INHALATION TREATMENT: CPT

## 2023-12-23 PROCEDURE — 80048 BASIC METABOLIC PNL TOTAL CA: CPT | Performed by: NURSE PRACTITIONER

## 2023-12-23 RX ORDER — GUAIFENESIN 600 MG/1
600 TABLET, EXTENDED RELEASE ORAL 2 TIMES DAILY
Qty: 14 TABLET | Refills: 0 | Status: SHIPPED | OUTPATIENT
Start: 2023-12-23 | End: 2023-12-30

## 2023-12-23 RX ORDER — LEVOFLOXACIN 500 MG/1
500 TABLET, FILM COATED ORAL DAILY
Qty: 5 TABLET | Refills: 0 | Status: SHIPPED | OUTPATIENT
Start: 2023-12-23 | End: 2023-12-28

## 2023-12-23 RX ADMIN — ALBUTEROL SULFATE 2.5 MG: 2.5 SOLUTION RESPIRATORY (INHALATION) at 07:16

## 2023-12-23 RX ADMIN — OXYCODONE HYDROCHLORIDE 5 MG: 5 TABLET ORAL at 00:55

## 2023-12-23 RX ADMIN — GUAIFENESIN 600 MG: 600 TABLET ORAL at 08:56

## 2023-12-23 RX ADMIN — PANTOPRAZOLE SODIUM 40 MG: 40 TABLET, DELAYED RELEASE ORAL at 08:56

## 2023-12-23 RX ADMIN — AZITHROMYCIN MONOHYDRATE 500 MG: 500 INJECTION, POWDER, LYOPHILIZED, FOR SOLUTION INTRAVENOUS at 13:11

## 2023-12-23 RX ADMIN — APIXABAN 5 MG: 5 TABLET, FILM COATED ORAL at 08:56

## 2023-12-23 RX ADMIN — CALCIUM 625 MG: 500 TABLET ORAL at 08:56

## 2023-12-23 ASSESSMENT — COGNITIVE AND FUNCTIONAL STATUS - GENERAL
MOBILITY SCORE: 18
STANDING UP FROM CHAIR USING ARMS: A LITTLE
MOVING FROM LYING ON BACK TO SITTING ON SIDE OF FLAT BED WITH BEDRAILS: A LITTLE
DAILY ACTIVITIY SCORE: 22
CLIMB 3 TO 5 STEPS WITH RAILING: A LITTLE
TURNING FROM BACK TO SIDE WHILE IN FLAT BAD: A LITTLE
WALKING IN HOSPITAL ROOM: A LITTLE
MOVING TO AND FROM BED TO CHAIR: A LITTLE
HELP NEEDED FOR BATHING: A LITTLE
DRESSING REGULAR LOWER BODY CLOTHING: A LITTLE

## 2023-12-23 ASSESSMENT — PAIN SCALES - WONG BAKER: WONGBAKER_NUMERICALRESPONSE: NO HURT

## 2023-12-23 ASSESSMENT — PAIN SCALES - GENERAL
PAINLEVEL_OUTOF10: 7
PAINLEVEL_OUTOF10: 0 - NO PAIN

## 2023-12-23 NOTE — CARE PLAN
The clinical goals for the shift include Patient will have a decrease in pain from coughing by end of shift.      Problem: Pain  Goal: My pain/discomfort is manageable  Outcome: Progressing     Problem: Safety  Goal: Patient will be injury free during hospitalization  Outcome: Progressing  Goal: I will remain free of falls  Outcome: Progressing     Problem: Daily Care  Goal: Daily care needs are met  Outcome: Progressing     Problem: Psychosocial Needs  Goal: Demonstrates ability to cope with hospitalization/illness  Outcome: Progressing  Goal: Collaborate with me, my family, and caregiver to identify my specific goals  Outcome: Progressing     Problem: Discharge Barriers  Goal: My discharge needs are met  Outcome: Progressing     Problem: Fall/Injury  Goal: Not fall by end of shift  Outcome: Progressing  Goal: Be free from injury by end of the shift  Outcome: Progressing  Goal: Verbalize understanding of personal risk factors for fall in the hospital  Outcome: Progressing  Goal: Verbalize understanding of risk factor reduction measures to prevent injury from fall in the home  Outcome: Progressing  Goal: Use assistive devices by end of the shift  Outcome: Progressing  Goal: Pace activities to prevent fatigue by end of the shift  Outcome: Progressing     Problem: Pain  Goal: Takes deep breaths with improved pain control throughout the shift  Outcome: Progressing  Goal: Turns in bed with improved pain control throughout the shift  Outcome: Progressing  Goal: Walks with improved pain control throughout the shift  Outcome: Progressing  Goal: Performs ADL's with improved pain control throughout shift  Outcome: Progressing  Goal: Participates in PT with improved pain control throughout the shift  Outcome: Progressing  Goal: Free from opioid side effects throughout the shift  Outcome: Progressing  Goal: Free from acute confusion related to pain meds throughout the shift  Outcome: Progressing     Problem: Pain -  Adult  Goal: Verbalizes/displays adequate comfort level or baseline comfort level  Outcome: Progressing     Problem: Safety - Adult  Goal: Free from fall injury  Outcome: Progressing     Problem: Discharge Planning  Goal: Discharge to home or other facility with appropriate resources  Outcome: Progressing

## 2023-12-23 NOTE — CONSULTS
General Surgery Consultation      Date of Consultation: 12/22/23     Referring Physician: KILEY Schultz MD  Surgeon: Umang Franklin MD      Assessment    1) Myalgia due to severe acute respiratory infection (M79.10)  2) Pneumonia (J18.9)  3) Systemic inflammatory response syndrome due to infectious process with acute organ dysfunction (A41.9/R65.20)  4) NO convincing subjective or objective clinical findings to support a diagnosis of acute appendicitis as a cause for this patient's right sided abdominal wall tenderness      Plan    1) Continue treatment of the underlying respiratory illness  2) Supportive medical care and administration of nonsteroidal anti-inflammatory agents as required for management of muscle wall discomfort      DISCUSSION:  The patient is advised that clinically, there is no subjective, objective, or radiographic evidence to suggest acute appendicitis as the cause for her right-sided abdominal wall tenderness.  I would agree with the patient's own personal assessment that the effort expended in forcefully trying to expel secretions from her respiratory passages as a result of her pneumonia, has caused an acute strain of the right rectus abdominis muscle producing an acute myalgia.    The patient is counseled that as her respiratory condition continues to improve and the urge to cough dissipates, the muscular soreness will resolve.  In the meantime, the patient should be supported medically and may utilize NSAID agents as required for management of muscle wall discomfort.    I appreciate the opportunity to have seen this patient in consultation and to have the privilege of participating in her evaluation and care.      HPI    Patient is an 88-year-old elderly white female who is seen in consultation at the request of the attending hospitalist, Dr. KILEY Schultz.  Consultation is requested in order to rule out the possibility of acute appendicitis as a cause for right-sided abdominal wall  tenderness.    The patient is seen in surgical consultation at approximately 1430 hrs. 12/22/2023.  In the course of consultation, the patient's [facility] EMR record is reviewed.  The patient is interviewed and examined.  Pertinent laboratory results and radiographic findings are reviewed and noted.    By history, this patient was admitted to [facility] 12/21/2023, after having presented to the emergency department with a 2-week history of worsening shortness of breath and a productive cough and has been diagnosed as having systemic inflammatory response syndrome due to an acute pulmonary pneumonia.  She is currently being treated with IV Zithromax and Rocephin.  This patient is a non-smoker.  She does have a history of gastroesophageal reflux disease without any known documented aspiration events.  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  She was treated 3 months ago in September 2023, for COVID-19.    In the course of her respiratory illness, the patient has experienced recurring fits of forceful coughing.  Despite this cough effort, she relates having a difficult time and expelling secretions from her upper airway.  With initial onset, the patient reports that she had not had any right-sided abdominal discomfort whatsoever.  As the respiratory illness has dragged on and the urge to cough has become nearly intractable, the patient reports increasing soreness along the right rectus abdominis.  This soreness is more diffuse than focal, and the point of maximal tenderness is above the umbilicus in the right upper quadrant.    The patient voices no other constitutional complaints in conjunction with this tenderness.  Her appetite is unaffected, and the patient has not had any nausea, vomiting, or other dyspeptic complaints.  Her bowels have not moved for the past couple of days although prior to admission her bowel  pattern was normal.  There are no reports of melena or gross hematochezia.  The patient denies any diarrhea or loose stools.  Her right-sided discomfort is exacerbated by physical movement.  The patient does not have a sense of deep intraperitoneal pain.  The patient denies any abdominal bloating or distention.  She does not complain of any crampy or colicky abdominal pain.    White blood cell count on admission was initially 22,200.  With initiation of broad-spectrum antimicrobial therapy, the white count is coming down and is currently 18,700.  Serum electrolytes are normal with the exception of hyponatremia.    Diagnostic contrast-enhanced CT scan examination of the abdomen and pelvis 12/21/2023, shows no acute intraperitoneal process within the abdomen or pelvis.  The cecum extends well into the pelvis, a considerable distance caudal to the umbilicus.  The appendix is not definitively visualized however there is no pericecal inflammatory stranding or fluid.  Incidental note is made of possible cholelithiasis, without evidence of acute cholecystitis.  Diffuse colonic diverticulosis is noted without evidence of acute diverticulitis.      Review of Systems  12 point ROS is negative except as documented in the HPI      Physical Exam  General: Well-developed, well-nourished, elderly white female in a chronic state of acute respiratory distress.  There is no evidence of abdominal distress.  Integument: Warm and dry.  Normal coloration.  No flushing or diaphoresis.  No jaundice.  HEENT: Head normocephalic and atraumatic.  Pupils are equally round and reactive to light.  The sclera are anicteric.  Mucous membranes are moist.  Chest: Mildly labored respirations.  Nonproductive forceful dry cough.  Cardiovascular: Regular rate and rhythm.  Abdomen: The abdomen is soft and nondistended.  There is no tympany to percussion.  Mild hepatomegaly is detected.  An intra-abdominal mass is not appreciated.  The 4 major quadrants of  the abdomen are nontender.  Flanks are nontender.  To the right of the ventral midline, anterior abdominal wall muscular tenderness is elicited along the right rectus abdominis.  There is no muscular wall rigidity or guarding.  Rebound tenderness is not elicited.  The point of maximal tenderness is cephalad to the umbilical recess.  The patient does not exhibit significant tenderness at McBurney's point.  Rovsing's, psoas, and obturator signs are negative.  Rectal: Not performed  Extremities: Normal range of motion in all extremities x 4.  Neurologic: Grossly intact and without obvious focal deficits.  Psychiatric: Pleasant and conversant.  Normal mood and affect.  Good insight and understanding.    Vital signs for last 24 hours:  Temp:  [36 °C (96.8 °F)-37.4 °C (99.3 °F)] 37.4 °C (99.3 °F)  Heart Rate:  [80-93] 93  Resp:  [17-22] 20  BP: (151-170)/(76-79) 151/76    Intake/Output this shift:  No intake/output data recorded.     Labs  Results for orders placed or performed during the hospital encounter of 12/20/23 (from the past 24 hour(s))   CBC   Result Value Ref Range    WBC 18.7 (H) 4.4 - 11.3 x10*3/uL    nRBC 0.0 0.0 - 0.0 /100 WBCs    RBC 3.41 (L) 4.00 - 5.20 x10*6/uL    Hemoglobin 10.2 (L) 12.0 - 16.0 g/dL    Hematocrit 31.7 (L) 36.0 - 46.0 %    MCV 93 80 - 100 fL    MCH 29.9 26.0 - 34.0 pg    MCHC 32.2 32.0 - 36.0 g/dL    RDW 14.6 (H) 11.5 - 14.5 %    Platelets 387 150 - 450 x10*3/uL   Basic Metabolic Panel   Result Value Ref Range    Glucose 116 (H) 74 - 99 mg/dL    Sodium 128 (L) 136 - 145 mmol/L    Potassium 3.6 3.5 - 5.3 mmol/L    Chloride 98 98 - 107 mmol/L    Bicarbonate 22 21 - 32 mmol/L    Anion Gap 12 10 - 20 mmol/L    Urea Nitrogen 13 6 - 23 mg/dL    Creatinine 0.76 0.50 - 1.05 mg/dL    eGFR 76 >60 mL/min/1.73m*2    Calcium 8.6 8.6 - 10.3 mg/dL       IMAGING  CT abdomen pelvis w IV contrast    Result Date: 12/21/2023  Interpreted By:  Taiwo Marshall   STUDY: CT ABDOMEN PELVIS W IV CONTRAST;   12/21/2023 3:04 pm     INDICATION: Signs/Symptoms:abd distention and tender in RUQ and epigastrium. WBC of 29. COMPARISON: CTA chest dated 12/20/2023. CTA abdomen and pelvis dated 09/23/2023.     ACCESSION NUMBER(S): [id number]     ORDERING CLINICIAN: KADEEM KUO       TECHNIQUE: CT of the abdomen and pelvis was performed following administration of intravenous contrast medium. Standard contiguous axial images were obtained at 3 mm slice thickness through the abdomen and pelvis. Coronal and sagittal reconstructions at 3 mm slice thickness were performed.   A total of 75 mL Omnipaque 350 intravenous contrast was administered.       FINDINGS:   LOWER CHEST: Small right and trace left pleural effusions. Similar appearance of diffuse bronchial wall thickening with elements of peripheral traction bronchiectasis and subpleural consolidative opacity involving the mid and lower lung zones, likely reflecting an element of pulmonary fibrosis. Additional thoracic findings as detailed 1 day prior.     ABDOMEN:     LIVER: The liver is normal in size without evidence of focal liver lesions.     BILE DUCTS: The intrahepatic and extrahepatic ducts are not dilated.     GALLBLADDER: Dependent hyperdensity within the gallbladder lumen may reflect biliary sludge, calculi, or vicarious excretion of contrast medium. No apparent gallbladder wall thickening or pericholecystic fluid.     PANCREAS: The pancreas appears unremarkable without evidence of ductal dilatation or masses.   SPLEEN: Calcification within the splenic parenchyma may reflect sequela of healed granulomatous disease. The spleen is normal in size without focal lesions.     ADRENAL GLANDS: Bilateral adrenal glands appear normal.     KIDNEYS AND URETERS: The kidneys are normal in size and enhance symmetrically. Fluid density lesions within the renal cortex bilaterally measuring up to 1.5 cm in diameter on the right, likely representing renal cysts. Slightly lobular  renal contours bilaterally. No hydroureteronephrosis or nephroureterolithiasis is identified.     PELVIS:     BLADDER: Contrast medium suggested within the bladder lumen. The bladder is unremarkable.   REPRODUCTIVE ORGANS: Calcified uterine fibroid suggested with atrophic appearance of the uterus. No suspicious adnexal mass lesion.     BOWEL: Small hiatal hernia. Stomach is otherwise unremarkable in CT assessment. Small and large bowel are normal in caliber with no focal wall thickening. No evidence to suggest obstruction. Extensive colonic diverticulosis throughout the colon. No definite CT evidence of acute diverticulitis. The appendix is not definitely visualized. There is however no pericecal stranding or fluid.     VESSELS: Mild-to-moderate atherosclerotic vascular calcification. Normal caliber abdominal aorta.   PERITONEUM/RETROPERITONEUM/LYMPH NODES: No ascites or free air, no fluid collection.  No lymphadenopathy.     BONES AND ABDOMINAL WALL: No suspicious osseous lesions are identified. Diffusely decreased bone mineralization. No acute osseous abnormality. Moderate, left-greater-than-right, osteoarthrosis of the bilateral hips. S-shaped scoliosis of the thoracolumbar spine. Small fat containing umbilical hernia.       IMPRESSION:  1) No CT apparent acute process within the abdomen and pelvis.   2) Hyperdensity within the dependent aspect of the gallbladder could reflect sludge, stones, or vicarious excretion of contrast medium. No CT evidence to suggest acute cholecystitis.     3) Diffuse colonic diverticulosis. No definite CT evidence of acute diverticulitis.     4) Small hiatal hernia.     5) Similar chronic/incidental findings as detailed above.       MACRO: None     Signed by: Taiwo Marshall 12/21/2023 3:52 PM   Dictation workstation:   [id number]        Umang Franklin MD

## 2023-12-23 NOTE — DOCUMENTATION CLARIFICATION NOTE
"    PATIENT:               MORGAN FERREIRA  ACCT #:                  [id number]  MRN:                       [id number]  :                       1935  ADMIT DATE:       2023 10:06 AM  DISCH DATE:  RESPONDING PROVIDER #:        [id number]          PROVIDER RESPONSE TEXT:    Sepsis was a differential diagnosis and ruled out after study    CDI QUERY TEXT:    [facility]_CV Sepsis    Instruction:  Based on your assessment of the patient and the clinical information, please provide the requested documentation by clicking on the appropriate radio button and enter any additional information if prompted.    Question: Sepsis was documented in the medical record. Based on the documentation and the clinical information, can the diagnosis be further clarified as      When answering this query, please exercise your independent professional judgment. The fact that a question is being asked, does not imply that any particular answer is desired or expected.    The patient's clinical indicators include:  Clinical Information: Admit with pneumonia    Documented Diagnosis: H&P states \"pneumonia\" and \"SIRS due to infectious process with acute organ dysfunction\"    Clinical Indicators:  -Vital Signs range, first 6 hrs from admit:  t 37.1-38.1, hr , rr 18-28, bp 94//67  -WBC: 29.2 on admit  -Microbiology Results: flu, covid, rsv, mrsa negative  -Band Neutrophil Count/percent Band Neutrophil: 25.04/85.8 on admit  -Lactic acid: 0.7 on admit.  - Bicarb: Trend from admit,   -BUN/Creat: 24/1.12 on admit (Creat 0.76 on 23, no baseline noted)  -Blood cultures: negative x 2  -Bilirubin: 0.5 on admit  -MAP: NA  -Doug Coma Scale: NA  -PAO2/FIO2: NA  -Procalcitonin: 1.39 on admit  -Platelets: 359 on admit  -Other clinical indicators: Progress note 23 states \"SIRS due to infectious process with acute organ dysfunction,  Pneumonia\" and \"Consult surgery rule out any possibility of acute appendicitis specially with right lower " "quadrant pain\"    Treatment: IV atb, IVF bolus on admit and continuous, lab monitoring    Risk Factors: pneumonia, possible appendicitis. elevated wbc, fever, tachycardia, tachypnea, elevated procal and creatinine. Low bicarb  Options provided:  -- Sepsis was a differential diagnosis and ruled out after study  -- Sepsis with renal organ dysfunction of metabolic acidosis, poa  -- Sepsis with other organ dysfunction, Please specify additional information below  -- Other - I will add my own diagnosis  -- Refer to Clinical Documentation Reviewer    Query created by: Benjamin Campos on 12/22/2023 12:39 PM      Electronically signed by:  KILEY CRUZ MD 12/23/2023 8:27 AM          "

## 2023-12-23 NOTE — CARE PLAN
The clinical goals for the shift include Patient will have a decrease in pain from coughing by end of shift.      Problem: Pain  Goal: My pain/discomfort is manageable  12/22/2023 1903 by Adelaida Melo RN  Outcome: Progressing  12/22/2023 1902 by Adelaida Melo RN  Outcome: Progressing     Problem: Safety  Goal: Patient will be injury free during hospitalization  12/22/2023 1903 by Adelaida Melo RN  Outcome: Progressing  12/22/2023 1902 by Adelaida Melo RN  Outcome: Progressing  Goal: I will remain free of falls  12/22/2023 1903 by Adelaida Melo RN  Outcome: Progressing  12/22/2023 1902 by Adelaida Melo RN  Outcome: Progressing     Problem: Daily Care  Goal: Daily care needs are met  12/22/2023 1903 by Adelaida Melo RN  Outcome: Progressing  12/22/2023 1902 by Adelaida Melo RN  Outcome: Progressing     Problem: Psychosocial Needs  Goal: Demonstrates ability to cope with hospitalization/illness  12/22/2023 1903 by Adelaida Melo RN  Outcome: Progressing  12/22/2023 1902 by Adelaida Melo RN  Outcome: Progressing  Goal: Collaborate with me, my family, and caregiver to identify my specific goals  12/22/2023 1903 by Adelaida Melo RN  Outcome: Progressing  12/22/2023 1902 by Adelaida Melo RN  Outcome: Progressing     Problem: Discharge Barriers  Goal: My discharge needs are met  12/22/2023 1903 by Adelaida Melo RN  Outcome: Progressing  12/22/2023 1902 by Adelaida Melo RN  Outcome: Progressing     Problem: Fall/Injury  Goal: Not fall by end of shift  12/22/2023 1903 by Adelaida Melo RN  Outcome: Progressing  12/22/2023 1902 by Adelaida Melo RN  Outcome: Progressing  Goal: Be free from injury by end of the shift  12/22/2023 1903 by Adelaida Melo RN  Outcome: Progressing  12/22/2023 1902 by Adelaida Melo RN  Outcome: Progressing  Goal: Verbalize understanding of personal risk factors for fall in the hospital  12/22/2023 1903 by Adelaida Melo RN  Outcome: Progressing  12/22/2023 1902 by Adelaida Melo  RN  Outcome: Progressing  Goal: Verbalize understanding of risk factor reduction measures to prevent injury from fall in the home  12/22/2023 1903 by Adelaida Melo RN  Outcome: Progressing  12/22/2023 1902 by Adelaida Melo RN  Outcome: Progressing  Goal: Use assistive devices by end of the shift  12/22/2023 1903 by Adelaida Melo RN  Outcome: Progressing  12/22/2023 1902 by Adelaida Melo RN  Outcome: Progressing  Goal: Pace activities to prevent fatigue by end of the shift  12/22/2023 1903 by Adelaida Melo RN  Outcome: Progressing  12/22/2023 1902 by Adelaida Melo RN  Outcome: Progressing     Problem: Pain  Goal: Takes deep breaths with improved pain control throughout the shift  12/22/2023 1903 by Adelaida Melo RN  Outcome: Progressing  12/22/2023 1902 by Adelaida Melo RN  Outcome: Progressing  Goal: Turns in bed with improved pain control throughout the shift  12/22/2023 1903 by Adelaida Melo RN  Outcome: Progressing  12/22/2023 1902 by Adelaida Melo RN  Outcome: Progressing  Goal: Walks with improved pain control throughout the shift  12/22/2023 1903 by Adelaida Melo RN  Outcome: Progressing  12/22/2023 1902 by Adelaida Melo RN  Outcome: Progressing  Goal: Performs ADL's with improved pain control throughout shift  12/22/2023 1903 by Adelaida Melo RN  Outcome: Progressing  12/22/2023 1902 by Adelaida Melo RN  Outcome: Progressing  Goal: Participates in PT with improved pain control throughout the shift  12/22/2023 1903 by Adelaida Melo RN  Outcome: Progressing  12/22/2023 1902 by Adelaida Melo RN  Outcome: Progressing  Goal: Free from opioid side effects throughout the shift  12/22/2023 1903 by Adelaida Melo RN  Outcome: Progressing  12/22/2023 1902 by Adelaida Melo RN  Outcome: Progressing  Goal: Free from acute confusion related to pain meds throughout the shift  12/22/2023 1903 by Adelaida Melo RN  Outcome: Progressing  12/22/2023 1902 by Adelaida Melo RN  Outcome: Progressing     Problem:  Pain - Adult  Goal: Verbalizes/displays adequate comfort level or baseline comfort level  12/22/2023 1903 by Adelaida Melo, RN  Outcome: Progressing  12/22/2023 1902 by Adelaida Melo RN  Outcome: Progressing     Problem: Safety - Adult  Goal: Free from fall injury  12/22/2023 1903 by Adelaida Melo RN  Outcome: Progressing  12/22/2023 1902 by Adelaida Melo, RN  Outcome: Progressing     Problem: Discharge Planning  Goal: Discharge to home or other facility with appropriate resources  12/22/2023 1903 by Adelaida Melo RN  Outcome: Progressing  12/22/2023 1902 by Adelaida Melo, RN  Outcome: Progressing

## 2023-12-23 NOTE — DISCHARGE SUMMARY
Discharge Diagnosis  Pneumonia      Issues Requiring Follow-Up  Follow-up with PCP    Discharge Meds     Your medication list        ASK your doctor about these medications        Instructions Last Dose Given Next Dose Due   acetaminophen 500 mg tablet  Commonly known as: Tylenol           apixaban 5 mg tablet  Commonly known as: Eliquis           benzonatate 100 mg capsule  Commonly known as: Tessalon           calcium citrate 200 mg (950 mg) tablet  Commonly known as: Calcitrate           cholecalciferol 50 MCG (2000 UT) tablet  Commonly known as: Vitamin D-3           diphenhydrAMINE-acetaminophen  mg per tablet  Commonly known as: Tylenol PM           furosemide 20 mg tablet  Commonly known as: Lasix           guaiFENesin 600 mg 12 hr tablet  Commonly known as: Mucinex  Ask about: Should I take this medication?           oxyCODONE 5 mg immediate release tablet  Commonly known as: Roxicodone           pantoprazole 40 mg EC tablet  Commonly known as: ProtoNix           polyethylene glycol 17 gram packet  Commonly known as: Glycolax, Miralax           ProAir HFA 90 mcg/actuation inhaler  Generic drug: albuterol                    Test Results Pending At Discharge  Pending Labs       Order Current Status    Basic Metabolic Panel In process    CBC In process    Blood Culture Preliminary result    Blood Culture Preliminary result            Hospital Course   Patient was admitted to the hospital secondary to pneumonia with elevation of her white count patient was treated with antibiotic with improvement of her white count and symptoms patient is not hypoxic currently patient was having right lower quadrant abdominal pain CAT scan was done on her was negative  Surgery was consulted and cleared her for discharge patient will be discharged on Levaquin follow-up as an outpatient      Pertinent Physical Exam At Time of Discharge  Physical Exam  HENT:      Right Ear: External ear normal.      Left Ear: External ear  normal.      Mouth/Throat:      Mouth: Mucous membranes are moist.   Cardiovascular:      Rate and Rhythm: Normal rate and regular rhythm.      Heart sounds: No murmur heard.     No friction rub. No gallop.   Pulmonary:      Effort: No accessory muscle usage or respiratory distress.      Breath sounds: No stridor. No wheezing or rhonchi.   Chest:      Chest wall: No tenderness.   Abdominal:      General: There is no distension.      Palpations: There is no mass.      Tenderness: There is no abdominal tenderness. There is no guarding or rebound.   Musculoskeletal:         General: No deformity or signs of injury.      Cervical back: No rigidity or tenderness. Normal range of motion.      Right lower leg: No edema.      Left lower leg: No edema.   Skin:     Coloration: Skin is not jaundiced or pale.      Findings: No lesion.   Neurological:      General: No focal deficit present.      Mental Status: She is alert, oriented to person, place, and time and easily aroused.      Cranial Nerves: No cranial nerve deficit.      Sensory: No sensory deficit.      Motor: No weakness.         Outpatient Follow-Up  No future appointments.      KILEY Schultz MD

## 2023-12-23 NOTE — CARE PLAN
The clinical goals for the shift include Patient will have a decrease in pain from coughing by end of shift.      Problem: Pain  Goal: My pain/discomfort is manageable  12/22/2023 1905 by Adelaida Melo RN  Outcome: Progressing  12/22/2023 1903 by Adelaida Melo RN  Outcome: Progressing  12/22/2023 1902 by Adelaida Melo RN  Outcome: Progressing     Problem: Safety  Goal: Patient will be injury free during hospitalization  12/22/2023 1905 by Adelaida Melo RN  Outcome: Progressing  12/22/2023 1903 by Adelaida Melo RN  Outcome: Progressing  12/22/2023 1902 by Adelaida Melo RN  Outcome: Progressing  Goal: I will remain free of falls  12/22/2023 1905 by Adelaida Melo RN  Outcome: Progressing  12/22/2023 1903 by Adelaida Melo RN  Outcome: Progressing  12/22/2023 1902 by Adelaida Melo RN  Outcome: Progressing     Problem: Daily Care  Goal: Daily care needs are met  12/22/2023 1905 by Adelaida Melo RN  Outcome: Progressing  12/22/2023 1903 by Adelaida Melo RN  Outcome: Progressing  12/22/2023 1902 by Adelaida Melo RN  Outcome: Progressing     Problem: Psychosocial Needs  Goal: Demonstrates ability to cope with hospitalization/illness  12/22/2023 1905 by Adelaida Melo RN  Outcome: Progressing  12/22/2023 1903 by Adelaida Melo RN  Outcome: Progressing  12/22/2023 1902 by Adelaida Melo RN  Outcome: Progressing  Goal: Collaborate with me, my family, and caregiver to identify my specific goals  12/22/2023 1905 by Adelaida Melo RN  Outcome: Progressing  12/22/2023 1903 by Adelaida Melo RN  Outcome: Progressing  12/22/2023 1902 by Adelaida Melo RN  Outcome: Progressing     Problem: Discharge Barriers  Goal: My discharge needs are met  12/22/2023 1905 by Adelaida Melo RN  Outcome: Progressing  12/22/2023 1903 by Adelaida Melo RN  Outcome: Progressing  12/22/2023 1902 by Adelaida Melo RN  Outcome: Progressing     Problem: Fall/Injury  Goal: Not fall by end of shift  12/22/2023 1905 by Adelaida Melo RN  Outcome:  Progressing  12/22/2023 1903 by Adelaida Melo RN  Outcome: Progressing  12/22/2023 1902 by Adelaida Melo RN  Outcome: Progressing  Goal: Be free from injury by end of the shift  12/22/2023 1905 by Adelaida Melo RN  Outcome: Progressing  12/22/2023 1903 by Adelaida Melo RN  Outcome: Progressing  12/22/2023 1902 by Adelaida Melo RN  Outcome: Progressing  Goal: Verbalize understanding of personal risk factors for fall in the hospital  12/22/2023 1905 by Adelaida Melo RN  Outcome: Progressing  12/22/2023 1903 by Adelaida Melo RN  Outcome: Progressing  12/22/2023 1902 by Adelaida Melo RN  Outcome: Progressing  Goal: Verbalize understanding of risk factor reduction measures to prevent injury from fall in the home  12/22/2023 1905 by Adelaida Melo RN  Outcome: Progressing  12/22/2023 1903 by Adelaida Melo RN  Outcome: Progressing  12/22/2023 1902 by Adelaida Melo RN  Outcome: Progressing  Goal: Use assistive devices by end of the shift  12/22/2023 1905 by Adelaida Melo RN  Outcome: Progressing  12/22/2023 1903 by Adelaida Melo RN  Outcome: Progressing  12/22/2023 1902 by Adelaida Melo RN  Outcome: Progressing  Goal: Pace activities to prevent fatigue by end of the shift  12/22/2023 1905 by Adelaida Melo RN  Outcome: Progressing  12/22/2023 1903 by Adelaida Melo RN  Outcome: Progressing  12/22/2023 1902 by Adelaida Melo RN  Outcome: Progressing     Problem: Pain  Goal: Takes deep breaths with improved pain control throughout the shift  12/22/2023 1905 by Adelaida Melo, RN  Outcome: Progressing  12/22/2023 1903 by Adelaida Melo, RN  Outcome: Progressing  12/22/2023 1902 by Adelaida Melo RN  Outcome: Progressing  Goal: Turns in bed with improved pain control throughout the shift  12/22/2023 1905 by Adelaida Melo, RN  Outcome: Progressing  12/22/2023 1903 by Adelaida Melo, RN  Outcome: Progressing  12/22/2023 1902 by Adelaida Melo RN  Outcome: Progressing  Goal: Walks with improved pain control throughout  the shift  12/22/2023 1905 by Adelaida Melo RN  Outcome: Progressing  12/22/2023 1903 by Adelaida Melo RN  Outcome: Progressing  12/22/2023 1902 by Adelaida Melo RN  Outcome: Progressing  Goal: Performs ADL's with improved pain control throughout shift  12/22/2023 1905 by Adelaida Melo RN  Outcome: Progressing  12/22/2023 1903 by Adelaida Melo RN  Outcome: Progressing  12/22/2023 1902 by Adelaida Melo RN  Outcome: Progressing  Goal: Participates in PT with improved pain control throughout the shift  12/22/2023 1905 by Adelaida Melo RN  Outcome: Progressing  12/22/2023 1903 by Adelaida Melo RN  Outcome: Progressing  12/22/2023 1902 by Adelaida Melo RN  Outcome: Progressing  Goal: Free from opioid side effects throughout the shift  12/22/2023 1905 by Adelaida Melo RN  Outcome: Progressing  12/22/2023 1903 by Adelaida Melo RN  Outcome: Progressing  12/22/2023 1902 by Adelaida Melo RN  Outcome: Progressing  Goal: Free from acute confusion related to pain meds throughout the shift  12/22/2023 1905 by Adelaida Melo RN  Outcome: Progressing  12/22/2023 1903 by Adelaida Melo RN  Outcome: Progressing  12/22/2023 1902 by Adelaida Melo RN  Outcome: Progressing     Problem: Pain - Adult  Goal: Verbalizes/displays adequate comfort level or baseline comfort level  12/22/2023 1905 by Adelaida Melo RN  Outcome: Progressing  12/22/2023 1903 by Adelaida Melo RN  Outcome: Progressing  12/22/2023 1902 by Adelaida Melo RN  Outcome: Progressing     Problem: Safety - Adult  Goal: Free from fall injury  12/22/2023 1905 by Adelaida Melo RN  Outcome: Progressing  12/22/2023 1903 by Adelaida Melo RN  Outcome: Progressing  12/22/2023 1902 by Adelaida Melo RN  Outcome: Progressing     Problem: Discharge Planning  Goal: Discharge to home or other facility with appropriate resources  12/22/2023 1905 by Adelaida Melo RN  Outcome: Progressing  12/22/2023 1903 by Adelaida Melo RN  Outcome: Progressing  12/22/2023 1902 by Adelaida  Kameron, RN  Outcome: Progressing

## 2023-12-23 NOTE — NURSING NOTE
Called report to Jaylen Marinelli. Spoke to Darcy CAST. Niece is transporting patient to facility and is in route.

## 2023-12-24 LAB
BACTERIA BLD CULT: NORMAL
BACTERIA BLD CULT: NORMAL

## 2023-12-27 LAB
ATRIAL RATE: 115 BPM
ATRIAL RATE: 115 BPM
P AXIS: 35 DEGREES
P AXIS: 45 DEGREES
P OFFSET: 176 MS
P OFFSET: 180 MS
P ONSET: 119 MS
P ONSET: 123 MS
PR INTERVAL: 200 MS
PR INTERVAL: 206 MS
Q ONSET: 222 MS
Q ONSET: 223 MS
QRS COUNT: 19 BEATS
QRS COUNT: 19 BEATS
QRS DURATION: 94 MS
QRS DURATION: 94 MS
QT INTERVAL: 302 MS
QT INTERVAL: 308 MS
QTC CALCULATION(BAZETT): 417 MS
QTC CALCULATION(BAZETT): 426 MS
QTC FREDERICIA: 374 MS
QTC FREDERICIA: 382 MS
R AXIS: -25 DEGREES
R AXIS: -25 DEGREES
T AXIS: 60 DEGREES
T AXIS: 70 DEGREES
T OFFSET: 374 MS
T OFFSET: 376 MS
VENTRICULAR RATE: 115 BPM
VENTRICULAR RATE: 115 BPM

## 2024-01-04 ENCOUNTER — NURSING HOME VISIT (OUTPATIENT)
Dept: POST ACUTE CARE | Facility: EXTERNAL LOCATION | Age: 89
End: 2024-01-04
Payer: MEDICARE

## 2024-01-04 DIAGNOSIS — A41.9 SEPSIS WITH ACUTE ORGAN DYSFUNCTION, DUE TO UNSPECIFIED ORGANISM, UNSPECIFIED ORGAN DYSFUNCTION TYPE, UNSPECIFIED WHETHER SEPTIC SHOCK PRESENT (MULTI): Primary | ICD-10-CM

## 2024-01-04 DIAGNOSIS — J18.9 PNEUMONIA DUE TO INFECTIOUS ORGANISM, UNSPECIFIED LATERALITY, UNSPECIFIED PART OF LUNG: ICD-10-CM

## 2024-01-04 DIAGNOSIS — R65.20 SEPSIS WITH ACUTE ORGAN DYSFUNCTION, DUE TO UNSPECIFIED ORGANISM, UNSPECIFIED ORGAN DYSFUNCTION TYPE, UNSPECIFIED WHETHER SEPTIC SHOCK PRESENT (MULTI): Primary | ICD-10-CM

## 2024-01-04 PROCEDURE — 99349 HOME/RES VST EST MOD MDM 40: CPT | Performed by: STUDENT IN AN ORGANIZED HEALTH CARE EDUCATION/TRAINING PROGRAM

## 2024-01-07 ASSESSMENT — ENCOUNTER SYMPTOMS
SHORTNESS OF BREATH: 1
CARDIOVASCULAR NEGATIVE: 1
GASTROINTESTINAL NEGATIVE: 1
CONSTITUTIONAL NEGATIVE: 1
COUGH: 1
MUSCULOSKELETAL NEGATIVE: 1
WEAKNESS: 1
PSYCHIATRIC NEGATIVE: 1

## 2024-01-07 NOTE — PROGRESS NOTES
Subjective   Patient ID: Natasha Lundberg is a 88 y.o. female.    Patient seen in apartment.  She reports that she recently was discharged from the hospital with pneumonia.  States that she completed course of antibiotics and is overall gaining her strength back.  However, has been having intermittent episodes of severe fatigue, in addition, she reports a continuation of a slightly productive cough.  States no recent falls no diarrhea fevers chills or constitutional symptoms.  Otherwise, is tolerating her medications but does not feel back to her baseline self.  No additional issues noted at this time.        Review of Systems   Constitutional: Negative.    HENT:  Positive for congestion.    Respiratory:  Positive for cough and shortness of breath.    Cardiovascular: Negative.    Gastrointestinal: Negative.    Musculoskeletal: Negative.    Neurological:  Positive for weakness.   Psychiatric/Behavioral: Negative.         Objective Vitals Reviewed via facility EMR   Physical Exam  Constitutional:       General: She is not in acute distress.     Appearance: She is not ill-appearing.   Eyes:      Pupils: Pupils are equal, round, and reactive to light.   Cardiovascular:      Rate and Rhythm: Normal rate and regular rhythm.      Pulses: Normal pulses.      Heart sounds: No murmur heard.  Pulmonary:      Effort: No respiratory distress.      Breath sounds: No wheezing.      Comments: Dec breath sounds  Abdominal:      General: Abdomen is flat. Bowel sounds are normal. There is no distension.   Musculoskeletal:      Right lower leg: No edema.      Left lower leg: No edema.   Skin:     General: Skin is warm and dry.   Neurological:      Mental Status: She is alert. Mental status is at baseline.      Cranial Nerves: No cranial nerve deficit.      Motor: No weakness.   Psychiatric:         Mood and Affect: Mood normal.         Behavior: Behavior normal.         Assessment/Plan   Diagnoses and all orders for this visit:  Sepsis with  acute organ dysfunction, due to unspecified organism, unspecified organ dysfunction type, unspecified whether septic shock present (CMS/[facility])  Pneumonia due to infectious organism, unspecified laterality, unspecified part of lung      Patient seen and examined, hospital course extensively reviewed.  Patient was treated for underlying pneumonia.  She does have slight wheezing on exam especially on the left lower lobe.  Suspect continuation of pneumonia, we will start a course of doxycycline 100 twice daily for 7 days.  In addition encouraged Tessalon Perles and Mucinex and breathing treatments.  Obtain follow-up labs with CBC and CMP, encouraged hydration and avoidance of sedating medications.  Closely monitor vitals.  Staff updated with care plan and are agreeable, patient agreeable with plan.    Matthew Jones DO

## 2024-02-06 ENCOUNTER — NURSING HOME VISIT (OUTPATIENT)
Dept: POST ACUTE CARE | Facility: EXTERNAL LOCATION | Age: 89
End: 2024-02-06
Payer: MEDICARE

## 2024-02-06 DIAGNOSIS — J18.9 PNEUMONIA DUE TO INFECTIOUS ORGANISM, UNSPECIFIED LATERALITY, UNSPECIFIED PART OF LUNG: Primary | ICD-10-CM

## 2024-02-06 PROCEDURE — 99348 HOME/RES VST EST LOW MDM 30: CPT | Performed by: STUDENT IN AN ORGANIZED HEALTH CARE EDUCATION/TRAINING PROGRAM

## 2024-02-06 ASSESSMENT — ENCOUNTER SYMPTOMS
PSYCHIATRIC NEGATIVE: 1
NEUROLOGICAL NEGATIVE: 1
RESPIRATORY NEGATIVE: 1
CONSTITUTIONAL NEGATIVE: 1
GASTROINTESTINAL NEGATIVE: 1
MUSCULOSKELETAL NEGATIVE: 1
CARDIOVASCULAR NEGATIVE: 1

## 2024-02-07 NOTE — PROGRESS NOTES
Subjective   Patient ID: Natasha Lundberg is a 88 y.o. female.    Patient seen and evaluated on routine evaluation.  She regards that she is overall doing well, symptoms are overall stable and states no acute complaints or concerns and feels fully recovered from her pneumonia.  States that she is tolerating her medications, overall feels well with no concerns.  No additional issues at this time.        Review of Systems   Constitutional: Negative.    HENT: Negative.     Respiratory: Negative.     Cardiovascular: Negative.    Gastrointestinal: Negative.    Musculoskeletal: Negative.    Neurological: Negative.    Psychiatric/Behavioral: Negative.         Objective Vitals Reviewed via facility EMR   Physical Exam  Constitutional:       General: She is not in acute distress.     Appearance: She is not ill-appearing.   Eyes:      Pupils: Pupils are equal, round, and reactive to light.   Cardiovascular:      Rate and Rhythm: Normal rate and regular rhythm.      Pulses: Normal pulses.      Heart sounds: No murmur heard.  Pulmonary:      Effort: No respiratory distress.      Breath sounds: No wheezing.   Abdominal:      General: Abdomen is flat. Bowel sounds are normal. There is no distension.   Musculoskeletal:      Right lower leg: No edema.      Left lower leg: No edema.   Skin:     General: Skin is warm and dry.   Neurological:      Mental Status: She is alert. Mental status is at baseline.      Cranial Nerves: No cranial nerve deficit.      Motor: No weakness.   Psychiatric:         Mood and Affect: Mood normal.         Behavior: Behavior normal.         Assessment/Plan   Diagnoses and all orders for this visit:  Pneumonia due to infectious organism, unspecified laterality, unspecified part of lung    Patient seen and evaluated at bedside.  Overall medically stable, continue supportive care.  No changes in medications at this time.  Encourage continuation with activities in facility.  No additional issues at this  time.    Reviewed and approved by SELINA ESTES on 2/6/24 at 10:25 PM.

## 2024-03-05 ENCOUNTER — NURSING HOME VISIT (OUTPATIENT)
Dept: POST ACUTE CARE | Facility: EXTERNAL LOCATION | Age: 89
End: 2024-03-05
Payer: MEDICARE

## 2024-03-05 DIAGNOSIS — R26.2 DIFFICULTY IN WALKING: Primary | ICD-10-CM

## 2024-03-05 DIAGNOSIS — M62.81 MUSCLE WEAKNESS (GENERALIZED): ICD-10-CM

## 2024-03-05 DIAGNOSIS — J06.9 UPPER RESPIRATORY TRACT INFECTION, UNSPECIFIED TYPE: ICD-10-CM

## 2024-03-05 PROCEDURE — 99348 HOME/RES VST EST LOW MDM 30: CPT

## 2024-03-05 NOTE — ASSESSMENT & PLAN NOTE
-Ssx started on Saturday/Sunday, so she is on day 4 of symptoms  -Given high risk, we will start 5 day course of Doxycycline 500mg for upper respiratory infection  -Symptomatic relief of cough/congestion with mucinex 600mg twice daily as needed  -Encourage fluid hydration as much as tolerated to help support healing

## 2024-03-05 NOTE — PROGRESS NOTES
"Visit  Note   Subjective   Natasha Lundberg is a 88 y.o. female who is being seen and evaluated for multiple medical problems. Nursing notes, vital signs, and labs were reviewed in the local facility chart.    JOANIE Sagastume is a 88yoF with a PMHx of weakness, HTN, difficulty walking and multiple other medical problems who was visited in her home today. She was seen with her  Kristian for this new congestion. She states that symptoms started with a cough last week, and have progressed to pressure sensations and headaches. She states that her main congestive symptoms are \"above her neck\" but she does have a productive cough that she can't seem to clear. She feels like her mucous is getting stuck and is not able to get it out. She does not endorse any fevers, chill, or known sick contacts. She says that overall she is improving.    Objective   There were no vitals taken for this visit.  Physical Exam  Vitals reviewed.   Constitutional:       General: She is not in acute distress.     Appearance: Normal appearance. She is not ill-appearing.   HENT:      Head: Normocephalic and atraumatic.      Right Ear: External ear normal.      Left Ear: External ear normal.      Nose: Nose normal. No congestion.   Eyes:      General:         Right eye: No discharge.         Left eye: No discharge.      Conjunctiva/sclera: Conjunctivae normal.   Cardiovascular:      Rate and Rhythm: Normal rate and regular rhythm.      Pulses: Normal pulses.      Heart sounds: Normal heart sounds.   Pulmonary:      Effort: Pulmonary effort is normal. No respiratory distress.      Breath sounds: Rhonchi and rales (in BL bases) present. No wheezing.   Abdominal:      General: There is no distension.      Palpations: Abdomen is soft.      Tenderness: There is no abdominal tenderness.   Musculoskeletal:      Cervical back: Normal range of motion.      Right lower leg: No edema.      Left lower leg: No edema.   Skin:     General: Skin is warm and dry. "   Neurological:      General: No focal deficit present.      Mental Status: She is alert and oriented to person, place, and time. Mental status is at baseline.   Psychiatric:         Mood and Affect: Mood normal.         Behavior: Behavior normal.       Wheelchair: Patient requires a wheelchair/rollator for use within the home due to limited mobility and lower extremity weakness.  They are able to safely operate a wheelchair    Assessment/Plan     Meds, orders, nursing notes reviewed.  Nurse will monitor and update physician of any change: Discussed with patient and provided information.  Supportive care will be given.  Follow-up in 1 month or earlier as needed.     Problem List Items Addressed This Visit       RESOLVED: Muscle weakness (generalized)    Difficulty in walking - Primary     -Continues to use wheelchair for majority of the day  -Only partial weight bearing status for transfers only  -Continue current PT/OT plans for continue strength training with activity as tolerated         Upper respiratory tract infection     -Ssx started on Saturday/Sunday, so she is on day 4 of symptoms  -Given high risk, we will start 5 day course of Doxycycline 500mg for upper respiratory infection  -Symptomatic relief of cough/congestion with mucinex 600mg twice daily as needed  -Encourage fluid hydration as much as tolerated to help support healing             Perla Nelsno DO  Family Medicine Resident, PGY-1     Continue to monitor ambulatory status, pt/ot, supportive care, routine labs, start on abx, will obtain chest x-ray due to productive cough, vitals overall stable, meds reviewed, wellness at next visit    Reviewed and approved by SELINA ESTES on 3/7/24 at 10:57 PM.

## 2024-03-05 NOTE — ASSESSMENT & PLAN NOTE
-Continues to use wheelchair for majority of the day  -Only partial weight bearing status for transfers only  -Continue current PT/OT plans for continue strength training with activity as tolerated

## 2024-03-06 NOTE — CONSULTS
Service:   Service: Nephrology     Consult:  Consult requested by (Attending Name): Cedrick Mehta   Reason: MATEO     History of Present Illness:   HPI:    MORGAN FERREIRA is a [age]-year-old female with past medical history of hypertension, GERD, iron deficiency anemia, pulmonary embolism in the setting of femur fracture  in February 2023, history of colonoscopy and polypectomy, parathyroidectomy and repaired hip fracture along with total knee replacement presented to the hospital with diarrhea and feeling dizzy and fatigued.  She was also found to have GI bleed and was  on the Eliquis.  Endovascular was consulted for right forearm swelling and found to have superficial thrombophlebitis and thrombus in the cephalic vein.  Patient was found to have hypomagnesemia and anemia.  Nephrology consulted for evaluation of acute  kidney injury but renal function has been stable with creatinine around 0.8 to 0.9 mg/dL.  Patient complains of pain in her right upper extremity otherwise denies any chest pain, shortness of breath, nausea, vomiting, abdominal pain, leg, dizziness.    Review Family/Social History and ROS:     Review Family/Social History and ROS:        Alcohol Use: denies  Drug Use: denies    History and Physical from Sep 21, 2023    Constitutional  Patient is  NEGATIVE for Fever, Chills, Anorexia, Malaise    Eyes  Patient is  NEGATIVE for Blurry Vision, Diploplia, Vision Loss/ Change    ENMT  Patient is  NEGATIVE for Nasal Discharge, Nasal Congestion, Ear Pain, Mouth Pain, Throat Pain    Respiratory  Patient is  NEGATIVE for Dry Cough, Productive Cough, Hemoptysis, Wheezing, Shortness of Breath    Cardiac  Patient is  NEGATIVE for Chest Pain, Dyspnea on Exertion, Syncope    Gastrointestinal  Patient is  POSITIVE for Diarrhea,  and NEGATIVE for Nausea, Vomiting, Constipation, Abdominal Pain    Genitourinary  Patient is  NEGATIVE for Dysuria, Flank Pain, Frequency    Musculoskeletal  Patient is  POSITIVE for Decreased  ROM, Pain, Weakness,  COMMENTS: Recent left shoulder fracture    Neurological  Patient is  POSITIVE for Dizziness,  and NEGATIVE for Confusion, Headache, Syncope    Skin  Patient is  NEGATIVE for Pain, Pruritus, Rash    Hematologic/Lymph  Patient is  NEGATIVE for Anemia, Bruising, Easy Bleeding    Family History:  ·   Family history of chronic obstructive pulmonary disease: Relationship to Patient: Mother (Age at Diagnosis: Age Unknown), Living: No, Adopted: No, Twin/Multiple: No  ·   Family history of malignant neoplasm of esophagus: Relationship to Patient: Sister (Age at Diagnosis: Age Unknown), Living: No, Adopted: No, Twin/Multiple: No  ·   Family history of lung cancer: Relationship to Patient: Father (Age at Diagnosis: Age Unknown), Living: No, Adopted: No, Twin/Multiple: No           Allergies:  ·  No Known Allergies :     Objective:     Objective Information:        T   P  R  BP   MAP  SpO2   Value  36.6  82  20  118/58   95  97%  Date/Time 9/28 8:00 9/28 8:00 9/28 8:00 9/28 8:00  9/27 16:00 9/28 8:00  Range  (36.5C - 36.8C )  (82 - 89 )  (16 - 20 )  (118 - 151 )/ (58 - 66 )  (91 - 95 )  (96% - 98% )    Physical Exam by System:    Constitutional: Well developed, awake/alert/oriented  x3, no distress, alert and cooperative   Eyes: PERRL, EOMI, clear sclera   ENMT: mucous membranes moist, no apparent injury,  no lesions seen   Head/Neck: Neck supple, no apparent injury, thyroid  without mass or tenderness, No JVD, trachea midline, no bruits   Respiratory/Thorax: Patent airways, CTAB, normal  breath sounds with good chest expansion, thorax symmetric   Cardiovascular: Regular, rate and rhythm, no murmurs,  2+ equal pulses of the extremities, normal S 1and S 2   Gastrointestinal: Nondistended, soft, non-tender,  no rebound tenderness or guarding, no masses palpable, no organomegaly, +BS, no bruits   Genitourinary: No Discharge, vesicles or other abnormalities   Musculoskeletal: ROM intact, no joint swelling,  normal  strength   Extremities: right hand swelling with tenderness  to palpation   Neurological: alert and oriented x3, intact senses,  motor, response and reflexes, normal strength   Lymphatic: No significant lymphadenopathy   Psychological: Appropriate mood and behavior   Skin: Warm and dry, no lesions, no rashes     Medications:    Medications:          Continuous Medications       --------------------------------  No continuous medications are active       Scheduled Medications       --------------------------------    1. Ammonium Lactate 12% Topical:  1  application(s)  Topical  2 Times a Day    2. Pantoprazole:  40  mg  Oral  Every 12 Hours         PRN Medications       --------------------------------    1. Acetaminophen:  650  mg  Oral  Every 6 Hours    2. Albuterol 2.5 mg/ 3 mL Nebulizer Soln:  3  mL  Inhalation  Every 4 Hours    3. hydrOXYzine Hydrochloride (ATARAX):  50  mg  Oral  Every 6 Hours    4. Sodium Chloride 0.9% Injectable Flush:  10  mL  IntraVenous Flush  Every 8 Hours and as Needed        Recent Lab Results:    Results:        I have reviewed these laboratory results:    Basic Metabolic Panel  28-Sep-2023 08:12:00      Result Value    Glucose, Serum  102   H   NA  136    K  3.6    CL  103    Bicarbonate, Serum  27    Anion Gap, Serum  10    BUN  7    CREAT  0.93    GFR Female  60    Calcium, Serum  7.7   L     Complete Blood Count  [facility]      Result 28-Sep-2023 08:12:00  27-Sep-2023 08:24:00    White Blood Cell Count 10.9   9.7    Nucleated Erythrocyte Count 0.0   0.0    Red Blood Cell Count 2.75   L   2.80   L    HGB 7.9   L   8.2   L    HCT 25.2   L   26.2   L    MCV 92   94    MCHC 31.3   L   31.3   L       297    RDW-CV 15.7   H   15.8   H        Magnesium, Serum  28-Sep-2023 08:12:00      Result Value    Magnesium, Serum  2.20      Phosphorus, Serum  28-Sep-2023 08:12:00      Result Value    Phosphorus, Serum  3.4          Assessment:      1.  Hypermagnesemia,  resolved.  2.  Anemia.  3.  GERD.  4.  Pulmonary embolism.  5.  GI bleed.    Plan.  -Ordered work-up for anemia including iron profile, folate and B12.  -GI further evaluation for work-up for anemia.  -Hypomagnesemia, resolved.  -Lites have been stable.  -Corrected calcium within normal range.    Thank you for the consultation and will follow with you closely.    Consult Status:  Consult Status    (select all that apply): initial  consult complete, will follow   Consult Order ID: [id number]       Electronic Signatures:  John Segura)  (Signed 28-Sep-2023 11:26)   Authored: Service, History of Present Illness, Review  Family/Social History and ROS, Allergies, Objective, Assessment/Recommendations, Note Completion      Last Updated: 28-Sep-2023 11:26 by John Segura)

## 2024-03-06 NOTE — CONSULTS
Service:   Service: Gastroenterology     Consult:  Consult requested by (Attending Name): Cedrick Mehta   Reason: GI bleed     History of Present Illness:   Admission Reason: Bloody diarrhea   HPI:    MORGAN FERREIRA is a 87 year old Female with GI consult for GI bleed.  PMH significant for GERD, HTN, KORI, PE on Eliquis, cardiac arrest during surgery 2/2023.  Patient  presented from nursing facility with reports of diarrhea that initially was brown and eventually became more maroon yeasterday.  She had related dizziness, weakness, fatigue.  She denies abdominal pain or cramping with episodes.  No previous GI bleeds.   No previous EGD. Her last colonoscopy was 3 years ago in [place] during which she had polyps removed.  Her last burgundy bowel movement was at 2:30 this AM.  She is feeling much better though she had been hypotensive with systolic in the 80s this a.m.,  improved with IV fluid bolus.  She is on an octreotide drip and PPI twice daily. Patient currently denies lightheadedness, dizziness, fever, chills, CP, SOB, abdominal pain, N/V, diarrhea, constipation, melena.  Her last dose of Eliquis was yesterday  morning.  She takes PPI daily.  No chronic NSAIDs.    Patient BP has been labile, most recent 85//62.  She has been afebrile on supplemental oxygen.  Labs note leukocytosis of 20.3, hemoglobin 8.2 after receiving 1 unit PRBCs for a drop to 7.0 (12 on 8/30/2023).  Patient received 2 units since then  for significant amounts of maroon liquid stool noted, up to 1 L.  INR 2.1.  No other significant lab abnormalities.  CRP 1.17.    CT A/P noting a sliding mild to moderate size hiatal hernia, diverticulosis without diverticulitis, hepatic steatosis.    PMH: GERD, HTN, KORI, PE on Eliquis, cardiac arrest during femur fracture surgery 2/2023    PSH: Right femur fracture repair, knee replacement, parathyroidectomy    Family Hx: Sister-esophageal cancer, father-lung cancer    Social: No tobacco, alcohol or  illicits      Review Family/Social History and ROS:   Constitutional: NEGATIVE: Fever, Chills, Anorexia,  Weight Loss, Malaise     Eyes: NEGATIVE: Blurry Vision, Drainage, Diploplia,  Redness, Vision Loss/ Change     ENMT: NEGATIVE: Nasal Discharge, Nasal Congestion,  Ear Pain, Mouth Pain, Throat Pain     Respiratory: NEGATIVE: Dry Cough, Productive Cough,  Hemoptysis, Wheezing, Shortness of Breath     Cardiac: NEGATIVE: Chest Pain, Dyspnea on Exertion,  Orthopnea, Palpitations, Syncope     Gastrointestinal: POSITIVE: Diarrhea; NEGATIVE: Nausea,  Vomiting, Constipation, Abdominal Pain     Musculoskeletal: NEGATIVE: Decreased ROM, Pain, Swelling,  Stiffness, Weakness     Neurological: POSITIVE: Dizziness; NEGATIVE: Confusion,  Headache, Seizures, Syncope     Skin: NEGATIVE: Mass, Pain, Pruritus, Rash, Ulcer     Hematologic/Lymph: NEGATIVE: Anemia, Bruising, Easy  Bleeding, Night Sweats, Petechiae              Allergies:  ·  No Known Allergies :     Objective:     Objective Information:        T   P  R  BP   MAP  SpO2   Value  36.4  82  21  95/60   73  95%  Date/Time 9/22 9:08 9/22 11:00 9/22 11:00 9/22 10:15  9/22 10:15 9/22 11:00  Range  (36C - 36.8C )  (82 - 120 )  (16 - 24 )  (81 - 120 )/ (41 - 82 )  (63 - 97 )  (93% - 100% )     As of 22-Sep-2023 04:03:00, patient is on 2 L/min of oxygen via room air.    Pain reported at 9/22 9:08: 0 = None     Weights   9/22 5:00: Weight in kg (Weight (kg))  63.5  9/22 5:00: Weight in lbs ((lbs))  139.9  9/21 22:10: BMI (kg/m2) (BMI (kg/m2))  25.994    Physical Exam by System:    Constitutional: Alert, pleasant and interactive elderly  female, in NAD   Eyes: PERRL, sclera clear, no conjunctival injection   ENMT: Mucous membranes moist, no lesions noted   Respiratory/Thorax: CTAB, even and unlabored   Cardiovascular: RRR, normal S1, S2, no m,r,g   Gastrointestinal: +BS, soft, round, NT, no rebound  tenderness or guarding, no palpable masses or organomegaly   Musculoskeletal: 5/5  strength, ROM intact, no joint  swelling   Extremities: Extremities warm, no edema, contusions,  wounds or cyanosis   Neurological: Alert and oriented x3   Psychological: Appropriate mood and behavior   Skin: Warm and dry, no rash or ecchymosis     Medications:    Medications:          Continuous Medications       --------------------------------  No continuous medications are active       Scheduled Medications       --------------------------------    1. Iohexol (Omnipaque 350-Radiology Contrast):  96.75  mL  IntraVenous Push  Once    2. Pantoprazole Injectable:  40  mg  IntraVenous Push  Every 12 Hours    3. Pneumococcal 23-Valent (PNEUMOVAX) Vaccine:  0.5  mL  IntraMuscular  Once         PRN Medications       --------------------------------    1. Albuterol 2.5 mg/ 3 mL Nebulizer Soln:  3  mL  Inhalation  Every 4 Hours    2. Calcium Chloride IVPB:  0.5  gram(s)  IntraVenous Piggyback  Every 8 Hours    3. Calcium Chloride IVPB:  1  gram(s)  IntraVenous Piggyback  Every 8 Hours    4. Magnesium Sulfate 2 gram/Sterile Water 50 mL Premix Soln:  2  gram(s)  IntraVenous Piggyback  Every 6 Hours    5. Magnesium Sulfate 4 gram/Sterile Water 100 mL Premix Soln:  4  gram(s)  IntraVenous Piggyback  Every 6 Hours    6. Potassium Chloride 20 mEq/Sterile Water 100 mL Premix IVPB:  20  mEq  IntraVenous Piggyback  Every 6 Hours    7. Potassium Chloride Powder Packet:  20  mEq  Oral  Every 6 Hours    8. Potassium Chloride Powder Packet:  40  mEq  Oral  Every 6 Hours    9. Sodium Chloride 0.9% Injectable Flush:  10  mL  IntraVenous Flush  Every 8 Hours and as Needed        Recent Lab Results:    Results:    I have reviewed these laboratory results:    Urinalysis with Culture if Indicated  22-Sep-2023 00:55:00      Result Value    Color, Urine  YELLOW  Reference Range: STRAW,YELLOW    Appearance, Urine  HAZY    Specific Gravity, Urine  1.020    pH, Urine  5.0    Protein, Urine  NEGATIVE    Glucose, Urine  NEGATIVE    Blood, Urine   NEGATIVE    Ketones, Urine  NEGATIVE    Bilirubin, Urine  NEGATIVE    Urobilinogen, Urine  <2.0    Nitrite, Urine  NEGATIVE    Leukocyte Esterase, Urine  NEGATIVE      Hemoglobin + Hematocrit  Trending View      Result 22-Sep-2023 00:50:00  21-Sep-2023 21:28:00    HCT 25.5   L   21.9   L    HGB 8.2   L   7.0   L        Complete Blood Count + Differential  21-Sep-2023 18:25:00      Result Value    White Blood Cell Count  20.3   H   Nucleated Erythrocyte Count  0.0    Red Blood Cell Count  3.05   L   HGB  8.8   L   HCT  28.2   L   MCV  92    MCHC  31.2   L   PLT  522   H   RDW-CV  13.3    Neutrophil %  79.9    Immature Granulocytes %  0.8    Lymphocyte %  9.4    Monocyte %  7.4    Eosinophil %  1.8    Basophil %  0.7    Neutrophil Count  16.18   H   Lymphocyte Count  1.91    Monocyte Count  1.49   H   Eosinophil Count  0.37    Basophil Count  0.15   H     Comprehensive Metabolic Panel  21-Sep-2023 18:25:00      Result Value    Glucose, Serum  134   H   NA  131   L   K  4.1    CL  99    Bicarbonate, Serum  21    Anion Gap, Serum  15    BUN  23    CREAT  1.01    GFR Female  54   A   Calcium, Serum  9.0    ALB  3.7    ALKP  85    T Pro  7.5    T Bili  0.4    Alanine Aminotransferase, Serum  6   L   Aspartate Transaminase, Serum  10      PT + INR, Plasma  21-Sep-2023 18:25:00      Result Value    Prothrombin Time, Plasma  23.3   H   International Normalized Ratio, Plasma  2.1   H     Lactate, Level  21-Sep-2023 18:25:00      Result Value    Lactate, Level  1.1      Activated Partial Thromboplastin Time  21-Sep-2023 18:25:00      Result Value    Activated Partial Thromboplastin Time  52   H     Troponin I, High Sensitivity  21-Sep-2023 18:25:00      Result Value    Troponin I, High Sensitivity  6        Radiology Results:    Results:        Impression:    1. Colonic diverticulosis is present without evidence of acute  diverticulitis.     2. Hepatic steatosis is present.  No suspicious hepatic lesion is  demonstrated.  No  biliary dilatation is demonstrated.  The gallbladder  appears within normal limits.     3. Fibrotic changes in the lungs.     4. Mild cardiomegaly.  Atherosclerosis of the coronary arteries is  present.     5. Mild to moderate size sliding-type hiatal hernia is present.     6. Severe scoliosis of the thoracolumbar spine.  Multilevel  degenerative change of the lumbar spine is present.  Severe  degenerative change of the hips demonstrated bilaterally.       Signed by Ambrosio Donaldson II, MD     CT Abdomen and Pelvis with IV Contrast [Sep 21 2023 10:19PM]        Assessment:    MORGAN FERREIRA is a 87 year old Female with GI consult for GI bleed.  PMH significant for GERD, HTN, KORI, PE on Eliquis, cardiac arrest during surgery 2/2023.  Patient  presented from nursing facility with reports of diarrhea that initially was brown and eventually became more maroon yesterday.  No previous GI bleeds.  Hemoglobin dropped from 8.8-7.0 since arrival for which she received 3 units PRBCs.  Hemoglobin noted  to be 12 8/30/2023.  Last episode of bloody stool at 2:30 AM.  Last dose of Eliquis yesterday AM.  Patient is on a PPI daily.  Last colonoscopy 3 years ago in [place] with polyps removed.  CT noting no acute findings, mild to moderate hiatal hernia, diverticulosis  without diverticulitis.  Patient is remaining stable with no further episodes of hematochezia.  Patient now asymptomatic and hemodynamically stable.    # acute blood loss anemia- diverticular v hemorrhoidal v infectious v ischemic (less likely)  # burgundy liquid stool- no further episodes  # diverticulosis on CT  # GERD  # supratherapeutic INR  # KORI    Plan:  - continue supportive care  - pt ok for clear liquids from GI standpoint  - continue to trend hemoglobin  - transfuse for hgb <7  - continue to monitor for and document signs of overt bleeding  - ok to continue PPI BID for now  - if pt rebleeds, send for stat CT angio  - if CT angio negative, will prep for colonoscopy  -  pt prefers not to prep for colonoscopy as she reports emesis with previous attempts  - no indication for EGD  - if no further bleeding, and hgb stable, will hold off on any endoscopic intervention    Plan has been discussed with Dr. Nolasco. GI will continue to follow.    JACQUI Pinzon              Consult Status:  Consult Order ID: [id number]       Electronic Signatures:  Alma Delaney (APRN-CNP)  (Signed 22-Sep-2023 11:58)   Authored: Service, History of Present Illness, Review  Family/Social History and ROS, Allergies, Objective, Assessment/Recommendations, Note Completion      Last Updated: 22-Sep-2023 11:58 by Alma Delaney (APRN-CNP)

## 2024-03-06 NOTE — CONSULTS
Service:   Service: Interventional Cardiology     Consult:  Consult requested by (Attending Name): Cedrick Mehta   Reason: distal cephalic occluded thrombus along IV  line     History of Present Illness:   HPI:    MORGAN FERREIRA is a 87 year old Female with history of hypertension, history of provoked PE on DOAC in February 2023 after femur fracture who was admitted with GI bleed.   She has been in the hospital for management of GI bleed.  Her apixaban has been held.    She was noted to have right forearm swelling and was found to have superficial forearm thrombophlebitis, with a thrombus in the cephalic vein.    Review Family/Social History and ROS:   Family History:  ·   Family history of chronic obstructive pulmonary disease: Relationship to Patient: Mother (Age at Diagnosis: Age Unknown), Living: No, Adopted: No, Twin/Multiple: No  ·   Family history of malignant neoplasm of esophagus: Relationship to Patient: Sister (Age at Diagnosis: Age Unknown), Living: No, Adopted: No, Twin/Multiple: No  ·   Family history of lung cancer: Relationship to Patient: Father (Age at Diagnosis: Age Unknown), Living: No, Adopted: No, Twin/Multiple: No           Allergies:  ·  No Known Allergies :     Objective:   Physical Exam by System:    Constitutional: Well developed, awake/alert/oriented  x3, no distress, alert and cooperative   ENMT: Mucous membranes moist, no jaundice   Head/Neck: Neck supple, No JVD, trachea midline,  no bruits   Respiratory/Thorax: Patent airways, CTAB, normal  breath sounds with good chest expansion,   Cardiovascular: Regular, rate and rhythm, no murmurs,  2+ equal pulses of the extremities   Gastrointestinal: Nondistended, soft, non-tender,   Musculoskeletal: right forearm swelling and mild  tenderness, palpable pulses   Extremities: No edema, no cyanosis   Neurological: alert and oriented x3, intact senses,  motor, response and reflexes, normal strength   Skin: Warm and dry, no lesions, no rashes      Radiology Results:    Results:        Conclusion:  CONCLUSIONS:  Right Upper Venous: There is acute occlusive superficial venous thrombosis visualized in the distal cephalic vein. Additional Findings; IV Line.    Imaging & Doppler Findings:    Right               Compressible    Thrombus     Flow  Internal Jugular        Yes          None  Subclavian              Yes           None  Subclavian Proximal     Yes           None  Subclavian Mid          Yes           None  Subclavian Distal       Yes           None  Axillary                Yes           None  Brachial         Yes           None  Cephalic                 No      Acute occlusive None  Basilic                 Yes           None      [id number] HATTIE Mckinnon MD  Electronically signed by 1799 HATTIE Mckinnon MD on 9/26/2023 at 4:29:40 PM        *** Final ***     [facility] LAB Venous Duplex Ultrasound for DVT [Sep 26 2023  4:29PM]          Assessment:     87 year old Female with history of hypertension, history of provoked PE on DOAC in February 2023 after femur fracture who was admitted with GI bleed, noted to have  developed superficial thrombophlebitis on the right forearm    -Recommend to continue local measures, including arm elevation with pillows, and cold compresses.  -At this moment recommend to continue DVT prophylaxis alone with subcu heparin or Lovenox  -Per chart patient appears to have had a provoked PE event in February in setting of femur fracture.  Venous duplex from this week showed no evidence of lower extremity DVT.  Given provoked event was 7 months ago, and in the light of admission for recent  GI bleed, full dose anticoagulation can be discontinued.  Recommend to continue DVT prophylaxis     thank you for involving me in this patient's care. Please do not hesitate to call if any questions arise.       Consult Status:  Consult Status    (select all that apply): initial  consult complete, will not follow, call as needed   Consult Order ID:  [id number]       Electronic Signatures:  Reggie Jasmine)  (Signed 27-Sep-2023 20:41)   Authored: Service, History of Present Illness, Review  Family/Social History and ROS, Allergies, Objective, Assessment/Recommendations, Note Completion      Last Updated: 27-Sep-2023 20:41 by Reggie Jasmine)

## 2024-03-10 ENCOUNTER — APPOINTMENT (OUTPATIENT)
Dept: CARDIOLOGY | Facility: HOSPITAL | Age: 89
DRG: 605 | End: 2024-03-10
Payer: MEDICARE

## 2024-03-10 ENCOUNTER — HOSPITAL ENCOUNTER (INPATIENT)
Facility: HOSPITAL | Age: 89
LOS: 3 days | Discharge: NURSING, REHAB OR LONG-TERM CARE | DRG: 605 | End: 2024-03-13
Attending: EMERGENCY MEDICINE | Admitting: INTERNAL MEDICINE
Payer: MEDICARE

## 2024-03-10 ENCOUNTER — APPOINTMENT (OUTPATIENT)
Dept: RADIOLOGY | Facility: HOSPITAL | Age: 89
DRG: 605 | End: 2024-03-10
Payer: MEDICARE

## 2024-03-10 DIAGNOSIS — R26.2 DIFFICULTY IN WALKING: ICD-10-CM

## 2024-03-10 DIAGNOSIS — N17.9 AKI (ACUTE KIDNEY INJURY) (CMS-HCC): ICD-10-CM

## 2024-03-10 DIAGNOSIS — S30.1XXA ABDOMINAL WALL HEMATOMA, INITIAL ENCOUNTER: ICD-10-CM

## 2024-03-10 DIAGNOSIS — E87.1 HYPONATREMIA: ICD-10-CM

## 2024-03-10 DIAGNOSIS — R10.9 ABDOMINAL PAIN, UNSPECIFIED ABDOMINAL LOCATION: Primary | ICD-10-CM

## 2024-03-10 LAB
ALBUMIN SERPL BCP-MCNC: 4.2 G/DL (ref 3.4–5)
ALP SERPL-CCNC: 66 U/L (ref 33–136)
ALT SERPL W P-5'-P-CCNC: 11 U/L (ref 7–45)
ANION GAP SERPL CALC-SCNC: 18 MMOL/L (ref 10–20)
APPEARANCE UR: CLEAR
AST SERPL W P-5'-P-CCNC: 25 U/L (ref 9–39)
BASOPHILS # BLD AUTO: 0.02 X10*3/UL (ref 0–0.1)
BASOPHILS NFR BLD AUTO: 0.2 %
BILIRUB SERPL-MCNC: 0.5 MG/DL (ref 0–1.2)
BILIRUB UR STRIP.AUTO-MCNC: NEGATIVE MG/DL
BNP SERPL-MCNC: 33 PG/ML (ref 0–99)
BUN SERPL-MCNC: 39 MG/DL (ref 6–23)
CALCIUM SERPL-MCNC: 8.9 MG/DL (ref 8.6–10.3)
CARDIAC TROPONIN I PNL SERPL HS: 13 NG/L (ref 0–13)
CARDIAC TROPONIN I PNL SERPL HS: 13 NG/L (ref 0–13)
CHLORIDE SERPL-SCNC: 92 MMOL/L (ref 98–107)
CO2 SERPL-SCNC: 19 MMOL/L (ref 21–32)
COLOR UR: YELLOW
CREAT SERPL-MCNC: 1.28 MG/DL (ref 0.5–1.05)
EGFRCR SERPLBLD CKD-EPI 2021: 41 ML/MIN/1.73M*2
EOSINOPHIL # BLD AUTO: 0.01 X10*3/UL (ref 0–0.4)
EOSINOPHIL NFR BLD AUTO: 0.1 %
ERYTHROCYTE [DISTWIDTH] IN BLOOD BY AUTOMATED COUNT: 15.6 % (ref 11.5–14.5)
ERYTHROCYTE [DISTWIDTH] IN BLOOD BY AUTOMATED COUNT: 15.7 % (ref 11.5–14.5)
FLUAV RNA RESP QL NAA+PROBE: DETECTED
FLUBV RNA RESP QL NAA+PROBE: NOT DETECTED
GLUCOSE SERPL-MCNC: 101 MG/DL (ref 74–99)
GLUCOSE UR STRIP.AUTO-MCNC: NEGATIVE MG/DL
HCT VFR BLD AUTO: 30.5 % (ref 36–46)
HCT VFR BLD AUTO: 31.4 % (ref 36–46)
HGB BLD-MCNC: 10.1 G/DL (ref 12–16)
HGB BLD-MCNC: 9.6 G/DL (ref 12–16)
HOLD SPECIMEN: NORMAL
HOLD SPECIMEN: NORMAL
IMM GRANULOCYTES # BLD AUTO: 0.04 X10*3/UL (ref 0–0.5)
IMM GRANULOCYTES NFR BLD AUTO: 0.4 % (ref 0–0.9)
KETONES UR STRIP.AUTO-MCNC: ABNORMAL MG/DL
LACTATE SERPL-SCNC: 1.3 MMOL/L (ref 0.4–2)
LEUKOCYTE ESTERASE UR QL STRIP.AUTO: NEGATIVE
LIPASE SERPL-CCNC: 135 U/L (ref 9–82)
LYMPHOCYTES # BLD AUTO: 1.24 X10*3/UL (ref 0.8–3)
LYMPHOCYTES NFR BLD AUTO: 11.9 %
MAGNESIUM SERPL-MCNC: 1.8 MG/DL (ref 1.6–2.4)
MCH RBC QN AUTO: 29.1 PG (ref 26–34)
MCH RBC QN AUTO: 29.2 PG (ref 26–34)
MCHC RBC AUTO-ENTMCNC: 31.5 G/DL (ref 32–36)
MCHC RBC AUTO-ENTMCNC: 32.2 G/DL (ref 32–36)
MCV RBC AUTO: 91 FL (ref 80–100)
MCV RBC AUTO: 92 FL (ref 80–100)
MONOCYTES # BLD AUTO: 0.83 X10*3/UL (ref 0.05–0.8)
MONOCYTES NFR BLD AUTO: 8 %
NEUTROPHILS # BLD AUTO: 8.28 X10*3/UL (ref 1.6–5.5)
NEUTROPHILS NFR BLD AUTO: 79.4 %
NITRITE UR QL STRIP.AUTO: NEGATIVE
NRBC BLD-RTO: 0 /100 WBCS (ref 0–0)
NRBC BLD-RTO: 0 /100 WBCS (ref 0–0)
PH UR STRIP.AUTO: 5 [PH]
PLATELET # BLD AUTO: 190 X10*3/UL (ref 150–450)
PLATELET # BLD AUTO: 242 X10*3/UL (ref 150–450)
POTASSIUM SERPL-SCNC: 4.6 MMOL/L (ref 3.5–5.3)
PROT SERPL-MCNC: 7.5 G/DL (ref 6.4–8.2)
PROT UR STRIP.AUTO-MCNC: ABNORMAL MG/DL
RBC # BLD AUTO: 3.3 X10*6/UL (ref 4–5.2)
RBC # BLD AUTO: 3.46 X10*6/UL (ref 4–5.2)
RBC # UR STRIP.AUTO: NEGATIVE /UL
RBC #/AREA URNS AUTO: NORMAL /HPF
SARS-COV-2 RNA RESP QL NAA+PROBE: NOT DETECTED
SODIUM SERPL-SCNC: 124 MMOL/L (ref 136–145)
SP GR UR STRIP.AUTO: 1.02
UROBILINOGEN UR STRIP.AUTO-MCNC: <2 MG/DL
WBC # BLD AUTO: 10.4 X10*3/UL (ref 4.4–11.3)
WBC # BLD AUTO: 7.7 X10*3/UL (ref 4.4–11.3)
WBC #/AREA URNS AUTO: NORMAL /HPF

## 2024-03-10 PROCEDURE — 36415 COLL VENOUS BLD VENIPUNCTURE: CPT | Performed by: EMERGENCY MEDICINE

## 2024-03-10 PROCEDURE — 80051 ELECTROLYTE PANEL: CPT

## 2024-03-10 PROCEDURE — 83880 ASSAY OF NATRIURETIC PEPTIDE: CPT

## 2024-03-10 PROCEDURE — 1200000002 HC GENERAL ROOM WITH TELEMETRY DAILY

## 2024-03-10 PROCEDURE — 93005 ELECTROCARDIOGRAM TRACING: CPT

## 2024-03-10 PROCEDURE — 99285 EMERGENCY DEPT VISIT HI MDM: CPT | Performed by: EMERGENCY MEDICINE

## 2024-03-10 PROCEDURE — 96375 TX/PRO/DX INJ NEW DRUG ADDON: CPT

## 2024-03-10 PROCEDURE — 99222 1ST HOSP IP/OBS MODERATE 55: CPT | Performed by: NURSE PRACTITIONER

## 2024-03-10 PROCEDURE — 83735 ASSAY OF MAGNESIUM: CPT

## 2024-03-10 PROCEDURE — 81001 URINALYSIS AUTO W/SCOPE: CPT

## 2024-03-10 PROCEDURE — 99285 EMERGENCY DEPT VISIT HI MDM: CPT | Mod: 25

## 2024-03-10 PROCEDURE — 96361 HYDRATE IV INFUSION ADD-ON: CPT

## 2024-03-10 PROCEDURE — 85027 COMPLETE CBC AUTOMATED: CPT | Performed by: NURSE PRACTITIONER

## 2024-03-10 PROCEDURE — 74177 CT ABD & PELVIS W/CONTRAST: CPT | Mod: FOREIGN READ | Performed by: RADIOLOGY

## 2024-03-10 PROCEDURE — 71275 CT ANGIOGRAPHY CHEST: CPT | Mod: FOREIGN READ | Performed by: RADIOLOGY

## 2024-03-10 PROCEDURE — 96376 TX/PRO/DX INJ SAME DRUG ADON: CPT

## 2024-03-10 PROCEDURE — 83690 ASSAY OF LIPASE: CPT

## 2024-03-10 PROCEDURE — 74177 CT ABD & PELVIS W/CONTRAST: CPT

## 2024-03-10 PROCEDURE — 84484 ASSAY OF TROPONIN QUANT: CPT

## 2024-03-10 PROCEDURE — 93010 ELECTROCARDIOGRAM REPORT: CPT | Performed by: INTERNAL MEDICINE

## 2024-03-10 PROCEDURE — 2500000004 HC RX 250 GENERAL PHARMACY W/ HCPCS (ALT 636 FOR OP/ED): Performed by: NURSE PRACTITIONER

## 2024-03-10 PROCEDURE — 96374 THER/PROPH/DIAG INJ IV PUSH: CPT

## 2024-03-10 PROCEDURE — 71275 CT ANGIOGRAPHY CHEST: CPT

## 2024-03-10 PROCEDURE — 36415 COLL VENOUS BLD VENIPUNCTURE: CPT

## 2024-03-10 PROCEDURE — 87636 SARSCOV2 & INF A&B AMP PRB: CPT

## 2024-03-10 PROCEDURE — 83605 ASSAY OF LACTIC ACID: CPT | Performed by: EMERGENCY MEDICINE

## 2024-03-10 PROCEDURE — 85025 COMPLETE CBC W/AUTO DIFF WBC: CPT

## 2024-03-10 PROCEDURE — 2550000001 HC RX 255 CONTRASTS: Performed by: EMERGENCY MEDICINE

## 2024-03-10 PROCEDURE — 2500000004 HC RX 250 GENERAL PHARMACY W/ HCPCS (ALT 636 FOR OP/ED)

## 2024-03-10 RX ORDER — MORPHINE SULFATE 2 MG/ML
2 INJECTION, SOLUTION INTRAMUSCULAR; INTRAVENOUS ONCE
Status: COMPLETED | OUTPATIENT
Start: 2024-03-10 | End: 2024-03-10

## 2024-03-10 RX ORDER — DOCUSATE SODIUM 100 MG/1
100 CAPSULE, LIQUID FILLED ORAL 2 TIMES DAILY
Status: DISCONTINUED | OUTPATIENT
Start: 2024-03-10 | End: 2024-03-13 | Stop reason: HOSPADM

## 2024-03-10 RX ORDER — POLYETHYLENE GLYCOL 3350 17 G/17G
17 POWDER, FOR SOLUTION ORAL DAILY
Status: DISCONTINUED | OUTPATIENT
Start: 2024-03-10 | End: 2024-03-13 | Stop reason: HOSPADM

## 2024-03-10 RX ORDER — ALBUTEROL SULFATE 90 UG/1
2 AEROSOL, METERED RESPIRATORY (INHALATION) EVERY 6 HOURS PRN
COMMUNITY

## 2024-03-10 RX ORDER — ONDANSETRON HYDROCHLORIDE 2 MG/ML
4 INJECTION, SOLUTION INTRAVENOUS ONCE
Status: COMPLETED | OUTPATIENT
Start: 2024-03-10 | End: 2024-03-10

## 2024-03-10 RX ORDER — MORPHINE SULFATE 2 MG/ML
2 INJECTION, SOLUTION INTRAMUSCULAR; INTRAVENOUS EVERY 4 HOURS PRN
Status: DISPENSED | OUTPATIENT
Start: 2024-03-10 | End: 2024-03-12

## 2024-03-10 RX ORDER — ACETAMINOPHEN 160 MG/5ML
650 SOLUTION ORAL EVERY 4 HOURS PRN
Status: DISCONTINUED | OUTPATIENT
Start: 2024-03-10 | End: 2024-03-13 | Stop reason: HOSPADM

## 2024-03-10 RX ORDER — ACETAMINOPHEN 650 MG/1
650 SUPPOSITORY RECTAL EVERY 4 HOURS PRN
Status: DISCONTINUED | OUTPATIENT
Start: 2024-03-10 | End: 2024-03-13 | Stop reason: HOSPADM

## 2024-03-10 RX ORDER — MORPHINE SULFATE 4 MG/ML
4 INJECTION, SOLUTION INTRAMUSCULAR; INTRAVENOUS ONCE
Status: COMPLETED | OUTPATIENT
Start: 2024-03-10 | End: 2024-03-10

## 2024-03-10 RX ORDER — DOXYCYCLINE HYCLATE 100 MG
100 TABLET ORAL 2 TIMES DAILY
COMMUNITY

## 2024-03-10 RX ORDER — ACETAMINOPHEN 325 MG/1
650 TABLET ORAL EVERY 4 HOURS PRN
Status: DISCONTINUED | OUTPATIENT
Start: 2024-03-10 | End: 2024-03-13 | Stop reason: HOSPADM

## 2024-03-10 RX ADMIN — IOHEXOL 75 ML: 350 INJECTION, SOLUTION INTRAVENOUS at 13:45

## 2024-03-10 RX ADMIN — SODIUM CHLORIDE, POTASSIUM CHLORIDE, SODIUM LACTATE AND CALCIUM CHLORIDE 1000 ML: 600; 310; 30; 20 INJECTION, SOLUTION INTRAVENOUS at 11:35

## 2024-03-10 RX ADMIN — MORPHINE SULFATE 2 MG: 2 INJECTION, SOLUTION INTRAMUSCULAR; INTRAVENOUS at 19:21

## 2024-03-10 RX ADMIN — ONDANSETRON 4 MG: 2 INJECTION INTRAMUSCULAR; INTRAVENOUS at 11:35

## 2024-03-10 RX ADMIN — MORPHINE SULFATE 4 MG: 4 INJECTION, SOLUTION INTRAMUSCULAR; INTRAVENOUS at 13:33

## 2024-03-10 RX ADMIN — MORPHINE SULFATE 2 MG: 2 INJECTION, SOLUTION INTRAMUSCULAR; INTRAVENOUS at 11:35

## 2024-03-10 RX ADMIN — SODIUM CHLORIDE, POTASSIUM CHLORIDE, SODIUM LACTATE AND CALCIUM CHLORIDE 1000 ML: 600; 310; 30; 20 INJECTION, SOLUTION INTRAVENOUS at 14:11

## 2024-03-10 SDOH — SOCIAL STABILITY: SOCIAL INSECURITY: ARE THERE ANY APPARENT SIGNS OF INJURIES/BEHAVIORS THAT COULD BE RELATED TO ABUSE/NEGLECT?: NO

## 2024-03-10 SDOH — SOCIAL STABILITY: SOCIAL INSECURITY: ARE YOU OR HAVE YOU BEEN THREATENED OR ABUSED PHYSICALLY, EMOTIONALLY, OR SEXUALLY BY ANYONE?: NO

## 2024-03-10 SDOH — SOCIAL STABILITY: SOCIAL INSECURITY: DO YOU FEEL UNSAFE GOING BACK TO THE PLACE WHERE YOU ARE LIVING?: NO

## 2024-03-10 SDOH — SOCIAL STABILITY: SOCIAL INSECURITY: DO YOU FEEL ANYONE HAS EXPLOITED OR TAKEN ADVANTAGE OF YOU FINANCIALLY OR OF YOUR PERSONAL PROPERTY?: NO

## 2024-03-10 SDOH — SOCIAL STABILITY: SOCIAL INSECURITY: HAVE YOU HAD THOUGHTS OF HARMING ANYONE ELSE?: NO

## 2024-03-10 SDOH — SOCIAL STABILITY: SOCIAL INSECURITY: DOES ANYONE TRY TO KEEP YOU FROM HAVING/CONTACTING OTHER FRIENDS OR DOING THINGS OUTSIDE YOUR HOME?: NO

## 2024-03-10 SDOH — SOCIAL STABILITY: SOCIAL INSECURITY: ABUSE: ADULT

## 2024-03-10 SDOH — SOCIAL STABILITY: SOCIAL INSECURITY: HAS ANYONE EVER THREATENED TO HURT YOUR FAMILY OR YOUR PETS?: NO

## 2024-03-10 ASSESSMENT — LIFESTYLE VARIABLES
SKIP TO QUESTIONS 9-10: 1
HOW OFTEN DO YOU HAVE A DRINK CONTAINING ALCOHOL: 2-4 TIMES A MONTH
EVER FELT BAD OR GUILTY ABOUT YOUR DRINKING: NO
HOW OFTEN DO YOU HAVE 6 OR MORE DRINKS ON ONE OCCASION: NEVER
EVER HAD A DRINK FIRST THING IN THE MORNING TO STEADY YOUR NERVES TO GET RID OF A HANGOVER: NO
AUDIT-C TOTAL SCORE: 2
HAVE YOU EVER FELT YOU SHOULD CUT DOWN ON YOUR DRINKING: NO
HAVE PEOPLE ANNOYED YOU BY CRITICIZING YOUR DRINKING: NO
AUDIT-C TOTAL SCORE: 2
HOW MANY STANDARD DRINKS CONTAINING ALCOHOL DO YOU HAVE ON A TYPICAL DAY: 1 OR 2

## 2024-03-10 ASSESSMENT — PAIN SCALES - GENERAL
PAINLEVEL_OUTOF10: 5 - MODERATE PAIN
PAINLEVEL_OUTOF10: 10 - WORST POSSIBLE PAIN
PAINLEVEL_OUTOF10: 8
PAINLEVEL_OUTOF10: 6
PAINLEVEL_OUTOF10: 10 - WORST POSSIBLE PAIN
PAINLEVEL_OUTOF10: 5 - MODERATE PAIN
PAINLEVEL_OUTOF10: 8
PAINLEVEL_OUTOF10: 6
PAINLEVEL_OUTOF10: 5 - MODERATE PAIN
PAINLEVEL_OUTOF10: 1
PAINLEVEL_OUTOF10: 6

## 2024-03-10 ASSESSMENT — ENCOUNTER SYMPTOMS
ABDOMINAL PAIN: 1
FEVER: 0
DIZZINESS: 0
SORE THROAT: 0
COLOR CHANGE: 0
NUMBNESS: 0
DYSURIA: 0
CHILLS: 0
DIARRHEA: 0
AGITATION: 0
VOMITING: 0
HEMATURIA: 0
BACK PAIN: 1
EYE PAIN: 0
WOUND: 0
SHORTNESS OF BREATH: 0
PALPITATIONS: 0
HEADACHES: 0
HALLUCINATIONS: 0
TROUBLE SWALLOWING: 0
BLOOD IN STOOL: 0
NECK PAIN: 0
CHEST TIGHTNESS: 0
NAUSEA: 0
POLYPHAGIA: 0

## 2024-03-10 ASSESSMENT — COGNITIVE AND FUNCTIONAL STATUS - GENERAL
WALKING IN HOSPITAL ROOM: TOTAL
DRESSING REGULAR LOWER BODY CLOTHING: A LOT
TURNING FROM BACK TO SIDE WHILE IN FLAT BAD: A LOT
DRESSING REGULAR UPPER BODY CLOTHING: A LOT
TOILETING: A LOT
STANDING UP FROM CHAIR USING ARMS: A LOT
MOVING TO AND FROM BED TO CHAIR: A LOT
CLIMB 3 TO 5 STEPS WITH RAILING: TOTAL
DAILY ACTIVITIY SCORE: 13
MOVING FROM LYING ON BACK TO SITTING ON SIDE OF FLAT BED WITH BEDRAILS: A LOT
HELP NEEDED FOR BATHING: A LOT
PERSONAL GROOMING: A LOT
EATING MEALS: A LITTLE
PATIENT BASELINE BEDBOUND: NO
MOBILITY SCORE: 10

## 2024-03-10 ASSESSMENT — ACTIVITIES OF DAILY LIVING (ADL)
ADEQUATE_TO_COMPLETE_ADL: YES
GROOMING: NEEDS ASSISTANCE
HEARING - LEFT EAR: FUNCTIONAL
FEEDING YOURSELF: INDEPENDENT
WALKS IN HOME: NEEDS ASSISTANCE
PATIENT'S MEMORY ADEQUATE TO SAFELY COMPLETE DAILY ACTIVITIES?: YES
JUDGMENT_ADEQUATE_SAFELY_COMPLETE_DAILY_ACTIVITIES: YES
HEARING - RIGHT EAR: FUNCTIONAL
BATHING: NEEDS ASSISTANCE
TOILETING: NEEDS ASSISTANCE
DRESSING YOURSELF: NEEDS ASSISTANCE
LACK_OF_TRANSPORTATION: NO
ASSISTIVE_DEVICE: WALKER

## 2024-03-10 ASSESSMENT — PAIN - FUNCTIONAL ASSESSMENT
PAIN_FUNCTIONAL_ASSESSMENT: 0-10
PAIN_FUNCTIONAL_ASSESSMENT: 0-10

## 2024-03-10 ASSESSMENT — PATIENT HEALTH QUESTIONNAIRE - PHQ9
2. FEELING DOWN, DEPRESSED OR HOPELESS: NOT AT ALL
1. LITTLE INTEREST OR PLEASURE IN DOING THINGS: NOT AT ALL
SUM OF ALL RESPONSES TO PHQ9 QUESTIONS 1 & 2: 0

## 2024-03-10 ASSESSMENT — PAIN DESCRIPTION - ORIENTATION: ORIENTATION: LEFT

## 2024-03-10 ASSESSMENT — PAIN DESCRIPTION - PAIN TYPE: TYPE: ACUTE PAIN

## 2024-03-10 ASSESSMENT — PAIN DESCRIPTION - LOCATION: LOCATION: ABDOMEN

## 2024-03-10 ASSESSMENT — COLUMBIA-SUICIDE SEVERITY RATING SCALE - C-SSRS
2. HAVE YOU ACTUALLY HAD ANY THOUGHTS OF KILLING YOURSELF?: NO
6. HAVE YOU EVER DONE ANYTHING, STARTED TO DO ANYTHING, OR PREPARED TO DO ANYTHING TO END YOUR LIFE?: NO
1. IN THE PAST MONTH, HAVE YOU WISHED YOU WERE DEAD OR WISHED YOU COULD GO TO SLEEP AND NOT WAKE UP?: NO

## 2024-03-10 NOTE — CARE PLAN
Problem: Psychosocial Needs  Goal: Demonstrates ability to cope with hospitalization/illness  Outcome: Progressing  Flowsheets (Taken 3/10/2024 5817)  Demonstrates ability to cope with hospitalization/illness:   Encourage participation in diversional activities   Encourage resident to set and complete small goals for self  Goal: Collaborate with me, my family, and caregiver to identify my specific goals  Outcome: Progressing   The patient's goals for the shift include      The clinical goals for the shift include free from worsening pain

## 2024-03-10 NOTE — ED PROVIDER NOTES
EMERGENCY DEPARTMENT ENCOUNTER      Pt Name: Natasha Lundberg  MRN: [id number]  Birthdate 1935  Date of evaluation: 3/10/2024  Provider: Fernando Johnson DO    CHIEF COMPLAINT       Chief Complaint   Patient presents with    Abdominal Pain     Left. Started Wednesday          HISTORY OF PRESENT ILLNESS    Patient is a 88-year-old female presenting to complaint of left-sided abdominal pain for the past few days.  Patient's medical history is significant for previous hip fracture with sustained cardiac arrest per chart review was secondary to PE during surgery for hip fracture.  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  She has not had symptoms similar to this in the past.  Has been going to the bathroom normally.  Denies any diarrhea or constipation.  No dysuria hematuria.  No melena hematochezia.  No chest pain or significant shortness of breath.  She does state that her abdominal pain is worse when she takes a breath in.  No lightheadedness or dizziness.          Nursing Notes were reviewed.    PAST MEDICAL HISTORY     Past Medical History:   Diagnosis Date    Bilateral leg edema 10/10/2023    Cardiac arrest (CMS/[facility])     2/2 PE    COVID-19 09/10/2023    Gastroesophageal reflux disease without esophagitis 04/24/2023    Humeral fracture 09/10/2023    Iron deficiency anemia due to chronic blood loss 04/24/2023    Other pulmonary embolism without acute cor pulmonale (CMS/HCC) 04/24/2023    PE (pulmonary thromboembolism) (CMS/[facility])     Primary hypertension 04/24/2023    Unspecified fracture of right femur, subsequent encounter for closed fracture with routine healing 04/24/2023         SURGICAL HISTORY       Past Surgical History:   Procedure Laterality Date    CT ABDOMEN PELVIS ANGIOGRAM W AND/OR WO IV CONTRAST  09/23/2023    CT ABDOMEN PELVIS ANGIOGRAM W AND/OR WO IV CONTRAST 9/23/2023 [facility] CT    FEMUR FRACTURE SURGERY      PARATHYROIDECTOMY      TOTAL KNEE ARTHROPLASTY           CURRENT MEDICATIONS       Previous  Medications    ACETAMINOPHEN (TYLENOL) 500 MG TABLET    Take 2 tablets (1,000 mg) by mouth every 8 hours if needed for mild pain (1 - 3).    ALBUTEROL (PROAIR HFA) 90 MCG/ACTUATION INHALER    Inhale 2 puffs every 6 hours if needed for shortness of breath or wheezing.    APIXABAN (ELIQUIS) 5 MG TABLET    Take 1 tablet (5 mg) by mouth 2 times a day.    BENZONATATE (TESSALON) 100 MG CAPSULE    Take 1 capsule (100 mg) by mouth 3 times a day as needed for cough.    CALCIUM CITRATE (CALCITRATE) 200 MG (950 MG) TABLET    Take 1 tablet (200 mg) by mouth 2 times a day.    CHOLECALCIFEROL (VITAMIN D-3) 50 MCG (2000 UT) TABLET    Take 1 tablet (50 mcg) by mouth once daily.    DIPHENHYDRAMINE-ACETAMINOPHEN (TYLENOL PM)  MG PER TABLET    Take 2 tablets by mouth once daily at bedtime.    FUROSEMIDE (LASIX) 20 MG TABLET    Take 1 tablet (20 mg) by mouth once daily as needed (swelling).    OXYCODONE (ROXICODONE) 5 MG IMMEDIATE RELEASE TABLET    Take 1 tablet (5 mg) by mouth every 6 hours if needed for severe pain (7 - 10).    PANTOPRAZOLE (PROTONIX) 40 MG EC TABLET    Take 1 tablet (40 mg) by mouth 2 times a day.    POLYETHYLENE GLYCOL (GLYCOLAX, MIRALAX) 17 GRAM PACKET    Take 17 g by mouth once daily as needed (swelling).       ALLERGIES     Patient has no known allergies.    FAMILY HISTORY       Family History   Problem Relation Name Age of Onset    COPD Mother      Lung cancer Father      Esophageal cancer Sister            SOCIAL HISTORY       Social History     Socioeconomic History    Marital status: Single     Spouse name: None    Number of children: None    Years of education: None    Highest education level: None   Occupational History    None   Tobacco Use    Smoking status: Never    Smokeless tobacco: Never   Substance and Sexual Activity    Alcohol use: Yes     Comment: social    Drug use: Never    Sexual activity: None   Other Topics Concern    None   Social History Narrative    None     Social Determinants of  Health     Financial Resource Strain: Low Risk  (12/21/2023)    Overall Financial Resource Strain (CARDIA)     Difficulty of Paying Living Expenses: Not hard at all   Recent Concern: Financial Resource Strain - High Risk (12/20/2023)    Overall Financial Resource Strain (CARDIA)     Difficulty of Paying Living Expenses: Very hard   Food Insecurity: Not on file   Transportation Needs: No Transportation Needs (12/21/2023)    PRAPARE - Transportation     Lack of Transportation (Medical): No     Lack of Transportation (Non-Medical): No   Physical Activity: Not on file   Stress: Not on file   Social Connections: Not on file   Intimate Partner Violence: Not on file   Housing Stability: Low Risk  (12/21/2023)    Housing Stability Vital Sign     Unable to Pay for Housing in the Last Year: No     Number of Places Lived in the Last Year: 1     Unstable Housing in the Last Year: No       SCREENINGS                        PHYSICAL EXAM    (up to 7 for level 4, 8 or more for level 5)     ED Triage Vitals [03/10/24 1046]   Temperature Heart Rate Respirations BP   37.3 °C (99.1 °F) 93 20 145/73      Pulse Ox Temp Source Heart Rate Source Patient Position   97 % Temporal Monitor --      BP Location FiO2 (%)     -- --       Physical Exam  Vitals and nursing note reviewed.   Constitutional:       General: She is not in acute distress.     Appearance: Normal appearance. She is not ill-appearing or toxic-appearing.   HENT:      Head: Normocephalic and atraumatic.      Right Ear: External ear normal.      Left Ear: External ear normal.      Nose: Nose normal.   Eyes:      General:         Right eye: No discharge.         Left eye: No discharge.      Extraocular Movements: Extraocular movements intact.      Conjunctiva/sclera: Conjunctivae normal.      Pupils: Pupils are equal, round, and reactive to light.   Cardiovascular:      Rate and Rhythm: Normal rate and regular rhythm.      Pulses: Normal pulses.      Heart sounds: Normal heart  sounds. No murmur heard.  Pulmonary:      Effort: Pulmonary effort is normal.      Breath sounds: Normal breath sounds.   Abdominal:      General: There is no distension.      Palpations: Abdomen is soft. There is no mass.      Tenderness: There is abdominal tenderness in the left upper quadrant and left lower quadrant. There is guarding. There is no rebound. Negative signs include Rangel's sign and McBurney's sign.   Musculoskeletal:         General: No deformity or signs of injury. Normal range of motion.   Skin:     General: Skin is warm and dry.   Neurological:      General: No focal deficit present.      Mental Status: She is alert and oriented to person, place, and time. Mental status is at baseline.   Psychiatric:         Mood and Affect: Mood normal.         Behavior: Behavior normal.          DIAGNOSTIC RESULTS     LABS:  Labs Reviewed   CBC WITH AUTO DIFFERENTIAL - Abnormal       Result Value    WBC 10.4      nRBC 0.0      RBC 3.46 (*)     Hemoglobin 10.1 (*)     Hematocrit 31.4 (*)     MCV 91      MCH 29.2      MCHC 32.2      RDW 15.7 (*)     Platelets 242      Neutrophils % 79.4      Immature Granulocytes %, Automated 0.4      Lymphocytes % 11.9      Monocytes % 8.0      Eosinophils % 0.1      Basophils % 0.2      Neutrophils Absolute 8.28 (*)     Immature Granulocytes Absolute, Automated 0.04      Lymphocytes Absolute 1.24      Monocytes Absolute 0.83 (*)     Eosinophils Absolute 0.01      Basophils Absolute 0.02     COMPREHENSIVE METABOLIC PANEL - Abnormal    Glucose 101 (*)     Sodium 124 (*)     Potassium 4.6      Chloride 92 (*)     Bicarbonate 19 (*)     Anion Gap 18      Urea Nitrogen 39 (*)     Creatinine 1.28 (*)     eGFR 41 (*)     Calcium 8.9      Albumin 4.2      Alkaline Phosphatase 66      Total Protein 7.5      AST 25      Bilirubin, Total 0.5      ALT 11     LIPASE - Abnormal    Lipase 135 (*)     Narrative:     Venipuncture immediately after or during the administration of Metamizole may  lead to falsely low results. Testing should be performed immediately prior to Metamizole dosing.   SARS-COV-2 AND INFLUENZA A/B PCR - Abnormal    Flu A Result Detected (*)     Flu B Result Not Detected      Coronavirus 2019, PCR Not Detected      Narrative:     This assay has received FDA Emergency Use Authorization (EUA) and  is only authorized for the duration of time that circumstances exist to justify the authorization of the emergency use of in vitro diagnostic tests for the detection of SARS-CoV-2 virus and/or diagnosis of COVID-19 infection under section 564(b)(1) of the Act, 21 U.S.C. 360bbb-3(b)(1). Testing for SARS-CoV-2 is only recommended for patients who meet current clinical and/or epidemiological criteria as defined by federal, state, or local public health directives. This assay is an in vitro diagnostic nucleic acid amplification test for the qualitative detection of SARS-CoV-2, Influenza A, and Influenza B from nasopharyngeal specimens and has been validated for use at [facility]. Negative results do not preclude COVID-19 infections or Influenza A/B infections, and should not be used as the sole basis for diagnosis, treatment, or other management decisions. If Influenza A/B and RSV PCR results are negative, testing for Parainfluenza virus, Adenovirus and Metapneumovirus is routinely performed for [facility] pediatric oncology and intensive care inpatients, and is available on other patients by placing an add-on request.    URINALYSIS WITH REFLEX CULTURE AND MICROSCOPIC - Abnormal    Color, Urine Yellow      Appearance, Urine Clear      Specific Gravity, Urine 1.021      pH, Urine 5.0      Protein, Urine 30 (1+) (*)     Glucose, Urine NEGATIVE      Blood, Urine NEGATIVE      Ketones, Urine 5 (TRACE) (*)     Bilirubin, Urine NEGATIVE      Urobilinogen, Urine <2.0      Nitrite, Urine NEGATIVE      Leukocyte Esterase, Urine NEGATIVE     MAGNESIUM - Normal    Magnesium 1.80     B-TYPE  NATRIURETIC PEPTIDE - Normal    BNP 33      Narrative:        <100 pg/mL - Heart failure unlikely  100-299 pg/mL - Intermediate probability of acute heart                  failure exacerbation. Correlate with clinical                  context and patient history.    >=300 pg/mL - Heart Failure likely. Correlate with clinical                  context and patient history.    BNP testing is performed using different testing methodology at [facility] than at other [facility]. Direct result comparisons should only be made within the same method.      SERIAL TROPONIN-INITIAL - Normal    Troponin I, High Sensitivity 13      Narrative:     Less than 99th percentile of normal range cutoff-  Female and children under 18 years old <14 ng/L; Male <21 ng/L: Negative  Repeat testing should be performed if clinically indicated.     Female and children under 18 years old 14-50 ng/L; Male 21-50 ng/L:  Consistent with possible cardiac damage and possible increased clinical   risk. Serial measurements may help to assess extent of myocardial damage.     >50 ng/L: Consistent with cardiac damage, increased clinical risk and  myocardial infarction. Serial measurements may help assess extent of   myocardial damage.      NOTE: Children less than 1 year old may have higher baseline troponin   levels and results should be interpreted in conjunction with the overall   clinical context.     NOTE: Troponin I testing is performed using a different   testing methodology at [facility] than at other   [facility]. Direct result comparisons should only   be made within the same method.   SERIAL TROPONIN, 1 HOUR - Normal    Troponin I, High Sensitivity 13      Narrative:     Less than 99th percentile of normal range cutoff-  Female and children under 18 years old <14 ng/L; Male <21 ng/L: Negative  Repeat testing should be performed if clinically indicated.     Female and children under 18 years old 14-50 ng/L;  Male 21-50 ng/L:  Consistent with possible cardiac damage and possible increased clinical   risk. Serial measurements may help to assess extent of myocardial damage.     >50 ng/L: Consistent with cardiac damage, increased clinical risk and  myocardial infarction. Serial measurements may help assess extent of   myocardial damage.      NOTE: Children less than 1 year old may have higher baseline troponin   levels and results should be interpreted in conjunction with the overall   clinical context.     NOTE: Troponin I testing is performed using a different   testing methodology at [facility] than at other   [facility]. Direct result comparisons should only   be made within the same method.   LACTATE - Normal    Lactate 1.3      Narrative:     Venipuncture immediately after or during the administration of Metamizole may lead to falsely low results. Testing should be performed immediately  prior to Metamizole dosing.   URINALYSIS MICROSCOPIC WITH REFLEX CULTURE - Normal    WBC, Urine 1-5      RBC, Urine NONE     TROPONIN SERIES- (INITIAL, 1 HR)    Narrative:     The following orders were created for panel order Troponin I Series, High Sensitivity (0, 1 HR).  Procedure                               Abnormality         Status                     ---------                               -----------         ------                     Troponin I, High Sensiti...[[id number]]  Normal              Final result               Troponin, High Sensitivi...[[id number]]  Normal              Final result                 Please view results for these tests on the individual orders.   URINALYSIS WITH REFLEX CULTURE AND MICROSCOPIC    Narrative:     The following orders were created for panel order Urinalysis with Reflex Culture and Microscopic.  Procedure                               Abnormality         Status                     ---------                               -----------         ------                      Urinalysis with Reflex C...[[id number]]  Abnormal            Final result               Extra Urine Gray Tube[[id number]]                            In process                   Please view results for these tests on the individual orders.   EXTRA URINE GRAY TUBE       All other labs were within normal range or not returned as of this dictation.    Imaging  CT angio chest for pulmonary embolism   Final Result   1. Interval development of an intramuscular hematoma involving the   left rectus sheath measuring approximately 4 cm x 5.6 cm x 10.4 cm.   There is accompanying decompression into the extraperitoneal portion   of the pelvis with small amount of blood in the anterolateral aspect   of the left hemipelvis.   2. No pulmonary embolism or acute aortic pathology.   3. Stable peripheral fibrotic changes in the lungs bilaterally with   accompanying bronchiectasis and honeycombing. Peripheral glass   opacities also noted relatively stable compared to 12/20/2023.   4. Trace pleural effusions.   5. Mild hepatic steatosis.   6. Small hiatal hernia.   7. Colonic diverticulosis without findings of diverticulitis.   8. Calcified uterine fibroids.   Signed by Choco Dawn MD      CT abdomen pelvis w IV contrast   Final Result   1. Interval development of an intramuscular hematoma involving the   left rectus sheath measuring approximately 4 cm x 5.6 cm x 10.4 cm.   There is accompanying decompression into the extraperitoneal portion   of the pelvis with small amount of blood in the anterolateral aspect   of the left hemipelvis.   2. No pulmonary embolism or acute aortic pathology.   3. Stable peripheral fibrotic changes in the lungs bilaterally with   accompanying bronchiectasis and honeycombing. Peripheral glass   opacities also noted relatively stable compared to 12/20/2023.   4. Trace pleural effusions.   5. Mild hepatic steatosis.   6. Small hiatal hernia.   7. Colonic diverticulosis without findings of diverticulitis.    8. Calcified uterine fibroids.   Signed by Choco Dawn MD           Procedures  Procedures     EMERGENCY DEPARTMENT COURSE/MDM:   Medical Decision Making  A 8-year-old female presenting with a chief complaint of abdominal pain localized to the left upper left lower quadrant.  Present for the past few days.  Has past medical history that is significant for previous cardiac arrest secondary to PE, currently on Eliquis.  Multiple recent falls and is at a nursing home currently.  Patient reportedly had a cardiac arrest while receiving surgery for a femoral fracture at an outside hospital.  Patient has not had symptoms like this in the past.  Otherwise denies any other symptoms.  No melena or hematochezia.  Vital signs reviewed and otherwise unremarkable.  Patient afebrile.  Will obtain usual abdominal pain workup.  Differential includes but is not limited to gastritis, GERD, appendicitis, diverticulitis, nephrolithiasis, cystitis. Will proceed with usual abdominal pain work-up including CBC, CMP, lipase, urinalysis, lactate, CT of the abdomen pelvis with IV contrast.  Will obtain usual cardiac workup to evaluate for cardiac cause of the patient's symptoms.  Will additionally obtain a troponin, BNP.  Will obtain a CT of the chest to rule out PE given the patient's significant history for this.        Please see ED course for additional MDM.    ED Course as of 03/10/24 1533   Sun Mar 10, 2024   1257 Lipase elevated to 135. [CL]   1257 Hyponatremic at 124, worse compared to most recent of 130. [CL]   1258 Flu a positive. [CL]   1258 Lactate normal. [CL]   1511 CT scan with and concerning for a rectal sheath hematoma on the left.  These results were discussed with on-call surgery, Dr. Ramos, she does not feel that this warrants any further surgical intervention or surgical treatment at this time.  The patient should be admitted for serial monitoring of H&H and if this does begin to drop patient should be evaluated  by interventional radiology for potential embolization of the culprit artery.   [CL]   1511 Patient does have evidence of an MATEO, is hyponatremic to 124 is flu a positive has elevated lipase of 135 and has a rectal sheath hematoma that requires further monitoring.  Patient will be admitted for monitoring of all of the above-mentioned problems. [CL]   1533 Patient was discussed with Dr. Mehta who agrees to accept the admission. [CL]      ED Course User Index  [CL] Fernando Johnson DO         Diagnoses as of 03/10/24 1533   Abdominal pain, unspecified abdominal location   Abdominal wall hematoma, initial encounter   Hyponatremia   MATEO (acute kidney injury) (CMS/[facility])        Patient and or family in agreement and understanding of treatment plan.  All questions answered.      I reviewed the case with the attending ED physician. The attending ED physician agrees with the plan. Patient and/or patient´s representative was counseled regarding labs, imaging, likely diagnosis, and plan. All questions were answered.    ED Medications administered this visit:    Medications   lactated Ringer's bolus 1,000 mL (0 mL intravenous Stopped 3/10/24 1235)   ondansetron (Zofran) injection 4 mg (4 mg intravenous Given 3/10/24 1135)   morphine injection 2 mg (2 mg intravenous Given 3/10/24 1135)   iohexol (OMNIPaque) 350 mg iodine/mL solution 75 mL (75 mL intravenous Given 3/10/24 1345)   morphine injection 4 mg (4 mg intravenous Given 3/10/24 1333)   lactated Ringer's bolus 1,000 mL (0 mL intravenous Stopped 3/10/24 1511)       (Please note that portions of this note were completed with a voice recognition program.  Efforts were made to edit the dictations but occasionally words are mis-transcribed.)     Fernando Johnson DO  Resident  03/10/24 1533

## 2024-03-10 NOTE — H&P
"History Of Present Illness  Natasha Lundberg is a 88 y.o. female from [facility] assisted living with past medical history of hypertension, hyperlipidemia, pulmonary embolus (on Eliquis), cardiac arrest, falls, admitted with abdominal pain, found to have intramuscular hematoma of the left rectus.  Patient states abdominal pain for few days prior to admission located in the left lower quadrant radiating to back.  Patient denies any dark or bloody stools, urinary urgency frequency hematuria or dysuria.  Denies falling or hitting her side recently.  Denies any dizziness, vision changes, sweats, chills, shortness of breath, chest pain, palpitations, nausea/vomiting/diarrhea.  Patient states left lower quadrant quadrant pain has much lessened since she received IV morphine.    Patient seen in the emergency department, CT of the abdomen revealed \"Interval development of an intramuscular hematoma involving the left rectus sheath measuring approximately 4 cm x 5.6 cm x 10.4 cm. There is accompanying decompression into the extraperitoneal portion of the pelvis with small amount of blood in the anterolateral aspect of the left hemipelvis.\" CT of the chest is negative for pulmonary embolus.  Labs done today include lipase 135, troponin 13, repeat 13, BNP 33, BUN 39, creatinine 1.28.  Patient received IV fluids in the emergency department. Patient did test positive for influenza, precautions maintained.     Past Medical History  Past Medical History:   Diagnosis Date    Bilateral leg edema 10/10/2023    Cardiac arrest (CMS/Formerly [facility])     2/2 PE    COVID-19 09/10/2023    Gastroesophageal reflux disease without esophagitis 04/24/2023    Humeral fracture 09/10/2023    Iron deficiency anemia due to chronic blood loss 04/24/2023    Other pulmonary embolism without acute cor pulmonale (CMS/HCC) 04/24/2023    PE (pulmonary thromboembolism) (CMS/Formerly [facility])     Primary hypertension 04/24/2023    Unspecified fracture of right femur, subsequent " encounter for closed fracture with routine healing 04/24/2023       Surgical History  Past Surgical History:   Procedure Laterality Date    CT ABDOMEN PELVIS ANGIOGRAM W AND/OR WO IV CONTRAST  09/23/2023    CT ABDOMEN PELVIS ANGIOGRAM W AND/OR WO IV CONTRAST 9/23/2023 [facility] CT    FEMUR FRACTURE SURGERY      PARATHYROIDECTOMY      TOTAL KNEE ARTHROPLASTY          Social History  She reports that she has quit smoking. Her smoking use included cigarettes. She has never used smokeless tobacco. She reports current alcohol use. She reports that she does not use drugs.    Family History  Family History   Problem Relation Name Age of Onset    COPD Mother      Lung cancer Father      Esophageal cancer Sister          Allergies  Patient has no known allergies.    Review of Systems   Constitutional:  Negative for chills and fever.   HENT:  Negative for congestion, sore throat and trouble swallowing.    Eyes:  Negative for pain and visual disturbance.   Respiratory:  Negative for chest tightness and shortness of breath.    Cardiovascular:  Negative for chest pain, palpitations and leg swelling.   Gastrointestinal:  Positive for abdominal pain. Negative for blood in stool, diarrhea, nausea and vomiting.        Left lower quadrant abdominal pain radiating to left back   Endocrine: Negative for polyphagia and polyuria.   Genitourinary:  Negative for dysuria, hematuria and urgency.   Musculoskeletal:  Positive for back pain. Negative for neck pain.        Left lower back pain   Skin:  Negative for color change, pallor, rash and wound.   Neurological:  Negative for dizziness, numbness and headaches.   Psychiatric/Behavioral:  Negative for agitation and hallucinations.         Physical Exam  Constitutional:       Appearance: Normal appearance.   HENT:      Head: Normocephalic and atraumatic.      Mouth/Throat:      Mouth: Mucous membranes are dry.   Eyes:      Extraocular Movements: Extraocular movements intact.      Pupils: Pupils are  equal, round, and reactive to light.   Cardiovascular:      Rate and Rhythm: Normal rate and regular rhythm.      Pulses: Normal pulses.      Heart sounds: Normal heart sounds.   Pulmonary:      Effort: Pulmonary effort is normal.      Breath sounds: Normal breath sounds.   Abdominal:      General: Bowel sounds are normal.      Palpations: Abdomen is soft.      Tenderness: There is abdominal tenderness.      Comments: Left lower quadrant tenderness    Musculoskeletal:         General: Normal range of motion.      Cervical back: Normal range of motion and neck supple.   Skin:     General: Skin is warm and dry.      Capillary Refill: Capillary refill takes less than 2 seconds.   Neurological:      General: No focal deficit present.      Mental Status: She is alert and oriented to person, place, and time.   Psychiatric:         Mood and Affect: Mood normal.         Behavior: Behavior normal.          Last Recorded Vitals  Blood pressure 116/62, pulse 86, temperature 37.1 °C (98.8 °F), resp. rate 16, height 1.524 m (5'), weight 67.6 kg (149 lb), SpO2 96 %.    Relevant Results      Current Facility-Administered Medications:     acetaminophen (Tylenol) tablet 650 mg, 650 mg, oral, q4h PRN **OR** acetaminophen (Tylenol) oral liquid 650 mg, 650 mg, oral, q4h PRN **OR** acetaminophen (Tylenol) suppository 650 mg, 650 mg, rectal, q4h PRN, JACQUI Vines    polyethylene glycol (Glycolax, Miralax) packet 17 g, 17 g, oral, Daily, JACQUI Vines    Current Outpatient Medications:     acetaminophen (Tylenol) 500 mg tablet, Take 2 tablets (1,000 mg) by mouth every 8 hours if needed for mild pain (1 - 3)., Disp: , Rfl:     albuterol (ProAir HFA) 90 mcg/actuation inhaler, Inhale 2 puffs every 6 hours if needed for wheezing., Disp: , Rfl:     apixaban (Eliquis) 5 mg tablet, Take 1 tablet (5 mg) by mouth 2 times a day., Disp: , Rfl:     benzonatate (Tessalon) 100 mg capsule, Take 1 capsule (100 mg) by mouth  3 times a day as needed for cough., Disp: , Rfl:     calcium citrate (Calcitrate) 200 mg (950 mg) tablet, Take 1 tablet (200 mg) by mouth 2 times a day., Disp: , Rfl:     cholecalciferol (Vitamin D-3) 50 MCG (2000 UT) tablet, Take 1 tablet (50 mcg) by mouth once daily., Disp: , Rfl:     diphenhydrAMINE-acetaminophen (Tylenol PM)  mg per tablet, Take 2 tablets by mouth once daily at bedtime., Disp: , Rfl:     doxycycline (Vibra-Tabs) 100 mg tablet, Take 1 tablet (100 mg) by mouth 2 times a day. LD 3/11/24, Disp: , Rfl:     furosemide (Lasix) 20 mg tablet, Take 1 tablet (20 mg) by mouth once daily as needed (swelling)., Disp: , Rfl:     oxyCODONE (Roxicodone) 5 mg immediate release tablet, Take 1 tablet (5 mg) by mouth every 6 hours if needed for severe pain (7 - 10)., Disp: , Rfl:     pantoprazole (ProtoNix) 40 mg EC tablet, Take 1 tablet (40 mg) by mouth 2 times a day., Disp: , Rfl:     polyethylene glycol (Glycolax, Miralax) 17 gram packet, Take 17 g by mouth once daily as needed (swelling)., Disp: , Rfl:    Results for orders placed or performed during the hospital encounter of 03/10/24 (from the past 24 hour(s))   CBC and Auto Differential   Result Value Ref Range    WBC 10.4 4.4 - 11.3 x10*3/uL    nRBC 0.0 0.0 - 0.0 /100 WBCs    RBC 3.46 (L) 4.00 - 5.20 x10*6/uL    Hemoglobin 10.1 (L) 12.0 - 16.0 g/dL    Hematocrit 31.4 (L) 36.0 - 46.0 %    MCV 91 80 - 100 fL    MCH 29.2 26.0 - 34.0 pg    MCHC 32.2 32.0 - 36.0 g/dL    RDW 15.7 (H) 11.5 - 14.5 %    Platelets 242 150 - 450 x10*3/uL    Neutrophils % 79.4 40.0 - 80.0 %    Immature Granulocytes %, Automated 0.4 0.0 - 0.9 %    Lymphocytes % 11.9 13.0 - 44.0 %    Monocytes % 8.0 2.0 - 10.0 %    Eosinophils % 0.1 0.0 - 6.0 %    Basophils % 0.2 0.0 - 2.0 %    Neutrophils Absolute 8.28 (H) 1.60 - 5.50 x10*3/uL    Immature Granulocytes Absolute, Automated 0.04 0.00 - 0.50 x10*3/uL    Lymphocytes Absolute 1.24 0.80 - 3.00 x10*3/uL    Monocytes Absolute 0.83 (H) 0.05 -  0.80 x10*3/uL    Eosinophils Absolute 0.01 0.00 - 0.40 x10*3/uL    Basophils Absolute 0.02 0.00 - 0.10 x10*3/uL   Comprehensive Metabolic Panel   Result Value Ref Range    Glucose 101 (H) 74 - 99 mg/dL    Sodium 124 (L) 136 - 145 mmol/L    Potassium 4.6 3.5 - 5.3 mmol/L    Chloride 92 (L) 98 - 107 mmol/L    Bicarbonate 19 (L) 21 - 32 mmol/L    Anion Gap 18 10 - 20 mmol/L    Urea Nitrogen 39 (H) 6 - 23 mg/dL    Creatinine 1.28 (H) 0.50 - 1.05 mg/dL    eGFR 41 (L) >60 mL/min/1.73m*2    Calcium 8.9 8.6 - 10.3 mg/dL    Albumin 4.2 3.4 - 5.0 g/dL    Alkaline Phosphatase 66 33 - 136 U/L    Total Protein 7.5 6.4 - 8.2 g/dL    AST 25 9 - 39 U/L    Bilirubin, Total 0.5 0.0 - 1.2 mg/dL    ALT 11 7 - 45 U/L   Magnesium   Result Value Ref Range    Magnesium 1.80 1.60 - 2.40 mg/dL   Lipase   Result Value Ref Range    Lipase 135 (H) 9 - 82 U/L   B-type natriuretic peptide   Result Value Ref Range    BNP 33 0 - 99 pg/mL   Troponin I, High Sensitivity, Initial   Result Value Ref Range    Troponin I, High Sensitivity 13 0 - 13 ng/L   Light Blue Top   Result Value Ref Range    Extra Tube Hold for add-ons.    Lavender Top   Result Value Ref Range    Extra Tube Hold for add-ons.    Sars-CoV-2 and Influenza A/B PCR   Result Value Ref Range    Flu A Result Detected (A) Not Detected    Flu B Result Not Detected Not Detected    Coronavirus 2019, PCR Not Detected Not Detected   Lactate   Result Value Ref Range    Lactate 1.3 0.4 - 2.0 mmol/L   Troponin, High Sensitivity, 1 Hour   Result Value Ref Range    Troponin I, High Sensitivity 13 0 - 13 ng/L   Urinalysis with Reflex Culture and Microscopic   Result Value Ref Range    Color, Urine Yellow Straw, Yellow    Appearance, Urine Clear Clear    Specific Gravity, Urine 1.021 1.005 - 1.035    pH, Urine 5.0 5.0, 5.5, 6.0, 6.5, 7.0, 7.5, 8.0    Protein, Urine 30 (1+) (N) NEGATIVE mg/dL    Glucose, Urine NEGATIVE NEGATIVE mg/dL    Blood, Urine NEGATIVE NEGATIVE    Ketones, Urine 5 (TRACE) (A)  NEGATIVE mg/dL    Bilirubin, Urine NEGATIVE NEGATIVE    Urobilinogen, Urine <2.0 <2.0 mg/dL    Nitrite, Urine NEGATIVE NEGATIVE    Leukocyte Esterase, Urine NEGATIVE NEGATIVE   Urinalysis Microscopic   Result Value Ref Range    WBC, Urine 1-5 1-5, NONE /HPF    RBC, Urine NONE NONE, 1-2, 3-5 /HPF    CT angio chest for pulmonary embolism    Result Date: 3/10/2024  STUDY: CT Angiogram of the Chest, CT Abdomen and Pelvis with IV Contrast; 03/10/2024, 1:56 PM. INDICATION: Left upper/lower quadrant abdominal pain. COMPARISON: CT AP: 12/21/23, 09/23/23; CTA chest: 12/20/23. ACCESSION NUMBER(S): [id number], [id number] ORDERING CLINICIAN: FREDY MARIANO TECHNIQUE:  CTA of the chest was performed following rapid injection of intravenous contrast.  Images are reviewed and processed at a workstation according to the CT angiogram protocol with 3-D and/or MIP post processing imaging generated.  CT of the abdomen and pelvis was performed with intravenous contrast.  Omnipaque 350 75 mL was administered intravenously; positive oral contrast was given. Automated mA/kV exposure control was utilized and patient examination was performed in strict accordance with principles of ALARA. FINDINGS:  CTA CHEST: Pulmonary arteries are adequately opacified without acute or chronic filling defects.  The thoracic aorta is normal in course and caliber without dissection or aneurysm. Heart is mildly enlarged.  Thoracic lymph nodes are not enlarged. Trace pleural effusions.  The airways are patent. Stable peripheral fibrotic changes in the lungs bilaterally with accompanying bronchiectasis and honeycombing.  Peripheral glass opacities also noted relatively stable compared to 12/20/2023.. ABDOMEN:  LIVER: No hepatomegaly.  Smooth surface contour.  Mild fatty infiltration of the liver  BILE DUCTS: No intrahepatic or extrahepatic biliary ductal dilatation.  GALLBLADDER: The gallbladder is present without gallstones.  STOMACH: Small hiatal hernia.   PANCREAS: No masses or ductal dilatation.  SPLEEN: No splenomegaly or focal splenic lesion.  ADRENAL GLANDS: No thickening or nodules.  KIDNEYS AND URETERS: Mild to moderate bilateral renal cortical atrophy with focal areas of scarring throughout both kidneys.  Small renal cysts measuring up to 1.5 cm on the right.  No renal or ureteral calculi.  PELVIS:  BLADDER: No abnormalities identified.  REPRODUCTIVE ORGANS: Calcified uterine fibroids measuring up to 1.9 cm.  BOWEL: Colonic diverticulosis without findings of diverticulitis.  Appendix is normal.  VESSELS: No abnormalities identified.  Abdominal aorta is normal in caliber.  PERITONEUM/RETROPERITONEUM/LYMPH NODES: No free fluid.  No pneumoperitoneum. No lymphadenopathy.  ABDOMINAL WALL: Interval development of an intramuscular hematoma involving the left rectus sheath measuring approximately 4 cm x 5.6 cm x 10.4 cm.  There is accompanying decompression into the extraperitoneal portion of the pelvis with small amount of blood in the anterolateral aspect of the left hemipelvis SOFT TISSUES: No abnormalities identified.  BONES: No acute fracture or aggressive osseous lesion.  L5 pars defects with grade 1 anterolisthesis of L5 on S1.  Severe multilevel degenerative change of the visualized throughout the lumbar spine.  Underlying scoliosis.    1. Interval development of an intramuscular hematoma involving the left rectus sheath measuring approximately 4 cm x 5.6 cm x 10.4 cm. There is accompanying decompression into the extraperitoneal portion of the pelvis with small amount of blood in the anterolateral aspect of the left hemipelvis. 2. No pulmonary embolism or acute aortic pathology. 3. Stable peripheral fibrotic changes in the lungs bilaterally with accompanying bronchiectasis and honeycombing. Peripheral glass opacities also noted relatively stable compared to 12/20/2023. 4. Trace pleural effusions. 5. Mild hepatic steatosis. 6. Small hiatal hernia. 7. Colonic  diverticulosis without findings of diverticulitis. 8. Calcified uterine fibroids. Signed by Choco Dawn MD    CT abdomen pelvis w IV contrast    Result Date: 3/10/2024  STUDY: CT Angiogram of the Chest, CT Abdomen and Pelvis with IV Contrast; 03/10/2024, 1:56 PM. INDICATION: Left upper/lower quadrant abdominal pain. COMPARISON: CT AP: 12/21/23, 09/23/23; CTA chest: 12/20/23. ACCESSION NUMBER(S): [id number], [id number] ORDERING CLINICIAN: FREDY MARIANO TECHNIQUE:  CTA of the chest was performed following rapid injection of intravenous contrast.  Images are reviewed and processed at a workstation according to the CT angiogram protocol with 3-D and/or MIP post processing imaging generated.  CT of the abdomen and pelvis was performed with intravenous contrast.  Omnipaque 350 75 mL was administered intravenously; positive oral contrast was given. Automated mA/kV exposure control was utilized and patient examination was performed in strict accordance with principles of ALARA. FINDINGS:  CTA CHEST: Pulmonary arteries are adequately opacified without acute or chronic filling defects.  The thoracic aorta is normal in course and caliber without dissection or aneurysm. Heart is mildly enlarged.  Thoracic lymph nodes are not enlarged. Trace pleural effusions.  The airways are patent. Stable peripheral fibrotic changes in the lungs bilaterally with accompanying bronchiectasis and honeycombing.  Peripheral glass opacities also noted relatively stable compared to 12/20/2023.. ABDOMEN:  LIVER: No hepatomegaly.  Smooth surface contour.  Mild fatty infiltration of the liver  BILE DUCTS: No intrahepatic or extrahepatic biliary ductal dilatation.  GALLBLADDER: The gallbladder is present without gallstones.  STOMACH: Small hiatal hernia.  PANCREAS: No masses or ductal dilatation.  SPLEEN: No splenomegaly or focal splenic lesion.  ADRENAL GLANDS: No thickening or nodules.  KIDNEYS AND URETERS: Mild to moderate bilateral renal  cortical atrophy with focal areas of scarring throughout both kidneys.  Small renal cysts measuring up to 1.5 cm on the right.  No renal or ureteral calculi.  PELVIS:  BLADDER: No abnormalities identified.  REPRODUCTIVE ORGANS: Calcified uterine fibroids measuring up to 1.9 cm.  BOWEL: Colonic diverticulosis without findings of diverticulitis.  Appendix is normal.  VESSELS: No abnormalities identified.  Abdominal aorta is normal in caliber.  PERITONEUM/RETROPERITONEUM/LYMPH NODES: No free fluid.  No pneumoperitoneum. No lymphadenopathy.  ABDOMINAL WALL: Interval development of an intramuscular hematoma involving the left rectus sheath measuring approximately 4 cm x 5.6 cm x 10.4 cm.  There is accompanying decompression into the extraperitoneal portion of the pelvis with small amount of blood in the anterolateral aspect of the left hemipelvis SOFT TISSUES: No abnormalities identified.  BONES: No acute fracture or aggressive osseous lesion.  L5 pars defects with grade 1 anterolisthesis of L5 on S1.  Severe multilevel degenerative change of the visualized throughout the lumbar spine.  Underlying scoliosis.    1. Interval development of an intramuscular hematoma involving the left rectus sheath measuring approximately 4 cm x 5.6 cm x 10.4 cm. There is accompanying decompression into the extraperitoneal portion of the pelvis with small amount of blood in the anterolateral aspect of the left hemipelvis. 2. No pulmonary embolism or acute aortic pathology. 3. Stable peripheral fibrotic changes in the lungs bilaterally with accompanying bronchiectasis and honeycombing. Peripheral glass opacities also noted relatively stable compared to 12/20/2023. 4. Trace pleural effusions. 5. Mild hepatic steatosis. 6. Small hiatal hernia. 7. Colonic diverticulosis without findings of diverticulitis. 8. Calcified uterine fibroids. Signed by Choco Dawn MD         Assessment/Plan   Principal Problem:    Abdominal pain, unspecified  abdominal location  Active Problems:    Difficulty in walking    Primary hypertension    Abdominal pain  Surgery is consulted  Trend H&H every 6 x 24 hours, if hemoglobin trends downward, consult IR  As needed pain medication    MATEO  Denies hx of renal problems  Received IVFs in ED  Monitor labs    Hypertension/HLD  Continue home medication regimen as appropriate    Influenza A  Maintain precautions    Difficulty in ambulating  Uses walker  History of falls, denies any recently  PT and OT eval and treatment    DVT prophylaxis  SCDs as patient with hematoma will hold Eliquis for now    CODE STATUS  Patient is full code     I spent 50 minutes in the professional and overall care of this patient.      Mallory Treviño, APRN-CNP

## 2024-03-10 NOTE — CARE PLAN
Problem: Skin  Goal: Participates in plan/prevention/treatment measures  3/10/2024 1745 by Jinny Webber RN  Flowsheets (Taken 3/10/2024 1745)  Participates in plan/prevention/treatment measures:   Discuss with provider PT/OT consult   Elevate heels  3/10/2024 1744 by Jinny Webber RN  Outcome: Progressing  Goal: Prevent/manage excess moisture  3/10/2024 1745 by Jinny Webber RN  Flowsheets (Taken 3/10/2024 1745)  Prevent/manage excess moisture:   Moisturize dry skin   Monitor for/manage infection if present  3/10/2024 1744 by Jinny Webber RN  Outcome: Progressing  Goal: Prevent/minimize sheer/friction injuries  3/10/2024 1745 by Jinny Webber RN  Flowsheets (Taken 3/10/2024 1745)  Prevent/minimize sheer/friction injuries:   Turn/reposition every 2 hours/use positioning/transfer devices   Increase activity/out of bed for meals  3/10/2024 1744 by Jinny Webber RN  Outcome: Progressing  Goal: Promote/optimize nutrition  3/10/2024 1745 by Jinny Webber RN  Flowsheets (Taken 3/10/2024 1745)  Promote/optimize nutrition:   Assist with feeding   Offer water/supplements/favorite foods  3/10/2024 1744 by Jinny Webber RN  Outcome: Progressing  Goal: Promote skin healing  3/10/2024 1745 by Jinny Webber RN  Flowsheets (Taken 3/10/2024 1745)  Promote skin healing: Turn/reposition every 2 hours/use positioning/transfer devices  3/10/2024 1744 by Jinny Webber RN  Outcome: Progressing   The patient's goals for the shift include      The clinical goals for the shift include free from worsening pain

## 2024-03-11 ENCOUNTER — DOCUMENTATION (OUTPATIENT)
Dept: PRIMARY CARE | Facility: CLINIC | Age: 89
End: 2024-03-11
Payer: MEDICARE

## 2024-03-11 LAB
ANION GAP SERPL CALC-SCNC: 12 MMOL/L (ref 10–20)
BUN SERPL-MCNC: 29 MG/DL (ref 6–23)
CALCIUM SERPL-MCNC: 8.3 MG/DL (ref 8.6–10.3)
CHLORIDE SERPL-SCNC: 99 MMOL/L (ref 98–107)
CO2 SERPL-SCNC: 21 MMOL/L (ref 21–32)
CREAT SERPL-MCNC: 1.01 MG/DL (ref 0.5–1.05)
EGFRCR SERPLBLD CKD-EPI 2021: 54 ML/MIN/1.73M*2
ERYTHROCYTE [DISTWIDTH] IN BLOOD BY AUTOMATED COUNT: 15.7 % (ref 11.5–14.5)
GLUCOSE SERPL-MCNC: 90 MG/DL (ref 74–99)
HCT VFR BLD AUTO: 26.5 % (ref 36–46)
HCT VFR BLD AUTO: 28.3 % (ref 36–46)
HCT VFR BLD AUTO: 28.5 % (ref 36–46)
HCT VFR BLD AUTO: 28.7 % (ref 36–46)
HGB BLD-MCNC: 8.5 G/DL (ref 12–16)
HGB BLD-MCNC: 8.9 G/DL (ref 12–16)
HGB BLD-MCNC: 8.9 G/DL (ref 12–16)
HGB BLD-MCNC: 9 G/DL (ref 12–16)
HOLD SPECIMEN: NORMAL
MCH RBC QN AUTO: 28.5 PG (ref 26–34)
MCH RBC QN AUTO: 28.8 PG (ref 26–34)
MCH RBC QN AUTO: 29.4 PG (ref 26–34)
MCHC RBC AUTO-ENTMCNC: 31.4 G/DL (ref 32–36)
MCHC RBC AUTO-ENTMCNC: 31.4 G/DL (ref 32–36)
MCHC RBC AUTO-ENTMCNC: 32.1 G/DL (ref 32–36)
MCV RBC AUTO: 91 FL (ref 80–100)
MCV RBC AUTO: 92 FL (ref 80–100)
MCV RBC AUTO: 92 FL (ref 80–100)
NRBC BLD-RTO: 0 /100 WBCS (ref 0–0)
PLATELET # BLD AUTO: 203 X10*3/UL (ref 150–450)
PLATELET # BLD AUTO: 213 X10*3/UL (ref 150–450)
PLATELET # BLD AUTO: 227 X10*3/UL (ref 150–450)
POTASSIUM SERPL-SCNC: 4.2 MMOL/L (ref 3.5–5.3)
RBC # BLD AUTO: 2.89 X10*6/UL (ref 4–5.2)
RBC # BLD AUTO: 3.12 X10*6/UL (ref 4–5.2)
RBC # BLD AUTO: 3.12 X10*6/UL (ref 4–5.2)
SODIUM SERPL-SCNC: 128 MMOL/L (ref 136–145)
WBC # BLD AUTO: 8.8 X10*3/UL (ref 4.4–11.3)
WBC # BLD AUTO: 8.9 X10*3/UL (ref 4.4–11.3)
WBC # BLD AUTO: 9.3 X10*3/UL (ref 4.4–11.3)

## 2024-03-11 PROCEDURE — 1200000002 HC GENERAL ROOM WITH TELEMETRY DAILY

## 2024-03-11 PROCEDURE — 36415 COLL VENOUS BLD VENIPUNCTURE: CPT | Performed by: NURSE PRACTITIONER

## 2024-03-11 PROCEDURE — 85014 HEMATOCRIT: CPT | Performed by: NURSE PRACTITIONER

## 2024-03-11 PROCEDURE — 85027 COMPLETE CBC AUTOMATED: CPT | Performed by: NURSE PRACTITIONER

## 2024-03-11 PROCEDURE — 2500000004 HC RX 250 GENERAL PHARMACY W/ HCPCS (ALT 636 FOR OP/ED): Performed by: NURSE PRACTITIONER

## 2024-03-11 PROCEDURE — 99222 1ST HOSP IP/OBS MODERATE 55: CPT | Performed by: SURGERY

## 2024-03-11 PROCEDURE — 80048 BASIC METABOLIC PNL TOTAL CA: CPT | Performed by: NURSE PRACTITIONER

## 2024-03-11 PROCEDURE — 2500000001 HC RX 250 WO HCPCS SELF ADMINISTERED DRUGS (ALT 637 FOR MEDICARE OP): Performed by: NURSE PRACTITIONER

## 2024-03-11 PROCEDURE — 99221 1ST HOSP IP/OBS SF/LOW 40: CPT | Performed by: NURSE PRACTITIONER

## 2024-03-11 RX ORDER — ALBUTEROL SULFATE 90 UG/1
2 AEROSOL, METERED RESPIRATORY (INHALATION) EVERY 6 HOURS PRN
Status: DISCONTINUED | OUTPATIENT
Start: 2024-03-11 | End: 2024-03-11

## 2024-03-11 RX ORDER — PANTOPRAZOLE SODIUM 40 MG/1
40 TABLET, DELAYED RELEASE ORAL 2 TIMES DAILY
Status: DISCONTINUED | OUTPATIENT
Start: 2024-03-11 | End: 2024-03-13 | Stop reason: HOSPADM

## 2024-03-11 RX ORDER — OXYCODONE HYDROCHLORIDE 5 MG/1
5 TABLET ORAL EVERY 6 HOURS PRN
Status: DISCONTINUED | OUTPATIENT
Start: 2024-03-11 | End: 2024-03-13 | Stop reason: HOSPADM

## 2024-03-11 RX ORDER — BENZONATATE 100 MG/1
100 CAPSULE ORAL 3 TIMES DAILY PRN
Status: DISCONTINUED | OUTPATIENT
Start: 2024-03-11 | End: 2024-03-13 | Stop reason: HOSPADM

## 2024-03-11 RX ORDER — ALBUTEROL SULFATE 0.83 MG/ML
2.5 SOLUTION RESPIRATORY (INHALATION) EVERY 6 HOURS PRN
Status: DISCONTINUED | OUTPATIENT
Start: 2024-03-11 | End: 2024-03-13 | Stop reason: HOSPADM

## 2024-03-11 RX ORDER — DOXYCYCLINE 100 MG/1
100 CAPSULE ORAL 2 TIMES DAILY
Status: COMPLETED | OUTPATIENT
Start: 2024-03-11 | End: 2024-03-11

## 2024-03-11 RX ADMIN — MORPHINE SULFATE 2 MG: 2 INJECTION, SOLUTION INTRAMUSCULAR; INTRAVENOUS at 05:20

## 2024-03-11 RX ADMIN — BENZONATATE 100 MG: 100 CAPSULE ORAL at 10:59

## 2024-03-11 RX ADMIN — OXYCODONE HYDROCHLORIDE 5 MG: 5 TABLET ORAL at 20:13

## 2024-03-11 RX ADMIN — DOCUSATE SODIUM 100 MG: 100 CAPSULE, LIQUID FILLED ORAL at 20:13

## 2024-03-11 RX ADMIN — PANTOPRAZOLE SODIUM 40 MG: 40 TABLET, DELAYED RELEASE ORAL at 08:22

## 2024-03-11 RX ADMIN — DOXYCYCLINE HYCLATE 100 MG: 100 CAPSULE ORAL at 08:22

## 2024-03-11 RX ADMIN — BENZONATATE 100 MG: 100 CAPSULE ORAL at 20:13

## 2024-03-11 RX ADMIN — ACETAMINOPHEN 650 MG: 325 TABLET ORAL at 10:57

## 2024-03-11 RX ADMIN — PANTOPRAZOLE SODIUM 40 MG: 40 TABLET, DELAYED RELEASE ORAL at 20:13

## 2024-03-11 RX ADMIN — DOXYCYCLINE HYCLATE 100 MG: 100 CAPSULE ORAL at 20:13

## 2024-03-11 ASSESSMENT — COGNITIVE AND FUNCTIONAL STATUS - GENERAL
STANDING UP FROM CHAIR USING ARMS: A LOT
WALKING IN HOSPITAL ROOM: TOTAL
EATING MEALS: A LITTLE
DRESSING REGULAR UPPER BODY CLOTHING: A LOT
STANDING UP FROM CHAIR USING ARMS: A LITTLE
CLIMB 3 TO 5 STEPS WITH RAILING: A LOT
PERSONAL GROOMING: A LITTLE
MOVING TO AND FROM BED TO CHAIR: A LOT
PERSONAL GROOMING: A LOT
CLIMB 3 TO 5 STEPS WITH RAILING: TOTAL
DRESSING REGULAR LOWER BODY CLOTHING: A LITTLE
TURNING FROM BACK TO SIDE WHILE IN FLAT BAD: A LOT
MOVING FROM LYING ON BACK TO SITTING ON SIDE OF FLAT BED WITH BEDRAILS: A LITTLE
MOVING TO AND FROM BED TO CHAIR: A LITTLE
HELP NEEDED FOR BATHING: A LITTLE
EATING MEALS: A LITTLE
TOILETING: A LOT
MOVING FROM LYING ON BACK TO SITTING ON SIDE OF FLAT BED WITH BEDRAILS: A LITTLE
DRESSING REGULAR UPPER BODY CLOTHING: A LITTLE
MOBILITY SCORE: 16
WALKING IN HOSPITAL ROOM: A LOT
DRESSING REGULAR LOWER BODY CLOTHING: A LITTLE
DAILY ACTIVITIY SCORE: 17
DAILY ACTIVITIY SCORE: 15
TURNING FROM BACK TO SIDE WHILE IN FLAT BAD: A LITTLE
HELP NEEDED FOR BATHING: A LITTLE
TOILETING: A LOT
MOBILITY SCORE: 11

## 2024-03-11 ASSESSMENT — PAIN SCALES - GENERAL
PAINLEVEL_OUTOF10: 3
PAINLEVEL_OUTOF10: 0 - NO PAIN
PAINLEVEL_OUTOF10: 1
PAINLEVEL_OUTOF10: 6
PAINLEVEL_OUTOF10: 6

## 2024-03-11 ASSESSMENT — ENCOUNTER SYMPTOMS
CONSTITUTIONAL NEGATIVE: 1
ABDOMINAL PAIN: 1
NEUROLOGICAL NEGATIVE: 1
RESPIRATORY NEGATIVE: 1

## 2024-03-11 ASSESSMENT — PAIN - FUNCTIONAL ASSESSMENT
PAIN_FUNCTIONAL_ASSESSMENT: 0-10
PAIN_FUNCTIONAL_ASSESSMENT: 0-10

## 2024-03-11 ASSESSMENT — PAIN DESCRIPTION - LOCATION
LOCATION: ABDOMEN
LOCATION: HEAD
LOCATION: BACK

## 2024-03-11 ASSESSMENT — PAIN DESCRIPTION - ORIENTATION
ORIENTATION: LEFT;LOWER
ORIENTATION: LEFT

## 2024-03-11 NOTE — PROGRESS NOTES
Nurse called 3/10/24 patient having severe LLQ pain for 2 days she was refusing ER at first now wants to go to ER I agree send to ER now for possible acute abdomen

## 2024-03-11 NOTE — CONSULTS
Interventional Radiology Consultation  March 11, 2024      Subjective:    History of Present Illness  Natasha Lundberg is a 88 y.o. female with past medical history of hypertension, PE (on Eliquis), cardiac arrest, falls, admitted with abdominal pain, presenting to [facility] from assisted living with abdominal pain found to have left rectus sheath hematoma.  Pain started a few days ago in the setting of having severe coughing and influenza. Hemoglobin dropped initially 10.1 to 9.0 last night.  Hgb now 8.5 today.  No tachycardia or hemodynamic instability.          Results for orders placed or performed during the hospital encounter of 03/10/24 (from the past 24 hour(s))   CBC   Result Value Ref Range    WBC 7.7 4.4 - 11.3 x10*3/uL    nRBC 0.0 0.0 - 0.0 /100 WBCs    RBC 3.30 (L) 4.00 - 5.20 x10*6/uL    Hemoglobin 9.6 (L) 12.0 - 16.0 g/dL    Hematocrit 30.5 (L) 36.0 - 46.0 %    MCV 92 80 - 100 fL    MCH 29.1 26.0 - 34.0 pg    MCHC 31.5 (L) 32.0 - 36.0 g/dL    RDW 15.6 (H) 11.5 - 14.5 %    Platelets 190 150 - 450 x10*3/uL   CBC   Result Value Ref Range    WBC 8.9 4.4 - 11.3 x10*3/uL    nRBC 0.0 0.0 - 0.0 /100 WBCs    RBC 3.12 (L) 4.00 - 5.20 x10*6/uL    Hemoglobin 9.0 (L) 12.0 - 16.0 g/dL    Hematocrit 28.7 (L) 36.0 - 46.0 %    MCV 92 80 - 100 fL    MCH 28.8 26.0 - 34.0 pg    MCHC 31.4 (L) 32.0 - 36.0 g/dL    RDW 15.7 (H) 11.5 - 14.5 %    Platelets 227 150 - 450 x10*3/uL   CBC   Result Value Ref Range    WBC 8.8 4.4 - 11.3 x10*3/uL    nRBC 0.0 0.0 - 0.0 /100 WBCs    RBC 3.12 (L) 4.00 - 5.20 x10*6/uL    Hemoglobin 8.9 (L) 12.0 - 16.0 g/dL    Hematocrit 28.3 (L) 36.0 - 46.0 %    MCV 91 80 - 100 fL    MCH 28.5 26.0 - 34.0 pg    MCHC 31.4 (L) 32.0 - 36.0 g/dL    RDW 15.7 (H) 11.5 - 14.5 %    Platelets 213 150 - 450 x10*3/uL   Basic Metabolic Panel   Result Value Ref Range    Glucose 90 74 - 99 mg/dL    Sodium 128 (L) 136 - 145 mmol/L    Potassium 4.2 3.5 - 5.3 mmol/L    Chloride 99 98 - 107 mmol/L    Bicarbonate 21 21 - 32 mmol/L     Anion Gap 12 10 - 20 mmol/L    Urea Nitrogen 29 (H) 6 - 23 mg/dL    Creatinine 1.01 0.50 - 1.05 mg/dL    eGFR 54 (L) >60 mL/min/1.73m*2    Calcium 8.3 (L) 8.6 - 10.3 mg/dL   CBC   Result Value Ref Range    WBC 9.3 4.4 - 11.3 x10*3/uL    nRBC 0.0 0.0 - 0.0 /100 WBCs    RBC 2.89 (L) 4.00 - 5.20 x10*6/uL    Hemoglobin 8.5 (L) 12.0 - 16.0 g/dL    Hematocrit 26.5 (L) 36.0 - 46.0 %    MCV 92 80 - 100 fL    MCH 29.4 26.0 - 34.0 pg    MCHC 32.1 32.0 - 36.0 g/dL    RDW 15.7 (H) 11.5 - 14.5 %    Platelets 203 150 - 450 x10*3/uL       Review of Systems Review of Systems   Constitutional: Negative.    HENT: Negative.     Respiratory: Negative.     Gastrointestinal:  Positive for abdominal pain.   Neurological: Negative.    All other systems reviewed and are negative.        Objective:    Vitals:    03/10/24 2000 03/11/24 0000 03/11/24 0741 03/11/24 1134   BP: 146/66 120/60 102/54 95/55   BP Location: Left arm Left arm Right arm Right arm   Patient Position: Lying Lying Lying Lying   Pulse: 99 95 83 79   Resp: 22 22 16 16   Temp: 36.2 °C (97.2 °F) 37 °C (98.6 °F) 36.7 °C (98.1 °F) 36.5 °C (97.7 °F)   TempSrc: Temporal Temporal Temporal Temporal   SpO2: 94% 93% 92% 93%   Weight:       Height:            No Known Allergies     Past Medical History:   Diagnosis Date    Bilateral leg edema 10/10/2023    Cardiac arrest (CMS/HCC)     2/2 PE    COVID-19 09/10/2023    Gastroesophageal reflux disease without esophagitis 04/24/2023    Humeral fracture 09/10/2023    Iron deficiency anemia due to chronic blood loss 04/24/2023    Other pulmonary embolism without acute cor pulmonale (CMS/HCC) 04/24/2023    PE (pulmonary thromboembolism) (CMS/HCC)     Primary hypertension 04/24/2023    Unspecified fracture of right femur, subsequent encounter for closed fracture with routine healing 04/24/2023        Past Surgical History:   Procedure Laterality Date    CT ABDOMEN PELVIS ANGIOGRAM W AND/OR WO IV CONTRAST  09/23/2023    CT ABDOMEN PELVIS  ANGIOGRAM W AND/OR WO IV CONTRAST 9/23/2023 [facility]    FEMUR FRACTURE SURGERY      PARATHYROIDECTOMY      TOTAL KNEE ARTHROPLASTY          Social History     Tobacco Use   Smoking Status Former    Types: Cigarettes   Smokeless Tobacco Never         Social History     Substance and Sexual Activity   Alcohol Use Yes    Comment: social         Social History     Substance and Sexual Activity   Drug Use Never         Current Facility-Administered Medications   Medication Dose Route Frequency Provider Last Rate Last Admin    acetaminophen (Tylenol) tablet 650 mg  650 mg oral q4h PRN JACQUI Vines   650 mg at 03/11/24 1057    Or    acetaminophen (Tylenol) oral liquid 650 mg  650 mg oral q4h PRN JACQUI Vines        Or    acetaminophen (Tylenol) suppository 650 mg  650 mg rectal q4h PRN JACQUI Vines        albuterol 2.5 mg /3 mL (0.083 %) nebulizer solution 2.5 mg  2.5 mg nebulization q6h PRN JACQUI Morin        [Held by provider] apixaban (Eliquis) tablet 5 mg  5 mg oral BID JACQUI Morin        benzonatate (Tessalon) capsule 100 mg  100 mg oral TID PRN JACQUI Morin   100 mg at 03/11/24 1059    docusate sodium (Colace) capsule 100 mg  100 mg oral BID JACQUI Vines        doxycycline (Vibramycin) capsule 100 mg  100 mg oral BID JACQUI Morin   100 mg at 03/11/24 0822    morphine injection 2 mg  2 mg intravenous q4h PRN JACQUI Vines   2 mg at 03/11/24 0520    oxyCODONE (Roxicodone) immediate release tablet 5 mg  5 mg oral q6h PRN JACQUI Morin        pantoprazole (ProtoNix) EC tablet 40 mg  40 mg oral BID JACQUI Morin   40 mg at 03/11/24 0822    polyethylene glycol (Glycolax, Miralax) packet 17 g  17 g oral Daily JACQUI Vines             Physical Exam       Physical Exam  HENT:      Head: Normocephalic.      Mouth/Throat:      Mouth: Mucous  membranes are dry.   Eyes:      Pupils: Pupils are equal, round, and reactive to light.   Cardiovascular:      Rate and Rhythm: Normal rate.      Pulses: Normal pulses.      Heart sounds: Normal heart sounds.   Pulmonary:      Effort: Pulmonary effort is normal.   Abdominal:      Palpations: Abdomen is soft.      Tenderness: There is abdominal tenderness.   Musculoskeletal:         General: No swelling.   Skin:     General: Skin is warm and dry.      Capillary Refill: Capillary refill takes less than 2 seconds.   Neurological:      General: No focal deficit present.      Mental Status: She is alert.   Psychiatric:         Mood and Affect: Mood normal.           Assessment/Plan:  Natasha Lundberg is an 88-year-old female who presents to [facility] with rectus sheath hematoma in the setting of anticoagulation likely d/t severe coughing from influenza.  Surgery has been following this patient and recommends holding off on intervention at this time as bleeding has appeared to stop. The most recent hemoglobin is stable at 8.5 and patient has remained hemodynamically stable.  IR is consulted for possible embolization which we are willing to offer and can bring down at any point should patient have concern for ongoing bleeding.  We will continue to follow along and monitor for any signs of recurrent bleeding.  Please call IR with any hemoglobin drop or clinical change and patient can be brought down for embolization.             Thank you for allowing me to participate in the care of Natasha Lundberg.       Discussed and reviewed any pertinent imaging with IR attending Dr. Alatorre  Further questions can be directed to:    Rene Hernandez, CNP  Interventional Radiology        This NP spent 35 minutes with above patient with 50% of the time was spent in counseling and/or coordination of care including reviewing medical history/labs/progress notes, and discussing plan with patient or patient representative.

## 2024-03-11 NOTE — CONSULTS
History and Physical        Referring Provider:   ED        Chief Complaint:   rectus sheath hematoma        History of Present Illness:  This is an 88-year-old female residing at [facility] living, who for the past week has had the flu with severe coughing.  Patient developed a rectus sheath hematoma.  This morning on evaluation she is lying comfortably in bed, she denies any pain, no nausea/vomiting, no shortness of breath, no dizziness.      Past Medical History:    Hypertension, hyperlipidemia, pulmonary embolus on Eliquis, cardiac arrest        Past Surgical History:   ORIF of femur, parathyroidectomy, total knee arthroplasty        Medications:   as per medical record        Allergies:  No Known Drug Allergies        Family History:  The information was reviewed and no pertinent findings are relevant to the presenting problem.        Social History:  Lives in assisted living, denies smoking, EtOH, IDU        Review of Systems  A complete 10 point review of systems was performed and is negative except as noted in the history of present illness.     Physical Exam:   /54 (BP Location: Right arm, Patient Position: Lying)   Pulse 83   Temp 36.7 °C (98.1 °F) (Temporal)   Resp 16   Ht 1.524 m (5')   Wt 67.6 kg (149 lb)   SpO2 92%   BMI 29.10 kg/m²     General: No acute distress. Sitting up in bed.   Neuro: Alert and oriented ×3. Follows commands.  Head: Atraumatic  Eyes: Pupils equal reactive to light. Extraocular motions intact.  Ears: Hears normal speaking voice.  Mouth, Nose, Throat: Mucous membranes moist.  Normal dentition.  Neck: Supple. No appreciable masses.  Breast: Not examined.  Chest: No crepitus.  No appreciable scars.  Heart: Palpable regular rate and rhythm.  Lung: Bilateral chest rise  Vascular: Palpable radial pulses bilaterally.  Abdomen: Soft. Nondistended. Nontender.  Palpable mass on left rectus.  Rectal: Not examined.  Genitourinary: Not examined.  Musculoskeletal: Moves  all extremities.  Normal range of motion.  Lymphatic: No palpable lymph nodes.  Skin: No rashes or lesions.  Psychological: Normal affect        Labs:    MATEO, chronic anemia with hemoglobin drop to 9 from 10.3.      Imaging: I have personally reviewed the images and the radiologist's report.    There is a fairly large rectus sheath hematoma on the left side           Assessment/Plan:  This is an 88-year-old female on Eliquis for a recent PE who has been sick with the flu and coughing all week.  She developed a rectus sheath hematoma.  Based on her H&H bleeding seems to have subsided.  This is not a condition we would treat with an operation.  The bleeding should have tamponaded and I recommend an abdominal binder for comfort and to help with tamponade.  If the bleeding continues recommend IR evaluation for angioembolization.           Michelle Ramos MD MPH  General Surgery  Office: [phone]  Fax: [phone]

## 2024-03-11 NOTE — CARE PLAN
The patient's goals for the shift include    Problem: Pain  Goal: My pain/discomfort is manageable  Outcome: Progressing     Problem: Safety  Goal: Patient will be injury free during hospitalization  Outcome: Progressing  Goal: I will remain free of falls  Outcome: Progressing     Problem: Daily Care  Goal: Daily care needs are met  Outcome: Progressing     Problem: Psychosocial Needs  Goal: Demonstrates ability to cope with hospitalization/illness  Outcome: Progressing  Goal: Collaborate with me, my family, and caregiver to identify my specific goals  Outcome: Progressing     Problem: Discharge Barriers  Goal: My discharge needs are met  Outcome: Progressing     Problem: Skin  Goal: Participates in plan/prevention/treatment measures  Outcome: Progressing  Flowsheets (Taken 3/11/2024 1006)  Participates in plan/prevention/treatment measures:   Elevate heels   Increase activity/out of bed for meals  Goal: Prevent/manage excess moisture  Outcome: Progressing  Flowsheets (Taken 3/11/2024 1006)  Prevent/manage excess moisture:   Moisturize dry skin   Cleanse incontinence/protect with barrier cream  Goal: Prevent/minimize sheer/friction injuries  Outcome: Progressing  Flowsheets (Taken 3/11/2024 1006)  Prevent/minimize sheer/friction injuries:   HOB 30 degrees or less   Turn/reposition every 2 hours/use positioning/transfer devices  Goal: Promote/optimize nutrition  Outcome: Progressing  Flowsheets (Taken 3/11/2024 1006)  Promote/optimize nutrition:   Consume > 50% meals/supplements   Monitor/record intake including meals  Goal: Promote skin healing  Outcome: Progressing  Flowsheets (Taken 3/11/2024 1006)  Promote skin healing: Turn/reposition every 2 hours/use positioning/transfer devices     Problem: Fall/Injury  Goal: Not fall by end of shift  Outcome: Progressing  Goal: Be free from injury by end of the shift  Outcome: Progressing  Goal: Verbalize understanding of personal risk factors for fall in the  hospital  Outcome: Progressing  Goal: Verbalize understanding of risk factor reduction measures to prevent injury from fall in the home  Outcome: Progressing  Goal: Use assistive devices by end of the shift  Outcome: Progressing  Goal: Pace activities to prevent fatigue by end of the shift  Outcome: Progressing     Problem: Pain  Goal: Takes deep breaths with improved pain control throughout the shift  Outcome: Progressing  Goal: Turns in bed with improved pain control throughout the shift  Outcome: Progressing  Goal: Walks with improved pain control throughout the shift  Outcome: Progressing  Goal: Performs ADL's with improved pain control throughout shift  Outcome: Progressing  Goal: Participates in PT with improved pain control throughout the shift  Outcome: Progressing  Goal: Free from opioid side effects throughout the shift  Outcome: Progressing  Goal: Free from acute confusion related to pain meds throughout the shift  Outcome: Progressing     Problem: Pain - Adult  Goal: Verbalizes/displays adequate comfort level or baseline comfort level  Outcome: Progressing     Problem: Safety - Adult  Goal: Free from fall injury  Outcome: Progressing     Problem: Discharge Planning  Goal: Discharge to home or other facility with appropriate resources  Outcome: Progressing     Problem: Chronic Conditions and Co-morbidities  Goal: Patient's chronic conditions and co-morbidity symptoms are monitored and maintained or improved  Outcome: Progressing       The clinical goals for the shift include be free of worsening abd pain this shift

## 2024-03-12 LAB
ANION GAP SERPL CALC-SCNC: 20 MMOL/L (ref 10–20)
BUN SERPL-MCNC: 29 MG/DL (ref 6–23)
CALCIUM SERPL-MCNC: 8.2 MG/DL (ref 8.6–10.3)
CHLORIDE SERPL-SCNC: 97 MMOL/L (ref 98–107)
CO2 SERPL-SCNC: 18 MMOL/L (ref 21–32)
CREAT SERPL-MCNC: 1.05 MG/DL (ref 0.5–1.05)
EGFRCR SERPLBLD CKD-EPI 2021: 52 ML/MIN/1.73M*2
ERYTHROCYTE [DISTWIDTH] IN BLOOD BY AUTOMATED COUNT: 15.9 % (ref 11.5–14.5)
GLUCOSE SERPL-MCNC: 73 MG/DL (ref 74–99)
HCT VFR BLD AUTO: 27 % (ref 36–46)
HGB BLD-MCNC: 8.4 G/DL (ref 12–16)
MCH RBC QN AUTO: 28.6 PG (ref 26–34)
MCHC RBC AUTO-ENTMCNC: 31.1 G/DL (ref 32–36)
MCV RBC AUTO: 92 FL (ref 80–100)
NRBC BLD-RTO: 0 /100 WBCS (ref 0–0)
PLATELET # BLD AUTO: 239 X10*3/UL (ref 150–450)
POTASSIUM SERPL-SCNC: 4.3 MMOL/L (ref 3.5–5.3)
RBC # BLD AUTO: 2.94 X10*6/UL (ref 4–5.2)
SODIUM SERPL-SCNC: 131 MMOL/L (ref 136–145)
WBC # BLD AUTO: 7.7 X10*3/UL (ref 4.4–11.3)

## 2024-03-12 PROCEDURE — 1200000002 HC GENERAL ROOM WITH TELEMETRY DAILY

## 2024-03-12 PROCEDURE — 2500000004 HC RX 250 GENERAL PHARMACY W/ HCPCS (ALT 636 FOR OP/ED): Performed by: NURSE PRACTITIONER

## 2024-03-12 PROCEDURE — 2500000001 HC RX 250 WO HCPCS SELF ADMINISTERED DRUGS (ALT 637 FOR MEDICARE OP): Performed by: NURSE PRACTITIONER

## 2024-03-12 PROCEDURE — 36415 COLL VENOUS BLD VENIPUNCTURE: CPT | Performed by: NURSE PRACTITIONER

## 2024-03-12 PROCEDURE — 80048 BASIC METABOLIC PNL TOTAL CA: CPT | Performed by: NURSE PRACTITIONER

## 2024-03-12 PROCEDURE — 99232 SBSQ HOSP IP/OBS MODERATE 35: CPT | Performed by: NURSE PRACTITIONER

## 2024-03-12 PROCEDURE — 85027 COMPLETE CBC AUTOMATED: CPT | Performed by: NURSE PRACTITIONER

## 2024-03-12 RX ADMIN — MORPHINE SULFATE 2 MG: 2 INJECTION, SOLUTION INTRAMUSCULAR; INTRAVENOUS at 10:14

## 2024-03-12 RX ADMIN — OXYCODONE HYDROCHLORIDE 5 MG: 5 TABLET ORAL at 03:19

## 2024-03-12 RX ADMIN — DOCUSATE SODIUM 100 MG: 100 CAPSULE, LIQUID FILLED ORAL at 10:12

## 2024-03-12 RX ADMIN — ACETAMINOPHEN 650 MG: 650 SUSPENSION ORAL at 20:39

## 2024-03-12 RX ADMIN — BENZONATATE 100 MG: 100 CAPSULE ORAL at 20:46

## 2024-03-12 RX ADMIN — DOCUSATE SODIUM 100 MG: 100 CAPSULE, LIQUID FILLED ORAL at 20:39

## 2024-03-12 RX ADMIN — BENZONATATE 100 MG: 100 CAPSULE ORAL at 10:14

## 2024-03-12 RX ADMIN — PANTOPRAZOLE SODIUM 40 MG: 40 TABLET, DELAYED RELEASE ORAL at 20:40

## 2024-03-12 RX ADMIN — PANTOPRAZOLE SODIUM 40 MG: 40 TABLET, DELAYED RELEASE ORAL at 10:12

## 2024-03-12 ASSESSMENT — COGNITIVE AND FUNCTIONAL STATUS - GENERAL
MOVING TO AND FROM BED TO CHAIR: A LOT
STANDING UP FROM CHAIR USING ARMS: A LOT
TOILETING: A LOT
DRESSING REGULAR UPPER BODY CLOTHING: A LITTLE
PERSONAL GROOMING: A LOT
HELP NEEDED FOR BATHING: A LITTLE
DAILY ACTIVITIY SCORE: 14
TOILETING: A LOT
WALKING IN HOSPITAL ROOM: TOTAL
MOBILITY SCORE: 11
CLIMB 3 TO 5 STEPS WITH RAILING: TOTAL
TURNING FROM BACK TO SIDE WHILE IN FLAT BAD: A LOT
WALKING IN HOSPITAL ROOM: TOTAL
STANDING UP FROM CHAIR USING ARMS: A LOT
MOBILITY SCORE: 11
DRESSING REGULAR LOWER BODY CLOTHING: A LITTLE
EATING MEALS: A LITTLE
MOVING FROM LYING ON BACK TO SITTING ON SIDE OF FLAT BED WITH BEDRAILS: A LITTLE
PERSONAL GROOMING: A LOT
EATING MEALS: A LITTLE
DRESSING REGULAR LOWER BODY CLOTHING: A LOT
MOVING FROM LYING ON BACK TO SITTING ON SIDE OF FLAT BED WITH BEDRAILS: A LITTLE
TURNING FROM BACK TO SIDE WHILE IN FLAT BAD: A LOT
DRESSING REGULAR UPPER BODY CLOTHING: A LOT
DAILY ACTIVITIY SCORE: 15
MOVING TO AND FROM BED TO CHAIR: A LOT
CLIMB 3 TO 5 STEPS WITH RAILING: TOTAL
HELP NEEDED FOR BATHING: A LOT

## 2024-03-12 ASSESSMENT — PAIN SCALES - GENERAL
PAINLEVEL_OUTOF10: 0 - NO PAIN
PAINLEVEL_OUTOF10: 6
PAINLEVEL_OUTOF10: 7
PAINLEVEL_OUTOF10: 2
PAINLEVEL_OUTOF10: 0 - NO PAIN
PAINLEVEL_OUTOF10: 3

## 2024-03-12 ASSESSMENT — PAIN DESCRIPTION - LOCATION
LOCATION: ABDOMEN
LOCATION: CHEST

## 2024-03-12 ASSESSMENT — PAIN - FUNCTIONAL ASSESSMENT
PAIN_FUNCTIONAL_ASSESSMENT: 0-10

## 2024-03-12 ASSESSMENT — ACTIVITIES OF DAILY LIVING (ADL): EFFECT OF PAIN ON DAILY ACTIVITIES: RESTLESS

## 2024-03-12 ASSESSMENT — PAIN DESCRIPTION - DESCRIPTORS
DESCRIPTORS: ACHING;DULL
DESCRIPTORS: CRAMPING

## 2024-03-12 ASSESSMENT — PAIN DESCRIPTION - ORIENTATION: ORIENTATION: LEFT;LOWER

## 2024-03-12 NOTE — PROGRESS NOTES
Interventional Radiology Consultation  March 12, 2024      Subjective:    Patient lying in bed in good spirits.  States her pain is only present with coughing but is much improved from yesterday after the abdominal binder was placed.          Results for orders placed or performed during the hospital encounter of 03/10/24 (from the past 24 hour(s))   Hemoglobin and hematocrit, blood   Result Value Ref Range    Hemoglobin 8.9 (L) 12.0 - 16.0 g/dL    Hematocrit 28.5 (L) 36.0 - 46.0 %   CBC   Result Value Ref Range    WBC 7.7 4.4 - 11.3 x10*3/uL    nRBC 0.0 0.0 - 0.0 /100 WBCs    RBC 2.94 (L) 4.00 - 5.20 x10*6/uL    Hemoglobin 8.4 (L) 12.0 - 16.0 g/dL    Hematocrit 27.0 (L) 36.0 - 46.0 %    MCV 92 80 - 100 fL    MCH 28.6 26.0 - 34.0 pg    MCHC 31.1 (L) 32.0 - 36.0 g/dL    RDW 15.9 (H) 11.5 - 14.5 %    Platelets 239 150 - 450 x10*3/uL   Basic Metabolic Panel   Result Value Ref Range    Glucose 73 (L) 74 - 99 mg/dL    Sodium 131 (L) 136 - 145 mmol/L    Potassium 4.3 3.5 - 5.3 mmol/L    Chloride 97 (L) 98 - 107 mmol/L    Bicarbonate 18 (L) 21 - 32 mmol/L    Anion Gap 20 10 - 20 mmol/L    Urea Nitrogen 29 (H) 6 - 23 mg/dL    Creatinine 1.05 0.50 - 1.05 mg/dL    eGFR 52 (L) >60 mL/min/1.73m*2    Calcium 8.2 (L) 8.6 - 10.3 mg/dL         Objective:    Vitals:    03/12/24 0000 03/12/24 0400 03/12/24 0800 03/12/24 1200   BP: 84/54 126/58 112/54 123/58   BP Location: Right arm Right arm     Patient Position: Lying Lying     Pulse: 92 84 83 87   Resp: 16 18 18 18   Temp: 36.4 °C (97.5 °F) 36.3 °C (97.3 °F) 36.6 °C (97.9 °F) 37.3 °C (99.1 °F)   TempSrc: Temporal Temporal     SpO2: 93% 94% 93% 93%   Weight:       Height:            No Known Allergies     Past Medical History:   Diagnosis Date    Bilateral leg edema 10/10/2023    Cardiac arrest (CMS/HCC)     2/2 PE    COVID-19 09/10/2023    Gastroesophageal reflux disease without esophagitis 04/24/2023    Humeral fracture 09/10/2023    Iron deficiency anemia due to chronic blood  loss 04/24/2023    Other pulmonary embolism without acute cor pulmonale (CMS/HCC) 04/24/2023    PE (pulmonary thromboembolism) (CMS/HCC)     Primary hypertension 04/24/2023    Unspecified fracture of right femur, subsequent encounter for closed fracture with routine healing 04/24/2023        Past Surgical History:   Procedure Laterality Date    CT ABDOMEN PELVIS ANGIOGRAM W AND/OR WO IV CONTRAST  09/23/2023    CT ABDOMEN PELVIS ANGIOGRAM W AND/OR WO IV CONTRAST 9/23/2023 [facility] CT    FEMUR FRACTURE SURGERY      PARATHYROIDECTOMY      TOTAL KNEE ARTHROPLASTY          Social History     Tobacco Use   Smoking Status Former    Types: Cigarettes   Smokeless Tobacco Never         Social History     Substance and Sexual Activity   Alcohol Use Yes    Comment: social         Social History     Substance and Sexual Activity   Drug Use Never         Current Facility-Administered Medications   Medication Dose Route Frequency Provider Last Rate Last Admin    acetaminophen (Tylenol) tablet 650 mg  650 mg oral q4h PRN ANAI VinesCNP   650 mg at 03/11/24 1057    Or    acetaminophen (Tylenol) oral liquid 650 mg  650 mg oral q4h PRN JACQUI Vines        Or    acetaminophen (Tylenol) suppository 650 mg  650 mg rectal q4h PRN JACQUI Vines        albuterol 2.5 mg /3 mL (0.083 %) nebulizer solution 2.5 mg  2.5 mg nebulization q6h PRN JACQUI Morin        [Held by provider] apixaban (Eliquis) tablet 5 mg  5 mg oral BID JACQUI Morin        benzonatate (Tessalon) capsule 100 mg  100 mg oral TID PRN JACQUI Morin   100 mg at 03/12/24 1014    docusate sodium (Colace) capsule 100 mg  100 mg oral BID ANAI VinesCNP   100 mg at 03/12/24 1012    morphine injection 2 mg  2 mg intravenous q4h PRN JACQUI Vines   2 mg at 03/12/24 1014    oxyCODONE (Roxicodone) immediate release tablet 5 mg  5 mg oral q6h PRN JACQUI Morin    5 mg at 03/12/24 0319    pantoprazole (ProtoNix) EC tablet 40 mg  40 mg oral BID LUKAS Morin-CNP   40 mg at 03/12/24 1012    polyethylene glycol (Glycolax, Miralax) packet 17 g  17 g oral Daily Mallory Treviño, LUKAS-VINICIUS             Physical Exam       Physical Exam  HENT:      Head: Normocephalic.      Mouth/Throat:      Mouth: Mucous membranes are moist.   Eyes:      Pupils: Pupils are equal, round, and reactive to light.   Cardiovascular:      Rate and Rhythm: Normal rate.      Pulses: Normal pulses.   Pulmonary:      Effort: Pulmonary effort is normal.   Abdominal:      General: There is no distension.      Palpations: Abdomen is soft.      Tenderness: There is abdominal tenderness.      Comments: Abdominal binder in place   Skin:     General: Skin is warm.      Capillary Refill: Capillary refill takes less than 2 seconds.   Neurological:      General: No focal deficit present.      Mental Status: She is alert.   Psychiatric:         Mood and Affect: Mood normal.           Assessment/Plan:  Natasha Lundberg is an 88-year-old female who presents to [facility] with rectus sheath hematoma in the setting of anticoagulation likely d/t severe coughing from influenza.  Hgb remains stable at 8.4 this morning from 8.5 yesterday.  Bleeding appears to have stopped.  Remains hemodynamically stable.  Agree with surgery recommendation to keep abdominal binder in place and monitor for signs of recurrent bleeding.  Consider liberalizing hgb checks to daily/prn.   If patient should have concerns for re-bleeding please call IR and can bring down for embolization.       Thank you for allowing me to participate in the care of Natasha Lundberg.       Discussed and reviewed any pertinent imaging with IR attending Dr. Alatorre  Further questions can be directed to:    Rene Hernandez CNP  Interventional Radiology    I spent 20 minutes in the professional and overall care of this patient

## 2024-03-12 NOTE — CARE PLAN
The clinical goals for the shift include remain afebrile this shift      Problem: Pain  Goal: My pain/discomfort is manageable  Outcome: Progressing  Flowsheets (Taken 3/12/2024 1411)  Resident's pain/discomfort is manageable:   Include resident/family/caregiver in decisions related to pain management   Offer non-pharmacological pain management interventions   Administer pain medication prior to activities that may trigger pain   Identify and avoid pain triggers     Problem: Safety  Goal: Patient will be injury free during hospitalization  Outcome: Progressing  Goal: I will remain free of falls  Outcome: Progressing  Flowsheets (Taken 3/12/2024 1411)  Resident will remain free of falls:   Use gait belt for all transfers   Apply bed/chair alarms as appropriate   Accompany resident as ordered (ex. 1:1, stand-by assist, dayroom monitoring, 15 minute checks, line of sight)   Assist with toileting as orderd   Visual checks per facility policy   Maintain bed at position as ordered (chair height, low bed)   Consult with physical therapy as needed   Assess and monitor medications that may increase fall risk     Problem: Daily Care  Goal: Daily care needs are met  Outcome: Progressing  Flowsheets (Taken 3/12/2024 1411)  Daily care needs are met:   Assess and monitor ability to perform self care and identify potential discharge needs   Assess skin integrity/risk for skin breakdown   Assist patient with activities of daily living as needed   Encourage independent activity per ability   Provide mouth care   Include patient/family/caregiver in decisions related to daily care     Problem: Psychosocial Needs  Goal: Demonstrates ability to cope with hospitalization/illness  Outcome: Progressing  Flowsheets (Taken 3/12/2024 1411)  Demonstrates ability to cope with hospitalization/illness:   Encourage verbalization of feelings/concerns/expectations   Provide low-stimulation environment as needed   Assist resident to identify and  practice own strengths and abilities   Encourage resident to set and complete small goals for self   Encourage participation in diversional activities   Reinforce positive adaptation of new coping behaviors   Include resident/family/caregiver in decisions related to psychosocial needs  Goal: Collaborate with me, my family, and caregiver to identify my specific goals  Outcome: Progressing     Problem: Discharge Barriers  Goal: My discharge needs are met  Outcome: Progressing  Flowsheets (Taken 3/12/2024 1411)  Resident's discharge needs are met:   Identify potential discharge barriers on admission and throughout stay   Involve resident/family/caregiver in discharge planning process     Problem: Skin  Goal: Participates in plan/prevention/treatment measures  Outcome: Progressing  Flowsheets (Taken 3/12/2024 1411)  Participates in plan/prevention/treatment measures:   Discuss with provider PT/OT consult   Elevate heels   Increase activity/out of bed for meals  Goal: Prevent/manage excess moisture  Outcome: Progressing  Flowsheets (Taken 3/12/2024 1411)  Prevent/manage excess moisture:   Cleanse incontinence/protect with barrier cream   Moisturize dry skin   Monitor for/manage infection if present  Goal: Prevent/minimize sheer/friction injuries  Outcome: Progressing  Flowsheets (Taken 3/12/2024 1411)  Prevent/minimize sheer/friction injuries:   HOB 30 degrees or less   Increase activity/out of bed for meals   Turn/reposition every 2 hours/use positioning/transfer devices   Use pull sheet  Goal: Promote/optimize nutrition  Outcome: Progressing  Flowsheets (Taken 3/12/2024 1411)  Promote/optimize nutrition:   Assist with feeding   Consume > 50% meals/supplements   Monitor/record intake including meals   Offer water/supplements/favorite foods  Goal: Promote skin healing  Outcome: Progressing  Flowsheets (Taken 3/12/2024 1411)  Promote skin healing:   Assess skin/pad under line(s)/device(s)   Protective dressings over bony  prominences   Turn/reposition every 2 hours/use positioning/transfer devices     Problem: Fall/Injury  Goal: Not fall by end of shift  Outcome: Progressing  Goal: Be free from injury by end of the shift  Outcome: Progressing  Goal: Verbalize understanding of personal risk factors for fall in the hospital  Outcome: Progressing  Goal: Verbalize understanding of risk factor reduction measures to prevent injury from fall in the home  Outcome: Progressing  Goal: Use assistive devices by end of the shift  Outcome: Progressing  Goal: Pace activities to prevent fatigue by end of the shift  Outcome: Progressing     Problem: Pain  Goal: Takes deep breaths with improved pain control throughout the shift  Outcome: Progressing  Goal: Turns in bed with improved pain control throughout the shift  Outcome: Progressing  Goal: Walks with improved pain control throughout the shift  Outcome: Progressing  Goal: Performs ADL's with improved pain control throughout shift  Outcome: Progressing  Goal: Participates in PT with improved pain control throughout the shift  Outcome: Progressing  Goal: Free from opioid side effects throughout the shift  Outcome: Progressing  Goal: Free from acute confusion related to pain meds throughout the shift  Outcome: Progressing     Problem: Pain - Adult  Goal: Verbalizes/displays adequate comfort level or baseline comfort level  Outcome: Progressing  Flowsheets (Taken 3/12/2024 1411)  Verbalizes/displays adequate comfort level or baseline comfort level:   Encourage patient to monitor pain and request assistance   Assess pain using appropriate pain scale   Administer analgesics based on type and severity of pain and evaluate response   Implement non-pharmacological measures as appropriate and evaluate response   Notify Licensed Independent Practitioner if interventions unsuccessful or patient reports new pain     Problem: Safety - Adult  Goal: Free from fall injury  Outcome: Progressing  Flowsheets (Taken  3/12/2024 1411)  Free from fall injury:   Instruct family/caregiver on patient safety   Based on caregiver fall risk screen, instruct family/caregiver to ask for assistance with transferring infant if caregiver noted to have fall risk factors     Problem: Discharge Planning  Goal: Discharge to home or other facility with appropriate resources  Outcome: Progressing  Flowsheets (Taken 3/12/2024 1411)  Discharge to home or other facility with appropriate resources:   Identify barriers to discharge with patient and caregiver   Arrange for needed discharge resources and transportation as appropriate   Identify discharge learning needs (meds, wound care, etc)   Refer to discharge planning if patient needs post-hospital services based on physician order or complex needs related to functional status, cognitive ability or social support system     Problem: Chronic Conditions and Co-morbidities  Goal: Patient's chronic conditions and co-morbidity symptoms are monitored and maintained or improved  Outcome: Progressing  Flowsheets (Taken 3/12/2024 1411)  Care Plan - Patient's Chronic Conditions and Co-Morbidity Symptoms are Monitored and Maintained or Improved:   Monitor and assess patient's chronic conditions and comorbid symptoms for stability, deterioration, or improvement   Collaborate with multidisciplinary team to address chronic and comorbid conditions and prevent exacerbation or deterioration   Update acute care plan with appropriate goals if chronic or comorbid symptoms are exacerbated and prevent overall improvement and discharge     Problem: Infection related to problem list condition  Goal: Infection will resolve through treatment  Outcome: Progressing  Flowsheets (Taken 3/12/2024 1411)  Infection will resolve through treatment:   Observe for and document signs and symptoms of infection   Nurse will administer medications as ordered by provider   Obtain labs/cultures as ordered by provider   Administer treatments as  ordered by provider     Problem: Respiratory  Goal: Clear secretions with interventions this shift  Outcome: Progressing  Flowsheets (Taken 3/12/2024 1411)  Clear secretions with interventions this shift:   Encourage/provide pulmonary hygiene/secretion clearance   Med administration/monitoring of effect  Goal: Minimize anxiety/maximize coping throughout shift  Outcome: Progressing  Flowsheets (Taken 3/12/2024 1411)  Minimize anxiety/maximize coping throughout shift:   Med administration/monitoring of effect   Monitor pain/anxiety level  Goal: Minimal/no exertional discomfort or dyspnea this shift  Outcome: Progressing  Flowsheets (Taken 3/12/2024 1411)  Minimal/no exertional discomfort or dyspnea this shift: Positioning to promote ventilation/comfort  Goal: No signs of respiratory distress (eg. Use of accessory muscles. Peds grunting)  Outcome: Progressing  Goal: Patent airway maintained this shift  Outcome: Progressing  Goal: Tolerate mechanical ventilation evidenced by VS/agitation level this shift  Outcome: Progressing  Goal: Tolerate pulmonary toileting this shift  Outcome: Progressing  Flowsheets (Taken 3/12/2024 1411)  Tolerate pulmonary toileting this shift: Positioning to promote ventilation/comfort  Goal: Verbalize decreased shortness of breath this shift  Outcome: Progressing  Flowsheets (Taken 3/12/2024 1411)  Verbalize decreased shortness of breath this shift: Encourage/provide pulmonary hygiene/secretion clearance  Goal: Wean oxygen to maintain O2 saturation per order/standard this shift  Outcome: Progressing  Flowsheets (Taken 3/12/2024 1411)  Wean oxygen to maintain O2 saturation per order/standard this shift: Encourage activity/mobility  Goal: Increase self care and/or family involvement in next 24 hours  Outcome: Progressing  Flowsheets (Taken 3/12/2024 1411)  Increase self care and/or family involvement in next 24 hours: Encourage activity/mobility

## 2024-03-12 NOTE — CARE PLAN
The patient's goals for the shift include control cough & abdominal pain.    The clinical goals for the shift include control cough and abdominal pain.    Over the shift, the patient did not make progress toward the following goals. Barriers to progression include n/a. Recommendations to address these barriers include n/a.

## 2024-03-12 NOTE — PROGRESS NOTES
Internal Medicine Progress Note    Patient Name: Natasha Lundberg          MRN: [id number]  Today's Date: March 11, 2024          Attending: Cedrick Mehta MD    Subjective:  Patient was seen and examined at bedside.    Review Of Systems:  GENERAL: No malaise or fevers.  CARDIOVASCULAR: Negative for chest pain, leg swelling  RESPIRATORY: Negative for shortness of breath  GI: Positive for abdominal pain  MUSCULOSKELETAL: Negative for joint pain or swelling    Objective:  Vitals:    03/11/24 0741 03/11/24 1134 03/11/24 1544 03/11/24 2100   BP: 102/54 95/55 92/55 121/56   BP Location: Right arm Right arm Left arm Left arm   Patient Position: Lying Lying Lying Lying   Pulse: 83 79 69 84   Resp: 16 16 14 18   Temp: 36.7 °C (98.1 °F) 36.5 °C (97.7 °F) 36 °C (96.8 °F) 37.4 °C (99.3 °F)   TempSrc: Temporal Temporal Temporal Temporal   SpO2: 92% 93% 94% 93%   Weight:       Height:           Physical Exam:   General appearance: Well-appearing alert, in no acute distress   Lungs: Clear to auscultation, no wheezing or rhonchi  Heart: RRR without murmur, gallop, or rubs  Abdomen: Soft, non-tender. Bowel sounds normal.  Extremities: No deformity, no edema  Neuro: Alert oriented x3, no focal deficit.    Labs:  Results for orders placed or performed during the hospital encounter of 03/10/24 (from the past 24 hour(s))   CBC   Result Value Ref Range    WBC 8.9 4.4 - 11.3 x10*3/uL    nRBC 0.0 0.0 - 0.0 /100 WBCs    RBC 3.12 (L) 4.00 - 5.20 x10*6/uL    Hemoglobin 9.0 (L) 12.0 - 16.0 g/dL    Hematocrit 28.7 (L) 36.0 - 46.0 %    MCV 92 80 - 100 fL    MCH 28.8 26.0 - 34.0 pg    MCHC 31.4 (L) 32.0 - 36.0 g/dL    RDW 15.7 (H) 11.5 - 14.5 %    Platelets 227 150 - 450 x10*3/uL   CBC   Result Value Ref Range    WBC 8.8 4.4 - 11.3 x10*3/uL    nRBC 0.0 0.0 - 0.0 /100 WBCs    RBC 3.12 (L) 4.00 - 5.20 x10*6/uL    Hemoglobin 8.9 (L) 12.0 - 16.0 g/dL    Hematocrit 28.3 (L) 36.0 - 46.0 %    MCV 91 80 - 100 fL    MCH 28.5 26.0 - 34.0 pg    MCHC 31.4 (L) 32.0 -  "36.0 g/dL    RDW 15.7 (H) 11.5 - 14.5 %    Platelets 213 150 - 450 x10*3/uL   Basic Metabolic Panel   Result Value Ref Range    Glucose 90 74 - 99 mg/dL    Sodium 128 (L) 136 - 145 mmol/L    Potassium 4.2 3.5 - 5.3 mmol/L    Chloride 99 98 - 107 mmol/L    Bicarbonate 21 21 - 32 mmol/L    Anion Gap 12 10 - 20 mmol/L    Urea Nitrogen 29 (H) 6 - 23 mg/dL    Creatinine 1.01 0.50 - 1.05 mg/dL    eGFR 54 (L) >60 mL/min/1.73m*2    Calcium 8.3 (L) 8.6 - 10.3 mg/dL   CBC   Result Value Ref Range    WBC 9.3 4.4 - 11.3 x10*3/uL    nRBC 0.0 0.0 - 0.0 /100 WBCs    RBC 2.89 (L) 4.00 - 5.20 x10*6/uL    Hemoglobin 8.5 (L) 12.0 - 16.0 g/dL    Hematocrit 26.5 (L) 36.0 - 46.0 %    MCV 92 80 - 100 fL    MCH 29.4 26.0 - 34.0 pg    MCHC 32.1 32.0 - 36.0 g/dL    RDW 15.7 (H) 11.5 - 14.5 %    Platelets 203 150 - 450 x10*3/uL       Medications:  Scheduled medications  [Held by provider] apixaban, 5 mg, oral, BID  docusate sodium, 100 mg, oral, BID  pantoprazole, 40 mg, oral, BID  polyethylene glycol, 17 g, oral, Daily      Continuous medications     PRN medications  PRN medications: acetaminophen **OR** acetaminophen **OR** acetaminophen, albuterol, benzonatate, morphine, oxyCODONE      Assessment/Plan:  Principal Problem:    Rectus sheath Hematoma    Will keep anticoagulation on hold  Monitor blood count  IR consult for possible embolization      Abdominal pain, unspecified abdominal location  Symptomatic treatment  General surgery consult      Difficulty in walking  PT/OT      Primary hypertension  Continue current medications    Discussed with patient, RN    Efrain Lacey MD   Date: 03/11/24  Time: 10:23 PM    This note was partially created using voice recognition software and is inherently subject to errors including those of syntax and \"sound-alike\" substitutions which may escape proofreading. In such instances, original meaning may be extrapolated by contextual derivation    "

## 2024-03-12 NOTE — PROGRESS NOTES
Internal Medicine Progress Note    Patient Name: aNtasha Lundberg          MRN: [id number]  Today's Date: March 12, 2024          Attending: Cedrick Mehta MD    Subjective:  Patient was seen and examined at bedside.    Review Of Systems:  GENERAL: No malaise or fevers.  CARDIOVASCULAR: Negative for chest pain   RESPIRATORY: Negative for shortness of breath  GI: Positive for abdominal pain    Objective:  Vitals:    03/12/24 0400 03/12/24 0800 03/12/24 1200 03/12/24 1600   BP: 126/58 112/54 123/58 131/60   BP Location: Right arm      Patient Position: Lying      Pulse: 84 83 87 97   Resp: 18 18 18 17   Temp: 36.3 °C (97.3 °F) 36.6 °C (97.9 °F) 37.3 °C (99.1 °F) 37.6 °C (99.7 °F)   TempSrc: Temporal      SpO2: 94% 93% 93% 92%   Weight:       Height:           Physical Exam:   General appearance: Well-appearing alert  Lungs: Clear to auscultation, no wheezing or rhonchi  Heart: RRR without murmur, gallop, or rubs  Abdomen: Soft, non-tender. Bowel sounds normal.  Extremities: No deformity, no edema  Neuro: Alert oriented x3, no focal deficit.    Labs:  Results for orders placed or performed during the hospital encounter of 03/10/24 (from the past 24 hour(s))   Hemoglobin and hematocrit, blood   Result Value Ref Range    Hemoglobin 8.9 (L) 12.0 - 16.0 g/dL    Hematocrit 28.5 (L) 36.0 - 46.0 %   CBC   Result Value Ref Range    WBC 7.7 4.4 - 11.3 x10*3/uL    nRBC 0.0 0.0 - 0.0 /100 WBCs    RBC 2.94 (L) 4.00 - 5.20 x10*6/uL    Hemoglobin 8.4 (L) 12.0 - 16.0 g/dL    Hematocrit 27.0 (L) 36.0 - 46.0 %    MCV 92 80 - 100 fL    MCH 28.6 26.0 - 34.0 pg    MCHC 31.1 (L) 32.0 - 36.0 g/dL    RDW 15.9 (H) 11.5 - 14.5 %    Platelets 239 150 - 450 x10*3/uL   Basic Metabolic Panel   Result Value Ref Range    Glucose 73 (L) 74 - 99 mg/dL    Sodium 131 (L) 136 - 145 mmol/L    Potassium 4.3 3.5 - 5.3 mmol/L    Chloride 97 (L) 98 - 107 mmol/L    Bicarbonate 18 (L) 21 - 32 mmol/L    Anion Gap 20 10 - 20 mmol/L    Urea Nitrogen 29 (H) 6 - 23 mg/dL     "Creatinine 1.05 0.50 - 1.05 mg/dL    eGFR 52 (L) >60 mL/min/1.73m*2    Calcium 8.2 (L) 8.6 - 10.3 mg/dL       Medications:  Scheduled medications  [Held by provider] apixaban, 5 mg, oral, BID  docusate sodium, 100 mg, oral, BID  pantoprazole, 40 mg, oral, BID  polyethylene glycol, 17 g, oral, Daily      Continuous medications     PRN medications  PRN medications: acetaminophen **OR** acetaminophen **OR** acetaminophen, albuterol, benzonatate, oxyCODONE      Assessment/Plan:  Principal Problem:    Rectus sheath Hematoma    Will keep anticoagulation on hold  Monitor blood count  Keep abdominal binder  IR and general surgery are following      Abdominal pain, unspecified abdominal location  Symptomatic treatment  General surgery consult      Difficulty in walking  PT/OT      Primary hypertension  Continue current medications    Discussed with patient, RN    Efrain Lacey MD   Date: 03/12/24  Time: 6:56 PM    This note was partially created using voice recognition software and is inherently subject to errors including those of syntax and \"sound-alike\" substitutions which may escape proofreading. In such instances, original meaning may be extrapolated by contextual derivation  "

## 2024-03-13 VITALS
WEIGHT: 149 LBS | SYSTOLIC BLOOD PRESSURE: 120 MMHG | RESPIRATION RATE: 20 BRPM | HEIGHT: 60 IN | HEART RATE: 88 BPM | BODY MASS INDEX: 29.25 KG/M2 | DIASTOLIC BLOOD PRESSURE: 61 MMHG | TEMPERATURE: 97.9 F | OXYGEN SATURATION: 96 %

## 2024-03-13 LAB
ANION GAP SERPL CALC-SCNC: 13 MMOL/L (ref 10–20)
BUN SERPL-MCNC: 27 MG/DL (ref 6–23)
CALCIUM SERPL-MCNC: 8.3 MG/DL (ref 8.6–10.3)
CHLORIDE SERPL-SCNC: 101 MMOL/L (ref 98–107)
CO2 SERPL-SCNC: 20 MMOL/L (ref 21–32)
CREAT SERPL-MCNC: 0.88 MG/DL (ref 0.5–1.05)
EGFRCR SERPLBLD CKD-EPI 2021: 64 ML/MIN/1.73M*2
ERYTHROCYTE [DISTWIDTH] IN BLOOD BY AUTOMATED COUNT: 15.6 % (ref 11.5–14.5)
GLUCOSE SERPL-MCNC: 118 MG/DL (ref 74–99)
HCT VFR BLD AUTO: 27.9 % (ref 36–46)
HGB BLD-MCNC: 8.7 G/DL (ref 12–16)
MCH RBC QN AUTO: 29 PG (ref 26–34)
MCHC RBC AUTO-ENTMCNC: 31.2 G/DL (ref 32–36)
MCV RBC AUTO: 93 FL (ref 80–100)
NRBC BLD-RTO: 0 /100 WBCS (ref 0–0)
PLATELET # BLD AUTO: 300 X10*3/UL (ref 150–450)
PMV BLD AUTO: 9.4 FL (ref 7.5–11.5)
POTASSIUM SERPL-SCNC: 4.1 MMOL/L (ref 3.5–5.3)
RBC # BLD AUTO: 3 X10*6/UL (ref 4–5.2)
SODIUM SERPL-SCNC: 130 MMOL/L (ref 136–145)
WBC # BLD AUTO: 8.3 X10*3/UL (ref 4.4–11.3)

## 2024-03-13 PROCEDURE — 85027 COMPLETE CBC AUTOMATED: CPT | Performed by: NURSE PRACTITIONER

## 2024-03-13 PROCEDURE — 97530 THERAPEUTIC ACTIVITIES: CPT | Mod: GP

## 2024-03-13 PROCEDURE — 97161 PT EVAL LOW COMPLEX 20 MIN: CPT | Mod: GP

## 2024-03-13 PROCEDURE — 36415 COLL VENOUS BLD VENIPUNCTURE: CPT | Performed by: NURSE PRACTITIONER

## 2024-03-13 PROCEDURE — 97112 NEUROMUSCULAR REEDUCATION: CPT | Mod: GO

## 2024-03-13 PROCEDURE — 2500000001 HC RX 250 WO HCPCS SELF ADMINISTERED DRUGS (ALT 637 FOR MEDICARE OP): Performed by: NURSE PRACTITIONER

## 2024-03-13 PROCEDURE — 80048 BASIC METABOLIC PNL TOTAL CA: CPT | Performed by: NURSE PRACTITIONER

## 2024-03-13 PROCEDURE — 97165 OT EVAL LOW COMPLEX 30 MIN: CPT | Mod: GO

## 2024-03-13 RX ADMIN — ACETAMINOPHEN 650 MG: 325 TABLET ORAL at 06:08

## 2024-03-13 RX ADMIN — PANTOPRAZOLE SODIUM 40 MG: 40 TABLET, DELAYED RELEASE ORAL at 09:54

## 2024-03-13 RX ADMIN — BENZONATATE 100 MG: 100 CAPSULE ORAL at 06:08

## 2024-03-13 ASSESSMENT — PAIN - FUNCTIONAL ASSESSMENT
PAIN_FUNCTIONAL_ASSESSMENT: 0-10

## 2024-03-13 ASSESSMENT — PAIN SCALES - GENERAL
PAINLEVEL_OUTOF10: 5 - MODERATE PAIN
PAINLEVEL_OUTOF10: 0 - NO PAIN

## 2024-03-13 ASSESSMENT — COGNITIVE AND FUNCTIONAL STATUS - GENERAL
MOVING TO AND FROM BED TO CHAIR: A LITTLE
DRESSING REGULAR LOWER BODY CLOTHING: A LOT
EATING MEALS: A LITTLE
TOILETING: A LOT
MOBILITY SCORE: 14
TURNING FROM BACK TO SIDE WHILE IN FLAT BAD: A LOT
CLIMB 3 TO 5 STEPS WITH RAILING: A LOT
STANDING UP FROM CHAIR USING ARMS: A LOT
DRESSING REGULAR UPPER BODY CLOTHING: A LITTLE
DAILY ACTIVITIY SCORE: 15
MOVING FROM LYING ON BACK TO SITTING ON SIDE OF FLAT BED WITH BEDRAILS: A LITTLE
PERSONAL GROOMING: A LITTLE
WALKING IN HOSPITAL ROOM: A LOT
HELP NEEDED FOR BATHING: A LOT

## 2024-03-13 ASSESSMENT — PAIN DESCRIPTION - DESCRIPTORS: DESCRIPTORS: ACHING

## 2024-03-13 ASSESSMENT — ACTIVITIES OF DAILY LIVING (ADL)
EFFECT OF PAIN ON DAILY ACTIVITIES: RESTLESS
BATHING_ASSISTANCE: MODERATE

## 2024-03-13 NOTE — PROGRESS NOTES
Internal Medicine Progress Note    Patient Name: Natasha Lundberg          MRN: [id number]  Today's Date: March 13, 2024          Attending: Cedrick Mehta MD    Subjective:  Patient was seen and examined at bedside.    Review Of Systems:  CARDIOVASCULAR: Negative for chest pain   RESPIRATORY: Negative for shortness of breath  GI: Positive for abdominal pain    Objective:  Vitals:    03/12/24 2000 03/12/24 2330 03/13/24 0342 03/13/24 0800   BP: 112/57 119/61 114/55 115/57   BP Location: Right arm Right arm Right arm    Patient Position: Lying Lying Lying    Pulse: 87 82 80 83   Resp: 18 20 20 17   Temp: 36.3 °C (97.3 °F) 35.9 °C (96.6 °F) 35.8 °C (96.4 °F) 36.2 °C (97.2 °F)   TempSrc: Temporal Temporal Temporal    SpO2: 93% 94% 93% 95%   Weight:       Height:           Physical Exam:   General appearance: Well-appearing alert  Lungs: Clear to auscultation, no wheezing or rhonchi  Heart: RRR without murmur, gallop, or rubs  Abdomen: Soft, positive for tenderness  Extremities: No deformity, no edema  Neuro: Alert oriented x3, no focal deficit.    Labs:  Results for orders placed or performed during the hospital encounter of 03/10/24 (from the past 24 hour(s))   CBC   Result Value Ref Range    WBC 8.3 4.4 - 11.3 x10*3/uL    nRBC 0.0 0.0 - 0.0 /100 WBCs    RBC 3.00 (L) 4.00 - 5.20 x10*6/uL    Hemoglobin 8.7 (L) 12.0 - 16.0 g/dL    Hematocrit 27.9 (L) 36.0 - 46.0 %    MCV 93 80 - 100 fL    MCH 29.0 26.0 - 34.0 pg    MCHC 31.2 (L) 32.0 - 36.0 g/dL    RDW 15.6 (H) 11.5 - 14.5 %    Platelets 300 150 - 450 x10*3/uL    MPV 9.4 7.5 - 11.5 fL   Basic Metabolic Panel   Result Value Ref Range    Glucose 118 (H) 74 - 99 mg/dL    Sodium 130 (L) 136 - 145 mmol/L    Potassium 4.1 3.5 - 5.3 mmol/L    Chloride 101 98 - 107 mmol/L    Bicarbonate 20 (L) 21 - 32 mmol/L    Anion Gap 13 10 - 20 mmol/L    Urea Nitrogen 27 (H) 6 - 23 mg/dL    Creatinine 0.88 0.50 - 1.05 mg/dL    eGFR 64 >60 mL/min/1.73m*2    Calcium 8.3 (L) 8.6 - 10.3 mg/dL  "      Medications:  Scheduled medications  [Held by provider] apixaban, 5 mg, oral, BID  docusate sodium, 100 mg, oral, BID  pantoprazole, 40 mg, oral, BID  polyethylene glycol, 17 g, oral, Daily      Continuous medications     PRN medications  PRN medications: acetaminophen **OR** acetaminophen **OR** acetaminophen, albuterol, benzonatate, oxyCODONE      Assessment/Plan:  Principal Problem:    Rectus sheath Hematoma    Hemoglobin continues to be stable  Monitor blood count  Keep abdominal binder  IR and general surgery are following  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 In such instances, original meaning may be extrapolated by contextual derivation  "

## 2024-03-13 NOTE — PROGRESS NOTES
MRN: [id number]  SERVICE DATE:  3/13/2024   SERVICE TIME:  9:29 AM  Today's Date: 03/13/24  Admission Date: 3/10/2024  Hospital Day: #3    Subjective      Patient seen today.  Admitted to the hospital with rectus sheath hematoma.  Monitor INR.  Patient's Eliquis has been discontinued        Objective      ROS:   General: Denies any change in weight, fever, chills, fatigue.   Head and Neck: Denies any headaches, syncope or history of head injury.   Eyes/Ears: Denies any cataracts, glaucoma, blurred vision, tinnitus.   Nose: Denies any epistaxis or sinus problems   Respiratory: Denies any cough, hemoptysis, wheezing, asthma, dyspnea.   Cardiovascular: No orthopnea, dyspnea, palpitations, congestive heart failure.   Gastrointestinal: Denies any indigestion, nausea, vomiting, diarrhea, constipation.   Neuro: No dizzyness, headache or syncope   ID: No fever or chills.   Endocrine: No weight loss or weight gain.  No thyroid or diabetic complaints     MEDICATIONS:  Current Facility-Administered Medications   Medication Dose Route Frequency Provider Last Rate Last Admin    acetaminophen (Tylenol) tablet 650 mg  650 mg oral q4h PRN JACQUI Vines   650 mg at 03/13/24 0608    Or    acetaminophen (Tylenol) oral liquid 650 mg  650 mg oral q4h PRN JACQUI Vines   650 mg at 03/12/24 2039    Or    acetaminophen (Tylenol) suppository 650 mg  650 mg rectal q4h PRN JACQUI Vines        albuterol 2.5 mg /3 mL (0.083 %) nebulizer solution 2.5 mg  2.5 mg nebulization q6h PRN JACQUI Morin        [Held by provider] apixaban (Eliquis) tablet 5 mg  5 mg oral BID JACQUI Morin        benzonatate (Tessalon) capsule 100 mg  100 mg oral TID PRN JACQUI Morin   100 mg at 03/13/24 0608    docusate sodium (Colace) capsule 100 mg  100 mg oral BID JACQUI Vines   100 mg at 03/12/24 2039    oxyCODONE (Roxicodone) immediate release tablet 5 mg  5 mg  "oral q6h PRN JACQUI Morin   5 mg at 03/12/24 0319    pantoprazole (ProtoNix) EC tablet 40 mg  40 mg oral BID JACQUI Morin   40 mg at 03/12/24 2040    polyethylene glycol (Glycolax, Miralax) packet 17 g  17 g oral Daily Mallory BOAZ JACQUI Treviño             PHYSICAL EXAM:   /57   Pulse 83   Temp 36.2 °C (97.2 °F)   Resp 17   Ht 1.524 m (5')   Wt 67.6 kg (149 lb)   SpO2 95%   BMI 29.10 kg/m²      CONSTITUTIONAL - well nourished, well developed, looks like stated age, in no acute distress, not ill-appearing   SKIN - normal skin color and pigmentation, normal skin turgor without rash   HEAD - no trauma, normocephalic  EYES - pupils are equal and reactive to light, extraocular muscles are intact, and normal external exam  ENT - TM's intact, no injection, no signs of infection, uvula midline, normal tongue movement and throat normal  NECK - supple without rigidity, no neck mass was observed, no thyromegaly    CHEST - clear to auscultation, no wheezing, no crackles and no rales, good effort  CARDIAC - regular rate and regular rhythm, no skipped beats, no murmur  ABDOMEN -epigastric tenderness to palpation  NEUROLOGICAL - normal gait, normal balance, normal motor, no ataxia, DTRs equal and symmetrical; alert, oriented and no focal signs  PSYCHIATRIC - alert, pleasant and cordial, age-appropriate  IMMUNOLOGIC - no cervical lymphadenopathy       Labs:  Lab Results   Component Value Date    WBC 8.3 03/13/2024    HGB 8.7 (L) 03/13/2024    HCT 27.9 (L) 03/13/2024    MCV 93 03/13/2024     03/13/2024     Lab Results   Component Value Date    GLUCOSE 118 (H) 03/13/2024    CALCIUM 8.3 (L) 03/13/2024     (L) 03/13/2024    K 4.1 03/13/2024    CO2 20 (L) 03/13/2024     03/13/2024    BUN 27 (H) 03/13/2024    CREATININE 0.88 03/13/2024   ESR: --No results found for: \"SEDRATE\"  Lab Results   Component Value Date    CRP 1.17 (A) 08/30/2023     Lab Results   Component Value " Date    ALT 11 03/10/2024    AST 25 03/10/2024    ALKPHOS 66 03/10/2024    BILITOT 0.5 03/10/2024                      Problem List Items Addressed This Visit             ICD-10-CM    * (Principal) Abdominal pain, unspecified abdominal location - Primary R10.9     Other Visit Diagnoses         Codes    Abdominal wall hematoma, initial encounter     S30.1XXA    Hyponatremia     E87.1    MATEO (acute kidney injury) (CMS/[facility])     N17.9          Continue current treatment  Monitor lab work  Monitor H&H  DVT and GI prophylaxis              Cedrick Mehta MD  This note was created using [employer]. Any inadvertent grammar, spelling or syntax errors are do to voice recognition software error and are not intentional.

## 2024-03-13 NOTE — PROGRESS NOTES
Occupational Therapy    Occupational Therapy    Evaluation    Patient Name: Natasha Lundberg  MRN: [id number]  Today's Date: 3/13/2024  Time Calculation  Start Time: 1135  Stop Time: 1157  Time Calculation (min): 22 min  Rm: 3122    Assessment  IP OT Assessment  OT Assessment: Pt pleasant and cooperative. Pt with increased weakness, and with increased difficulty completing ADL's. Recommend SNF to increase safety and independence with ADL's.  Prognosis: Good  End of Session Communication: Bedside nurse  End of Session Patient Position: Up in chair, Alarm on    Plan:  Treatment Interventions: ADL retraining, Functional transfer training, Endurance training, Neuromuscular reeducation  OT Frequency: 3 times per week  OT Discharge Recommendations: Moderate intensity level of continued care (SNF)  Equipment Recommended upon Discharge: Wheeled walker  OT Recommended Transfer Status: Moderate assist, Assist of 2  OT - OK to Discharge: Yes (to next level of care)    Subjective     Current Problem:  1. Abdominal pain, unspecified abdominal location        2. Abdominal wall hematoma, initial encounter        3. Hyponatremia        4. MATEO (acute kidney injury) (CMS/[facility])            General:  General  Reason for Referral: ADL's; Safety Assessment  Referred By: Cedrick Mehta  Past Medical History Relevant to Rehab: dementia, DM, HTN, recent COVID  Co-Treatment: PT  Prior to Session Communication: Bedside nurse  Patient Position Received: Up in chair, Alarm on    General Visit Information:  Per EMR: pt admitted with abdominal pain, found to have intramuscular hematoma of the left rectus.  Patient states abdominal pain for few days prior to admission located in the left lower quadrant radiating to back.  Patient denies any dark or bloody stools, urinary urgency frequency hematuria or dysuria.  Denies falling or hitting her side recently.  Denies any dizziness, vision changes, sweats, chills, shortness of breath, chest pain, palpitations,  "nausea/vomiting/diarrhea.  Patient states left lower quadrant quadrant pain has much lessened since she received IV morphine.     Patient seen in the emergency department, CT of the abdomen revealed \"Interval development of an intramuscular hematoma involving the left rectus sheath measuring approximately 4 cm x 5.6 cm x 10.4 cm. There is accompanying decompression into the extraperitoneal portion of the pelvis with small amount of blood in the anterolateral aspect of the left hemipelvis.\" CT of the chest is negative for pulmonary embolus.  Labs done today include lipase 135, troponin 13, repeat 13, BNP 33, BUN 39, creatinine 1.28.  Patient received IV fluids in the emergency department. Patient did test positive for influenza, precautions maintained.     General: On arrival, pt sitting in bedside chair.  Pt in no apparent distress and agreeable to therapy.    Precautions:  Medical Precautions: Fall precautions  Precautions Comment: (+) Flu A, droplet precautions; abdominal binder for rectus sheath hematoma (bed/chair alarm)    Pain:  Pain Assessment  Pain Assessment: 0-10  Pain Score: 0 - No pain    Objective     Cognition:  Overall Cognitive Status: Within Functional Limits  Orientation Level: Oriented X4    Home Living/PLOF:  Pt is from [facility] with .  Pt states she occasionally needs assist with bed mobility and LB dressing.  Pt has assist with showers with WIS, chair and Gbs.  Pt states she normally ambulates with FWW and is able to ambulate to dining room with FWW.  Family provides transport as needed.  Pt denies any recent falls.     ADL:  Eating Assistance: Independent  Grooming Assistance: Stand by (setup)  Bathing Assistance: Moderate  UE Dressing Assistance: Moderate  LE Dressing Assistance: Maximal  Toileting Assistance with Device: Maximal    Activity Tolerance:  Endurance: Tolerates less than 10 min exercise, no significant change in vital signs    Bed Mobility/Transfers:       "     Ambulation/Gait Training:       Sitting Balance:  Static Sitting Balance  Static Sitting-Comment/Number of Minutes: good  Dynamic Sitting Balance  Dynamic Sitting-Comments: fair+    Standing Balance:  Static Standing Balance  Static Standing-Comment/Number of Minutes: fair  Dynamic Standing Balance  Dynamic Standing-Comments: fair+    Functional Assessments:  Bed Mobility   NT secondary to sitting in bedside chair     Transfers  Sit to stand: Mod A x1; Vcs for hand placement  Stand to sit: Min A x1; Vcs for hand placement      Ambulation/Gait Training  Pt ambulated 5 ft with FWW and Min A x1; pt easily fatigued and required chair brought behind her for seated break    Strength:  Strength Comments: YUDY COLE's WFL    Extremities: RUE   RUE : Within Functional Limits and LUE   LUE: Within Functional Limits    Outcome Measures: [facility] Daily Activity  Putting on and taking off regular lower body clothing: A lot  Bathing (including washing, rinsing, drying): A lot  Putting on and taking off regular upper body clothing: A little  Toileting, which includes using toilet, bedpan or urinal: A lot  Taking care of personal grooming such as brushing teeth: A little  Eating Meals: A little  Daily Activity - Total Score: 15     EDUCATION:  Education  Individual(s) Educated: Patient  Education Provided:  (safety)  Education Documentation  Body Mechanics, taught by Michelle Pettit OT at 3/13/2024  2:16 PM.  Learner: Patient  Readiness: Acceptance  Method: Explanation  Response: Verbalizes Understanding    Precautions, taught by Michelle Pettit OT at 3/13/2024  2:16 PM.  Learner: Patient  Readiness: Acceptance  Method: Explanation  Response: Verbalizes Understanding    Education Comments  No comments found.        Goals:   Encounter Problems       Encounter Problems (Active)       OT Goals       OT Goal 1 (Progressing)       Start:  03/13/24    Expected End:  03/27/24       Pt will complete all bed mobility with supervision safely             OT Goal 2 (Progressing)       Start:  03/13/24    Expected End:  03/27/24       Pt with complete all transfers safely with SBA           OT Goal 3 (Progressing)       Start:  03/13/24    Expected End:  03/27/24       Pt will complete ADL's and mobility with good sit balance and fair+ stand balance            OT Goal 4 (Progressing)       Start:  03/13/24    Expected End:  03/27/24       Pt will complete UB dressing ADL's with SBA using adaptive device(s) as needed           OT Goal 5 (Progressing)       Start:  03/13/24    Expected End:  03/27/24       Pt with complete grooming ADL's with supervision and good stand balance                 Treatment: Pt was up in bedside chair. Pt required Mod A to complete sit-stand to wheeled walker. Pt required increased time to come to a complete stand. While standing, pt with complaints of increased abdominal pain. Pt did not rate pain. Pt ambulated about 10' using wheeled walker with Min A and chair follow. Pt completed stand-sit back into bedside chair. Pt remains in chair with chair alarm on and call light within reach.

## 2024-03-13 NOTE — CARE PLAN
The patient's goals for the shift include pain management    The clinical goals for the shift include hemodynamic stability.    Over the shift, the patient did not make progress toward the following goals. Barriers to progression include n/a. Recommendations to address these barriers include n/a.

## 2024-03-13 NOTE — PROGRESS NOTES
Physical Therapy    Physical Therapy Evaluation    Patient Name: Natasha Lundberg  MRN: [id number]  Today's Date: 3/13/2024   Time Calculation  Start Time: 1103  Stop Time: 1120  Time Calculation (min): 17 min  3122    Assessment/Plan   PT Assessment: Pt demonstrates decreased strength, decreased activity tolerance, increased pain, and impaired balance/mobility.  Pt appears below baseline level of function and based on current level of function, pt would benefit from continued skilled therapy while in the hospital to ensure safety, decrease risk of falls, and regains strength/mobility back to baseline.  Once stable enough for discharge, pt would benefit from moderate intensity therapy.     PT Assessment Results: Decreased strength, Decreased endurance, Impaired balance, Decreased mobility, Decreased safety awareness, Pain  Rehab Prognosis: Good  Evaluation/Treatment Tolerance: Patient limited by fatigue, Patient limited by pain  Medical Staff Made Aware: Yes  End of Session Communication: Bedside nurse  End of Session Patient Position: Up in chair, Alarm on  IP OR SWING BED PT PLAN  Inpatient or Swing Bed: Inpatient  PT Plan  Treatment/Interventions: Bed mobility, Transfer training, Gait training, Balance training, Neuromuscular re-education, Strengthening, Therapeutic exercise, Therapeutic activity, Endurance training  PT Plan: Skilled PT  PT Frequency: 3 times per week  PT Discharge Recommendations: Moderate intensity level of continued care  Equipment Recommended upon Discharge: Wheeled walker  PT Recommended Transfer Status: Assist x1 (Min-Mod A)  PT - OK to Discharge: Yes - To next level of care when cleared by medical team    Subjective     Current Problem:  Patient Active Problem List   Diagnosis    Difficulty in walking    Primary hypertension    SIRS due to infectious process with acute organ dysfunction (CMS/HCC)    Pneumonia    Leukocytosis    Upper respiratory tract infection    Abdominal pain, unspecified  "abdominal location       General Visit Information:  Per EMR: pt admitted with abdominal pain, found to have intramuscular hematoma of the left rectus.  Patient states abdominal pain for few days prior to admission located in the left lower quadrant radiating to back.  Patient denies any dark or bloody stools, urinary urgency frequency hematuria or dysuria.  Denies falling or hitting her side recently.  Denies any dizziness, vision changes, sweats, chills, shortness of breath, chest pain, palpitations, nausea/vomiting/diarrhea.  Patient states left lower quadrant quadrant pain has much lessened since she received IV morphine.     Patient seen in the emergency department, CT of the abdomen revealed \"Interval development of an intramuscular hematoma involving the left rectus sheath measuring approximately 4 cm x 5.6 cm x 10.4 cm. There is accompanying decompression into the extraperitoneal portion of the pelvis with small amount of blood in the anterolateral aspect of the left hemipelvis.\" CT of the chest is negative for pulmonary embolus.  Labs done today include lipase 135, troponin 13, repeat 13, BNP 33, BUN 39, creatinine 1.28.  Patient received IV fluids in the emergency department. Patient did test positive for influenza, precautions maintained.    General: On arrival, pt sitting in bedside chair.  Pt in no apparent distress and agreeable to therapy.  Reason for Referral: impaired mobility, gait training, impaired cognition/safety awareness  Referred By: Cedrick Mehta  Past Medical History Relevant to Rehab: hip fracture with sustained cardiac arrest secondary to PE (8/2023), HTN  Co-Treatment: OT  Prior to Session Communication: Bedside nurse  Patient Position Received: Up in chair, Alarm on    Home Living/PLOF:  Pt is from [facility] with .  Pt states she occasionally needs assist with bed mobility and LB dressing.  Pt has assist with showers with WIS, chair and Gbs.  Pt states she normally " ambulates with FWW and is able to ambulate to dining room with FWW.  Family provides transport as needed.  Pt denies any recent falls.      Precautions:  Precautions  Medical Precautions: Fall precautions  Precautions Comment: (+) Flu A, droplet precautions; abdominal binder for rectus sheath hematoma     Objective     Pain:  Pain Assessment  Pain Assessment: 0-10  Pain Score:  (pt initially denies any pain, but c/o increased ABD pain with ambulation)    Cognition:  Cognition  Overall Cognitive Status: Within Functional Limits  Orientation Level: Oriented X4    General Assessments:      Activity Tolerance  Endurance: Tolerates less than 10 min exercise, no significant change in vital signs    Static Sitting Balance  Static Sitting-Comment/Number of Minutes: fair plus  Dynamic Sitting Balance  Dynamic Sitting-Comments: fair  Static Standing Balance  Static Standing-Comment/Number of Minutes: fair  Dynamic Standing Balance  Dynamic Standing-Comments: fair minus    Functional Assessments:  Bed Mobility   NT secondary to sitting in bedside chair    Transfers  Sit to stand: Mod A x1; Vcs for hand placement  Stand to sit: Min A x1; Vcs for hand placement     Ambulation/Gait Training  Pt ambulated 5 ft with FWW and Min A x1; pt easily fatigued and required chair brought behind her for seated break    Extremity/Trunk Assessments:  BLE strength: at least 3+/5; resistant not applied secondary to rectus sheath hematoma and abdominal pain    Outcome Measures:  [facility] Basic Mobility  Turning from your back to your side while in a flat bed without using bedrails: A little  Moving from lying on your back to sitting on the side of a flat bed without using bedrails: A lot  Moving to and from bed to chair (including a wheelchair): A little  Standing up from a chair using your arms (e.g. wheelchair or bedside chair): A lot  To walk in hospital room: A lot  Climbing 3-5 steps with railing: A lot  Basic Mobility - Total Score:  14    Goals:  Encounter Problems       Encounter Problems (Active)       PT Problem       Pt will be able to perform all bed mobility tasks with SBA.  (Progressing)       Start:  03/13/24    Expected End:  03/27/24            Pt will perform all transfers with SBA and FWW with proper safety mechanics.   (Progressing)       Start:  03/13/24    Expected End:  03/27/24            Pt will ambulate 75 ft with SBA using FWW for improved functional independence.  (Progressing)       Start:  03/13/24    Expected End:  03/27/24            Pt will demonstrate good stand balance for completion of therapeutic exercises and functional tasks.  (Progressing)       Start:  03/13/24    Expected End:  03/27/24                 Education Documentation  Body Mechanics, taught by Sosa Toure, PT at 3/13/2024 12:51 PM.  Learner: Patient  Readiness: Acceptance  Method: Explanation  Response: Verbalizes Understanding    Home Exercise Program, taught by Sosa Toure PT at 3/13/2024 12:51 PM.  Learner: Patient  Readiness: Acceptance  Method: Explanation  Response: Verbalizes Understanding    Mobility Training, taught by Sosa Toure PT at 3/13/2024 12:51 PM.  Learner: Patient  Readiness: Acceptance  Method: Explanation  Response: Verbalizes Understanding    Education Comments  No comments found.

## 2024-03-18 NOTE — DOCUMENTATION CLARIFICATION NOTE
"    PATIENT:               MORGAN FERREIRA  ACCT #:                  [id number]  MRN:                       [id number]  :                       1935  ADMIT DATE:       3/10/2024 10:56 AM  DISCH DATE:        3/13/2024 1:03 PM  RESPONDING PROVIDER #:        [id number]          PROVIDER RESPONSE TEXT:    Acute Kidney Injury ruled in as evidenced by improvement of serum creatinine with IV fluids from 1.28 to 0.88    CDI QUERY TEXT:    [facility]_CV MATEO    Instruction:    Based on your assessment of the patient and the clinical information, please provide the requested documentation by clicking on the appropriate radio button and enter any additional information if prompted.    Question: Please clarify the diagnosis of Acute Kidney Injury    When answering this query, please exercise your independent professional judgment. The fact that a question is being asked, does not imply that any particular answer is desired or expected.    The patient's clinical indicators include:  Clinical Information:  88 yr old female presenting from assisted living w/abdominal pain.  Admitted for Large Rectus Sheath Hematoma which developed after Flu w/severe coughing.    Documented Diagnosis:  HP by IM 3/10 0421 PM states \"MATEO\", \"Denies hx of renal problems\", \"Received IVFs in ED\", and \"Monitor labs\".    Clinical Indicators and Documentation on admission 3/13 unless otherwise stated:  -Vital Signs: T 37.3, HR 93, RR 20 RA, /73.  -Baseline Creatinine: not stated  -SCr lab values: 1.28, 1.01, 1.05, 0.88 from 3/10 to 3/13.  -Urine Output: first recorded 3/11 1630, 900 mL.  -Electrolyte lab values: Na 124, K 4.6, CL 92, Ca 8.9.  -Nephrology consult: NA    Treatment: Daily lab monitoring of kidney function.  LR IVF bolus x2L.  No I/O ordered.    Risk Factors: Elderly female w/no hx of MATEO with elevated creatinine.  Options provided:  -- Acute Kidney Injury is ruled out  -- Acute Kidney Injury ruled in as evidenced by, Please specify additional " information below  -- Other - I will add my own diagnosis  -- Refer to Clinical Documentation Reviewer    Query created by: Edith Nichole on 3/13/2024 2:18 PM      Electronically signed by:  LE BALDERAS MD 3/18/2024 10:20 AM

## 2024-03-18 NOTE — DOCUMENTATION CLARIFICATION NOTE
PATIENT:               MORGAN FERREIRA  ACCT #:                  [id number]  MRN:                       [id number]  :                       1935  ADMIT DATE:       3/10/2024 10:56 AM  DISCH DATE:        3/13/2024 1:03 PM  RESPONDING PROVIDER #:        [id number]          PROVIDER RESPONSE TEXT:    Low serum bicarbonate, insignificant clinical finding    CDI QUERY TEXT:    [facility]_Abnormal Studies    Instruction:    Based on your assessment of the patient and the clinical information, please provide the requested documentation by clicking on the appropriate radio button and enter any additional information if prompted.    Question: Is there a diagnosis indicative of the lab values and clinical findings    When answering this query, please exercise your independent professional judgment. The fact that a question is being asked, does not imply that any particular answer is desired or expected.    The patient's clinical indicators include:  Clinical Information:  88 yr old female presenting w/abdominal pain.  Admitted for Rectus Sheath Hematoma which developed after severe coughing w/Influenza A , MATEO, and Hyponatremia.    Clinical Indicators:  Labs: Serum Bicarb 19, 21, 18 from 3/10 to 3/12.  Cr 1.28, 1.01, 1.05 from 3/10 to 3/12.  No hx of CKD w/MATEO.    Receiving LR IVFs.    Treatment: Daily lab monitoring of serum bicarbonate and kidney function.  LR IVF bolus x2L.    Risk Factors: Elderly female w/elevated serum bicarbonate w/MATEO, no hx of CKD.  Options provided:  -- Metabolic Acidosis  -- Low serum bicarbonate, insignificant clinical finding  -- Other - I will add my own diagnosis  -- Refer to Clinical Documentation Reviewer    Query created by: Edith Nichole on 3/12/2024 12:27 PM      Electronically signed by:  LE BALDERAS MD 3/18/2024 10:20 AM

## 2024-03-19 ENCOUNTER — NURSING HOME VISIT (OUTPATIENT)
Dept: POST ACUTE CARE | Facility: EXTERNAL LOCATION | Age: 89
End: 2024-03-19
Payer: MEDICARE

## 2024-03-19 DIAGNOSIS — I10 PRIMARY HYPERTENSION: Primary | ICD-10-CM

## 2024-03-19 DIAGNOSIS — R26.2 DIFFICULTY IN WALKING: ICD-10-CM

## 2024-03-19 DIAGNOSIS — J18.9 PNEUMONIA DUE TO INFECTIOUS ORGANISM, UNSPECIFIED LATERALITY, UNSPECIFIED PART OF LUNG: ICD-10-CM

## 2024-03-19 DIAGNOSIS — R10.9 ABDOMINAL PAIN, UNSPECIFIED ABDOMINAL LOCATION: ICD-10-CM

## 2024-03-19 DIAGNOSIS — J06.9 UPPER RESPIRATORY TRACT INFECTION, UNSPECIFIED TYPE: ICD-10-CM

## 2024-03-19 LAB
ATRIAL RATE: 83 BPM
ATRIAL RATE: 98 BPM
P AXIS: 23 DEGREES
P AXIS: 29 DEGREES
P OFFSET: 153 MS
P OFFSET: 155 MS
P ONSET: 103 MS
P ONSET: 105 MS
PR INTERVAL: 216 MS
PR INTERVAL: 224 MS
Q ONSET: 213 MS
Q ONSET: 215 MS
QRS COUNT: 14 BEATS
QRS COUNT: 16 BEATS
QRS DURATION: 102 MS
QRS DURATION: 92 MS
QT INTERVAL: 350 MS
QT INTERVAL: 360 MS
QTC CALCULATION(BAZETT): 423 MS
QTC CALCULATION(BAZETT): 446 MS
QTC FREDERICIA: 401 MS
QTC FREDERICIA: 412 MS
R AXIS: -34 DEGREES
R AXIS: -38 DEGREES
T AXIS: 27 DEGREES
T AXIS: 64 DEGREES
T OFFSET: 388 MS
T OFFSET: 395 MS
VENTRICULAR RATE: 83 BPM
VENTRICULAR RATE: 98 BPM

## 2024-03-19 PROCEDURE — 99349 HOME/RES VST EST MOD MDM 40: CPT | Performed by: STUDENT IN AN ORGANIZED HEALTH CARE EDUCATION/TRAINING PROGRAM

## 2024-03-21 ASSESSMENT — ENCOUNTER SYMPTOMS
MUSCULOSKELETAL NEGATIVE: 1
PSYCHIATRIC NEGATIVE: 1
COUGH: 1
CONSTITUTIONAL NEGATIVE: 1
CHEST TIGHTNESS: 1
NEUROLOGICAL NEGATIVE: 1
GASTROINTESTINAL NEGATIVE: 1
CARDIOVASCULAR NEGATIVE: 1

## 2024-03-22 NOTE — PROGRESS NOTES
Subjective   Patient ID: Natasha Lundberg is a 88 y.o. female.    Patient seen and examined at bedside.  She endorses that she is overall doing well.  She was recently admitted to the hospital for an underlying pneumonia as well as rectus sheath hematoma and MATEO.  Improved with supportive care and was subsequently discharged back to assisted living. She now feels as if she has pneumonia but is overall improving.  She states that she still has a slight cough but is improving every day.  Otherwise, states no additional issues or concerns.        Review of Systems   Constitutional: Negative.    HENT: Negative.     Respiratory:  Positive for cough and chest tightness.    Cardiovascular: Negative.    Gastrointestinal: Negative.    Musculoskeletal: Negative.    Neurological: Negative.    Psychiatric/Behavioral: Negative.         Objective Vitals Reviewed via facility EMR   Physical Exam  Constitutional:       General: She is not in acute distress.     Appearance: She is not ill-appearing.      Comments: Elderly female, pleasant   Eyes:      Pupils: Pupils are equal, round, and reactive to light.   Cardiovascular:      Rate and Rhythm: Normal rate and regular rhythm.      Pulses: Normal pulses.      Heart sounds: No murmur heard.  Pulmonary:      Effort: No respiratory distress.      Breath sounds: No wheezing.   Abdominal:      General: Abdomen is flat. Bowel sounds are normal. There is no distension.   Musculoskeletal:      Right lower leg: No edema.      Left lower leg: No edema.   Skin:     General: Skin is warm and dry.   Neurological:      Mental Status: She is alert. Mental status is at baseline.      Cranial Nerves: No cranial nerve deficit.      Motor: No weakness.   Psychiatric:         Mood and Affect: Mood normal.         Behavior: Behavior normal.         Assessment/Plan   Diagnoses and all orders for this visit:  Primary hypertension  Abdominal pain, unspecified abdominal location  Pneumonia due to infectious  organism, unspecified laterality, unspecified part of lung  Difficulty in walking  Upper respiratory tract infection, unspecified type      Patient seen and examined, hospital course reviewed.  Medications reconciled from hospitalization.  Overall continue to monitor respiratory status.  Cough has been improving but consider repeat chest x-ray if this is persistent.  Will obtain lab work at appropriate time continue on current medications.  Continue on current diet as well.  Staff updated with care plan.    Reviewed and approved by SELINA ESTES on 3/21/24 at 9:56 PM.

## 2024-04-04 NOTE — DISCHARGE SUMMARY
Discharge Diagnosis  Abdominal pain, unspecified abdominal location  Rectus sheath hematoma  Difficulty walking  HTN    Discharge Meds     Your medication list        CONTINUE taking these medications        Instructions Last Dose Given Next Dose Due   acetaminophen 500 mg tablet  Commonly known as: Tylenol           benzonatate 100 mg capsule  Commonly known as: Tessalon           calcium citrate 200 mg (950 mg) tablet  Commonly known as: Calcitrate           cholecalciferol 50 MCG (2000 UT) tablet  Commonly known as: Vitamin D-3           diphenhydrAMINE-acetaminophen  mg per tablet  Commonly known as: Tylenol PM           doxycycline 100 mg tablet  Commonly known as: Vibra-Tabs           furosemide 20 mg tablet  Commonly known as: Lasix           oxyCODONE 5 mg immediate release tablet  Commonly known as: Roxicodone           pantoprazole 40 mg EC tablet  Commonly known as: ProtoNix           polyethylene glycol 17 gram packet  Commonly known as: Glycolax, Miralax           ProAir HFA 90 mcg/actuation inhaler  Generic drug: albuterol                  STOP taking these medications      apixaban 5 mg tablet  Commonly known as: Eliquis                 Test Results Pending At Discharge  Pending Labs       No current pending labs.            Hospital Course  Patient is a 88 year old female, admitted to the hospital with abdominal pain, she was found to have rectus sheath hematoma, she was evaluated by general surgery, and interventional radiology, conservative management was recommended, Eliquis was placed on hold, hemoglobin was stable, patient was discharged in stable condition    Pertinent Physical Exam At Time of Discharge  Physical Exam  Constitutional:       Appearance: She is normal weight.   HENT:      Head: Normocephalic and atraumatic.      Mouth/Throat:      Mouth: Mucous membranes are moist.   Eyes:      Conjunctiva/sclera: Conjunctivae normal.      Pupils: Pupils are equal, round, and reactive to  light.   Cardiovascular:      Rate and Rhythm: Normal rate and regular rhythm.      Pulses: Normal pulses.      Heart sounds: Normal heart sounds.   Pulmonary:      Effort: Pulmonary effort is normal.      Breath sounds: Normal breath sounds.   Abdominal:      General: Abdomen is flat. Bowel sounds are normal.      Palpations: Abdomen is soft.   Skin:     General: Skin is warm and dry.   Neurological:      General: No focal deficit present.      Mental Status: She is alert.         Outpatient Follow-Up  No future appointments.      Efrain Lacey MD

## 2024-05-07 DIAGNOSIS — I25.83 CORONARY ARTERY DISEASE DUE TO LIPID RICH PLAQUE: Primary | ICD-10-CM

## 2024-05-07 DIAGNOSIS — I25.10 CORONARY ARTERY DISEASE DUE TO LIPID RICH PLAQUE: Primary | ICD-10-CM

## 2024-05-07 RX ORDER — ASPIRIN 81 MG/81MG
81 CAPSULE ORAL DAILY
Qty: 90 CAPSULE | Refills: 3 | Status: SHIPPED | OUTPATIENT
Start: 2024-05-07 | End: 2025-05-07

## 2024-06-04 ENCOUNTER — NURSING HOME VISIT (OUTPATIENT)
Dept: POST ACUTE CARE | Facility: EXTERNAL LOCATION | Age: 89
End: 2024-06-04
Payer: MEDICARE

## 2024-06-04 DIAGNOSIS — R26.2 DIFFICULTY IN WALKING: ICD-10-CM

## 2024-06-04 DIAGNOSIS — J06.9 UPPER RESPIRATORY TRACT INFECTION, UNSPECIFIED TYPE: ICD-10-CM

## 2024-06-04 DIAGNOSIS — I10 PRIMARY HYPERTENSION: Primary | ICD-10-CM

## 2024-06-04 PROCEDURE — 99348 HOME/RES VST EST LOW MDM 30: CPT | Performed by: STUDENT IN AN ORGANIZED HEALTH CARE EDUCATION/TRAINING PROGRAM

## 2024-06-05 ASSESSMENT — ENCOUNTER SYMPTOMS
CARDIOVASCULAR NEGATIVE: 1
RESPIRATORY NEGATIVE: 1
PSYCHIATRIC NEGATIVE: 1
GASTROINTESTINAL NEGATIVE: 1
MUSCULOSKELETAL NEGATIVE: 1
CONSTITUTIONAL NEGATIVE: 1
NEUROLOGICAL NEGATIVE: 1

## 2024-06-06 NOTE — PROGRESS NOTES
Subjective   Patient ID: Natasha Lundberg is a 88 y.o. female.    Due to patient seen and examined on routine evaluation.  She regards that she is doing overall well.  Recently, her  did have a upper respiratory illness.  She did have some symptoms from this, however they resolved on her own.  Otherwise, she is doing well in general.  Mood is overall stable, and she is fairly active within the community.  Otherwise, regards no additional issues at this time.        Review of Systems   Constitutional: Negative.    HENT:  Positive for congestion and postnasal drip.    Respiratory: Negative.          Slight congestion   Cardiovascular: Negative.    Gastrointestinal: Negative.    Musculoskeletal: Negative.    Neurological: Negative.    Psychiatric/Behavioral: Negative.         Objective Vitals Reviewed via facility EMR   Physical Exam  Constitutional:       General: She is not in acute distress.     Appearance: She is not ill-appearing.   Eyes:      Pupils: Pupils are equal, round, and reactive to light.   Cardiovascular:      Rate and Rhythm: Normal rate and regular rhythm.      Pulses: Normal pulses.      Heart sounds: No murmur heard.  Pulmonary:      Effort: No respiratory distress.      Breath sounds: No wheezing.   Abdominal:      General: Abdomen is flat. Bowel sounds are normal. There is no distension.   Musculoskeletal:      Right lower leg: No edema.      Left lower leg: No edema.   Skin:     General: Skin is warm and dry.   Neurological:      Mental Status: She is alert. Mental status is at baseline.      Cranial Nerves: No cranial nerve deficit.      Motor: No weakness.   Psychiatric:         Mood and Affect: Mood normal.         Behavior: Behavior normal.         Assessment/Plan   Diagnoses and all orders for this visit:  Primary hypertension  Difficulty in walking  Upper respiratory tract infection, unspecified type  Patient seen and examined at bedside.  Overall medically stable.  Continue on current  medications.  As per HPI, patient's  recently had an upper respiratory illness.  Monitor for signs and symptoms of infection.  Continue supportive care.  No changes in medications at this time.  Patient overall at baseline physical status.  Will continue to closely monitor.    Reviewed and approved by SELINA ESTES on 6/5/24 at 9:19 PM.

## 2024-08-06 ENCOUNTER — NURSING HOME VISIT (OUTPATIENT)
Dept: POST ACUTE CARE | Facility: EXTERNAL LOCATION | Age: 89
End: 2024-08-06
Payer: MEDICARE

## 2024-08-06 DIAGNOSIS — J06.9 UPPER RESPIRATORY TRACT INFECTION, UNSPECIFIED TYPE: Primary | ICD-10-CM

## 2024-08-07 NOTE — PROGRESS NOTES
Subjective   Natasha Lundberg is a 88 y.o. female who presents for No chief complaint on file..  Patient seen and examined. She is generally doing well. Without any concerns today. She still has some residual congestion that is improving. Was having headaches form sinus congestion, have improved today. She is not having any cough, dyspnea, chest pains. Chronic arthritic pain is managed well. No concerns today. No fevers. Eating well, much improved overall since last week. No concerns or questions today.         Review of Systems    Objective   There were no vitals taken for this visit.   Physical Exam  Constitutional:       General: She is not in acute distress.     Appearance: She is not ill-appearing.   HENT:      Nose: Congestion present. No rhinorrhea.      Mouth/Throat:      Mouth: Mucous membranes are moist.      Pharynx: Oropharynx is clear.   Eyes:      Conjunctiva/sclera: Conjunctivae normal.   Cardiovascular:      Rate and Rhythm: Normal rate and regular rhythm.      Pulses: Normal pulses.      Heart sounds: No murmur heard.  Pulmonary:      Effort: Pulmonary effort is normal. No respiratory distress.      Breath sounds: Normal breath sounds.   Musculoskeletal:      Right lower leg: No edema.      Left lower leg: No edema.   Skin:     General: Skin is warm and dry.      Capillary Refill: Capillary refill takes less than 2 seconds.   Neurological:      General: No focal deficit present.      Mental Status: She is alert and oriented to person, place, and time.   Psychiatric:         Mood and Affect: Mood normal.         Behavior: Behavior normal.         Assessment/Plan     Patient seen and examined. She is doing well without any concerns or compaints today. Improving from viral URI last week. Some residual congestion but resolution of headaches, no cough or dyspnea. Will manage symptomatically until complete resolution of URI, discussed expected course of symptoms. No changes to remainder of medications today. No  further workup at this time.   Assessment & Plan  Upper respiratory tract infection, unspecified type                  Antonio Courtney DO   08/07/24 8:58 AM     Pt seen and examined with resident physician, overall having some upper respiratory symptoms, closely monitor and treat with supportive care, consider covid testing in the future, fall precuations.  was recently put on hospice, will need to closely monitor mood and anxiety/depression. No additional issues.       Reviewed and approved by SELINA ESTES on 8/11/24 at 9:36 PM.

## 2024-08-12 NOTE — PROGRESS NOTES
I saw and evaluated the patient. I personally obtained the key and critical portions of the history and physical exam or was physically present for key and critical portions performed by the resident/fellow. I reviewed the resident/fellow's documentation and discussed the patient with the resident/fellow. I agree with the resident/fellow's medical decision making as documented in the note.    Matthew Jones, DO

## 2024-09-10 ENCOUNTER — NURSING HOME VISIT (OUTPATIENT)
Dept: POST ACUTE CARE | Facility: EXTERNAL LOCATION | Age: 89
End: 2024-09-10
Payer: MEDICARE

## 2024-09-10 DIAGNOSIS — I10 PRIMARY HYPERTENSION: Primary | ICD-10-CM

## 2024-09-10 DIAGNOSIS — J06.9 UPPER RESPIRATORY TRACT INFECTION, UNSPECIFIED TYPE: ICD-10-CM

## 2024-09-10 DIAGNOSIS — G43.809 OTHER MIGRAINE WITHOUT STATUS MIGRAINOSUS, NOT INTRACTABLE: ICD-10-CM

## 2024-09-10 DIAGNOSIS — R26.2 DIFFICULTY IN WALKING: ICD-10-CM

## 2024-09-10 PROCEDURE — 99349 HOME/RES VST EST MOD MDM 40: CPT | Performed by: STUDENT IN AN ORGANIZED HEALTH CARE EDUCATION/TRAINING PROGRAM

## 2024-09-12 PROBLEM — G43.909 MIGRAINES: Status: ACTIVE | Noted: 2024-09-12

## 2024-09-12 ASSESSMENT — ENCOUNTER SYMPTOMS
GASTROINTESTINAL NEGATIVE: 1
ARTHRALGIAS: 1
FATIGUE: 1
RESPIRATORY NEGATIVE: 1
PSYCHIATRIC NEGATIVE: 1
CARDIOVASCULAR NEGATIVE: 1
WEAKNESS: 1

## 2024-09-13 NOTE — PROGRESS NOTES
Subjective   Patient ID: Natasha Lundberg is a 88 y.o. female.    Patient seen and examined at bedside.  Regards that she is overall doing well, however intermittently has been having worsening headaches and migraines as of late.  She has tried multiple over-the-counter medications, without any improvements.  States that the headaches almost happen every day requiring as needed Tylenol.  Otherwise, states no additional issues or concerns.        Review of Systems   Constitutional:  Positive for fatigue.   HENT: Negative.     Respiratory: Negative.     Cardiovascular: Negative.    Gastrointestinal: Negative.    Musculoskeletal:  Positive for arthralgias.   Neurological:  Positive for weakness.   Psychiatric/Behavioral: Negative.         Objective Vitals Reviewed via facility EMR   Physical Exam  Constitutional:       General: She is not in acute distress.     Appearance: She is not ill-appearing.   Eyes:      Pupils: Pupils are equal, round, and reactive to light.   Cardiovascular:      Rate and Rhythm: Normal rate and regular rhythm.      Pulses: Normal pulses.      Heart sounds: No murmur heard.  Pulmonary:      Effort: No respiratory distress.      Breath sounds: No wheezing.   Abdominal:      General: Abdomen is flat. Bowel sounds are normal. There is no distension.   Musculoskeletal:      Right lower leg: No edema.      Left lower leg: No edema.   Skin:     General: Skin is warm and dry.   Neurological:      Mental Status: She is alert. Mental status is at baseline.      Cranial Nerves: No cranial nerve deficit.      Motor: Weakness present.   Psychiatric:         Mood and Affect: Mood normal.         Behavior: Behavior normal.         Assessment/Plan   Diagnoses and all orders for this visit:  Primary hypertension  Upper respiratory tract infection, unspecified type  Difficulty in walking  Other migraine without status migrainosus, not intractable    Patient seen and examined at bedside.  Likely would benefit from  preventative medication for underlying headaches and migraines.  She also endorses some insomnia issues, due to this, we will trial low-dose amitriptyline.  Otherwise, she regards no acute issues or concerns.  Feels overall well.    Reviewed and approved by SELINA ESTES on 9/12/24 at 10:07 PM.

## 2024-09-16 ENCOUNTER — HOSPITAL ENCOUNTER (INPATIENT)
Facility: HOSPITAL | Age: 89
LOS: 3 days | Discharge: HOME | End: 2024-09-20
Attending: EMERGENCY MEDICINE | Admitting: INTERNAL MEDICINE
Payer: MEDICARE

## 2024-09-16 DIAGNOSIS — R29.6 RECURRENT FALLS: ICD-10-CM

## 2024-09-16 DIAGNOSIS — K92.2 LOWER GI BLEED: Primary | ICD-10-CM

## 2024-09-16 DIAGNOSIS — D50.9 IRON DEFICIENCY ANEMIA, UNSPECIFIED IRON DEFICIENCY ANEMIA TYPE: ICD-10-CM

## 2024-09-16 DIAGNOSIS — R26.2 DIFFICULTY IN WALKING: ICD-10-CM

## 2024-09-16 LAB
BASOPHILS # BLD AUTO: 0.11 X10*3/UL (ref 0–0.1)
BASOPHILS NFR BLD AUTO: 0.8 %
EOSINOPHIL # BLD AUTO: 0.45 X10*3/UL (ref 0–0.4)
EOSINOPHIL NFR BLD AUTO: 3.2 %
ERYTHROCYTE [DISTWIDTH] IN BLOOD BY AUTOMATED COUNT: 14.2 % (ref 11.5–14.5)
HCT VFR BLD AUTO: 30 % (ref 36–46)
HGB BLD-MCNC: 9.2 G/DL (ref 12–16)
IMM GRANULOCYTES # BLD AUTO: 0.08 X10*3/UL (ref 0–0.5)
IMM GRANULOCYTES NFR BLD AUTO: 0.6 % (ref 0–0.9)
LYMPHOCYTES # BLD AUTO: 1.43 X10*3/UL (ref 0.8–3)
LYMPHOCYTES NFR BLD AUTO: 10.2 %
MCH RBC QN AUTO: 30.4 PG (ref 26–34)
MCHC RBC AUTO-ENTMCNC: 30.7 G/DL (ref 32–36)
MCV RBC AUTO: 99 FL (ref 80–100)
MONOCYTES # BLD AUTO: 0.84 X10*3/UL (ref 0.05–0.8)
MONOCYTES NFR BLD AUTO: 6 %
NEUTROPHILS # BLD AUTO: 11.11 X10*3/UL (ref 1.6–5.5)
NEUTROPHILS NFR BLD AUTO: 79.2 %
NRBC BLD-RTO: 0 /100 WBCS (ref 0–0)
PLATELET # BLD AUTO: 264 X10*3/UL (ref 150–450)
RBC # BLD AUTO: 3.03 X10*6/UL (ref 4–5.2)
WBC # BLD AUTO: 14 X10*3/UL (ref 4.4–11.3)

## 2024-09-16 PROCEDURE — 2500000004 HC RX 250 GENERAL PHARMACY W/ HCPCS (ALT 636 FOR OP/ED): Performed by: EMERGENCY MEDICINE

## 2024-09-16 PROCEDURE — 83735 ASSAY OF MAGNESIUM: CPT | Performed by: EMERGENCY MEDICINE

## 2024-09-16 PROCEDURE — 85730 THROMBOPLASTIN TIME PARTIAL: CPT | Performed by: EMERGENCY MEDICINE

## 2024-09-16 PROCEDURE — 36415 COLL VENOUS BLD VENIPUNCTURE: CPT | Performed by: EMERGENCY MEDICINE

## 2024-09-16 PROCEDURE — 85610 PROTHROMBIN TIME: CPT | Performed by: EMERGENCY MEDICINE

## 2024-09-16 PROCEDURE — 99285 EMERGENCY DEPT VISIT HI MDM: CPT

## 2024-09-16 PROCEDURE — 99285 EMERGENCY DEPT VISIT HI MDM: CPT | Performed by: EMERGENCY MEDICINE

## 2024-09-16 PROCEDURE — 80053 COMPREHEN METABOLIC PANEL: CPT | Performed by: EMERGENCY MEDICINE

## 2024-09-16 PROCEDURE — 85025 COMPLETE CBC W/AUTO DIFF WBC: CPT | Performed by: EMERGENCY MEDICINE

## 2024-09-16 ASSESSMENT — PAIN - FUNCTIONAL ASSESSMENT: PAIN_FUNCTIONAL_ASSESSMENT: 0-10

## 2024-09-16 ASSESSMENT — PAIN SCALES - GENERAL
PAINLEVEL_OUTOF10: 0 - NO PAIN
PAINLEVEL_OUTOF10: 0 - NO PAIN

## 2024-09-16 ASSESSMENT — COLUMBIA-SUICIDE SEVERITY RATING SCALE - C-SSRS
2. HAVE YOU ACTUALLY HAD ANY THOUGHTS OF KILLING YOURSELF?: NO
1. IN THE PAST MONTH, HAVE YOU WISHED YOU WERE DEAD OR WISHED YOU COULD GO TO SLEEP AND NOT WAKE UP?: NO
6. HAVE YOU EVER DONE ANYTHING, STARTED TO DO ANYTHING, OR PREPARED TO DO ANYTHING TO END YOUR LIFE?: NO

## 2024-09-17 ENCOUNTER — APPOINTMENT (OUTPATIENT)
Dept: RADIOLOGY | Facility: HOSPITAL | Age: 89
End: 2024-09-17
Payer: MEDICARE

## 2024-09-17 PROBLEM — D72.828 NEUTROPHILIC LEUKOCYTOSIS: Status: ACTIVE | Noted: 2023-12-20

## 2024-09-17 PROBLEM — K92.2 LOWER GI BLEED: Status: ACTIVE | Noted: 2024-09-17

## 2024-09-17 PROBLEM — N17.9 AKI (ACUTE KIDNEY INJURY) (CMS-HCC): Status: ACTIVE | Noted: 2024-09-17

## 2024-09-17 PROBLEM — D72.9 NEUTROPHILIC LEUKOCYTOSIS: Status: ACTIVE | Noted: 2023-12-20

## 2024-09-17 PROBLEM — K57.30 DIVERTICULOSIS OF COLON WITHOUT DIVERTICULITIS: Status: ACTIVE | Noted: 2024-09-17

## 2024-09-17 PROBLEM — K62.5 RECTAL BLEEDING: Status: ACTIVE | Noted: 2024-09-17

## 2024-09-17 LAB
ABO GROUP (TYPE) IN BLOOD: NORMAL
ALBUMIN SERPL BCP-MCNC: 3.7 G/DL (ref 3.4–5)
ALP SERPL-CCNC: 65 U/L (ref 33–136)
ALT SERPL W P-5'-P-CCNC: 6 U/L (ref 7–45)
ANION GAP SERPL CALC-SCNC: 12 MMOL/L (ref 10–20)
ANION GAP SERPL CALC-SCNC: 14 MMOL/L (ref 10–20)
ANTIBODY SCREEN: NORMAL
APTT PPP: 33 SECONDS (ref 27–38)
AST SERPL W P-5'-P-CCNC: 13 U/L (ref 9–39)
BILIRUB SERPL-MCNC: 0.3 MG/DL (ref 0–1.2)
BUN SERPL-MCNC: 28 MG/DL (ref 6–23)
BUN SERPL-MCNC: 32 MG/DL (ref 6–23)
CALCIUM SERPL-MCNC: 8.3 MG/DL (ref 8.6–10.3)
CALCIUM SERPL-MCNC: 8.5 MG/DL (ref 8.6–10.3)
CHLORIDE SERPL-SCNC: 103 MMOL/L (ref 98–107)
CHLORIDE SERPL-SCNC: 105 MMOL/L (ref 98–107)
CO2 SERPL-SCNC: 24 MMOL/L (ref 21–32)
CO2 SERPL-SCNC: 25 MMOL/L (ref 21–32)
CREAT SERPL-MCNC: 1.06 MG/DL (ref 0.5–1.05)
CREAT SERPL-MCNC: 1.18 MG/DL (ref 0.5–1.05)
EGFRCR SERPLBLD CKD-EPI 2021: 45 ML/MIN/1.73M*2
EGFRCR SERPLBLD CKD-EPI 2021: 51 ML/MIN/1.73M*2
ERYTHROCYTE [DISTWIDTH] IN BLOOD BY AUTOMATED COUNT: 14.3 % (ref 11.5–14.5)
FERRITIN SERPL-MCNC: 38 NG/ML (ref 8–150)
GLUCOSE SERPL-MCNC: 109 MG/DL (ref 74–99)
GLUCOSE SERPL-MCNC: 122 MG/DL (ref 74–99)
HCT VFR BLD AUTO: 26.3 % (ref 36–46)
HEMOCCULT SP1 STL QL: NEGATIVE
HGB BLD-MCNC: 8.2 G/DL (ref 12–16)
INR PPP: 1.1 (ref 0.9–1.1)
INR PPP: 1.2 (ref 0.9–1.1)
IRON SATN MFR SERPL: 18 % (ref 25–45)
IRON SERPL-MCNC: 49 UG/DL (ref 35–150)
MAGNESIUM SERPL-MCNC: 1.78 MG/DL (ref 1.6–2.4)
MAGNESIUM SERPL-MCNC: 1.82 MG/DL (ref 1.6–2.4)
MCH RBC QN AUTO: 30.7 PG (ref 26–34)
MCHC RBC AUTO-ENTMCNC: 31.2 G/DL (ref 32–36)
MCV RBC AUTO: 99 FL (ref 80–100)
NRBC BLD-RTO: 0 /100 WBCS (ref 0–0)
PHOSPHATE SERPL-MCNC: 3.6 MG/DL (ref 2.5–4.9)
PLATELET # BLD AUTO: 237 X10*3/UL (ref 150–450)
POTASSIUM SERPL-SCNC: 4.5 MMOL/L (ref 3.5–5.3)
POTASSIUM SERPL-SCNC: 4.7 MMOL/L (ref 3.5–5.3)
PROT SERPL-MCNC: 6.6 G/DL (ref 6.4–8.2)
PROTHROMBIN TIME: 12 SECONDS (ref 9.8–12.8)
PROTHROMBIN TIME: 13.1 SECONDS (ref 9.8–12.8)
RBC # BLD AUTO: 2.67 X10*6/UL (ref 4–5.2)
RH FACTOR (ANTIGEN D): NORMAL
SODIUM SERPL-SCNC: 136 MMOL/L (ref 136–145)
SODIUM SERPL-SCNC: 137 MMOL/L (ref 136–145)
TIBC SERPL-MCNC: 272 UG/DL (ref 240–445)
UIBC SERPL-MCNC: 223 UG/DL (ref 110–370)
WBC # BLD AUTO: 9.3 X10*3/UL (ref 4.4–11.3)

## 2024-09-17 PROCEDURE — 96360 HYDRATION IV INFUSION INIT: CPT | Mod: 59

## 2024-09-17 PROCEDURE — 83540 ASSAY OF IRON: CPT

## 2024-09-17 PROCEDURE — 96361 HYDRATE IV INFUSION ADD-ON: CPT

## 2024-09-17 PROCEDURE — 82270 OCCULT BLOOD FECES: CPT

## 2024-09-17 PROCEDURE — 2500000001 HC RX 250 WO HCPCS SELF ADMINISTERED DRUGS (ALT 637 FOR MEDICARE OP): Performed by: NURSE PRACTITIONER

## 2024-09-17 PROCEDURE — 82728 ASSAY OF FERRITIN: CPT

## 2024-09-17 PROCEDURE — 84100 ASSAY OF PHOSPHORUS: CPT

## 2024-09-17 PROCEDURE — 85610 PROTHROMBIN TIME: CPT

## 2024-09-17 PROCEDURE — 99222 1ST HOSP IP/OBS MODERATE 55: CPT | Performed by: NURSE PRACTITIONER

## 2024-09-17 PROCEDURE — 83735 ASSAY OF MAGNESIUM: CPT

## 2024-09-17 PROCEDURE — 97161 PT EVAL LOW COMPLEX 20 MIN: CPT | Mod: GP

## 2024-09-17 PROCEDURE — 99222 1ST HOSP IP/OBS MODERATE 55: CPT

## 2024-09-17 PROCEDURE — 36415 COLL VENOUS BLD VENIPUNCTURE: CPT | Performed by: EMERGENCY MEDICINE

## 2024-09-17 PROCEDURE — 80048 BASIC METABOLIC PNL TOTAL CA: CPT

## 2024-09-17 PROCEDURE — 2500000001 HC RX 250 WO HCPCS SELF ADMINISTERED DRUGS (ALT 637 FOR MEDICARE OP)

## 2024-09-17 PROCEDURE — 36415 COLL VENOUS BLD VENIPUNCTURE: CPT

## 2024-09-17 PROCEDURE — 97530 THERAPEUTIC ACTIVITIES: CPT | Mod: GP

## 2024-09-17 PROCEDURE — 1200000002 HC GENERAL ROOM WITH TELEMETRY DAILY

## 2024-09-17 PROCEDURE — 86901 BLOOD TYPING SEROLOGIC RH(D): CPT | Performed by: EMERGENCY MEDICINE

## 2024-09-17 PROCEDURE — 2550000001 HC RX 255 CONTRASTS: Performed by: EMERGENCY MEDICINE

## 2024-09-17 PROCEDURE — 74177 CT ABD & PELVIS W/CONTRAST: CPT

## 2024-09-17 PROCEDURE — 74177 CT ABD & PELVIS W/CONTRAST: CPT | Mod: FOREIGN READ | Performed by: STUDENT IN AN ORGANIZED HEALTH CARE EDUCATION/TRAINING PROGRAM

## 2024-09-17 PROCEDURE — 85027 COMPLETE CBC AUTOMATED: CPT

## 2024-09-17 PROCEDURE — 2500000004 HC RX 250 GENERAL PHARMACY W/ HCPCS (ALT 636 FOR OP/ED)

## 2024-09-17 RX ORDER — CALCIUM CARBONATE 500(1250)
1250 TABLET ORAL DAILY
Status: DISCONTINUED | OUTPATIENT
Start: 2024-09-17 | End: 2024-09-20 | Stop reason: HOSPADM

## 2024-09-17 RX ORDER — AMITRIPTYLINE HYDROCHLORIDE 10 MG/1
10 TABLET, FILM COATED ORAL NIGHTLY
COMMUNITY

## 2024-09-17 RX ORDER — ACETAMINOPHEN 325 MG/1
650 TABLET ORAL EVERY 6 HOURS PRN
Status: DISCONTINUED | OUTPATIENT
Start: 2024-09-17 | End: 2024-09-20 | Stop reason: HOSPADM

## 2024-09-17 RX ORDER — ALBUTEROL SULFATE 0.83 MG/ML
2.5 SOLUTION RESPIRATORY (INHALATION) EVERY 6 HOURS PRN
Status: DISCONTINUED | OUTPATIENT
Start: 2024-09-17 | End: 2024-09-20 | Stop reason: HOSPADM

## 2024-09-17 RX ORDER — PANTOPRAZOLE SODIUM 40 MG/1
40 TABLET, DELAYED RELEASE ORAL
Status: DISCONTINUED | OUTPATIENT
Start: 2024-09-18 | End: 2024-09-20 | Stop reason: HOSPADM

## 2024-09-17 RX ORDER — CHOLECALCIFEROL (VITAMIN D3) 25 MCG
2000 TABLET ORAL DAILY
Status: DISCONTINUED | OUTPATIENT
Start: 2024-09-17 | End: 2024-09-20 | Stop reason: HOSPADM

## 2024-09-17 RX ORDER — TALC
3 POWDER (GRAM) TOPICAL NIGHTLY PRN
Status: DISCONTINUED | OUTPATIENT
Start: 2024-09-17 | End: 2024-09-20 | Stop reason: HOSPADM

## 2024-09-17 RX ORDER — PANTOPRAZOLE SODIUM 40 MG/10ML
40 INJECTION, POWDER, LYOPHILIZED, FOR SOLUTION INTRAVENOUS DAILY
Status: DISCONTINUED | OUTPATIENT
Start: 2024-09-17 | End: 2024-09-17 | Stop reason: SDUPTHER

## 2024-09-17 RX ORDER — IBUPROFEN 200 MG
400 TABLET ORAL EVERY 12 HOURS PRN
COMMUNITY
End: 2024-09-20 | Stop reason: HOSPADM

## 2024-09-17 RX ORDER — POLYETHYLENE GLYCOL 3350 17 G/17G
17 POWDER, FOR SOLUTION ORAL DAILY PRN
Status: DISCONTINUED | OUTPATIENT
Start: 2024-09-17 | End: 2024-09-20 | Stop reason: HOSPADM

## 2024-09-17 RX ORDER — ASPIRIN 81 MG/1
81 TABLET ORAL DAILY
Status: DISCONTINUED | OUTPATIENT
Start: 2024-09-17 | End: 2024-09-20 | Stop reason: HOSPADM

## 2024-09-17 RX ORDER — OXYCODONE HYDROCHLORIDE 5 MG/1
5 TABLET ORAL EVERY 6 HOURS PRN
Status: DISCONTINUED | OUTPATIENT
Start: 2024-09-17 | End: 2024-09-20 | Stop reason: HOSPADM

## 2024-09-17 RX ORDER — AMITRIPTYLINE HYDROCHLORIDE 10 MG/1
10 TABLET, FILM COATED ORAL NIGHTLY
Status: DISCONTINUED | OUTPATIENT
Start: 2024-09-17 | End: 2024-09-20 | Stop reason: HOSPADM

## 2024-09-17 RX ORDER — IBUPROFEN 200 MG
600 CAPSULE ORAL DAILY
Status: DISCONTINUED | OUTPATIENT
Start: 2024-09-17 | End: 2024-09-17

## 2024-09-17 SDOH — SOCIAL STABILITY: SOCIAL INSECURITY: DOES ANYONE TRY TO KEEP YOU FROM HAVING/CONTACTING OTHER FRIENDS OR DOING THINGS OUTSIDE YOUR HOME?: NO

## 2024-09-17 SDOH — SOCIAL STABILITY: SOCIAL INSECURITY: HAVE YOU HAD THOUGHTS OF HARMING ANYONE ELSE?: YES

## 2024-09-17 SDOH — SOCIAL STABILITY: SOCIAL INSECURITY: DO YOU FEEL UNSAFE GOING BACK TO THE PLACE WHERE YOU ARE LIVING?: NO

## 2024-09-17 SDOH — SOCIAL STABILITY: SOCIAL INSECURITY: ABUSE: ADULT

## 2024-09-17 SDOH — SOCIAL STABILITY: SOCIAL INSECURITY: DO YOU FEEL ANYONE HAS EXPLOITED OR TAKEN ADVANTAGE OF YOU FINANCIALLY OR OF YOUR PERSONAL PROPERTY?: NO

## 2024-09-17 SDOH — SOCIAL STABILITY: SOCIAL INSECURITY: ARE THERE ANY APPARENT SIGNS OF INJURIES/BEHAVIORS THAT COULD BE RELATED TO ABUSE/NEGLECT?: NO

## 2024-09-17 SDOH — SOCIAL STABILITY: SOCIAL INSECURITY: HAVE YOU HAD ANY THOUGHTS OF HARMING ANYONE ELSE?: NO

## 2024-09-17 SDOH — SOCIAL STABILITY: SOCIAL INSECURITY: ARE YOU OR HAVE YOU BEEN THREATENED OR ABUSED PHYSICALLY, EMOTIONALLY, OR SEXUALLY BY ANYONE?: NO

## 2024-09-17 SDOH — SOCIAL STABILITY: SOCIAL INSECURITY: WERE YOU ABLE TO COMPLETE ALL THE BEHAVIORAL HEALTH SCREENINGS?: YES

## 2024-09-17 SDOH — SOCIAL STABILITY: SOCIAL INSECURITY: HAS ANYONE EVER THREATENED TO HURT YOUR FAMILY OR YOUR PETS?: NO

## 2024-09-17 ASSESSMENT — COGNITIVE AND FUNCTIONAL STATUS - GENERAL
TOILETING: A LITTLE
CLIMB 3 TO 5 STEPS WITH RAILING: A LITTLE
STANDING UP FROM CHAIR USING ARMS: A LITTLE
STANDING UP FROM CHAIR USING ARMS: A LITTLE
MOVING TO AND FROM BED TO CHAIR: A LITTLE
MOVING TO AND FROM BED TO CHAIR: A LITTLE
HELP NEEDED FOR BATHING: A LOT
TOILETING: A LITTLE
DRESSING REGULAR UPPER BODY CLOTHING: A LITTLE
DAILY ACTIVITIY SCORE: 17
MOVING TO AND FROM BED TO CHAIR: A LITTLE
DRESSING REGULAR LOWER BODY CLOTHING: A LOT
DAILY ACTIVITIY SCORE: 20
DAILY ACTIVITIY SCORE: 24
WALKING IN HOSPITAL ROOM: A LITTLE
MOBILITY SCORE: 19
CLIMB 3 TO 5 STEPS WITH RAILING: A LOT
DRESSING REGULAR LOWER BODY CLOTHING: A LITTLE
DRESSING REGULAR UPPER BODY CLOTHING: A LITTLE
WALKING IN HOSPITAL ROOM: A LITTLE
HELP NEEDED FOR BATHING: A LITTLE
TOILETING: A LITTLE
PATIENT BASELINE BEDBOUND: NO
DRESSING REGULAR UPPER BODY CLOTHING: A LITTLE
DRESSING REGULAR LOWER BODY CLOTHING: A LOT
HELP NEEDED FOR BATHING: A LITTLE
TURNING FROM BACK TO SIDE WHILE IN FLAT BAD: A LITTLE
STANDING UP FROM CHAIR USING ARMS: A LITTLE
PERSONAL GROOMING: A LITTLE
WALKING IN HOSPITAL ROOM: A LITTLE
PERSONAL GROOMING: A LITTLE
DAILY ACTIVITIY SCORE: 18
MOBILITY SCORE: 19
MOBILITY SCORE: 24
CLIMB 3 TO 5 STEPS WITH RAILING: A LITTLE
MOBILITY SCORE: 20

## 2024-09-17 ASSESSMENT — ACTIVITIES OF DAILY LIVING (ADL)
JUDGMENT_ADEQUATE_SAFELY_COMPLETE_DAILY_ACTIVITIES: YES
BATHING_ASSISTANCE: MODERATE
HEARING - LEFT EAR: FUNCTIONAL
BATHING: NEEDS ASSISTANCE
GROOMING: NEEDS ASSISTANCE
TOILETING: NEEDS ASSISTANCE
DRESSING YOURSELF: NEEDS ASSISTANCE
ASSISTIVE_DEVICE: WALKER;WHEELCHAIR
ADEQUATE_TO_COMPLETE_ADL: YES
FEEDING YOURSELF: INDEPENDENT
WALKS IN HOME: NEEDS ASSISTANCE
LACK_OF_TRANSPORTATION: NO
PATIENT'S MEMORY ADEQUATE TO SAFELY COMPLETE DAILY ACTIVITIES?: YES
HEARING - RIGHT EAR: FUNCTIONAL

## 2024-09-17 ASSESSMENT — ENCOUNTER SYMPTOMS
DIZZINESS: 0
WEAKNESS: 0
DIFFICULTY URINATING: 0
SHORTNESS OF BREATH: 0
DYSURIA: 0
LIGHT-HEADEDNESS: 0
FLANK PAIN: 0
BRUISES/BLEEDS EASILY: 1
NERVOUS/ANXIOUS: 0
NAUSEA: 0
PALPITATIONS: 0
SORE THROAT: 0
CHILLS: 0
AGITATION: 0
POLYPHAGIA: 0
HEMATURIA: 0
BLOOD IN STOOL: 1
CONFUSION: 0
DIARRHEA: 0
TROUBLE SWALLOWING: 0
EYE PAIN: 0
FEVER: 0
WOUND: 0
HEADACHES: 0
CONSTIPATION: 0
CHEST TIGHTNESS: 0
COUGH: 0
ABDOMINAL DISTENTION: 0
COLOR CHANGE: 0
VOMITING: 0
POLYDIPSIA: 0
ABDOMINAL PAIN: 0
DYSPHORIC MOOD: 0
FREQUENCY: 0
BACK PAIN: 0
FATIGUE: 0

## 2024-09-17 ASSESSMENT — PAIN SCALES - GENERAL
PAINLEVEL_OUTOF10: 0 - NO PAIN

## 2024-09-17 ASSESSMENT — LIFESTYLE VARIABLES
PRESCIPTION_ABUSE_PAST_12_MONTHS: NO
SKIP TO QUESTIONS 9-10: 1
AUDIT-C TOTAL SCORE: 2
HOW MANY STANDARD DRINKS CONTAINING ALCOHOL DO YOU HAVE ON A TYPICAL DAY: 1 OR 2
AUDIT-C TOTAL SCORE: 2
HOW OFTEN DO YOU HAVE A DRINK CONTAINING ALCOHOL: 2-4 TIMES A MONTH
HOW OFTEN DO YOU HAVE 6 OR MORE DRINKS ON ONE OCCASION: NEVER
SUBSTANCE_ABUSE_PAST_12_MONTHS: NO

## 2024-09-17 ASSESSMENT — PAIN - FUNCTIONAL ASSESSMENT
PAIN_FUNCTIONAL_ASSESSMENT: 0-10

## 2024-09-17 ASSESSMENT — PATIENT HEALTH QUESTIONNAIRE - PHQ9
SUM OF ALL RESPONSES TO PHQ9 QUESTIONS 1 & 2: 0
1. LITTLE INTEREST OR PLEASURE IN DOING THINGS: NOT AT ALL
2. FEELING DOWN, DEPRESSED OR HOPELESS: NOT AT ALL

## 2024-09-17 ASSESSMENT — VISUAL ACUITY: OU: 1

## 2024-09-17 NOTE — H&P
History Of Present Illness  Natasha Lundberg is a 88 y.o. female with pertinent past medical history of HTN, HLD, pulmonary embolus (no longer on eliquis), cardiac arrest, recurrent falls, COVID-19, iron deficiency anemia due to chronic blood loss, GERD, hx rectus sheath hematoma 3/2024 and bilateral leg edema presents to [facility] on 9/16/2024 from [facility] with complaints of rectal bleeding    Patient states she has been having dark maroon blood in her stool since the morning of 9/16. She reports having three bowel movements all with maroon/dark red colored stool. Of note, patient does have a history of GI bleed related to eliquis use which has since been discontinued and patient was also admitted in march 2024 with a left rectus sheath hematoma. She currently only takes a baby aspirin once daily. Patient does not recall following up with GI in the outpatient setting and is not noted to be on any iron supplement related to her chronic iron deficiency anemia. She any recent fever/chills, headache, dizziness, chest pain / palpitations, shortness of breath, cough, abdominal pain, N/V/D/C, hematemesis, dysuria, or hematuria.    ED Course:  Vital signs: Temp. 97.7 F, HR 104bpm, RR 20, /71, SPO2 94% on room air   CMP: Glucose 122, BUN 32, Cr 1.18, EGFR 45, Ca2+ 8.5, ALT 6, Mg2+ 1.78  CBC w/diff: WBC 14.0, RBC 3.03, H/H 9.2/30.0, neutrophils 11.11  CT abdomen and pelvis w IV contrast: No acute intraabdominal findings. Evaluation for active GI bleeding is limited given single phase of contrast. No significant fluid within the colon. Colonic diverticulosis without diverticulitis   Medications Given: 0.9% sodium chloride 500 mL bolus IV x 1  Digital rectal exam was performed by ER provider with chaperone at the beside and small amount of maroon-colored stool noted without clots or melena. No thrombosed hemorrhoids were noted. No anal fissures noted.   Disposition: Patient admitted for concerns of rectal bleeding and mild  Spoke with patient, reviewed check in time and instructions for left leg angiogram  Scheduled for 9/19 check in at 6:00 am at [facility].  Patient will obtain pre-op  COVID test within 48 hours of procedure.  Takes Metformin in the evening, discussed NPO status.  Mailed instructions to patient.    Patito Kelly RN  IR nurse clinician  [phone]     MATEO, with GI on consult    Past Medical History  She has a past medical history of Bilateral leg edema (10/10/2023), Cardiac arrest (Multi), COVID-19 (09/10/2023), Gastroesophageal reflux disease without esophagitis (04/24/2023), GI bleed, Humeral fracture (09/10/2023), Iron deficiency anemia due to chronic blood loss (04/24/2023), Other pulmonary embolism without acute cor pulmonale (Multi) (04/24/2023), PE (pulmonary thromboembolism) (Multi), Primary hypertension (04/24/2023), Rectus sheath hematoma, and Unspecified fracture of right femur, subsequent encounter for closed fracture with routine healing (04/24/2023).    Surgical History  She has a past surgical history that includes CT angio abdomen pelvis w and or wo IV IV contrast (09/23/2023); Total knee arthroplasty; Femur fracture surgery; and Parathyroidectomy.     Social History  She reports that she has quit smoking. Her smoking use included cigarettes. She has never used smokeless tobacco. She reports current alcohol use. She reports that she does not use drugs.    Family History  Family History   Problem Relation Name Age of Onset    COPD Mother      Lung cancer Father      Esophageal cancer Sister          Allergies  Patient has no known allergies.    Review of Systems   Constitutional:  Negative for chills, fatigue and fever.   HENT:  Negative for hearing loss, sore throat and trouble swallowing.    Eyes:  Negative for pain and visual disturbance.   Respiratory:  Negative for cough, chest tightness and shortness of breath.    Cardiovascular:  Positive for leg swelling. Negative for chest pain and palpitations.   Gastrointestinal:  Positive for blood in stool. Negative for abdominal distention, abdominal pain, constipation, diarrhea, nausea and vomiting.   Endocrine: Negative for polydipsia, polyphagia and polyuria.   Genitourinary:  Negative for difficulty urinating, dysuria, flank pain, frequency, hematuria and urgency.   Musculoskeletal:  Negative for  back pain and gait problem.   Skin:  Negative for color change, rash and wound.   Allergic/Immunologic: Negative for immunocompromised state.   Neurological:  Negative for dizziness, syncope, weakness, light-headedness and headaches.   Hematological:  Bruises/bleeds easily.   Psychiatric/Behavioral:  Negative for agitation, confusion and dysphoric mood. The patient is not nervous/anxious.       Physical Exam  Vitals reviewed.   Constitutional:       General: She is not in acute distress.     Appearance: Normal appearance.   HENT:      Head: Normocephalic and atraumatic.      Right Ear: Hearing and external ear normal.      Left Ear: Hearing and external ear normal.   Eyes:      General: Lids are normal. Vision grossly intact.      Extraocular Movements: Extraocular movements intact.      Conjunctiva/sclera: Conjunctivae normal.      Pupils: Pupils are equal, round, and reactive to light.      Visual Fields: Right eye visual fields normal and left eye visual fields normal.   Cardiovascular:      Rate and Rhythm: Normal rate and regular rhythm.      Pulses:           Radial pulses are 2+ on the right side and 2+ on the left side.        Dorsalis pedis pulses are 1+ on the right side and 1+ on the left side.        Posterior tibial pulses are 2+ on the right side and 2+ on the left side.      Heart sounds: S1 normal and S2 normal. No murmur heard.     No S3 or S4 sounds.   Pulmonary:      Effort: Pulmonary effort is normal. No tachypnea or accessory muscle usage.      Breath sounds: Normal breath sounds and air entry. No stridor. No wheezing, rhonchi or rales.   Abdominal:      General: Bowel sounds are normal. There is no distension.      Palpations: Abdomen is soft.      Tenderness: There is abdominal tenderness (to palpation of LLQ).   Musculoskeletal:      Cervical back: Full passive range of motion without pain and normal range of motion.      Right lower le+ Edema present.      Left lower le+ Edema  "present.   Skin:     General: Skin is warm and dry.      Capillary Refill: Capillary refill takes less than 2 seconds.      Coloration: Skin is not cyanotic or jaundiced.      Findings: No bruising, ecchymosis, erythema or rash.   Neurological:      General: No focal deficit present.      Mental Status: She is alert and oriented to person, place, and time. Mental status is at baseline.      Comments: Bilateral hand grasps and foot push/pulls are strong and equal   Psychiatric:         Attention and Perception: Attention normal.         Mood and Affect: Mood and affect normal.         Speech: Speech normal.         Behavior: Behavior normal. Behavior is cooperative.       Last Recorded Vitals  Blood pressure 125/57, pulse 94, temperature 36.5 °C (97.7 °F), temperature source Temporal, resp. rate 16, height 1.549 m (5' 1\"), weight 68 kg (150 lb), SpO2 95%.  Visit Vitals  /57 (BP Location: Right arm)   Pulse 94   Temp 36.5 °C (97.7 °F) (Temporal)   Resp 16   Ht 1.549 m (5' 1\")   Wt 68 kg (150 lb)   SpO2 95%   BMI 28.34 kg/m²   OB Status Postmenopausal   Smoking Status Former   BSA 1.71 m²     Weight: 68 kg (150 lb)   Pain Assessment: 0-10  0-10 (Numeric) Pain Score: 0 - No pain      Relevant Results  Results for orders placed or performed during the hospital encounter of 09/16/24 (from the past 24 hour(s))   CBC and Auto Differential   Result Value Ref Range    WBC 14.0 (H) 4.4 - 11.3 x10*3/uL    nRBC 0.0 0.0 - 0.0 /100 WBCs    RBC 3.03 (L) 4.00 - 5.20 x10*6/uL    Hemoglobin 9.2 (L) 12.0 - 16.0 g/dL    Hematocrit 30.0 (L) 36.0 - 46.0 %    MCV 99 80 - 100 fL    MCH 30.4 26.0 - 34.0 pg    MCHC 30.7 (L) 32.0 - 36.0 g/dL    RDW 14.2 11.5 - 14.5 %    Platelets 264 150 - 450 x10*3/uL    Neutrophils % 79.2 40.0 - 80.0 %    Immature Granulocytes %, Automated 0.6 0.0 - 0.9 %    Lymphocytes % 10.2 13.0 - 44.0 %    Monocytes % 6.0 2.0 - 10.0 %    Eosinophils % 3.2 0.0 - 6.0 %    Basophils % 0.8 0.0 - 2.0 %    Neutrophils " Absolute 11.11 (H) 1.60 - 5.50 x10*3/uL    Immature Granulocytes Absolute, Automated 0.08 0.00 - 0.50 x10*3/uL    Lymphocytes Absolute 1.43 0.80 - 3.00 x10*3/uL    Monocytes Absolute 0.84 (H) 0.05 - 0.80 x10*3/uL    Eosinophils Absolute 0.45 (H) 0.00 - 0.40 x10*3/uL    Basophils Absolute 0.11 (H) 0.00 - 0.10 x10*3/uL   Comprehensive metabolic panel   Result Value Ref Range    Glucose 122 (H) 74 - 99 mg/dL    Sodium 136 136 - 145 mmol/L    Potassium 4.7 3.5 - 5.3 mmol/L    Chloride 103 98 - 107 mmol/L    Bicarbonate 24 21 - 32 mmol/L    Anion Gap 14 10 - 20 mmol/L    Urea Nitrogen 32 (H) 6 - 23 mg/dL    Creatinine 1.18 (H) 0.50 - 1.05 mg/dL    eGFR 45 (L) >60 mL/min/1.73m*2    Calcium 8.5 (L) 8.6 - 10.3 mg/dL    Albumin 3.7 3.4 - 5.0 g/dL    Alkaline Phosphatase 65 33 - 136 U/L    Total Protein 6.6 6.4 - 8.2 g/dL    AST 13 9 - 39 U/L    Bilirubin, Total 0.3 0.0 - 1.2 mg/dL    ALT 6 (L) 7 - 45 U/L   Magnesium   Result Value Ref Range    Magnesium 1.78 1.60 - 2.40 mg/dL   Protime-INR   Result Value Ref Range    Protime 12.0 9.8 - 12.8 seconds    INR 1.1 0.9 - 1.1   aPTT   Result Value Ref Range    aPTT 33 27 - 38 seconds   Type And Screen   Result Value Ref Range    ABO TYPE A     Rh TYPE POS     ANTIBODY SCREEN NEG       CT abdomen pelvis w IV contrast    Result Date: 9/17/2024  STUDY: CT Abdomen and Pelvis with IV Contrast; 9/17/2024 at 1:52 AM INDICATION: Abdominal pain and rectal bleeding. COMPARISON: CTA chest and CT abdomen and pelvis 3/10/2024, CTA chest and CT abdomen and pelvis 12/21/2023. ACCESSION NUMBER(S): [id number] ORDERING CLINICIAN: KAYDEN THORNE TECHNIQUE: CT of the abdomen and pelvis was performed.  Contiguous axial images were obtained at 3 mm slice thickness through the abdomen and pelvis. Coronal and sagittal reconstructions at 3 mm slice thickness were performed.  Omnipaque 350 75 mL was administered intravenously.  FINDINGS: LOWER CHEST: Mild cardiomegaly.  Coronary artery calcifications.  No  pericardial effusion.  Similar bibasilar bronchiectasis and fibrosis.  ABDOMEN:  LIVER: No hepatomegaly.  Smooth surface contour.  Normal attenuation.  BILE DUCTS: No intrahepatic or extrahepatic biliary ductal dilatation.  GALLBLADDER: The gallbladder is unremarkable. STOMACH: No abnormalities identified.  PANCREAS: No masses or ductal dilatation.  SPLEEN: No splenomegaly.  Calcified granuloma within the posterior spleen.  ADRENAL GLANDS: No thickening or nodules.  KIDNEYS AND URETERS: Mild bilateral renal atrophy.  Simple cyst within the superior pole right kidney.  Subcentimeter hypodensity inferior pole left kidney, too small to characterize.  No hydronephrosis.  Ureters are normal in course and caliber.  PELVIS:  BLADDER: No abnormalities identified.  REPRODUCTIVE ORGANS: Calcified uterine fibroid.  BOWEL: No wall thickening.  Colonic diverticula without focal adjacent fat stranding.  No significant fluid within the colon.  Moderate hiatal hernia.  VESSELS: Atherosclerotic calcifications of the aorta and branch vessels.  PERITONEUM/RETROPERITONEUM/LYMPH NODES: No free fluid.  No pneumoperitoneum. No lymphadenopathy.  ABDOMINAL WALL: No abnormalities identified. SOFT TISSUES: Small fat-containing periumbilical hernia.  Resolution of the previously noted left rectus abdominal wall hematoma.  BONES: No acute fracture or aggressive osseous lesion.  Advanced degenerative changes of the spine and hips.    No acute intra-abdominal findings.  Evaluation for active gastrointestinal bleeding is limited given single phase of contrast. No significant fluid within the colon. Colonic diverticulosis without diverticulitis. Signed by Danny Aponte, DO        Home Medications  Prior to Admission medications    Medication Sig Start Date End Date Taking? Authorizing Provider   acetaminophen (Tylenol) 500 mg tablet Take 2 tablets (1,000 mg) by mouth every 8 hours if needed for mild pain (1 - 3).    Historical Provider, MD   albuterol  (ProAir HFA) 90 mcg/actuation inhaler Inhale 2 puffs every 6 hours if needed for wheezing.    Historical Provider, MD   aspirin ([place]) 81 mg capsule Take 81 mg by mouth once daily. 5/7/24 5/7/25  Matthew Jones DO   benzonatate (Tessalon) 100 mg capsule Take 1 capsule (100 mg) by mouth 3 times a day as needed for cough.    Historical Provider, MD   calcium citrate (Calcitrate) 200 mg (950 mg) tablet Take 1 tablet (200 mg) by mouth 2 times a day.    Historical Provider, MD   cholecalciferol (Vitamin D-3) 50 MCG (2000 UT) tablet Take 1 tablet (50 mcg) by mouth once daily.    Historical Provider, MD   diphenhydrAMINE-acetaminophen (Tylenol PM)  mg per tablet Take 2 tablets by mouth once daily at bedtime.    Historical Provider, MD   doxycycline (Vibra-Tabs) 100 mg tablet Take 1 tablet (100 mg) by mouth 2 times a day. LD 3/11/24    Historical Provider, MD   furosemide (Lasix) 20 mg tablet Take 1 tablet (20 mg) by mouth once daily as needed (swelling).    Historical Provider, MD   oxyCODONE (Roxicodone) 5 mg immediate release tablet Take 1 tablet (5 mg) by mouth every 6 hours if needed for severe pain (7 - 10).    Historical Provider, MD   pantoprazole (ProtoNix) 40 mg EC tablet Take 1 tablet (40 mg) by mouth 2 times a day.    Historical Provider, MD   polyethylene glycol (Glycolax, Miralax) 17 gram packet Take 17 g by mouth once daily as needed (swelling).    Historical Provider, MD       Medications  Scheduled medications  pantoprazole, 40 mg, intravenous, Daily      Continuous medications     PRN medications  PRN medications: acetaminophen, melatonin, polyethylene glycol        Assessment/Plan   Assessment & Plan  Rectal bleeding    Neutrophilic leukocytosis    MATEO (acute kidney injury) (CMS-HCC)    Diverticulosis of colon without diverticulitis        Plan:  Code Status: DNR and No Intubation   Consult: GI  Meds: Tylenol 650 mg p.o. every 6 hours as needed for pain 1-6/headaches, melatonin 3 milligrams  p.o. daily as needed for insomnia, MiraLAX 17 gms p.o daily as needed for constipation.  Caution with administering NSAIDs due to history of GI bleed  IV protonix 40 mg once daily  Diet: Clear liquid, no red dye  Telemetry monitoring   Vital signs with BP, HR, & RR Q 4 hours.  Pulse ox Q 4 hours.   Admission height and weight.  Labs ordered: CBC, BMP, Mg, Phos, PT/INR. Iron and TIBC, ferritin. Occult stool.   - leukocytosis and MATEO noted on ER labs, Cr. 1.18, WBC 14.0- likely reactive in the setting of blood loss. IV fluids given in the ER. Monitor with AM labs  - H&H relatively stable (hgb 9.2) when compared to prior labs completed in March 2024 (last Hgb 8.7)  Test ordered: Deferred to attending and consults  Monitor / trend labs while inpatient.  Replace electrolytes as needed.  Transfuse with PRBC for hemoglobin<7.0   Strict I&Os   Fall precautions   Out of bed with assistance   PT/OT eval and treat as indicated   Call physician for temperature < 36.5 or >38.0 degrees celsius.  Call physician for pulse <50 or >120.  Call physician for respiratory rate <10 or >22.  Call physician for systolic blood pressure <90 or >170.  Call physician for diastolic blood pressure < 50 or >100.  Call physician for pulse ox <92%.  See orders for further plan of care ordered.     VTE prophylaxis:   Pharmacological prophylaxis not ordered due to patient being admitted with rectal bleeding  BLE SCDs.  Monitor for s/s of bleeding    Medication reconciliation to be completed when nursing/pharmacy  are able to verify patient's accurate home medications.    See additional orders for further plan of care. Any further evaluation and management per attending and consulting physicians.      Any information found to be copied from previous providers is done in the best interest of the patient to provide accurate, quality, and continuity of care.     I spent 60 minutes in the professional and overall care of this patient.    Maryam Patterson,  LUKAS-CNP  [facility]

## 2024-09-17 NOTE — ED PROVIDER NOTES
HPI   Chief Complaint   Patient presents with    Rectal Bleeding       This is an 88 years old female patient presented to the emergency department with a chief complaint of rectal bleeding.  Stated that it started few hours prior to arrival to the hospital.  She had 3 bowel movement of maroon-colored stool.  Denies taking any oral anticoagulant but she is on aspirin at home.  Stated that she had GI bleed in the past.  Denies any headache, lightheadedness, dizziness, chest pain, shortness of breath, cough, weakness, numbness, fever, chills or bodyaches.  Reported mild abdominal pain.    Review of system: As stated above in the HPI section.              Patient History   Past Medical History:   Diagnosis Date    Bilateral leg edema 10/10/2023    Cardiac arrest (Multi)     2/2 PE    COVID-19 09/10/2023    Gastroesophageal reflux disease without esophagitis 04/24/2023    Humeral fracture 09/10/2023    Iron deficiency anemia due to chronic blood loss 04/24/2023    Other pulmonary embolism without acute cor pulmonale (Multi) 04/24/2023    PE (pulmonary thromboembolism) (Multi)     Primary hypertension 04/24/2023    Unspecified fracture of right femur, subsequent encounter for closed fracture with routine healing 04/24/2023     Past Surgical History:   Procedure Laterality Date    CT ABDOMEN PELVIS ANGIOGRAM W AND/OR WO IV CONTRAST  09/23/2023    CT ABDOMEN PELVIS ANGIOGRAM W AND/OR WO IV CONTRAST 9/23/2023 [facility] CT    FEMUR FRACTURE SURGERY      PARATHYROIDECTOMY      TOTAL KNEE ARTHROPLASTY       Family History   Problem Relation Name Age of Onset    COPD Mother      Lung cancer Father      Esophageal cancer Sister       Social History     Tobacco Use    Smoking status: Former     Types: Cigarettes    Smokeless tobacco: Never   Substance Use Topics    Alcohol use: Yes     Comment: social    Drug use: Never       Physical Exam   ED Triage Vitals [09/16/24 2233]   Temperature Heart Rate Respirations BP   36.5 °C (97.7 °F)  (!) 104 20 129/71      Pulse Ox Temp Source Heart Rate Source Patient Position   94 % Temporal Monitor Sitting      BP Location FiO2 (%)     Right arm --       Physical Exam  Vitals and nursing note reviewed.   Constitutional:       General: She is not in acute distress.     Appearance: She is well-developed.   HENT:      Head: Normocephalic and atraumatic.   Eyes:      Conjunctiva/sclera: Conjunctivae normal.   Cardiovascular:      Rate and Rhythm: Normal rate and regular rhythm.      Heart sounds: No murmur heard.  Pulmonary:      Effort: Pulmonary effort is normal. No respiratory distress.      Breath sounds: Normal breath sounds.   Abdominal:      General: There is distension.      Palpations: Abdomen is soft.      Tenderness: There is abdominal tenderness.      Comments: Rectal exam is done by the resident physician in the presence of the nursing staff and revealed maroon-colored stool.   Musculoskeletal:         General: No swelling.      Cervical back: Neck supple.   Skin:     General: Skin is warm and dry.      Capillary Refill: Capillary refill takes less than 2 seconds.   Neurological:      Mental Status: She is alert.   Psychiatric:         Mood and Affect: Mood normal.           ED Course & MDM   ED Course as of 09/17/24 0315   Tue Sep 17, 2024   0114 Digital rectal exam performed with patient's nurse Monica Tripp RN at bedside.  Patient does have a small amount of maroon-colored stool without clots or melena.  No thrombosed hemorrhoids noted.  No anal fissures noted. [CH]      ED Course User Index  [CH] Abbe Montalvo DO         Diagnoses as of 09/17/24 0315   Lower GI bleed                 No data recorded     Doug Coma Scale Score: 15 (09/16/24 2235 : Leyda Alaniz, SEGUN)                           Medical Decision Making  Patient seen and examined, CT scan is unremarkable, labs are unremarkable except for very mild acute kidney injury and leukocytosis which is likely reactive H&H are at  baseline..  Given the patient's tachycardia, and age we will admit the patient to medicine team likely will need GI consult.        Procedure  Procedures     Kirt Velázquez,   09/17/24 0315

## 2024-09-17 NOTE — PROGRESS NOTES
09/17/24 1147   Discharge Planning   Living Arrangements Spouse/significant other  (@ [facility])   Support Systems Spouse/significant other   Assistance Needed yes- walker   Type of Residence Assisted living   Home or Post Acute Services In home services   Type of Post Acute Facility Services Assisted living   Type of Home Care Services Home PT;Home OT  (Resuming as she is already active with onsite therapy 3x week)   Expected Discharge Disposition Home Health   Financial Resource Strain   How hard is it for you to pay for the very basics like food, housing, medical care, and heating? Not hard   Patient Choice   Patient / Family choosing to utilize agency / facility established prior to hospitalization Yes  (Already active with HHC at [facility])     Met with pt at bedside to review her dc plan. The pt resides at the [facility] along with her  and plans to return there upon dc. She is independent for ADL's except for receiving help with showers. Uses a walker. Goes to the dining room for meals. Facility manages medications. The pt is already active with HHC at the [facility] and sees therapy three days per week. She plans to resume that upon her return. Pt is obs so she should not need new orders. Dr. Jones is her PCP. Denies difficulty with finances. Confirms she has a ride home upon dc.

## 2024-09-17 NOTE — CONSULTS
"Reason For Consult  Rectal bleeding    History Of Present Illness  Natasha Lundberg is a 88 y.o. female with PMH HTN, PE (no longer on AC), iron deficiency anemia, GERD, intraoperative cardiac arrest 2/2023 presenting from [facility] with rectal bleeding. Bleeding started a few hours prior to arrival.  She had noted a total of 3 maroon-colored BMs. She believes they were all low volume. She denies associated abdominal pain, cramping or dyschezia.  She denies constipation or diarrhea.  She states that she has a regular soft brown bowel movement at least daily.  No further episodes since arrival. Of note, I saw this patient 1 year ago also with hematochezia.  Colonoscopy 9/24/2023 with Dr. Sawyer noting severe diverticulosis throughout the entire colon with impacted diverticulum, evidence of past bleeding from diverticular opening. Blood in transverse colon, external hemorrhoids.  Hemoglobin today noted to have a 1 point drop since arrival to 8.2 (baseline of 9 over the past year).  INR 1.2.  CT A/P noting no acute abdominal findings, colonic diverticulosis without diverticulitis.  Digital rectal exam completed with maroon specks noted.  No lightheadedness, dizziness, CP, SOB, abdominal pain, N/V, melena.     Past Medical History  GERD, HTN, KORI, PE on Eliquis, cardiac arrest during femur fracture surgery 2/2023     Surgical History  Right femur fracture repair, knee replacement, parathyroidectomy     Family History  Sister-esophageal cancer, Father-lung cancer    Social History  Former smoker, no alcohol or illicits     Review of Systems  ROS negative unless stated otherwise in HPI     Objective  /60 (BP Location: Left arm, Patient Position: Lying)   Pulse 84   Temp 36.4 °C (97.5 °F) (Temporal)   Resp 16   Ht 1.549 m (5' 1\")   Wt 68 kg (150 lb)   SpO2 98%   BMI 28.34 kg/m²     Physical Exam  Constitutional: Alert, pleasant and interactive, in NAD  Eyes: PERRL, sclera clear, no conjunctival " injection  Skin: Warm and dry, no rash or ecchymosis  ENMT: Mucous membranes moist, no lesions noted  Resp: CTAB, even and unlabored  CV: RRR, normal S1, S2, no m,r,g  GI: +BS, soft, round, NT, no rebound tenderness or guarding, no palpable masses or organomegaly  Extremities: Extremities warm, no edema, contusions, wounds or cyanosis  Neuro: Alert and oriented x3  Psych: Appropriate mood and behavior    Medications  Scheduled medications  pantoprazole, 40 mg, intravenous, Daily      Continuous medications     PRN medications  PRN medications: acetaminophen, melatonin, polyethylene glycol     Labs  Lab Results   Component Value Date    WBC 9.3 09/17/2024    HGB 8.2 (L) 09/17/2024    HCT 26.3 (L) 09/17/2024    MCV 99 09/17/2024     09/17/2024     Lab Results   Component Value Date    GLUCOSE 109 (H) 09/17/2024    CALCIUM 8.3 (L) 09/17/2024     09/17/2024    K 4.5 09/17/2024    CO2 25 09/17/2024     09/17/2024    BUN 28 (H) 09/17/2024    CREATININE 1.06 (H) 09/17/2024     Lab Results   Component Value Date    ALT 6 (L) 09/16/2024    AST 13 09/16/2024    ALKPHOS 65 09/16/2024    BILITOT 0.3 09/16/2024     Lab Results   Component Value Date    IRON 49 09/17/2024    TIBC 272 09/17/2024    FERRITIN 38 09/17/2024     Lab Results   Component Value Date    INR 1.2 (H) 09/17/2024    INR 1.1 09/16/2024    INR 2.9 (H) 12/20/2023    PROTIME 13.1 (H) 09/17/2024    PROTIME 12.0 09/16/2024    PROTIME 33.1 (H) 12/20/2023       Radiology  CT A/P with IV contrast 9/17/2024 noting:  IMPRESSION:  No acute intra-abdominal findings.  Evaluation for active  gastrointestinal bleeding is limited given single phase of contrast.   No significant fluid within the colon.  Colonic diverticulosis without diverticulitis.  Signed by Danny Aponte DO    Assessment:  Natasha Lundberg is a 88 y.o. female with PMH HTN, PE (no longer on AC), iron deficiency anemia, GERD, intraoperative cardiac arrest 2/2023 presenting from [facility]  facility with rectal bleeding.  1 point drop in hemoglobin since arrival to 8.2 (baseline~9).  No further episodes.  DESIRE completed with specks of maroon-colored material.  Colonoscopy 9/2023 for hematochezia noting severe diverticulosis throughout colon with impacted diverticulum.  Patient asymptomatic.    # hematochezia- resolving- diverticular (most likely) v hemorrhoidal v malignancy  # chronic anemia  # Fe Def anemia  # diverticular bleed 9/2023  # PE (no longer on Eliquis)     Plan:  - continue supportive care  - clear liquids as tolerated, can advance to low fiber this evening if no further sign s of bleeding  - continue to trend hgb daily unless pt becomes symptomatic or decompensates  - transfuse to maintain hgb >7  - continue to monitor for and document overt signs of bleeding  - no NSAIDS  - will continue conservative management given that pt is stable with no further active bleeding noted    Plan has been discussed with Dr. Kuo. GI will continue to follow.     Alma Delaney, APRN/CNP

## 2024-09-17 NOTE — CARE PLAN
The patient's goals for the shift include rest    The clinical goals for the shift include comfort, consult to gi      Problem: Skin  Goal: Decreased wound size/increased tissue granulation at next dressing change  Outcome: Progressing  Goal: Participates in plan/prevention/treatment measures  Outcome: Progressing  Goal: Prevent/manage excess moisture  Outcome: Progressing  Goal: Prevent/minimize sheer/friction injuries  Outcome: Progressing  Goal: Promote/optimize nutrition  Outcome: Progressing  Goal: Promote skin healing  Outcome: Progressing     Problem: Pain - Adult  Goal: Verbalizes/displays adequate comfort level or baseline comfort level  Outcome: Progressing     Problem: Safety - Adult  Goal: Free from fall injury  Outcome: Progressing     Problem: Discharge Planning  Goal: Discharge to home or other facility with appropriate resources  Outcome: Progressing     Problem: Chronic Conditions and Co-morbidities  Goal: Patient's chronic conditions and co-morbidity symptoms are monitored and maintained or improved  Outcome: Progressing     Problem: Fall/Injury  Goal: Not fall by end of shift  Outcome: Progressing  Goal: Be free from injury by end of the shift  Outcome: Progressing  Goal: Verbalize understanding of personal risk factors for fall in the hospital  Outcome: Progressing  Goal: Verbalize understanding of risk factor reduction measures to prevent injury from fall in the home  Outcome: Progressing  Goal: Use assistive devices by end of the shift  Outcome: Progressing  Goal: Pace activities to prevent fatigue by end of the shift  Outcome: Progressing     Problem: Pain  Goal: Takes deep breaths with improved pain control throughout the shift  Outcome: Progressing  Goal: Turns in bed with improved pain control throughout the shift  Outcome: Progressing  Goal: Walks with improved pain control throughout the shift  Outcome: Progressing  Goal: Performs ADL's with improved pain control throughout  shift  Outcome: Progressing  Goal: Participates in PT with improved pain control throughout the shift  Outcome: Progressing  Goal: Free from opioid side effects throughout the shift  Outcome: Progressing  Goal: Free from acute confusion related to pain meds throughout the shift  Outcome: Progressing

## 2024-09-17 NOTE — PROGRESS NOTES
Occupational Therapy    Evaluation    Patient Name: Natasha Lundberg  MRN: [id number]  Department: [facility] 3 S OBS  Room: [facility]3106-A  Today's Date: 9/17/2024  Time Calculation  Start Time: 1007  Stop Time: 1021  Time Calculation (min): 14 min    Assessment  IP OT Assessment  End of Session Communication: Bedside nurse  End of Session Patient Position: Up in chair, Alarm on (all needs in reach)  Plan:  Treatment Interventions: ADL retraining, UE strengthening/ROM, Endurance training, Patient/family training, Equipment evaluation/education, Compensatory technique education  OT Frequency: 3 times per week  OT Discharge Recommendations: High intensity level of continued care, Low intensity level of continued care  OT - OK to Discharge: Yes (when medically appropriate)    Subjective   Current Problem:  1. Lower GI bleed          Past Medical History:   Diagnosis Date    Bilateral leg edema 10/10/2023    Cardiac arrest (Multi)     2/2 PE    COVID-19 09/10/2023    Gastroesophageal reflux disease without esophagitis 04/24/2023    GI bleed     Humeral fracture 09/10/2023    Iron deficiency anemia due to chronic blood loss 04/24/2023    Other pulmonary embolism without acute cor pulmonale (Multi) 04/24/2023    PE (pulmonary thromboembolism) (Multi)     Primary hypertension 04/24/2023    Rectus sheath hematoma     Unspecified fracture of right femur, subsequent encounter for closed fracture with routine healing 04/24/2023     Past Surgical History:   Procedure Laterality Date    CT ABDOMEN PELVIS ANGIOGRAM W AND/OR WO IV CONTRAST  09/23/2023    CT ABDOMEN PELVIS ANGIOGRAM W AND/OR WO IV CONTRAST 9/23/2023 [facility] CT    FEMUR FRACTURE SURGERY      PARATHYROIDECTOMY      TOTAL KNEE ARTHROPLASTY           General:  General  Reason for Referral: ADL  Referred By: Leonardo  Past Medical History Relevant to Rehab: Includes: HTN, PE, cardiac arrest, recurrent falls, COVID 19, h/o rectus sheath hematoma  Co-Treatment: PT  Co-Treatment Reason:  "safety  Prior to Session Communication: Bedside nurse  Patient Position Received: Bed, 3 rail up, Alarm on  General Comment: Admitted 9/16 with rectal bleeding.  Hgb 9/17 is 8.2.  Precautions:  Medical Precautions: Fall precautions    Pain:  Pain Assessment  Pain Assessment: 0-10  0-10 (Numeric) Pain Score: 0 - No pain    Objective   Cognition:  Overall Cognitive Status: Within Functional Limits  Orientation Level: Oriented X4           Home Living:  Home Living Comments: Pt. lives at [facility].  Uses FWW for ambulation, including to dining room for meals. Independent with bed mobility and transfers.  Assist with showers, and sometimes assist to dress.  Walk in shower with seat and safety bars.        ADL:  Eating Assistance: Independent  Grooming Deficit: Setup  Bathing Assistance: Moderate  UE Dressing Deficit: Setup  LE Dressing Assistance: Moderate  Toileting Assistance with Device: Minimal  Functional Assistance: Minimal  Activity Tolerance:     Bed Mobility/Transfers: Bed Mobility  Bed Mobility:  (Supervision for sup to sit.  Cues for technique and increased time/difficulty. Stated, bed feels like \"a pit\".)    Transfers  Transfer:  (min assist sit<>stand at EOB and recliner, FWW and gait belt)      Functional Mobility:  Functional Mobility  Functional Mobility Performed: Yes (~ 8' with FWW and gait belt with min assist for balance)  Sitting Balance:  Static Sitting Balance  Static Sitting-Comment/Number of Minutes: Good  Dynamic Sitting Balance  Dynamic Sitting-Comments: Good (-)  Standing Balance:  Static Standing Balance  Static Standing-Comment/Number of Minutes: Fair (-)  Dynamic Standing Balance  Dynamic Standing-Comments: Fair (-)       Extremities: RUE   RUE :  (~ 80 bilat. active shoulder flexion.  WFL bilat. distal UE AROM.) and      Outcome Measures: [facility] Daily Activity  Putting on and taking off regular lower body clothing: A lot  Bathing (including washing, rinsing, drying): A " lot  Putting on and taking off regular upper body clothing: A little  Toileting, which includes using toilet, bedpan or urinal: A little  Taking care of personal grooming such as brushing teeth: A little  Eating Meals: None  Daily Activity - Total Score: 17      Education Documentation  ADL Training, taught by Lien Jolly OT at 9/17/2024  4:09 PM.  Learner: Patient  Readiness: Acceptance  Method: Explanation  Response: Verbalizes Understanding    Body Mechanics, taught by Lien Jolly OT at 9/17/2024  4:09 PM.  Learner: Patient  Readiness: Acceptance  Method: Explanation  Response: Verbalizes Understanding    Precautions, taught by Lien Jolly OT at 9/17/2024  4:09 PM.  Learner: Patient  Readiness: Acceptance  Method: Explanation  Response: Verbalizes Understanding    Education Comments  No comments found.      Goals:   Encounter Problems       Encounter Problems (Active)       OT Goals       Mod I ADL functional mobility with FWW.        Start:  09/17/24    Expected End:  10/01/24            Mod I sit/stand, bed/chair/commode with FWW.       Start:  09/17/24    Expected End:  10/01/24            Good dynamic standing balance for ADL.        Start:  09/17/24    Expected End:  10/01/24            Tolerate 10 mins light functional activity.        Start:  09/17/24    Expected End:  10/01/24

## 2024-09-17 NOTE — PROGRESS NOTES
Physical Therapy    Physical Therapy Evaluation    Patient Name: Natasha Lundberg  MRN: [id number]  Today's Date: 9/17/2024   Time Calculation  Start Time: 1008  Stop Time: 1021  Time Calculation (min): 13 min  3106/3106-A    Assessment/Plan   PT Assessment: Pt demonstrates decreased strength, decreased activity tolerance, and impaired balance/mobility.  Pt appears below baseline level of function and based on current level of function, pt would benefit from continued skilled therapy while in the hospital to ensure safety, decrease risk of falls, and regain strength/mobility back to baseline.  Once stable enough for discharge, pt would benefit from low intensity therapy.     PT Assessment Results: Decreased strength, Decreased range of motion, Impaired balance, Decreased mobility, Impaired sensation  Rehab Prognosis: Good  Evaluation/Treatment Tolerance: Patient tolerated treatment well  Medical Staff Made Aware: Yes  End of Session Communication: Bedside nurse  End of Session Patient Position: Up in chair, Alarm on  IP OR SWING BED PT PLAN  Inpatient or Swing Bed: Inpatient  PT Plan  Treatment/Interventions: Bed mobility, Transfer training, Gait training, Balance training, Neuromuscular re-education, Strengthening, Range of motion, Therapeutic exercise, Therapeutic activity  PT Plan: Ongoing PT  PT Frequency: 3 times per week  PT Discharge Recommendations: Low intensity level of continued care  Equipment Recommended upon Discharge: Wheeled walker  PT Recommended Transfer Status: Assist x1 (Min A)  PT - OK to Discharge: Yes - To next level of care when cleared by medical team    Subjective     Current Problem:  1. Lower GI bleed            Past Medical History:  Patient Active Problem List   Diagnosis    Difficulty in walking    Primary hypertension    SIRS due to infectious process with acute organ dysfunction (Multi)    Pneumonia    Neutrophilic leukocytosis    Upper respiratory tract infection    Abdominal pain,  unspecified abdominal location    Migraines    Rectal bleeding    MATEO (acute kidney injury) (CMS-HCC)    Diverticulosis of colon without diverticulitis       General Visit Information:  Per EMR: pt [facility] to [facility] on [facility]/16/2024 from [facility] with complaints of rectal bleeding     Patient states she has been having dark maroon blood in her stool since the morning of 9/16. She reports having three bowel movements all with maroon/dark red colored stool. Of note, patient does have a history of GI bleed related to eliquis use which has since been discontinued and patient was also admitted in march 2024 with a left rectus sheath hematoma. She currently only takes a baby aspirin once daily. Patient does not recall following up with GI in the outpatient setting and is not noted to be on any iron supplement related to her chronic iron deficiency anemia. She any recent fever/chills, headache, dizziness, chest pain / palpitations, shortness of breath, cough, abdominal pain, N/V/D/C, hematemesis, dysuria, or hematuria.    On arrival, pt supine in bed.  Pt in no apparent distress and agreeable to therapy.    General  Reason for Referral: impaired mobility  Referred By: Gwyn Crain  Past Medical History Relevant to Rehab: HTN, PE, cardiac arrest, recurrent falls, COVID 19, h/o rectus sheath hematoma  Co-Treatment: OT  Prior to Session Communication: Bedside nurse  Patient Position Received: Bed, 3 rail up, Alarm on    Home Living/PLOF:  Pt is from [facility] with  for ~1 year.  Pt states she is Mod I for mobility using FWW and also takes FWW to dining room for meals.  Has assist for dressing and bathing.  Has Wis with chair and Gbs.  Works with PT/OT 3x/wk at facility.  Denies any falls.     Precautions:  Precautions  Medical Precautions: Fall precautions     Objective     Pain:  Pain Assessment  Pain Assessment: 0-10  0-10 (Numeric) Pain Score: 0 - No pain    Cognition:  Cognition  Overall  Cognitive Status: Within Functional Limits  Orientation Level: Oriented X4    General Assessments:      Activity Tolerance  Endurance: Tolerates 10 - 20 min exercise with multiple rests  Sensation  Sensation Comment: pt reports BLE numbness/tingling    Static Sitting Balance  Static Sitting-Comment/Number of Minutes: good  Dynamic Sitting Balance  Dynamic Sitting-Comments: fair plus  Static Standing Balance  Static Standing-Comment/Number of Minutes: good  Dynamic Standing Balance  Dynamic Standing-Comments: fair plus    Extremity/Trunk Assessments:  BLE strength: WFL    Functional Mobility:  Bed mobility  Supine to sit: SUP with increased time and effort    Transfers  Sit to stand: CGA with Vcs for hand placement   Stand to sit: CGA with Vcs for hand placement     Ambulation/Stairs  Pt ambulated 4 ft from bed to chair with close CGA and FWW; decreased reinier noted with quick onset of fatigue     Outcome Measures:  [facility] Basic Mobility  Turning from your back to your side while in a flat bed without using bedrails: None  Moving from lying on your back to sitting on the side of a flat bed without using bedrails: None  Moving to and from bed to chair (including a wheelchair): A little  Standing up from a chair using your arms (e.g. wheelchair or bedside chair): A little  To walk in hospital room: A little  Climbing 3-5 steps with railing: A little  Basic Mobility - Total Score: 20    Goals:  Encounter Problems       Encounter Problems (Active)       PT Problem       Pt will be able to perform all bed mobility tasks with Mod I.  (Progressing)       Start:  09/17/24    Expected End:  10/01/24            Pt will perform all transfers with Mod I and FWW with proper safety mechanics.   (Progressing)       Start:  09/17/24    Expected End:  10/01/24            Pt will ambulate 100 ft with Mod I using FWW for improved functional independence.  (Progressing)       Start:  09/17/24    Expected End:  10/01/24            Pt  will demonstrate good dynamic stand balance for completion of therapeutic exercises and functional tasks.  (Progressing)       Start:  09/17/24    Expected End:  10/01/24                 Education Documentation  Body Mechanics, taught by Sosa Tatum, PT at 9/17/2024  1:10 PM.  Learner: Patient  Readiness: Acceptance  Method: Explanation  Response: Verbalizes Understanding    Home Exercise Program, taught by Sosa Tatum, PT at 9/17/2024  1:10 PM.  Learner: Patient  Readiness: Acceptance  Method: Explanation  Response: Verbalizes Understanding    Mobility Training, taught by Sosa Tatum, PT at 9/17/2024  1:10 PM.  Learner: Patient  Readiness: Acceptance  Method: Explanation  Response: Verbalizes Understanding    Education Comments  No comments found.

## 2024-09-18 LAB
ANION GAP SERPL CALC-SCNC: 9 MMOL/L (ref 10–20)
BUN SERPL-MCNC: 20 MG/DL (ref 6–23)
CALCIUM SERPL-MCNC: 8.5 MG/DL (ref 8.6–10.3)
CHLORIDE SERPL-SCNC: 105 MMOL/L (ref 98–107)
CO2 SERPL-SCNC: 26 MMOL/L (ref 21–32)
CREAT SERPL-MCNC: 1.04 MG/DL (ref 0.5–1.05)
EGFRCR SERPLBLD CKD-EPI 2021: 52 ML/MIN/1.73M*2
ERYTHROCYTE [DISTWIDTH] IN BLOOD BY AUTOMATED COUNT: 14.1 % (ref 11.5–14.5)
ERYTHROCYTE [DISTWIDTH] IN BLOOD BY AUTOMATED COUNT: 14.4 % (ref 11.5–14.5)
GLUCOSE SERPL-MCNC: 95 MG/DL (ref 74–99)
HCT VFR BLD AUTO: 27 % (ref 36–46)
HCT VFR BLD AUTO: 27.6 % (ref 36–46)
HGB BLD-MCNC: 8.3 G/DL (ref 12–16)
HGB BLD-MCNC: 8.4 G/DL (ref 12–16)
MCH RBC QN AUTO: 30.6 PG (ref 26–34)
MCH RBC QN AUTO: 30.7 PG (ref 26–34)
MCHC RBC AUTO-ENTMCNC: 30.4 G/DL (ref 32–36)
MCHC RBC AUTO-ENTMCNC: 30.7 G/DL (ref 32–36)
MCV RBC AUTO: 100 FL (ref 80–100)
MCV RBC AUTO: 101 FL (ref 80–100)
NRBC BLD-RTO: 0 /100 WBCS (ref 0–0)
NRBC BLD-RTO: 0 /100 WBCS (ref 0–0)
PLATELET # BLD AUTO: 226 X10*3/UL (ref 150–450)
PLATELET # BLD AUTO: 278 X10*3/UL (ref 150–450)
POTASSIUM SERPL-SCNC: 4.3 MMOL/L (ref 3.5–5.3)
RBC # BLD AUTO: 2.71 X10*6/UL (ref 4–5.2)
RBC # BLD AUTO: 2.74 X10*6/UL (ref 4–5.2)
SODIUM SERPL-SCNC: 136 MMOL/L (ref 136–145)
WBC # BLD AUTO: 13 X10*3/UL (ref 4.4–11.3)
WBC # BLD AUTO: 9.9 X10*3/UL (ref 4.4–11.3)

## 2024-09-18 PROCEDURE — 2500000001 HC RX 250 WO HCPCS SELF ADMINISTERED DRUGS (ALT 637 FOR MEDICARE OP): Performed by: NURSE PRACTITIONER

## 2024-09-18 PROCEDURE — 1200000002 HC GENERAL ROOM WITH TELEMETRY DAILY

## 2024-09-18 PROCEDURE — 82374 ASSAY BLOOD CARBON DIOXIDE: CPT

## 2024-09-18 PROCEDURE — 97116 GAIT TRAINING THERAPY: CPT | Mod: GP,CQ

## 2024-09-18 PROCEDURE — 36415 COLL VENOUS BLD VENIPUNCTURE: CPT

## 2024-09-18 PROCEDURE — 2500000001 HC RX 250 WO HCPCS SELF ADMINISTERED DRUGS (ALT 637 FOR MEDICARE OP)

## 2024-09-18 PROCEDURE — 36415 COLL VENOUS BLD VENIPUNCTURE: CPT | Performed by: NURSE PRACTITIONER

## 2024-09-18 PROCEDURE — 99232 SBSQ HOSP IP/OBS MODERATE 35: CPT | Performed by: NURSE PRACTITIONER

## 2024-09-18 PROCEDURE — 85027 COMPLETE CBC AUTOMATED: CPT

## 2024-09-18 PROCEDURE — 85027 COMPLETE CBC AUTOMATED: CPT | Performed by: NURSE PRACTITIONER

## 2024-09-18 RX ORDER — FERROUS SULFATE 325(65) MG
65 TABLET ORAL
Status: DISCONTINUED | OUTPATIENT
Start: 2024-09-19 | End: 2024-09-20 | Stop reason: HOSPADM

## 2024-09-18 RX ORDER — FERROUS SULFATE 325(65) MG
1 TABLET, DELAYED RELEASE (ENTERIC COATED) ORAL
Qty: 30 TABLET | Refills: 2 | Status: SHIPPED | OUTPATIENT
Start: 2024-09-18 | End: 2024-12-17

## 2024-09-18 ASSESSMENT — COGNITIVE AND FUNCTIONAL STATUS - GENERAL
TURNING FROM BACK TO SIDE WHILE IN FLAT BAD: A LITTLE
EATING MEALS: A LITTLE
MOVING FROM LYING ON BACK TO SITTING ON SIDE OF FLAT BED WITH BEDRAILS: A LITTLE
STANDING UP FROM CHAIR USING ARMS: A LITTLE
MOBILITY SCORE: 18
MOVING FROM LYING ON BACK TO SITTING ON SIDE OF FLAT BED WITH BEDRAILS: A LITTLE
MOBILITY SCORE: 17
MOVING TO AND FROM BED TO CHAIR: A LITTLE
WALKING IN HOSPITAL ROOM: A LITTLE
MOBILITY SCORE: 18
CLIMB 3 TO 5 STEPS WITH RAILING: A LITTLE
DRESSING REGULAR UPPER BODY CLOTHING: A LITTLE
STANDING UP FROM CHAIR USING ARMS: A LITTLE
MOVING TO AND FROM BED TO CHAIR: A LITTLE
TURNING FROM BACK TO SIDE WHILE IN FLAT BAD: A LITTLE
MOVING TO AND FROM BED TO CHAIR: A LITTLE
STANDING UP FROM CHAIR USING ARMS: A LITTLE
DRESSING REGULAR LOWER BODY CLOTHING: A LITTLE
CLIMB 3 TO 5 STEPS WITH RAILING: A LITTLE
DAILY ACTIVITIY SCORE: 18
CLIMB 3 TO 5 STEPS WITH RAILING: A LOT
DRESSING REGULAR LOWER BODY CLOTHING: A LITTLE
WALKING IN HOSPITAL ROOM: A LITTLE
PERSONAL GROOMING: A LITTLE
TURNING FROM BACK TO SIDE WHILE IN FLAT BAD: A LITTLE
TOILETING: A LITTLE
HELP NEEDED FOR BATHING: A LITTLE
WALKING IN HOSPITAL ROOM: A LITTLE
DAILY ACTIVITIY SCORE: 20
DRESSING REGULAR UPPER BODY CLOTHING: A LITTLE
HELP NEEDED FOR BATHING: A LITTLE
TOILETING: A LITTLE
MOVING FROM LYING ON BACK TO SITTING ON SIDE OF FLAT BED WITH BEDRAILS: A LITTLE

## 2024-09-18 ASSESSMENT — PAIN - FUNCTIONAL ASSESSMENT
PAIN_FUNCTIONAL_ASSESSMENT: 0-10
PAIN_FUNCTIONAL_ASSESSMENT: 0-10

## 2024-09-18 ASSESSMENT — PAIN SCALES - GENERAL
PAINLEVEL_OUTOF10: 0 - NO PAIN
PAINLEVEL_OUTOF10: 0 - NO PAIN

## 2024-09-18 NOTE — PROGRESS NOTES
Spiritual Care Visit    Clinical Encounter Type  Visited With: Patient  Routine Visit: Follow-up  Continue Visiting: No      Taxonomy  Intended Effects: Aligning care plan with patient's values, Build relationship of care and support, Convey a calming presence, Demonstrate caring and concern, Lessen someone's feelings of isolation, Lessen anxiety, Promote sense of peace, Preserve dignity and respect, Meaning-making, Establish rapport and connectedness, Brittney affirmation, Helping someone feel comforted      This was 2nd exchange between patient and .   popped in to follow up on yesterday's visit and check in with patient before anticipated discharge.  Discussion included common [place] roots and patient's  and family situation.

## 2024-09-18 NOTE — PROGRESS NOTES
09/18/24 1141   Discharge Planning   Type of Post Acute Facility Services Assisted living   Expected Discharge Disposition Home     Met with patient and patient's great-nephew Rony at bedside. Patient plans to return to [facility] and resume therapy there. Spoke with Luann at [facility] who confirms patient is active with [facility] for PT/OT and will just need an outpatient RX to resume therapy upon return. Rony states he or Kim will transport patient back to her AL at d/c and he will just need to be called at [phone].

## 2024-09-18 NOTE — PROGRESS NOTES
Physical Therapy    Physical Therapy Treatment    Patient Name: Natasha Lundberg  MRN: [id number]  Today's Date: 9/18/2024  Time Calculation  Start Time: 1300  Stop Time: 1325  Time Calculation (min): 25 min     3031/3031-A       09/18/24 1300   PT  Visit   PT Received On 09/18/24   Response to Previous Treatment Patient with no complaints from previous session.   General   Referred By Leonardo   Past Medical History Relevant to Rehab Includes: HTN, PE, cardiac arrest, recurrent falls, COVID 19, h/o rectus sheath hematoma   Prior to Session Communication Bedside nurse   Patient Position Received Bed, 3 rail up;Alarm on   Preferred Learning Style verbal;visual   General Comment blood noticed on sanitary pad upon stand, nurse notified   Precautions   Medical Precautions Fall precautions   Pain Assessment   Pain Assessment 0-10   0-10 (Numeric) Pain Score 0 - No pain   Cognition   Overall Cognitive Status WFL   Orientation Level Oriented X4   Coordination   Movements are Fluid and Coordinated Yes   Bed Mobility   Bed Mobility Yes   Bed Mobility 1   Bed Mobility 1 Supine to sitting   Level of Assistance 1 Contact guard   Bed Mobility Comments 1 extra time and effort needed   Ambulation/Gait Training   Ambulation/Gait Training Performed Yes   Ambulation/Gait Training 1   Surface 1 Level tile   Device 1 Rolling walker   Gait Support Devices Gait belt   Assistance 1 Contact guard;Close supervision   Quality of Gait 1 Decreased step length;WBOS   Comments/Distance (ft) 1 100' x2   Transfers   Transfer Yes   Transfer 1   Transfer From 1 Bed to   Transfer to 1 Stand   Technique 1 Sit to stand;Stand to sit   Transfer Device 1 Walker;Gait belt   Transfer Level of Assistance 1 Minimum assistance   Transfers 2   Transfer From 2 Stand to   Transfer to 2 Chair with arms   Technique 2 Stand to sit   Transfer Device 2 Walker;Gait belt   Transfer Level of Assistance 2 Contact guard   Trials/Comments 2 decreased eccentric control   Activity  Tolerance   Endurance Tolerates 30 min exercise with multiple rests   Early Mobility/Exercise Safety Screen Proceed with mobilization - No exclusion criteria met   PT Assessment   PT Assessment Results Decreased strength;Decreased endurance;Impaired balance;Decreased mobility   Rehab Prognosis Good   End of Session Communication Bedside nurse   End of Session Patient Position Up in chair;Alarm on   Outpatient Education   Individual(s) Educated Patient   Education Provided Fall Risk   Patient/Caregiver Demonstrated Understanding yes   Plan of Care Discussed and Agreed Upon yes   Patient Response to Education Patient/Caregiver Verbalized Understanding of Information   PT Plan   Inpatient/Swing Bed or Outpatient Inpatient   PT Plan   Treatment/Interventions Bed mobility;Transfer training;Gait training   PT Plan Ongoing PT   PT Discharge Recommendations Low intensity level of continued care   Equipment Recommended upon Discharge Wheeled walker   PT Recommended Transfer Status Contact guard;Assistive device           Outcome Measures:  [facility] Basic Mobility  Turning from your back to your side while in a flat bed without using bedrails: A little  Moving from lying on your back to sitting on the side of a flat bed without using bedrails: A little  Moving to and from bed to chair (including a wheelchair): A little  Standing up from a chair using your arms (e.g. wheelchair or bedside chair): A little  To walk in hospital room: A little  Climbing 3-5 steps with railing: A lot  Basic Mobility - Total Score: 17            EDUCATION:  Outpatient Education  Individual(s) Educated: Patient  Education Provided: Fall Risk  Patient/Caregiver Demonstrated Understanding: yes  Plan of Care Discussed and Agreed Upon: yes  Patient Response to Education: Patient/Caregiver Verbalized Understanding of Information    GOALS:  Encounter Problems       Encounter Problems (Active)       PT Problem       Pt will be able to perform all bed mobility  tasks with Mod I.  (Progressing)       Start:  09/17/24    Expected End:  10/01/24            Pt will perform all transfers with Mod I and FWW with proper safety mechanics.   (Progressing)       Start:  09/17/24    Expected End:  10/01/24            Pt will ambulate 100 ft with Mod I using FWW for improved functional independence.  (Progressing)       Start:  09/17/24    Expected End:  10/01/24            Pt will demonstrate good dynamic stand balance for completion of therapeutic exercises and functional tasks.  (Progressing)       Start:  09/17/24    Expected End:  10/01/24               Pain - Adult          Safety       LTG - Patient will adhere to hip precautions during ADL's and transfers       Start:  09/17/24            LTG - Patient will demonstrate safety requirements appropriate to situation/environment       Start:  09/17/24            LTG - Patient will utilize safety techniques       Start:  09/17/24            STG - Patient locks brakes on wheelchair       Start:  09/17/24            STG - Patient uses call light consistently to request assistance with transfers       Start:  09/17/24            STG - Patient uses gait belt during all transfers       Start:  09/17/24            Goal 1       Start:  09/17/24            Goal 2       Start:  09/17/24            Goal 3       Start:  09/17/24

## 2024-09-18 NOTE — PROGRESS NOTES
"Subjective  Pt sitting up in bed in NAD. Hemoglobin stable with no abdominal pain or nausea.  Residual blood mixed with stool last evening.  No further bowel movements.  Recent bleeding likely diverticular in nature given severity of diverticulosis on last colonoscopy 9/2023.    Objective  Blood pressure 139/69, pulse 84, temperature 36.4 °C (97.5 °F), temperature source Temporal, resp. rate 16, height 1.549 m (5' 1\"), weight 68 kg (150 lb), SpO2 98%.    Physical Exam  Constitutional: Alert, pleasant and interactive, in NAD  Eyes: PERRL, sclera clear, no conjunctival injection  Skin: Warm and dry, no rash or ecchymosis  ENMT: Mucous membranes moist, no lesions noted  Resp: CTAB, even and unlabored  CV: RRR, normal S1, S2, no m,r,g  GI: +BS, soft, round, NT, no rebound tenderness or guarding, no palpable masses or organomegaly  Extremities: Extremities warm, no edema, contusions, wounds or cyanosis  Neuro: Alert and oriented x3  Psych: Appropriate mood and behavior    Medications  Scheduled medications  amitriptyline, 10 mg, oral, Nightly  [Held by provider] aspirin, 81 mg, oral, Daily  calcium carbonate, 1,250 mg, oral, Daily  cholecalciferol, 2,000 Units, oral, Daily  pantoprazole, 40 mg, oral, Daily before breakfast      Continuous medications     PRN medications  PRN medications: acetaminophen, albuterol, melatonin, oxyCODONE, polyethylene glycol     Labs  Lab Results   Component Value Date    WBC 9.9 09/18/2024    HGB 8.3 (L) 09/18/2024    HCT 27.0 (L) 09/18/2024     09/18/2024     09/18/2024     Lab Results   Component Value Date    GLUCOSE 95 09/18/2024    CALCIUM 8.5 (L) 09/18/2024     09/18/2024    K 4.3 09/18/2024    CO2 26 09/18/2024     09/18/2024    BUN 20 09/18/2024    CREATININE 1.04 09/18/2024     Lab Results   Component Value Date    ALT 6 (L) 09/16/2024    AST 13 09/16/2024    ALKPHOS 65 09/16/2024    BILITOT 0.3 09/16/2024     Lab Results   Component Value Date    IRON 49 " Referral generated and mailed to patient home as requested. Spoke with patient and informed. Patient verbalized understanding and agreement.      09/17/2024    TIBC 272 09/17/2024    FERRITIN 38 09/17/2024     Lab Results   Component Value Date    INR 1.2 (H) 09/17/2024    INR 1.1 09/16/2024    INR 2.9 (H) 12/20/2023    PROTIME 13.1 (H) 09/17/2024    PROTIME 12.0 09/16/2024    PROTIME 33.1 (H) 12/20/2023     Radiology  CT A/P with IV contrast 9/17/2024 noting:  IMPRESSION:  No acute intra-abdominal findings.  Evaluation for active  gastrointestinal bleeding is limited given single phase of contrast.   No significant fluid within the colon.  Colonic diverticulosis without diverticulitis.  Signed by Danny Aponte DO     Assessment:  Natasha Lundberg is a 88 y.o. female with PMH HTN, PE (no longer on AC), iron deficiency anemia, GERD, intraoperative cardiac arrest 2/2023 presenting from [facility] with rectal bleeding.  1 point drop in hemoglobin since arrival to 8.2 (baseline~9).  No further episodes.  DESIRE completed with specks of maroon-colored material.  Colonoscopy 9/2023 for hematochezia noting severe diverticulosis throughout colon with impacted diverticulum.  Patient asymptomatic.     # hematochezia- resolving- diverticular (most likely) v hemorrhoidal v malignancy  # chronic anemia  # Fe Def anemia  # diverticular bleed 9/2023  # PE (no longer on Eliquis)      Plan:  - continue supportive care  - ok for low fiber diet as tolerated for 1-2 weeks   - then pt should have high fiber diet  - continue to trend hgb daily unless pt becomes symptomatic or decompensates  - transfuse to maintain hgb >7  - continue to monitor for and document overt signs of bleeding  - no current indication for urgent GI intervention given stable hgb, pt asymptomatic, pt preference     Plan has been discussed with Dr. Kuo. GI will sign off.      LUKAS Pinzon/CNP

## 2024-09-18 NOTE — CARE PLAN
The patient's goals for the shift include rest    The clinical goals for the shift include comfort, consult to gi      Problem: Skin  Goal: Decreased wound size/increased tissue granulation at next dressing change  Outcome: Progressing  Flowsheets (Taken 9/18/2024 0353)  Decreased wound size/increased tissue granulation at next dressing change: Promote sleep for wound healing  Goal: Participates in plan/prevention/treatment measures  Outcome: Progressing  Flowsheets (Taken 9/18/2024 0353)  Participates in plan/prevention/treatment measures:   Discuss with provider PT/OT consult   Elevate heels  Goal: Prevent/manage excess moisture  Outcome: Progressing  Flowsheets (Taken 9/18/2024 0353)  Prevent/manage excess moisture: Monitor for/manage infection if present  Goal: Prevent/minimize sheer/friction injuries  Outcome: Progressing  Flowsheets (Taken 9/18/2024 0353)  Prevent/minimize sheer/friction injuries:   Use pull sheet   HOB 30 degrees or less   Turn/reposition every 2 hours/use positioning/transfer devices  Goal: Promote/optimize nutrition  Outcome: Progressing  Goal: Promote skin healing  Outcome: Progressing     Problem: Pain - Adult  Goal: Verbalizes/displays adequate comfort level or baseline comfort level  Outcome: Progressing     Problem: Safety - Adult  Goal: Free from fall injury  Outcome: Progressing     Problem: Discharge Planning  Goal: Discharge to home or other facility with appropriate resources  Outcome: Progressing     Problem: Chronic Conditions and Co-morbidities  Goal: Patient's chronic conditions and co-morbidity symptoms are monitored and maintained or improved  Outcome: Progressing     Problem: Fall/Injury  Goal: Not fall by end of shift  Outcome: Progressing  Goal: Be free from injury by end of the shift  Outcome: Progressing  Goal: Verbalize understanding of personal risk factors for fall in the hospital  Outcome: Progressing  Goal: Verbalize understanding of risk factor reduction measures  to prevent injury from fall in the home  Outcome: Progressing  Goal: Use assistive devices by end of the shift  Outcome: Progressing  Goal: Pace activities to prevent fatigue by end of the shift  Outcome: Progressing     Problem: Pain  Goal: Takes deep breaths with improved pain control throughout the shift  Outcome: Progressing  Goal: Turns in bed with improved pain control throughout the shift  Outcome: Progressing  Goal: Walks with improved pain control throughout the shift  Outcome: Progressing  Goal: Performs ADL's with improved pain control throughout shift  Outcome: Progressing  Goal: Participates in PT with improved pain control throughout the shift  Outcome: Progressing  Goal: Free from opioid side effects throughout the shift  Outcome: Progressing  Goal: Free from acute confusion related to pain meds throughout the shift  Outcome: Progressing

## 2024-09-18 NOTE — PROGRESS NOTES
Natasha Lundberg is a 88 y.o. female on day 1 of admission presenting with Rectal bleeding.  Patient had another round of acute rectal bleed.    Subjective   No CP or SOB       Objective         Current Facility-Administered Medications:     acetaminophen (Tylenol) tablet 650 mg, 650 mg, oral, q6h PRN, JACQUI Lopez    albuterol 2.5 mg /3 mL (0.083 %) nebulizer solution 2.5 mg, 2.5 mg, nebulization, q6h PRN, JACQUI Herrera    amitriptyline (Elavil) tablet 10 mg, 10 mg, oral, Nightly, JACQUI Herrera, 10 mg at 09/17/24 2027    [Held by provider] aspirin EC tablet 81 mg, 81 mg, oral, Daily, JACQUI Herrera    calcium carbonate (Oscal) tablet 1,250 mg, 1,250 mg, oral, Daily, Aman BoltonD, 1,250 mg at 09/18/24 0956    cholecalciferol (Vitamin D-3) tablet 2,000 Units, 2,000 Units, oral, Daily, JACQUI Herrera, 2,000 Units at 09/18/24 0956    melatonin tablet 3 mg, 3 mg, oral, Nightly PRN, JACQUI Lopez    oxyCODONE (Roxicodone) immediate release tablet 5 mg, 5 mg, oral, q6h PRN, JACQUI Herrera    pantoprazole (ProtoNix) EC tablet 40 mg, 40 mg, oral, Daily before breakfast, JACQUI Herrera, 40 mg at 09/18/24 0642    polyethylene glycol (Glycolax, Miralax) packet 17 g, 17 g, oral, Daily PRN, JACQUI Lopez       Physical Exam  HENT:      Head: Normocephalic.   Eyes:      Conjunctiva/sclera: Conjunctivae normal.   Cardiovascular:      Rate and Rhythm: Regular rhythm.   Pulmonary:      Breath sounds: Normal breath sounds.   Abdominal:      General: Bowel sounds are normal.      Palpations: Abdomen is soft.   Musculoskeletal:         General: Normal range of motion.   Skin:     General: Skin is warm and dry.   Neurological:      General: No focal deficit present.      Mental Status: She is alert.   Psychiatric:         Behavior: Behavior normal.           Last Recorded Vitals  Blood pressure 139/69,  "pulse 84, temperature 36.4 °C (97.5 °F), temperature source Temporal, resp. rate 16, height 1.549 m (5' 1\"), weight 68 kg (150 lb), SpO2 98%.  Intake/Output last 3 Shifts:  I/O last 3 completed shifts:  In: 620 (9.1 mL/kg) [P.O.:620]  Out: 1100 (16.2 mL/kg) [Urine:1100 (0.4 mL/kg/hr)]  Weight: 68 kg     Labs:       Results for orders placed or performed during the hospital encounter of 09/16/24 (from the past 24 hour(s))   Occult Blood, Stool    Specimen: Stool   Result Value Ref Range    Occult Blood, Stool X1 Negative Negative   Basic metabolic panel   Result Value Ref Range    Glucose 95 74 - 99 mg/dL    Sodium 136 136 - 145 mmol/L    Potassium 4.3 3.5 - 5.3 mmol/L    Chloride 105 98 - 107 mmol/L    Bicarbonate 26 21 - 32 mmol/L    Anion Gap 9 (L) 10 - 20 mmol/L    Urea Nitrogen 20 6 - 23 mg/dL    Creatinine 1.04 0.50 - 1.05 mg/dL    eGFR 52 (L) >60 mL/min/1.73m*2    Calcium 8.5 (L) 8.6 - 10.3 mg/dL   CBC   Result Value Ref Range    WBC 9.9 4.4 - 11.3 x10*3/uL    nRBC 0.0 0.0 - 0.0 /100 WBCs    RBC 2.71 (L) 4.00 - 5.20 x10*6/uL    Hemoglobin 8.3 (L) 12.0 - 16.0 g/dL    Hematocrit 27.0 (L) 36.0 - 46.0 %     80 - 100 fL    MCH 30.6 26.0 - 34.0 pg    MCHC 30.7 (L) 32.0 - 36.0 g/dL    RDW 14.4 11.5 - 14.5 %    Platelets 226 150 - 450 x10*3/uL        Radiology:      CT Abdomen and Pelvis with IV Contrast; 9/17/2024 at 1:52 AM  INDICATION:  Abdominal pain and rectal bleeding.  COMPARISON:  CTA chest and CT abdomen and pelvis 3/10/2024, CTA chest and CT  abdomen and pelvis 12/21/2023.  ACCESSION NUMBER(S):  [id number]  ORDERING CLINICIAN:  KAYDEN THORNE  TECHNIQUE:  CT of the abdomen and pelvis was performed.  Contiguous axial images  were obtained at 3 mm slice thickness through the abdomen and pelvis.   Coronal and sagittal reconstructions at 3 mm slice thickness were  performed.  Omnipaque 350 75 mL was administered intravenously.    FINDINGS:  LOWER CHEST:  Mild cardiomegaly.  Coronary artery " calcifications.  No pericardial  effusion.  Similar bibasilar bronchiectasis and fibrosis.     ABDOMEN:     LIVER:  No hepatomegaly.  Smooth surface contour.  Normal attenuation.     BILE DUCTS:  No intrahepatic or extrahepatic biliary ductal dilatation.     GALLBLADDER:  The gallbladder is unremarkable.  STOMACH:  No abnormalities identified.     PANCREAS:  No masses or ductal dilatation.     SPLEEN:  No splenomegaly.  Calcified granuloma within the posterior spleen.     ADRENAL GLANDS:  No thickening or nodules.     KIDNEYS AND URETERS:  Mild bilateral renal atrophy.  Simple cyst within the superior pole  right kidney.  Subcentimeter hypodensity inferior pole left kidney,  too small to characterize.  No hydronephrosis.  Ureters are normal in  course and caliber.     PELVIS:     BLADDER:  No abnormalities identified.     REPRODUCTIVE ORGANS:  Calcified uterine fibroid.     BOWEL:  No wall thickening.  Colonic diverticula without focal adjacent fat  stranding.  No significant fluid within the colon.  Moderate hiatal  hernia.     VESSELS:  Atherosclerotic calcifications of the aorta and branch vessels.     PERITONEUM/RETROPERITONEUM/LYMPH NODES:  No free fluid.  No pneumoperitoneum.  No lymphadenopathy.     ABDOMINAL WALL:  No abnormalities identified.  SOFT TISSUES:   Small fat-containing periumbilical hernia.  Resolution of the  previously noted left rectus abdominal wall hematoma.     BONES:  No acute fracture or aggressive osseous lesion.  Advanced degenerative  changes of the spine and hips.  IMPRESSION:  No acute intra-abdominal findings.  Evaluation for active  gastrointestinal bleeding is limited given single phase of contrast.   No significant fluid within the colon.  Colonic diverticulosis without diverticulitis.  Signed by Danny Aponte DO       Assessment/Plan   Principal Problem:    Rectal bleeding  Active Problems:    Neutrophilic leukocytosis    MATEO (acute kidney injury) (CMS-HCC)    Diverticulosis of  colon without diverticulitis    Lower GI bleed            PLAN: Patient had an acute episode of rectal bleeding earlier today discharge on hold, monitor H&H, continue with IV Protonix, GI on board, monitor labs, may need EGD or colonoscopy, discussed with CNP, follow closely, I have coordinated the care.               Gwyn Crain MD

## 2024-09-18 NOTE — CARE PLAN
Patient alert and oriented x3. Patient ambulatory to bathroom and continent. Patient had no Bms this shift. No c/o pain. Patient currently resting bed with the tv on. No acute changes this shift    The patient's goals for the shift include rest    The clinical goals for the shift include see plan of care      Problem: Skin  Goal: Decreased wound size/increased tissue granulation at next dressing change  Outcome: Progressing  Goal: Participates in plan/prevention/treatment measures  Outcome: Progressing  Goal: Prevent/manage excess moisture  Outcome: Progressing  Goal: Prevent/minimize sheer/friction injuries  Outcome: Progressing  Goal: Promote/optimize nutrition  Outcome: Progressing  Goal: Promote skin healing  Outcome: Progressing     Problem: Pain - Adult  Goal: Verbalizes/displays adequate comfort level or baseline comfort level  Outcome: Progressing     Problem: Safety - Adult  Goal: Free from fall injury  Outcome: Progressing     Problem: Discharge Planning  Goal: Discharge to home or other facility with appropriate resources  Outcome: Progressing     Problem: Chronic Conditions and Co-morbidities  Goal: Patient's chronic conditions and co-morbidity symptoms are monitored and maintained or improved  Outcome: Progressing     Problem: Fall/Injury  Goal: Verbalize understanding of personal risk factors for fall in the hospital  Outcome: Progressing  Goal: Verbalize understanding of risk factor reduction measures to prevent injury from fall in the home  Outcome: Progressing     Problem: Fall/Injury  Goal: Not fall by end of shift  Outcome: Met  Goal: Be free from injury by end of the shift  Outcome: Met  Goal: Use assistive devices by end of the shift  Outcome: Met  Goal: Pace activities to prevent fatigue by end of the shift  Outcome: Met     Problem: Pain  Goal: Takes deep breaths with improved pain control throughout the shift  Outcome: Met  Goal: Turns in bed with improved pain control throughout the  shift  Outcome: Met  Goal: Walks with improved pain control throughout the shift  Outcome: Met  Goal: Performs ADL's with improved pain control throughout shift  Outcome: Met  Goal: Participates in PT with improved pain control throughout the shift  Outcome: Met  Goal: Free from opioid side effects throughout the shift  Outcome: Met  Goal: Free from acute confusion related to pain meds throughout the shift  Outcome: Met

## 2024-09-18 NOTE — NURSING NOTE
[phone]- Message Fern Arguelles NP about patient having blood saturated pad when getting up from the bed with therapy.    EOS- No acute changes throughout the shift. Blood noted on pad this afternoon and patient had a medium bowel movement with blood noted. Patient up in chair this afternoon and evening. Family at bedside this evening. Safety maintained and call light within reach.

## 2024-09-18 NOTE — NURSING NOTE
SHIFT  NOTE  Pt had a BM at the start of shift that looked like clots.  Sample was sent to lab and resulted negative for occult stool. Vitals remained stable. Pt only had a small smear since the beginning of shift.  Stool is dark red. Pt denied abdominal tenderness unless being pressed really hard. Pt denied dizziness, lightheadedness, and nausea. Vitals remained stable. Safety maintained.

## 2024-09-19 LAB
ANION GAP SERPL CALC-SCNC: 12 MMOL/L (ref 10–20)
BUN SERPL-MCNC: 17 MG/DL (ref 6–23)
CALCIUM SERPL-MCNC: 8.5 MG/DL (ref 8.6–10.3)
CHLORIDE SERPL-SCNC: 105 MMOL/L (ref 98–107)
CO2 SERPL-SCNC: 26 MMOL/L (ref 21–32)
CREAT SERPL-MCNC: 1.03 MG/DL (ref 0.5–1.05)
EGFRCR SERPLBLD CKD-EPI 2021: 53 ML/MIN/1.73M*2
ERYTHROCYTE [DISTWIDTH] IN BLOOD BY AUTOMATED COUNT: 14.3 % (ref 11.5–14.5)
GLUCOSE SERPL-MCNC: 92 MG/DL (ref 74–99)
HCT VFR BLD AUTO: 25.9 % (ref 36–46)
HCT VFR BLD AUTO: 26.8 % (ref 36–46)
HCT VFR BLD AUTO: 28.9 % (ref 36–46)
HEMOCCULT SP1 STL QL: POSITIVE
HGB BLD-MCNC: 7.9 G/DL (ref 12–16)
HGB BLD-MCNC: 8 G/DL (ref 12–16)
HGB BLD-MCNC: 8.9 G/DL (ref 12–16)
MAGNESIUM SERPL-MCNC: 1.99 MG/DL (ref 1.6–2.4)
MCH RBC QN AUTO: 30.8 PG (ref 26–34)
MCHC RBC AUTO-ENTMCNC: 29.9 G/DL (ref 32–36)
MCV RBC AUTO: 103 FL (ref 80–100)
NRBC BLD-RTO: 0 /100 WBCS (ref 0–0)
PLATELET # BLD AUTO: 242 X10*3/UL (ref 150–450)
POTASSIUM SERPL-SCNC: 3.9 MMOL/L (ref 3.5–5.3)
RBC # BLD AUTO: 2.6 X10*6/UL (ref 4–5.2)
SODIUM SERPL-SCNC: 139 MMOL/L (ref 136–145)
WBC # BLD AUTO: 8.3 X10*3/UL (ref 4.4–11.3)

## 2024-09-19 PROCEDURE — 83735 ASSAY OF MAGNESIUM: CPT | Performed by: NURSE PRACTITIONER

## 2024-09-19 PROCEDURE — 85014 HEMATOCRIT: CPT | Performed by: NURSE PRACTITIONER

## 2024-09-19 PROCEDURE — 85027 COMPLETE CBC AUTOMATED: CPT

## 2024-09-19 PROCEDURE — 2500000001 HC RX 250 WO HCPCS SELF ADMINISTERED DRUGS (ALT 637 FOR MEDICARE OP): Performed by: NURSE PRACTITIONER

## 2024-09-19 PROCEDURE — 2500000001 HC RX 250 WO HCPCS SELF ADMINISTERED DRUGS (ALT 637 FOR MEDICARE OP)

## 2024-09-19 PROCEDURE — 82270 OCCULT BLOOD FECES: CPT | Performed by: NURSE PRACTITIONER

## 2024-09-19 PROCEDURE — 97110 THERAPEUTIC EXERCISES: CPT | Mod: GP,CQ

## 2024-09-19 PROCEDURE — 80048 BASIC METABOLIC PNL TOTAL CA: CPT

## 2024-09-19 PROCEDURE — 97116 GAIT TRAINING THERAPY: CPT | Mod: GP,CQ

## 2024-09-19 PROCEDURE — 1200000002 HC GENERAL ROOM WITH TELEMETRY DAILY

## 2024-09-19 PROCEDURE — 97535 SELF CARE MNGMENT TRAINING: CPT | Mod: GO,CO

## 2024-09-19 PROCEDURE — 36415 COLL VENOUS BLD VENIPUNCTURE: CPT

## 2024-09-19 PROCEDURE — 36415 COLL VENOUS BLD VENIPUNCTURE: CPT | Performed by: NURSE PRACTITIONER

## 2024-09-19 ASSESSMENT — PAIN - FUNCTIONAL ASSESSMENT
PAIN_FUNCTIONAL_ASSESSMENT: 0-10

## 2024-09-19 ASSESSMENT — COGNITIVE AND FUNCTIONAL STATUS - GENERAL
TOILETING: A LITTLE
DRESSING REGULAR LOWER BODY CLOTHING: A LITTLE
PERSONAL GROOMING: A LITTLE
DAILY ACTIVITIY SCORE: 18
STANDING UP FROM CHAIR USING ARMS: A LITTLE
DRESSING REGULAR UPPER BODY CLOTHING: A LITTLE
WALKING IN HOSPITAL ROOM: A LITTLE
MOVING TO AND FROM BED TO CHAIR: A LITTLE
MOVING FROM LYING ON BACK TO SITTING ON SIDE OF FLAT BED WITH BEDRAILS: A LITTLE
CLIMB 3 TO 5 STEPS WITH RAILING: A LOT
DRESSING REGULAR UPPER BODY CLOTHING: A LITTLE
TOILETING: A LITTLE
TURNING FROM BACK TO SIDE WHILE IN FLAT BAD: A LITTLE
PERSONAL GROOMING: A LITTLE
DRESSING REGULAR LOWER BODY CLOTHING: A LOT
MOBILITY SCORE: 17
MOVING FROM LYING ON BACK TO SITTING ON SIDE OF FLAT BED WITH BEDRAILS: A LITTLE
HELP NEEDED FOR BATHING: A LOT
WALKING IN HOSPITAL ROOM: A LITTLE
EATING MEALS: A LITTLE
DAILY ACTIVITIY SCORE: 17
STANDING UP FROM CHAIR USING ARMS: A LITTLE
MOBILITY SCORE: 17
TURNING FROM BACK TO SIDE WHILE IN FLAT BAD: A LITTLE
HELP NEEDED FOR BATHING: A LITTLE
MOVING TO AND FROM BED TO CHAIR: A LITTLE
CLIMB 3 TO 5 STEPS WITH RAILING: A LOT

## 2024-09-19 ASSESSMENT — ACTIVITIES OF DAILY LIVING (ADL)
HOME_MANAGEMENT_TIME_ENTRY: 25
BATHING_LEVEL_OF_ASSISTANCE: MODERATE ASSISTANCE

## 2024-09-19 ASSESSMENT — PAIN SCALES - GENERAL
PAINLEVEL_OUTOF10: 0 - NO PAIN

## 2024-09-19 NOTE — SIGNIFICANT EVENT
"Nursing reached out to Alma Delaney CNP regarding the patient having a bloody stool yesterday, which is why her discharge was canceled yesterday. Alma Delaney CNP responded that the patient has \"severe diverticulosis. This is a diverticular bleed. When bleeding has resolved and hemoglobin is stable, she can be discharged.\"     The patient's H/H 8.0/26.8 this am at 0644. At 1349 a repeat H/H was 8.9/28.9. However nursing informed this provider that the patient had bloody stool, which family told her about but flushed it before she could see it. Short time later nursing sent a secure message that the patient had a small maroon stool, picture sent via secure chat. Of, note the patient's H/H on admission was 9.2/30.0. Previous stool for occult blood from 9/17/2024 was negative, therefore repeat stool for occult blood was ordered. Awaiting GI input for any further recommendations.     I spoke with the patient and her niece  Wendy via telephone regarding the patient's blood counts, vital signs, plan of care, and GI recommendations. We agreed that she will go back to a clear liquid diet and avoid hard crunchy foods when her diet is advanced. In addition, a repeat H/H will be done tonight about 2000, along with monitoring her stools and vital signs. In the morning we will re-evaluate her to see if her vital signs and H/H remain stable and if sigifredo bleeding has to stop to decide on a further plan of care for her. If stable and no more bloody stools she could be discharged home. Secure message sent to Alma Delaney NP with this updated plan of care, which she was in an agreement with.           "

## 2024-09-19 NOTE — NURSING NOTE
0700: Assumed care of pt. At this time.     1230: Attempted to call the family member back but no answer at this time. CB number given and will await a return call. Pt. Denies any further needs at this time. Will continue to follow. Call light in reach and bed in lowest position.     1250: Call received from Wendy at this time to receive update. This nurse let the family member know that we are still waiting for GI clearance for DC. Will reach out to the GI team to make them aware of re-consult. Pt. Stable and HGB stable this shift. No further needs at this time. Will call the family once GI sees the pt. Again today. Pt. Sitting up in the chair with call light in reach.    1415: This nurse called to the pt. Room and informed by the pt. and family she just had a small BM that had some blood in there. The pt. family is now really apprehensive about her leaving because they dont want her to come back if this still isnt resolved. Family asking to speak with the team regarding questions. Pt. States that BM was small in size and had some maroon blood and not bright red blood. This nurse to message the team and place collection hat in toilet to collect next sample. STAT H/H collected at this time.    1435: Hgb 8.9 at this time. Will collect stool next time the pt. Is able to provide sample.     1525: Reached out to the team again at this time to see if the family can speak with someone on the care team. Still awaiting a response from GI/primary team. Occult stool collected and sent and the pt. Is having maroon colored stool although she is feeling okay.    1600: Family left the bedside at this time and the NP arrived to speak with the pt. She explained the plan moving forward for trending the pt. HGB and waiting for occult stool results. She reports that she will call the family members since they had to leave after waiting to speak with someone from the care team for several hours.     1820: This pt. Stable at this time  however occult stool that was positive. The pt. Denied any dizziness or shortness of breath throughout this shift. The pt. Has call light in reach and bed in lowest position. Will continue to follow throughout the duration of this shift.

## 2024-09-19 NOTE — PROGRESS NOTES
Occupational Therapy    OT Treatment    Patient Name: Natasha Lundberg  MRN: [id number]  Today's Date: 9/19/2024  Time Calculation  Start Time: 1145  Stop Time: 1210  Time Calculation (min): 25 min       3031/3031-A    Assessment:  End of Session Communication: Bedside nurse  End of Session Patient Position: Up in chair, Alarm on       Plan:  OT Frequency: 3 times per week  OT Discharge Recommendations: High intensity level of continued care, Low intensity level of continued care     Subjective        09/19/24 1145   OT Last Visit   OT Received On 09/19/24   General   Prior to Session Communication Bedside nurse   Patient Position Received Up in chair;Alarm on   Preferred Learning Style verbal;visual   General Comment Pt pleasant and cooperative   Pain Assessment   Pain Assessment 0-10   0-10 (Numeric) Pain Score 0 - No pain   Cognition   Overall Cognitive Status WFL   Orientation Level Oriented X4   UE Bathing   UE Bathing Level of Assistance Minimum assistance   UE Bathing Where Assessed   (chair)   LE Bathing   LE Bathing Level of Assistance Moderate assistance   LE Bathing Where Assessed   (chair)   UE Dressing   UE Dressing Level of Assistance Minimum assistance   UE Dressing Where Assessed Chair   Transfers   Transfer Yes   Transfer 1   Transfer From 1 Sit to   Transfer to 1 Stand   Technique 1 Sit to stand;Stand to sit   Transfer Device 1 Walker;Gait belt   Transfer Level of Assistance 1 Minimum assistance   IP OT Assessment   End of Session Communication Bedside nurse   End of Session Patient Position Up in chair;Alarm on   Inpatient Plan   OT Frequency 3 times per week   OT Discharge Recommendations High intensity level of continued care;Low intensity level of continued care       Outcome Measures:[facility] Daily Activity  Putting on and taking off regular lower body clothing: A lot  Bathing (including washing, rinsing, drying): A lot  Putting on and taking off regular upper body clothing: A little  Toileting, which  includes using toilet, bedpan or urinal: A little  Taking care of personal grooming such as brushing teeth: A little  Eating Meals: None  Daily Activity - Total Score: 17  Education Documentation  ADL Training, taught by GRISELDA Chacon at 9/19/2024  2:19 PM.  Learner: Patient  Readiness: Acceptance  Method: Explanation, Demonstration  Response: Verbalizes Understanding, Demonstrated Understanding, Needs Reinforcement    Body Mechanics, taught by GRISELDA Chacon at 9/19/2024  2:19 PM.  Learner: Patient  Readiness: Acceptance  Method: Explanation, Demonstration  Response: Verbalizes Understanding, Demonstrated Understanding, Needs Reinforcement    Precautions, taught by GRISELDA Chacon at 9/19/2024  2:19 PM.  Learner: Patient  Readiness: Acceptance  Method: Explanation, Demonstration  Response: Verbalizes Understanding, Demonstrated Understanding, Needs Reinforcement    Education Comments  No comments found.            Goals:  Encounter Problems       Encounter Problems (Active)       OT Goals       Mod I ADL functional mobility with FWW.        Start:  09/17/24    Expected End:  10/01/24            Mod I sit/stand, bed/chair/commode with FWW.       Start:  09/17/24    Expected End:  10/01/24            Good dynamic standing balance for ADL.        Start:  09/17/24    Expected End:  10/01/24            Tolerate 10 mins light functional activity.        Start:  09/17/24    Expected End:  10/01/24

## 2024-09-19 NOTE — PROGRESS NOTES
Physical Therapy    Physical Therapy Treatment    Patient Name: Natasha Lundberg  MRN: [id number]  Today's Date: 9/19/2024  Time Calculation  Start Time: 1100  Stop Time: 1124  Time Calculation (min): 24 min     3031/3031-A       09/19/24 1100   PT  Visit   PT Received On 09/19/24   Response to Previous Treatment Patient with no complaints from previous session.   General   Referred By Leonardo   Past Medical History Relevant to Rehab Includes: HTN, PE, cardiac arrest, recurrent falls, COVID 19, h/o rectus sheath hematoma   Prior to Session Communication Bedside nurse   Patient Position Received Up in chair;Alarm on   Preferred Learning Style verbal;visual   General Comment blood noticed on sanitary pad upon stand, nurse notified   Precautions   Medical Precautions Fall precautions   Pain Assessment   Pain Assessment 0-10   0-10 (Numeric) Pain Score 0 - No pain   Cognition   Overall Cognitive Status WFL   Orientation Level Oriented X4   Coordination   Movements are Fluid and Coordinated Yes   Therapeutic Exercise   Therapeutic Exercise Performed Yes   Therapeutic Exercise Activity 1 ankle pumps x15   Therapeutic Exercise Activity 2 resisted foot press x15   Therapeutic Exercise Activity 3 heel slides x15   Therapeutic Exercise Activity 4 pillow squeeze x15   Therapeutic Exercise Activity 5 hip ABD x15   Ambulation/Gait Training   Ambulation/Gait Training Performed Yes   Ambulation/Gait Training 1   Surface 1 Level tile   Device 1 Rolling walker   Gait Support Devices Gait belt   Assistance 1 Close supervision   Quality of Gait 1 Decreased step length;WBOS;Forward flexed posture   Comments/Distance (ft) 1 60' x2   Transfers   Transfer Yes   Transfers 2   Transfer From 2 Stand to   Transfer to 2 Chair with arms   Technique 2 Stand to sit;Sit to stand   Transfer Device 2 Walker;Gait belt   Transfer Level of Assistance 2 Close supervision   Activity Tolerance   Endurance Tolerates 30 min exercise with multiple rests   PT Assessment    PT Assessment Results Decreased strength;Decreased endurance;Impaired balance;Decreased mobility   Rehab Prognosis Good   Evaluation/Treatment Tolerance Patient tolerated treatment well   End of Session Communication Bedside nurse   End of Session Patient Position Up in chair;Alarm on   Outpatient Education   Individual(s) Educated Patient   Education Provided Fall Risk;Body Mechanics;Home Safety;POC;Posture   Risk and Benefits Discussed with Patient/Caregiver/Other yes   Patient/Caregiver Demonstrated Understanding yes   Plan of Care Discussed and Agreed Upon yes   Patient Response to Education Patient/Caregiver Verbalized Understanding of Information   PT Plan   Inpatient/Swing Bed or Outpatient Inpatient   PT Plan   Treatment/Interventions Transfer training;Gait training;Therapeutic exercise   PT Plan Ongoing PT   PT Frequency 3 times per week   PT Discharge Recommendations Low intensity level of continued care   Equipment Recommended upon Discharge Wheeled walker   PT Recommended Transfer Status Stand by assist;Assistive device         Outcome Measures:  [facility] Basic Mobility  Turning from your back to your side while in a flat bed without using bedrails: A little  Moving from lying on your back to sitting on the side of a flat bed without using bedrails: A little  Moving to and from bed to chair (including a wheelchair): A little  Standing up from a chair using your arms (e.g. wheelchair or bedside chair): A little  To walk in hospital room: A little  Climbing 3-5 steps with railing: A lot  Basic Mobility - Total Score: 17          EDUCATION:  Outpatient Education  Individual(s) Educated: Patient  Education Provided: Fall Risk, Body Mechanics, Home Safety, POC, Posture  Risk and Benefits Discussed with Patient/Caregiver/Other: yes  Patient/Caregiver Demonstrated Understanding: yes  Plan of Care Discussed and Agreed Upon: yes  Patient Response to Education: Patient/Caregiver Verbalized Understanding of  Information    GOALS:  Encounter Problems       Encounter Problems (Active)       PT Problem       Pt will be able to perform all bed mobility tasks with Mod I.  (Progressing)       Start:  09/17/24    Expected End:  10/01/24            Pt will perform all transfers with Mod I and FWW with proper safety mechanics.   (Progressing)       Start:  09/17/24    Expected End:  10/01/24            Pt will ambulate 100 ft with Mod I using FWW for improved functional independence.  (Progressing)       Start:  09/17/24    Expected End:  10/01/24            Pt will demonstrate good dynamic stand balance for completion of therapeutic exercises and functional tasks.  (Progressing)       Start:  09/17/24    Expected End:  10/01/24               Pain - Adult          Safety       LTG - Patient will adhere to hip precautions during ADL's and transfers       Start:  09/17/24            LTG - Patient will demonstrate safety requirements appropriate to situation/environment       Start:  09/17/24            LTG - Patient will utilize safety techniques       Start:  09/17/24            STG - Patient locks brakes on wheelchair       Start:  09/17/24            STG - Patient uses call light consistently to request assistance with transfers       Start:  09/17/24            STG - Patient uses gait belt during all transfers       Start:  09/17/24            Goal 1       Start:  09/17/24            Goal 2       Start:  09/17/24            Goal 3       Start:  09/17/24

## 2024-09-19 NOTE — PROGRESS NOTES
Natasha Lundberg is a 88 y.o. female on day 2 of admission presenting with Rectal bleeding.    Subjective   Had Rectal bleeding yesterday.       Objective         Current Facility-Administered Medications:     acetaminophen (Tylenol) tablet 650 mg, 650 mg, oral, q6h PRN, JACQUI Lopez, 650 mg at 09/19/24 1053    albuterol 2.5 mg /3 mL (0.083 %) nebulizer solution 2.5 mg, 2.5 mg, nebulization, q6h PRN, JACQUI Herrera    amitriptyline (Elavil) tablet 10 mg, 10 mg, oral, Nightly, JACQUI Herrera, 10 mg at 09/18/24 2343    [Held by provider] aspirin EC tablet 81 mg, 81 mg, oral, Daily, JACQUI Herrera    calcium carbonate (Oscal) tablet 1,250 mg, 1,250 mg, oral, Daily, Aman BoltonD, 1,250 mg at 09/19/24 1053    cholecalciferol (Vitamin D-3) tablet 2,000 Units, 2,000 Units, oral, Daily, JACQUI Herrera, 2,000 Units at 09/19/24 1053    ferrous sulfate (325 mg ferrous sulfate) tablet 325 mg, 65 mg of iron, oral, Daily with breakfast, JACQUI Herrera, 325 mg at 09/19/24 1053    melatonin tablet 3 mg, 3 mg, oral, Nightly PRN, JACQUI Lopez    oxyCODONE (Roxicodone) immediate release tablet 5 mg, 5 mg, oral, q6h PRN, JACQUI Herrera    pantoprazole (ProtoNix) EC tablet 40 mg, 40 mg, oral, Daily before breakfast, JACQUI Herrera, 40 mg at 09/19/24 0822    polyethylene glycol (Glycolax, Miralax) packet 17 g, 17 g, oral, Daily PRN, JACQUI Lopez       Physical Exam  HENT:      Head: Normocephalic.   Eyes:      Conjunctiva/sclera: Conjunctivae normal.   Cardiovascular:      Rate and Rhythm: Regular rhythm.   Pulmonary:      Breath sounds: Normal breath sounds.   Abdominal:      General: Bowel sounds are normal.      Palpations: Abdomen is soft.   Musculoskeletal:         General: Normal range of motion.   Skin:     General: Skin is warm and dry.   Neurological:      General: No focal  "deficit present.      Mental Status: She is alert.   Psychiatric:         Behavior: Behavior normal.           Last Recorded Vitals  Blood pressure 131/70, pulse 77, temperature 35.9 °C (96.6 °F), temperature source Temporal, resp. rate 16, height 1.549 m (5' 1\"), weight 68 kg (150 lb), SpO2 97%.  Intake/Output last 3 Shifts:  I/O last 3 completed shifts:  In: 600 (8.8 mL/kg) [P.O.:600]  Out: 1800 (26.5 mL/kg) [Urine:1800 (0.7 mL/kg/hr)]  Weight: 68 kg     Labs:       Results for orders placed or performed during the hospital encounter of 09/16/24 (from the past 24 hour(s))   CBC   Result Value Ref Range    WBC 13.0 (H) 4.4 - 11.3 x10*3/uL    nRBC 0.0 0.0 - 0.0 /100 WBCs    RBC 2.74 (L) 4.00 - 5.20 x10*6/uL    Hemoglobin 8.4 (L) 12.0 - 16.0 g/dL    Hematocrit 27.6 (L) 36.0 - 46.0 %     (H) 80 - 100 fL    MCH 30.7 26.0 - 34.0 pg    MCHC 30.4 (L) 32.0 - 36.0 g/dL    RDW 14.1 11.5 - 14.5 %    Platelets 278 150 - 450 x10*3/uL   Basic metabolic panel   Result Value Ref Range    Glucose 92 74 - 99 mg/dL    Sodium 139 136 - 145 mmol/L    Potassium 3.9 3.5 - 5.3 mmol/L    Chloride 105 98 - 107 mmol/L    Bicarbonate 26 21 - 32 mmol/L    Anion Gap 12 10 - 20 mmol/L    Urea Nitrogen 17 6 - 23 mg/dL    Creatinine 1.03 0.50 - 1.05 mg/dL    eGFR 53 (L) >60 mL/min/1.73m*2    Calcium 8.5 (L) 8.6 - 10.3 mg/dL   CBC   Result Value Ref Range    WBC 8.3 4.4 - 11.3 x10*3/uL    nRBC 0.0 0.0 - 0.0 /100 WBCs    RBC 2.60 (L) 4.00 - 5.20 x10*6/uL    Hemoglobin 8.0 (L) 12.0 - 16.0 g/dL    Hematocrit 26.8 (L) 36.0 - 46.0 %     (H) 80 - 100 fL    MCH 30.8 26.0 - 34.0 pg    MCHC 29.9 (L) 32.0 - 36.0 g/dL    RDW 14.3 11.5 - 14.5 %    Platelets 242 150 - 450 x10*3/uL   Magnesium   Result Value Ref Range    Magnesium 1.99 1.60 - 2.40 mg/dL              Assessment/Plan   Principal Problem:    Rectal bleeding  Active Problems:    Neutrophilic leukocytosis    MATEO (acute kidney injury) (CMS-HCC)    Diverticulosis of colon without " diverticulitis    Lower GI bleed  ABLA       PLAN: Pt's H&H is running low, monitor CBC, transfuse PRBC as needed, c/w Protonix, med list and labs reviewed for now c/w current Rx, may need GI work up, follow closely, I ve coordinated the care.        Gwyn Crain MD

## 2024-09-20 VITALS
TEMPERATURE: 98.1 F | HEIGHT: 61 IN | WEIGHT: 150 LBS | DIASTOLIC BLOOD PRESSURE: 64 MMHG | HEART RATE: 97 BPM | OXYGEN SATURATION: 96 % | SYSTOLIC BLOOD PRESSURE: 110 MMHG | BODY MASS INDEX: 28.32 KG/M2 | RESPIRATION RATE: 18 BRPM

## 2024-09-20 LAB
ANION GAP SERPL CALC-SCNC: 12 MMOL/L (ref 10–20)
BUN SERPL-MCNC: 18 MG/DL (ref 6–23)
CALCIUM SERPL-MCNC: 8.6 MG/DL (ref 8.6–10.3)
CHLORIDE SERPL-SCNC: 104 MMOL/L (ref 98–107)
CO2 SERPL-SCNC: 27 MMOL/L (ref 21–32)
CREAT SERPL-MCNC: 1.07 MG/DL (ref 0.5–1.05)
EGFRCR SERPLBLD CKD-EPI 2021: 51 ML/MIN/1.73M*2
ERYTHROCYTE [DISTWIDTH] IN BLOOD BY AUTOMATED COUNT: 14.2 % (ref 11.5–14.5)
GLUCOSE SERPL-MCNC: 92 MG/DL (ref 74–99)
HCT VFR BLD AUTO: 26.1 % (ref 36–46)
HCT VFR BLD AUTO: 29.7 % (ref 36–46)
HGB BLD-MCNC: 8.1 G/DL (ref 12–16)
HGB BLD-MCNC: 9.2 G/DL (ref 12–16)
MAGNESIUM SERPL-MCNC: 2.02 MG/DL (ref 1.6–2.4)
MCH RBC QN AUTO: 30.7 PG (ref 26–34)
MCHC RBC AUTO-ENTMCNC: 31 G/DL (ref 32–36)
MCV RBC AUTO: 99 FL (ref 80–100)
NRBC BLD-RTO: 0 /100 WBCS (ref 0–0)
PLATELET # BLD AUTO: 279 X10*3/UL (ref 150–450)
POTASSIUM SERPL-SCNC: 4 MMOL/L (ref 3.5–5.3)
RBC # BLD AUTO: 2.64 X10*6/UL (ref 4–5.2)
SODIUM SERPL-SCNC: 139 MMOL/L (ref 136–145)
WBC # BLD AUTO: 8.8 X10*3/UL (ref 4.4–11.3)

## 2024-09-20 PROCEDURE — 80048 BASIC METABOLIC PNL TOTAL CA: CPT

## 2024-09-20 PROCEDURE — 85027 COMPLETE CBC AUTOMATED: CPT

## 2024-09-20 PROCEDURE — 36415 COLL VENOUS BLD VENIPUNCTURE: CPT

## 2024-09-20 PROCEDURE — 2500000001 HC RX 250 WO HCPCS SELF ADMINISTERED DRUGS (ALT 637 FOR MEDICARE OP): Performed by: NURSE PRACTITIONER

## 2024-09-20 PROCEDURE — 2500000001 HC RX 250 WO HCPCS SELF ADMINISTERED DRUGS (ALT 637 FOR MEDICARE OP)

## 2024-09-20 PROCEDURE — 99232 SBSQ HOSP IP/OBS MODERATE 35: CPT | Performed by: NURSE PRACTITIONER

## 2024-09-20 PROCEDURE — 36415 COLL VENOUS BLD VENIPUNCTURE: CPT | Performed by: NURSE PRACTITIONER

## 2024-09-20 PROCEDURE — 85018 HEMOGLOBIN: CPT | Performed by: NURSE PRACTITIONER

## 2024-09-20 PROCEDURE — 83735 ASSAY OF MAGNESIUM: CPT | Performed by: NURSE PRACTITIONER

## 2024-09-20 ASSESSMENT — COGNITIVE AND FUNCTIONAL STATUS - GENERAL
EATING MEALS: A LITTLE
DRESSING REGULAR LOWER BODY CLOTHING: A LITTLE
PERSONAL GROOMING: A LITTLE
CLIMB 3 TO 5 STEPS WITH RAILING: A LOT
MOVING FROM LYING ON BACK TO SITTING ON SIDE OF FLAT BED WITH BEDRAILS: A LITTLE
WALKING IN HOSPITAL ROOM: A LITTLE
DRESSING REGULAR UPPER BODY CLOTHING: A LITTLE
TOILETING: A LITTLE
MOBILITY SCORE: 17
HELP NEEDED FOR BATHING: A LITTLE
TURNING FROM BACK TO SIDE WHILE IN FLAT BAD: A LITTLE
DAILY ACTIVITIY SCORE: 18
MOVING TO AND FROM BED TO CHAIR: A LITTLE
STANDING UP FROM CHAIR USING ARMS: A LITTLE

## 2024-09-20 ASSESSMENT — PAIN SCALES - GENERAL: PAINLEVEL_OUTOF10: 0 - NO PAIN

## 2024-09-20 NOTE — PROGRESS NOTES
"Subjective  Patient sitting up in chair in NAD.   and niece at bedside.  Patient discharge postponed due to blood noted with stool yesterday.  Patient reports intermittent rectal bleeding at home.  She has remained asymptomatic with stable hemoglobin this hospital stay.  Patient and family agreeable to minimal intervention.  Patient educated on symptoms to be aware of should she experience further evidence of bleeding at home.    Objective  Blood pressure 138/76, pulse 74, temperature 36.3 °C (97.3 °F), temperature source Temporal, resp. rate 20, height 1.549 m (5' 1\"), weight 68 kg (150 lb), SpO2 96%.    Physical Exam  Constitutional: Alert, pleasant and interactive, in NAD, Family at bedside  Eyes: PERRL, sclera clear, no conjunctival injection  Skin: Warm and dry, no rash or ecchymosis  ENMT: Mucous membranes moist, no lesions noted  Resp: CTAB, even and unlabored  CV: RRR, normal S1, S2, no m,r,g  GI: +BS, soft, round, NT, no rebound tenderness or guarding, no palpable masses or organomegaly  Extremities: Extremities warm, no edema, contusions, wounds or cyanosis  Neuro: Alert and oriented x3  Psych: Appropriate mood and behavior    Medications  Scheduled medications  amitriptyline, 10 mg, oral, Nightly  [Held by provider] aspirin, 81 mg, oral, Daily  calcium carbonate, 1,250 mg, oral, Daily  cholecalciferol, 2,000 Units, oral, Daily  ferrous sulfate (325 mg ferrous sulfate), 65 mg of iron, oral, Daily with breakfast  pantoprazole, 40 mg, oral, Daily before breakfast      Continuous medications     PRN medications  PRN medications: acetaminophen, albuterol, melatonin, oxyCODONE, polyethylene glycol     Labs  Lab Results   Component Value Date    WBC 8.8 09/20/2024    HGB 8.1 (L) 09/20/2024    HCT 26.1 (L) 09/20/2024    MCV 99 09/20/2024     09/20/2024     Lab Results   Component Value Date    GLUCOSE 92 09/20/2024    CALCIUM 8.6 09/20/2024     09/20/2024    K 4.0 09/20/2024    CO2 27 " 09/20/2024     09/20/2024    BUN 18 09/20/2024    CREATININE 1.07 (H) 09/20/2024     Lab Results   Component Value Date    ALT 6 (L) 09/16/2024    AST 13 09/16/2024    ALKPHOS 65 09/16/2024    BILITOT 0.3 09/16/2024     Lab Results   Component Value Date    IRON 49 09/17/2024    TIBC 272 09/17/2024    FERRITIN 38 09/17/2024     Lab Results   Component Value Date    INR 1.2 (H) 09/17/2024    INR 1.1 09/16/2024    INR 2.9 (H) 12/20/2023    PROTIME 13.1 (H) 09/17/2024    PROTIME 12.0 09/16/2024    PROTIME 33.1 (H) 12/20/2023     Endoscopic reports:  Colonoscopy 9/24/2023 with Dr. Sawyer noting:  Impression:            - Severe diverticulosis in the entire examined colon.                          There was narrowing of the colon in association with                          the diverticular opening. There was evidence of an                          impacted diverticulum. There was evidence of past                          bleeding from the diverticular opening.                         - Blood in the transverse colon.                         - External hemorrhoids.                         - No specimens collected.  Recommendation:        - Return patient to ICU for ongoing care.                         - Clear liquid diet today.                         - No repeat colonoscopy    Radiology  CT A/P with IV contrast 9/17/2024 noting:  IMPRESSION:  No acute intra-abdominal findings.  Evaluation for active  gastrointestinal bleeding is limited given single phase of contrast.   No significant fluid within the colon.  Colonic diverticulosis without diverticulitis.  Signed by Danny Aponte DO     Assessment:  Natasha Lundberg is a 88 y.o. 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  DESIRE completed with specks of maroon-colored material.      Patient underwent colonoscopy  9/2023 for hematochezia noting severe diverticulosis throughout colon with impacted diverticulum, external hemorrhoids.  Plan for no repeat colonoscopy.      Patient has remained stable, asymptomatic with stable hemoglobin this hospital stay.  Intermittent blood noted with defecation postponing discharge.    # Intermittent hematochezia- diverticular (most likely) v hemorrhoidal v malignancy  # severe pancolonic diverticulosis  # external hemorrhoids  # Fe Def anemia  # PE (no longer on Eliquis)      Plan:  - continue supportive care  - diet as tolerated  - pt should have high fiber diet once discharged  - continue to trend hgb daily unless pt becomes symptomatic or decompensates  - avoid iatrogenic anemia  - transfuse to maintain hgb >7  - patient and family opting for conservative measures, minimal intervention per patient request given chronic intermittent rectal bleeding, no symptoms of blood loss     Plan has been discussed with Dr. Kuo. GI will sign off.      Alma Delaney, APRN/CNP

## 2024-09-20 NOTE — DOCUMENTATION CLARIFICATION NOTE
"    PATIENT:               MORGAN FERREIRA  ACCT #:                  [id number]  MRN:                       [id number]  :                       1935  ADMIT DATE:       2024 10:29 PM  DISCH DATE:  RESPONDING PROVIDER #:        [id number]          PROVIDER RESPONSE TEXT:    Rectal Bleeding due to or enhanced by Aspirin use    CDI QUERY TEXT:    Clarification    Instruction:    Based on your assessment of the patient and the clinical information, please provide the requested documentation by clicking on the appropriate radio button and enter any additional information if prompted.    Question: Please further clarify if a relationship exists between Rectal Bleeding and Aspirin    When answering this query, please exercise your independent professional judgment. The fact that a question is being asked, does not imply that any particular answer is desired or expected.    The patient's clinical indicators include:  Clinical Information:  88 yr old female presenting from NH w/rectal bleeding.  Admitted for Rectal Bleed most likely Diverticular w/MATEO.    Clinical Indicators:  VS first 24 hrs of admission -: T 35.1-37.2, HR , RR 16-20 w/pox 94-98 RA, //74.    Labs: Hgb 9.2, 8.2, 8.3 from -.  INR 1.1, 1.2 from -.    HP by IM  0250 AM states \"history of GI bleed related to eliquis use which has since been discontinued..\", \"She currently only takes a baby aspirin once daily.\"    HP by IM  0105 PM states \"Rectal bleeding\", \"Diverticulosis of colon without diverticulitis\".    Aspirin held since .    Treatment: Lab monitoring of CBC, INR.  Aspirin EC 81 mg PO daily - held , .  Type/Screen.  Stool occult.  CT Abd/Pelvis.    Risk Factors: Elderly female w/hx of GI Bleed due to Eliquis and on Aspirin daily.  Options provided:  -- Rectal Bleeding due to or enhanced by Aspirin use  -- Rectal Bleeding not due to or enhanced by Aspirin use  -- Other - I will add my own " diagnosis  -- Refer to Clinical Documentation Reviewer    Query created by: Edith Nichole on 9/18/2024 12:54 PM      Electronically signed by:  DONOVAN HUGHES MD 9/19/2024 10:20 PM

## 2024-09-20 NOTE — CARE PLAN
Patient alert and oriented x3. Patient ambulatory in room and content of bowl and bladder. Patient had bloody stool this shift. No c/o pain. Patient currently in bed watching tv. No needs at this time    The patient's goals for the shift include rest    The clinical goals for the shift include See care plan      Problem: Skin  Goal: Decreased wound size/increased tissue granulation at next dressing change  Outcome: Progressing  Goal: Participates in plan/prevention/treatment measures  Outcome: Progressing  Goal: Prevent/manage excess moisture  Outcome: Progressing  Goal: Prevent/minimize sheer/friction injuries  Outcome: Progressing  Goal: Promote/optimize nutrition  Outcome: Progressing  Goal: Promote skin healing  Outcome: Progressing     Problem: Pain - Adult  Goal: Verbalizes/displays adequate comfort level or baseline comfort level  Outcome: Progressing     Problem: Safety - Adult  Goal: Free from fall injury  Outcome: Progressing     Problem: Discharge Planning  Goal: Discharge to home or other facility with appropriate resources  Outcome: Progressing     Problem: Chronic Conditions and Co-morbidities  Goal: Patient's chronic conditions and co-morbidity symptoms are monitored and maintained or improved  Outcome: Progressing     Problem: Fall/Injury  Goal: Verbalize understanding of personal risk factors for fall in the hospital  Outcome: Progressing  Goal: Verbalize understanding of risk factor reduction measures to prevent injury from fall in the home  Outcome: Progressing

## 2024-09-20 NOTE — PROGRESS NOTES
"Nutrition Diet Education:   Nutrition Assessment    Reason for Assessment: Dietitian discretion (RN request for education)    Patient is a 88 y.o. female presenting from [facility] for rectal bleeding. GI on consult.     Past Medical History   has a past medical history of Bilateral leg edema (10/10/2023), Cardiac arrest (Multi), COVID-19 (09/10/2023), Gastroesophageal reflux disease without esophagitis (04/24/2023), GI bleed, Humeral fracture (09/10/2023), Iron deficiency anemia due to chronic blood loss (04/24/2023), Other pulmonary embolism without acute cor pulmonale (Multi) (04/24/2023), PE (pulmonary thromboembolism) (Multi), Primary hypertension (04/24/2023), Rectus sheath hematoma, and Unspecified fracture of right femur, subsequent encounter for closed fracture with routine healing (04/24/2023).  Surgical History   has a past surgical history that includes CT angio abdomen pelvis w and or wo IV IV contrast (09/23/2023); Total knee arthroplasty; Femur fracture surgery; and Parathyroidectomy.        Nutrition History:  Food and Nutrient History: Pt w/meals provided by facility. Pt often has white toast or cream of wheat w/brown sugar for breakfast. Pt has a grilled cheese or peanut butter and jelly often for lunch.  Vitamin/Herbal Supplement Use: no oral nutrition supplements.  Food Allergies/Intolerances:  None  GI Symptoms:  rectal bleeding  Oral Problems: None       Anthropometrics:  Height: 154.9 cm (5' 1\")   Weight: 68 kg (150 lb)   BMI (Calculated): 28.36             Weight History:   Wt Readings from Last 22 Encounters:   09/16/24 68 kg (150 lb)   03/10/24 67.6 kg (149 lb)   12/20/23 63.8 kg (140 lb 10.5 oz)   09/28/23 66.5 kg (146 lb 9.7 oz)         Weight Change %:  Weight History / % Weight Change: stable per pt  Significant Weight Loss: No    Nutrition Focused Physical Exam Findings:    Subcutaneous Fat Loss:      Muscle Wasting:     Edema:  Edema: +1 trace  Edema Location: " "BLE  Physical Findings:  Skin: Negative    Nutrition Significant Labs:  CBC Trend:   Results from last 7 days   Lab Units 09/20/24  0707 09/19/24  1349 09/19/24  0644 09/18/24  1746 09/18/24  0639   WBC AUTO x10*3/uL 8.8  --  8.3 13.0* 9.9   RBC AUTO x10*6/uL 2.64*  --  2.60* 2.74* 2.71*   HEMOGLOBIN g/dL 8.1*   < > 8.0* 8.4* 8.3*   HEMATOCRIT % 26.1*   < > 26.8* 27.6* 27.0*   MCV fL 99  --  103* 101* 100   PLATELETS AUTO x10*3/uL 279  --  242 278 226    < > = values in this interval not displayed.    , BMP Trend:   Results from last 7 days   Lab Units 09/20/24  0707 09/19/24  0644 09/18/24  0639 09/17/24  0646   GLUCOSE mg/dL 92 92 95 109*   CALCIUM mg/dL 8.6 8.5* 8.5* 8.3*   SODIUM mmol/L 139 139 136 137   POTASSIUM mmol/L 4.0 3.9 4.3 4.5   CO2 mmol/L 27 26 26 25   CHLORIDE mmol/L 104 105 105 105   BUN mg/dL 18 17 20 28*   CREATININE mg/dL 1.07* 1.03 1.04 1.06*    , A1C:No results found for: \"HGBA1C\", BG POCT trend:    , Liver Function Trend:   Results from last 7 days   Lab Units 09/16/24  2346   ALK PHOS U/L 65   AST U/L 13   ALT U/L 6*   BILIRUBIN TOTAL mg/dL 0.3    , Renal Lab Trend:   Results from last 7 days   Lab Units 09/20/24  0707 09/19/24  0644 09/18/24  0639 09/17/24  0646   POTASSIUM mmol/L 4.0 3.9 4.3 4.5   PHOSPHORUS mg/dL  --   --   --  3.6   SODIUM mmol/L 139 139 136 137   MAGNESIUM mg/dL 2.02 1.99  --  1.82   EGFR mL/min/1.73m*2 51* 53* 52* 51*   BUN mg/dL 18 17 20 28*   CREATININE mg/dL 1.07* 1.03 1.04 1.06*    , Vit D: No results found for: \"VITD25\" , Vit B12:   Lab Results   Component Value Date    [id number] CANCELED 09/29/2023    , Iron Panel:   Lab Results   Component Value Date    IRON 49 09/17/2024    TIBC 272 09/17/2024    FERRITIN 38 09/17/2024    , Folate:   Lab Results   Component Value Date    FOLATE CANCELED 09/29/2023        Nutrition Specific Medications:  Scheduled medications  amitriptyline, 10 mg, oral, Nightly  [Held by provider] aspirin, 81 mg, oral, Daily  calcium carbonate, " "1,250 mg, oral, Daily  cholecalciferol, 2,000 Units, oral, Daily  ferrous sulfate (325 mg ferrous sulfate), 65 mg of iron, oral, Daily with breakfast  pantoprazole, 40 mg, oral, Daily before breakfast      Continuous medications     PRN medications  PRN medications: acetaminophen, albuterol, melatonin, oxyCODONE, polyethylene glycol      I/O:   Last BM Date: 09/17/24; Stool Appearance: Bloody, Formed (Reported to RN) (09/19/24 2000)    Dietary Orders (From admission, onward)       Start     Ordered    09/20/24 1031  Adult diet Regular  Diet effective now        Question:  Diet type  Answer:  Regular    09/20/24 1031                     Estimated Needs:  - deferred for education note.                          Nutrition Diagnosis   Malnutrition Diagnosis  Patient has Malnutrition Diagnosis: No    Nutrition Diagnosis  Patient has Nutrition Diagnosis: Yes  Diagnosis Status (1): New  Nutrition Diagnosis 1: Food and nutrition related knowledge deficit  Related to (1): diverticulosis  As Evidenced by (1): low to high fiber diet instruction.       Nutrition Interventions/Recommendations         Nutrition Prescription:  Individualized Nutrition Prescription Provided for : Diet: continue w/regular diet per oral diet ordered. Pt instructed on lower fiber choices.        Nutrition Interventions:   Interventions: Meals and snacks  Meals and Snacks: General healthful diet  Goal: will consume 75% of meals.    Collaboration and Referral of Nutrition Care: Collaboration by nutrition professional with other providers  Goal: SEGUN Zhang    Nutrition Education:   Education Documentation  Nutrition Care Manual, taught by Kina Chris RD at 9/20/2024 12:02 PM.  Learner: Patient  Readiness: Acceptance  Method: Explanation, Handout  Response: Verbalizes Understanding  Comment: Provided handouts from [facility] & reviewed. Instructed to follow \"Low Fiber Nutrition Therapy\" up to 1 week. Then use \"5 sample menus for gradually increasing " "fiber\" handout to add more fiber to diet. Finally, follow longterm \"High Fiber Nutrition Therapy\".      AYR ordering reviewed.          Nutrition Monitoring and Evaluation   Food/Nutrient Related History Monitoring  Monitoring and Evaluation Plan: Energy intake  Energy Intake: Estimated energy intake  Criteria: will meet 75% of estimated energy needs w/meals         Biochemical Data, Medical Tests and Procedures  Monitoring and Evaluation Plan: Electrolyte/renal panel  Electrolyte and Renal Panel: Sodium, Potassium, Phosphorus, Magnesium, Creatinine  Criteria: WNR    Nutrition Focused Physical Findings  Monitoring and Evaluation Plan: Digestive System  Digestive System: Other (Comment)  Criteria: rectal bleeding well resolve         Time Spent (min): 30 minutes  Follow up 5-7 days  Last RD note 9/20/24  "

## 2024-09-20 NOTE — NURSING NOTE
3731 Discharge instructions given to patient and nephew, Jose Roberto, who is to transport patient back to Assisted Living Facility. Report called to [facility]. Pt had medium, formed, dark brown BM about an hour prior to discharge, no blood noted. Discharged to AL facility via wheelchair. Assessment without decline, injury free throughout shift.

## 2024-09-21 NOTE — DOCUMENTATION CLARIFICATION NOTE
"    PATIENT:               MORGAN FERREIRA  ACCT #:                  [id number]  MRN:                       [id number]  :                       1935  ADMIT DATE:       2024 10:29 PM  DISCH DATE:        2024 6:49 PM  RESPONDING PROVIDER #:        [id number]          PROVIDER RESPONSE TEXT:    Non-infectious SIRS without acute organ dysfunction    CDI QUERY TEXT:    Clarification    Instruction:    Based on your assessment of the patient and the clinical information, please provide the requested documentation by clicking on the appropriate radio button and enter any additional information if prompted.    Question: Please further clarify if there is a diagnosis related to the clinical information    When answering this query, please exercise your independent professional judgment. The fact that a question is being asked, does not imply that any particular answer is desired or expected.    The patient's clinical indicators include:  Clinical Information:  88 yr old female presenting from NH w/rectal bleeding.  Admitted for Sigmoid Diverticulosis w/bleeding.    Clinical Indicators:  VS first 24 hrs -: T 35.1-37.2, HR , RR 16-20 w/pox 94-98 RA, //74.    Labs: WBC 14.0 on .  Hgb 9.2, 8.2, 8.3, 8.4, 8.0, 8.9, 7.9 from -.    HP by IM  0105 PM states \"Rectal bleeding\", \"Neutrophilic leukocytosis\", \"Diverticulosis of colon without diverticulitis\".    Treatment: Lab monitoring of H/H, Iron.  Type/Screen.  INR.  Stool occult, blood.  CT Abd/Pelvis.  Aspirin held.  Ferrous Sulfate 325 mg PO daily on .  Protonix 40 mg PO daily -.  Protonix 40 mg IV on .  GI consult.    Risk Factors: Elderly female w/Diverticulosis, KORI, long term Aspirin use with positive SIRS findings.  Options provided:  -- Non-infectious SIRS without acute organ dysfunction  -- No evidence of SIRS due to non-infectious process  -- Other - I will add my own diagnosis  -- Refer to Clinical " Documentation Reviewer    Query created by: Edith Nichole on 9/20/2024 8:01 AM      Electronically signed by:  DONOVAN HUGHES MD 9/20/2024 11:16 PM

## 2024-09-21 NOTE — DISCHARGE SUMMARY
Discharge Diagnosis  Rectal bleeding    Issues Requiring Follow-Up  Rectal bleeding     Discharge Meds     Medication List      START taking these medications     ferrous sulfate 325 (65 Fe) MG EC tablet; Take 1 tablet by mouth once   daily with breakfast. Do not crush, chew, or split.     CONTINUE taking these medications     acetaminophen 500 mg tablet; Commonly known as: Tylenol   amitriptyline 10 mg tablet; Commonly known as: Elavil   benzonatate 100 mg capsule; Commonly known as: Tessalon   calcium citrate 200 mg (950 mg) tablet; Commonly known as: Calcitrate   cholecalciferol 50 MCG (2000 UT) tablet; Commonly known as: Vitamin D-3   diphenhydrAMINE-acetaminophen  mg per tablet; Commonly known as:   Tylenol PM   furosemide 20 mg tablet; Commonly known as: Lasix   oxyCODONE 5 mg immediate release tablet; Commonly known as: Roxicodone   pantoprazole 40 mg EC tablet; Commonly known as: ProtoNix   polyethylene glycol 17 gram packet; Commonly known as: Glycolax, Miralax   ProAir HFA 90 mcg/actuation inhaler; Generic drug: albuterol   [place] 81 mg capsule; Generic drug: aspirin; Take 81 mg by mouth once   daily.     STOP taking these medications     ibuprofen 200 mg tablet       Test Results Pending At Discharge  Pending Labs       No current pending labs.          Pt  is a 88 y.o. female from Counts include [facility] beds at the [facility] with PMH of HTN, HLD, PE, CP arrest, recurrent falls, COVID 19, KORI, GERD presented to ER due to rectal bleeding. Per Pt she has been having dark maroon color stool. Pt was evaluated by GI, treated conservatively, was dcd home for further care and follow up as out pt.      Hospital Course       Pertinent Physical Exam At Time of Discharge  Physical Exam  HENT:      Head: Normocephalic.   Eyes:      Conjunctiva/sclera: Conjunctivae normal.   Cardiovascular:      Rate and Rhythm: Regular rhythm.   Pulmonary:      Breath sounds: Normal breath sounds.   Abdominal:      General: Bowel sounds are normal.      Palpations:  Abdomen is soft.   Musculoskeletal:         General: Normal range of motion.   Skin:     General: Skin is warm and dry.   Neurological:      General: No focal deficit present.      Mental Status: She is alert.   Psychiatric:         Behavior: Behavior normal.         Outpatient Follow-Up   Follow up with PCP    Gwyn Crain MD

## 2024-09-24 ENCOUNTER — PATIENT OUTREACH (OUTPATIENT)
Dept: PRIMARY CARE | Facility: CLINIC | Age: 89
End: 2024-09-24
Payer: MEDICARE

## 2024-09-24 ENCOUNTER — NURSING HOME VISIT (OUTPATIENT)
Dept: POST ACUTE CARE | Facility: EXTERNAL LOCATION | Age: 89
End: 2024-09-24
Payer: MEDICARE

## 2024-09-24 DIAGNOSIS — K62.5 RECTAL BLEEDING: Primary | ICD-10-CM

## 2024-09-24 DIAGNOSIS — R26.2 DIFFICULTY IN WALKING: ICD-10-CM

## 2024-09-24 DIAGNOSIS — I10 PRIMARY HYPERTENSION: ICD-10-CM

## 2024-09-24 DIAGNOSIS — K92.2 LOWER GI BLEED: ICD-10-CM

## 2024-09-24 DIAGNOSIS — K57.30 DIVERTICULOSIS OF COLON WITHOUT DIVERTICULITIS: ICD-10-CM

## 2024-09-24 PROCEDURE — 99496 TRANSJ CARE MGMT HIGH F2F 7D: CPT | Performed by: STUDENT IN AN ORGANIZED HEALTH CARE EDUCATION/TRAINING PROGRAM

## 2024-09-24 NOTE — PROGRESS NOTES
Discharge Facility:[facility]  Discharge Diagnosis:Rectal Bleed   Admission Date:9/16/2024  Discharge Date: 9/20/2024    PCP Appointment Date:TBD, Sees at Assisted Living  Specialist Appointment Date: TBD  Hospital Encounter and Summary Linked: Yes    Two attempts were made to reach patient within two business days after discharge. Voicemail left with contact information for patient to call back with any non-emergent questions or concerns.

## 2024-09-25 ASSESSMENT — ENCOUNTER SYMPTOMS
GASTROINTESTINAL NEGATIVE: 1
RESPIRATORY NEGATIVE: 1
PSYCHIATRIC NEGATIVE: 1
CARDIOVASCULAR NEGATIVE: 1
MUSCULOSKELETAL NEGATIVE: 1
WEAKNESS: 1
FATIGUE: 1

## 2024-09-26 NOTE — PROGRESS NOTES
"Patient: Natasha Lundberg  : 1935  PCP: Matthew Jones DO  MRN: [id number]  Program: Transitional Care Management  Status: Enrolled  Effective Dates: 2024 - present  Responsible Staff: Nohelia Hanson MA  Social Determinants to be Addressed: Physical Activity, Social Connections, Stress, Tobacco Use         Natasha Lundberg is a 88 y.o. female presenting today for follow-up after being discharged from the hospital 5 days ago. The main problem requiring admission was lower gib. The discharge summary and/or Transitional Care Management documentation was reviewed. Medication reconciliation was performed as indicated via the \"Mathew as Reviewed\" timestamp.     Natasha Lundberg was contacted by Transitional Care Management services two days after her discharge. This encounter and supporting documentation was reviewed.    Patient seen and examined at bedside.  Recently, was admitted to the hospital, secondary to diverticular bleed.  Was treated conservatively, improved with supportive care.  Patient discharged back to his facility in stable condition.  Since discharge, she endorses no bleeding or issues with her bowel movements.  Has been closely monitoring this.  Otherwise states strength is overall at baseline.  Endorses no additional issues or concerns.    Review of Systems   Constitutional:  Positive for fatigue.   HENT: Negative.     Respiratory: Negative.     Cardiovascular: Negative.    Gastrointestinal: Negative.    Musculoskeletal: Negative.    Neurological:  Positive for weakness.   Psychiatric/Behavioral: Negative.         There were no vitals taken for this visit.Vitals Reviewed via facility EMR     Physical Exam  Constitutional:       General: She is not in acute distress.     Appearance: She is not ill-appearing.      Comments: Pleasant elderly female   Eyes:      Pupils: Pupils are equal, round, and reactive to light.   Cardiovascular:      Rate and Rhythm: Normal rate and regular rhythm.      Pulses: Normal pulses. "      Heart sounds: No murmur heard.  Pulmonary:      Effort: No respiratory distress.      Breath sounds: No wheezing.   Abdominal:      General: Abdomen is flat. Bowel sounds are normal. There is no distension.   Musculoskeletal:      Right lower leg: No edema.      Left lower leg: No edema.   Skin:     General: Skin is warm and dry.   Neurological:      Mental Status: She is alert. Mental status is at baseline.      Cranial Nerves: No cranial nerve deficit.      Motor: Weakness present.   Psychiatric:         Mood and Affect: Mood normal.         Behavior: Behavior normal.         The complexity of medical decision making for this patient's transitional care is high.    Assessment/Plan   Problem List Items Addressed This Visit             ICD-10-CM    Difficulty in walking R26.2    Primary hypertension I10    Rectal bleeding - Primary K62.5    Diverticulosis of colon without diverticulitis K57.30    Lower GI bleed K92.2     Patient seen and examined at bedside.  Hospital course reviewed and reconciled.  Will need to closely monitor for signs symptoms of bleeding, unfortunately this likely will be a persistent issue for the patient.  Otherwise, continue supportive care.  No additional issues at this time.    Reviewed and approved by SELINA ESTES on 9/25/24 at 10:51 PM.

## 2024-10-01 ENCOUNTER — NURSING HOME VISIT (OUTPATIENT)
Dept: POST ACUTE CARE | Facility: EXTERNAL LOCATION | Age: 89
End: 2024-10-01
Payer: MEDICARE

## 2024-10-01 DIAGNOSIS — R26.2 DIFFICULTY IN WALKING: ICD-10-CM

## 2024-10-01 DIAGNOSIS — I10 PRIMARY HYPERTENSION: ICD-10-CM

## 2024-10-01 DIAGNOSIS — B37.2 INTERTRIGINOUS CANDIDIASIS: Primary | ICD-10-CM

## 2024-10-01 PROBLEM — N17.9 AKI (ACUTE KIDNEY INJURY) (CMS-HCC): Status: RESOLVED | Noted: 2024-09-17 | Resolved: 2024-10-01

## 2024-10-01 PROBLEM — R65.20 SIRS DUE TO INFECTIOUS PROCESS WITH ACUTE ORGAN DYSFUNCTION: Status: RESOLVED | Noted: 2023-12-20 | Resolved: 2024-10-01

## 2024-10-01 PROBLEM — J06.9 UPPER RESPIRATORY TRACT INFECTION: Status: RESOLVED | Noted: 2024-03-05 | Resolved: 2024-10-01

## 2024-10-01 PROBLEM — A41.9 SIRS DUE TO INFECTIOUS PROCESS WITH ACUTE ORGAN DYSFUNCTION: Status: RESOLVED | Noted: 2023-12-20 | Resolved: 2024-10-01

## 2024-10-01 PROBLEM — K92.2 LOWER GI BLEED: Status: RESOLVED | Noted: 2024-09-17 | Resolved: 2024-10-01

## 2024-10-01 PROBLEM — J18.9 PNEUMONIA: Status: RESOLVED | Noted: 2023-12-20 | Resolved: 2024-10-01

## 2024-10-01 PROBLEM — R10.9 ABDOMINAL PAIN, UNSPECIFIED ABDOMINAL LOCATION: Status: RESOLVED | Noted: 2024-03-10 | Resolved: 2024-10-01

## 2024-10-01 PROCEDURE — 99348 HOME/RES VST EST LOW MDM 30: CPT

## 2024-10-01 NOTE — PROGRESS NOTES
Visit  Note   Subjective   Natasha Lundberg is a 88 y.o. female who is being seen and evaluated for multiple medical problems. Nursing notes, vital signs, and labs were reviewed in the local facility chart.    HPI   This is a 88yoF who is being seen at her nursing home. She states that she does have a rash/itch on BL sides in her skin folds and that she noticed in the past couple weeks/days. It is itching her and she has been trying benadryl PRN with minimal relief. She denies any fevers, chills, chest pains, recent abx use, or SOB.    Objective   There were no vitals taken for this visit.  Physical Exam  Vitals reviewed.   Constitutional:       General: She is not in acute distress.     Appearance: Normal appearance. She is not ill-appearing.   HENT:      Head: Normocephalic and atraumatic.      Right Ear: External ear normal.      Left Ear: External ear normal.   Eyes:      Conjunctiva/sclera: Conjunctivae normal.   Cardiovascular:      Rate and Rhythm: Normal rate and regular rhythm.   Pulmonary:      Effort: Pulmonary effort is normal.      Breath sounds: Normal breath sounds.   Musculoskeletal:      Cervical back: Normal range of motion.   Skin:     General: Skin is warm and dry.      Findings: Rash (Under skin fold on R and L; pruitic, macular rash w some central pallor) present.   Neurological:      Mental Status: She is alert and oriented to person, place, and time.      Gait: Gait normal.   Psychiatric:         Mood and Affect: Mood normal.         Behavior: Behavior normal.         Wheelchair: Yes - Patient requires a wheelchair for use within the home due to limited mobility and lower extremity weakness.  Able to safely operate a wheelchair        Assessment/Plan   -Rash most consistent with intertrigo given that it is between her folds on her stomach, will use nystatin powder BID for this and encourage her to keep the area dry    Meds, orders, nursing notes reviewed.  Nurse will monitor and update physician of  any change: Discussed with patient and provided information.  Supportive care will be given.  Follow-up in 1 month or earlier as needed.     Problem List Items Addressed This Visit          Medium    Difficulty in walking    Primary hypertension     Other Visit Diagnoses       Intertriginous candidiasis    -  Primary           Perla Nelson DO  Family Medicine Resident, PGY-2     Pt seen with resident physician, start on nystatin powder for suspected intertrigo, continue supportive care, and closely monitor consider derm fu if no improvement

## 2024-10-08 ENCOUNTER — PATIENT OUTREACH (OUTPATIENT)
Dept: PRIMARY CARE | Facility: CLINIC | Age: 89
End: 2024-10-08
Payer: MEDICARE

## 2024-11-19 ENCOUNTER — NURSING HOME VISIT (OUTPATIENT)
Dept: POST ACUTE CARE | Facility: EXTERNAL LOCATION | Age: 89
End: 2024-11-19
Payer: MEDICARE

## 2024-11-19 DIAGNOSIS — K62.5 RECTAL BLEEDING: ICD-10-CM

## 2024-11-19 DIAGNOSIS — R26.2 DIFFICULTY IN WALKING: ICD-10-CM

## 2024-11-19 DIAGNOSIS — I10 PRIMARY HYPERTENSION: Primary | ICD-10-CM

## 2024-11-19 DIAGNOSIS — G43.809 OTHER MIGRAINE WITHOUT STATUS MIGRAINOSUS, NOT INTRACTABLE: ICD-10-CM

## 2024-11-19 PROCEDURE — 99348 HOME/RES VST EST LOW MDM 30: CPT | Performed by: STUDENT IN AN ORGANIZED HEALTH CARE EDUCATION/TRAINING PROGRAM

## 2024-11-21 ASSESSMENT — ENCOUNTER SYMPTOMS
CARDIOVASCULAR NEGATIVE: 1
FATIGUE: 1
RESPIRATORY NEGATIVE: 1
GASTROINTESTINAL NEGATIVE: 1
WEAKNESS: 1
MUSCULOSKELETAL NEGATIVE: 1
PSYCHIATRIC NEGATIVE: 1

## 2024-11-22 NOTE — PROGRESS NOTES
Subjective   Patient ID: Natasha Lundberg is a 89 y.o. female.    Patient seen and examined on routine evaluation, she regards that she is overall doing well without any acute issues or concerns.  States that she overall feels well, no recent issues of bloody bowel movements.  Otherwise, feels overall well.  No additional issues at this time.        Review of Systems   Constitutional:  Positive for fatigue.   HENT: Negative.     Respiratory: Negative.     Cardiovascular: Negative.    Gastrointestinal: Negative.    Musculoskeletal: Negative.    Neurological:  Positive for weakness.   Psychiatric/Behavioral: Negative.         Objective Vitals Reviewed via facility EMR   Physical Exam  Constitutional:       General: She is not in acute distress.     Appearance: She is not ill-appearing.   Eyes:      Pupils: Pupils are equal, round, and reactive to light.   Cardiovascular:      Rate and Rhythm: Normal rate and regular rhythm.      Pulses: Normal pulses.      Heart sounds: No murmur heard.  Pulmonary:      Effort: No respiratory distress.      Breath sounds: No wheezing.   Abdominal:      General: Abdomen is flat. Bowel sounds are normal. There is no distension.   Musculoskeletal:      Right lower leg: No edema.      Left lower leg: No edema.   Skin:     General: Skin is warm and dry.   Neurological:      Mental Status: She is alert. Mental status is at baseline.      Cranial Nerves: No cranial nerve deficit.      Motor: Weakness present.   Psychiatric:         Mood and Affect: Mood normal.         Behavior: Behavior normal.         Assessment/Plan   Diagnoses and all orders for this visit:  Primary hypertension  Difficulty in walking  Other migraine without status migrainosus, not intractable  Rectal bleeding  Patient seen and examined at bedside.  Overall medically stable, continue supportive care, no change in medications at this time.  Otherwise, continue supportive care.  Continue closely monitor for signs symptoms of  bleeding.  Otherwise no additional issues or concerns.    Reviewed and approved by SELINA ESTES on 11/21/24 at 10:32 PM.

## 2025-01-07 ENCOUNTER — NURSING HOME VISIT (OUTPATIENT)
Dept: POST ACUTE CARE | Facility: EXTERNAL LOCATION | Age: 89
End: 2025-01-07
Payer: MEDICARE

## 2025-01-07 DIAGNOSIS — R26.2 DIFFICULTY IN WALKING: ICD-10-CM

## 2025-01-07 DIAGNOSIS — K62.5 RECTAL BLEEDING: Primary | ICD-10-CM

## 2025-01-07 DIAGNOSIS — G43.809 OTHER MIGRAINE WITHOUT STATUS MIGRAINOSUS, NOT INTRACTABLE: ICD-10-CM

## 2025-01-07 PROCEDURE — 99348 HOME/RES VST EST LOW MDM 30: CPT

## 2025-01-07 NOTE — ASSESSMENT & PLAN NOTE
No recent falls. Patient is getting around room and facility well, Improving. No dizziness or vertigo.   Conitnue normal supprotive care.

## 2025-01-07 NOTE — ASSESSMENT & PLAN NOTE
Patient seen and evaluated. No changes in current medications.  No recurrence of rectal bleeding. Normal bowel movements. No abdominal pain. Recommend continuing to get adequate hydration and fiber intake

## 2025-01-07 NOTE — PROGRESS NOTES
Subjective   Natasha Lundberg is a 89 y.o. female who presents for No chief complaint on file..  Patient seen and examined in her room. States she is doing well with no complaints.             Objective   There were no vitals taken for this visit.   Physical Exam  Constitutional:       General: She is not in acute distress.  Eyes:      General:         Right eye: No discharge.         Left eye: No discharge.   Cardiovascular:      Rate and Rhythm: Normal rate.      Pulses: Normal pulses.   Pulmonary:      Effort: Pulmonary effort is normal. No respiratory distress.   Abdominal:      General: Abdomen is flat.      Palpations: Abdomen is soft.      Tenderness: There is no abdominal tenderness.   Musculoskeletal:         General: No tenderness.      Right lower leg: No edema.      Left lower leg: No edema.   Skin:     General: Skin is warm and dry.   Neurological:      General: No focal deficit present.      Mental Status: She is alert and oriented to person, place, and time.   Psychiatric:         Mood and Affect: Mood normal.         Behavior: Behavior normal.         Assessment & Plan  Rectal bleeding  Patient seen and evaluated. No changes in current medications.  No recurrence of rectal bleeding. Normal bowel movements. No abdominal pain. Recommend continuing to get adequate hydration and fiber intake       Difficulty in walking  No recent falls. Patient is getting around room and facility well, Improving. No dizziness or vertigo.   Conitnue normal supprotive care.          Other migraine without status migrainosus, not intractable  Resolved. No headaches recently. Doing well without any other neurologic symptoms. Tolerating ambulation well.                 Antonio Courtney DO   01/07/25 3:46 PM       Pt seen in conjunction with resident physician, continue to monitor for s/s of bleeding. Continue to optimized adl's. Otherwise continue supportive care. No additional issues.     Reviewed and approved by SELINA ESTES  on 1/7/25 at 11:24 PM.

## 2025-01-07 NOTE — ASSESSMENT & PLAN NOTE
Resolved. No headaches recently. Doing well without any other neurologic symptoms. Tolerating ambulation well.

## 2025-01-16 ENCOUNTER — APPOINTMENT (OUTPATIENT)
Dept: RADIOLOGY | Facility: HOSPITAL | Age: 89
DRG: 378 | End: 2025-01-16
Payer: MEDICARE

## 2025-01-16 ENCOUNTER — APPOINTMENT (OUTPATIENT)
Dept: CARDIOLOGY | Facility: HOSPITAL | Age: 89
DRG: 378 | End: 2025-01-16
Payer: MEDICARE

## 2025-01-16 ENCOUNTER — HOSPITAL ENCOUNTER (INPATIENT)
Facility: HOSPITAL | Age: 89
LOS: 3 days | Discharge: NURSING, REHAB OR LONG-TERM CARE | End: 2025-01-19
Attending: EMERGENCY MEDICINE
Payer: MEDICARE

## 2025-01-16 DIAGNOSIS — K62.5 RECTAL BLEEDING: ICD-10-CM

## 2025-01-16 DIAGNOSIS — K92.1 GASTROINTESTINAL HEMORRHAGE WITH MELENA: ICD-10-CM

## 2025-01-16 DIAGNOSIS — D64.9 ANEMIA, UNSPECIFIED TYPE: ICD-10-CM

## 2025-01-16 DIAGNOSIS — R42 LIGHTHEADEDNESS: Primary | ICD-10-CM

## 2025-01-16 LAB
ABO GROUP (TYPE) IN BLOOD: NORMAL
ALBUMIN SERPL BCP-MCNC: 3.6 G/DL (ref 3.4–5)
ALP SERPL-CCNC: 57 U/L (ref 33–136)
ALT SERPL W P-5'-P-CCNC: 6 U/L (ref 7–45)
ANION GAP SERPL CALC-SCNC: 14 MMOL/L (ref 10–20)
ANTIBODY SCREEN: NORMAL
APPEARANCE UR: ABNORMAL
AST SERPL W P-5'-P-CCNC: 12 U/L (ref 9–39)
ATRIAL RATE: 97 BPM
BASOPHILS # BLD AUTO: 0.13 X10*3/UL (ref 0–0.1)
BASOPHILS NFR BLD AUTO: 1.1 %
BILIRUB SERPL-MCNC: 0.3 MG/DL (ref 0–1.2)
BILIRUB UR STRIP.AUTO-MCNC: NEGATIVE MG/DL
BLOOD EXPIRATION DATE: NORMAL
BUN SERPL-MCNC: 34 MG/DL (ref 6–23)
CALCIUM SERPL-MCNC: 8.1 MG/DL (ref 8.6–10.3)
CARDIAC TROPONIN I PNL SERPL HS: 6 NG/L (ref 0–13)
CARDIAC TROPONIN I PNL SERPL HS: 8 NG/L (ref 0–13)
CHLORIDE SERPL-SCNC: 103 MMOL/L (ref 98–107)
CO2 SERPL-SCNC: 24 MMOL/L (ref 21–32)
COLOR UR: ABNORMAL
CREAT SERPL-MCNC: 1.54 MG/DL (ref 0.5–1.05)
DISPENSE STATUS: NORMAL
EGFRCR SERPLBLD CKD-EPI 2021: 33 ML/MIN/1.73M*2
EOSINOPHIL # BLD AUTO: 0.73 X10*3/UL (ref 0–0.4)
EOSINOPHIL NFR BLD AUTO: 6.3 %
ERYTHROCYTE [DISTWIDTH] IN BLOOD BY AUTOMATED COUNT: 17.3 % (ref 11.5–14.5)
GLUCOSE SERPL-MCNC: 123 MG/DL (ref 74–99)
GLUCOSE UR STRIP.AUTO-MCNC: NORMAL MG/DL
HCT VFR BLD AUTO: 24.6 % (ref 36–46)
HCT VFR BLD AUTO: 24.6 % (ref 36–46)
HEMOCCULT SP1 STL QL: POSITIVE
HGB BLD-MCNC: 7.3 G/DL (ref 12–16)
HGB BLD-MCNC: 7.6 G/DL (ref 12–16)
HOLD SPECIMEN: NORMAL
HOLD SPECIMEN: NORMAL
IMM GRANULOCYTES # BLD AUTO: 0.05 X10*3/UL (ref 0–0.5)
IMM GRANULOCYTES NFR BLD AUTO: 0.4 % (ref 0–0.9)
KETONES UR STRIP.AUTO-MCNC: NEGATIVE MG/DL
LEUKOCYTE ESTERASE UR QL STRIP.AUTO: ABNORMAL
LYMPHOCYTES # BLD AUTO: 2.57 X10*3/UL (ref 0.8–3)
LYMPHOCYTES NFR BLD AUTO: 22 %
MCH RBC QN AUTO: 26.6 PG (ref 26–34)
MCHC RBC AUTO-ENTMCNC: 29.7 G/DL (ref 32–36)
MCV RBC AUTO: 90 FL (ref 80–100)
MONOCYTES # BLD AUTO: 1.06 X10*3/UL (ref 0.05–0.8)
MONOCYTES NFR BLD AUTO: 9.1 %
NEUTROPHILS # BLD AUTO: 7.14 X10*3/UL (ref 1.6–5.5)
NEUTROPHILS NFR BLD AUTO: 61.1 %
NITRITE UR QL STRIP.AUTO: NEGATIVE
NRBC BLD-RTO: 0 /100 WBCS (ref 0–0)
P AXIS: 21 DEGREES
P OFFSET: 166 MS
P ONSET: 106 MS
PH UR STRIP.AUTO: 5.5 [PH]
PLATELET # BLD AUTO: 301 X10*3/UL (ref 150–450)
POTASSIUM SERPL-SCNC: 4.3 MMOL/L (ref 3.5–5.3)
PR INTERVAL: 236 MS
PRODUCT BLOOD TYPE: 600
PRODUCT CODE: NORMAL
PROT SERPL-MCNC: 6.5 G/DL (ref 6.4–8.2)
PROT UR STRIP.AUTO-MCNC: NEGATIVE MG/DL
Q ONSET: 224 MS
QRS COUNT: 16 BEATS
QRS DURATION: 98 MS
QT INTERVAL: 368 MS
QTC CALCULATION(BAZETT): 467 MS
QTC FREDERICIA: 431 MS
R AXIS: -32 DEGREES
RBC # BLD AUTO: 2.74 X10*6/UL (ref 4–5.2)
RBC # UR STRIP.AUTO: NEGATIVE /UL
RBC #/AREA URNS AUTO: ABNORMAL /HPF
RH FACTOR (ANTIGEN D): NORMAL
SODIUM SERPL-SCNC: 137 MMOL/L (ref 136–145)
SP GR UR STRIP.AUTO: 1.02
SQUAMOUS #/AREA URNS AUTO: ABNORMAL /HPF
T AXIS: 100 DEGREES
T OFFSET: 408 MS
UNIT ABO: NORMAL
UNIT NUMBER: NORMAL
UNIT RH: NORMAL
UNIT VOLUME: 500
UROBILINOGEN UR STRIP.AUTO-MCNC: NORMAL MG/DL
VENTRICULAR RATE: 97 BPM
WBC # BLD AUTO: 11.7 X10*3/UL (ref 4.4–11.3)
WBC #/AREA URNS AUTO: >50 /HPF
XM INTEP: NORMAL

## 2025-01-16 PROCEDURE — C1760 CLOSURE DEV, VASC: HCPCS

## 2025-01-16 PROCEDURE — 2500000004 HC RX 250 GENERAL PHARMACY W/ HCPCS (ALT 636 FOR OP/ED): Performed by: NURSE PRACTITIONER

## 2025-01-16 PROCEDURE — 74174 CTA ABD&PLVS W/CONTRAST: CPT | Performed by: RADIOLOGY

## 2025-01-16 PROCEDURE — 99221 1ST HOSP IP/OBS SF/LOW 40: CPT

## 2025-01-16 PROCEDURE — 2550000001 HC RX 255 CONTRASTS

## 2025-01-16 PROCEDURE — 76937 US GUIDE VASCULAR ACCESS: CPT | Mod: RT | Performed by: RADIOLOGY

## 2025-01-16 PROCEDURE — 99285 EMERGENCY DEPT VISIT HI MDM: CPT | Performed by: NURSE PRACTITIONER

## 2025-01-16 PROCEDURE — 96360 HYDRATION IV INFUSION INIT: CPT

## 2025-01-16 PROCEDURE — P9016 RBC LEUKOCYTES REDUCED: HCPCS

## 2025-01-16 PROCEDURE — 36247 INS CATH ABD/L-EXT ART 3RD: CPT | Performed by: RADIOLOGY

## 2025-01-16 PROCEDURE — 36430 TRANSFUSION BLD/BLD COMPNT: CPT

## 2025-01-16 PROCEDURE — 76937 US GUIDE VASCULAR ACCESS: CPT | Performed by: RADIOLOGY

## 2025-01-16 PROCEDURE — 2500000005 HC RX 250 GENERAL PHARMACY W/O HCPCS: Performed by: RADIOLOGY

## 2025-01-16 PROCEDURE — C1769 GUIDE WIRE: HCPCS

## 2025-01-16 PROCEDURE — B4141ZZ FLUOROSCOPY OF SUPERIOR MESENTERIC ARTERY USING LOW OSMOLAR CONTRAST: ICD-10-PCS | Performed by: RADIOLOGY

## 2025-01-16 PROCEDURE — 84484 ASSAY OF TROPONIN QUANT: CPT | Performed by: NURSE PRACTITIONER

## 2025-01-16 PROCEDURE — 2060000001 HC INTERMEDIATE ICU ROOM DAILY

## 2025-01-16 PROCEDURE — 2500000004 HC RX 250 GENERAL PHARMACY W/ HCPCS (ALT 636 FOR OP/ED)

## 2025-01-16 PROCEDURE — 81001 URINALYSIS AUTO W/SCOPE: CPT | Performed by: NURSE PRACTITIONER

## 2025-01-16 PROCEDURE — 99153 MOD SED SAME PHYS/QHP EA: CPT | Performed by: RADIOLOGY

## 2025-01-16 PROCEDURE — 2720000007 HC OR 272 NO HCPCS

## 2025-01-16 PROCEDURE — 36415 COLL VENOUS BLD VENIPUNCTURE: CPT | Performed by: NURSE PRACTITIONER

## 2025-01-16 PROCEDURE — 2550000001 HC RX 255 CONTRASTS: Performed by: EMERGENCY MEDICINE

## 2025-01-16 PROCEDURE — 74174 CTA ABD&PLVS W/CONTRAST: CPT

## 2025-01-16 PROCEDURE — 75726 ARTERY X-RAYS ABDOMEN: CPT | Performed by: RADIOLOGY

## 2025-01-16 PROCEDURE — 85018 HEMOGLOBIN: CPT | Performed by: NURSE PRACTITIONER

## 2025-01-16 PROCEDURE — 87086 URINE CULTURE/COLONY COUNT: CPT | Mod: STJLAB | Performed by: NURSE PRACTITIONER

## 2025-01-16 PROCEDURE — 71045 X-RAY EXAM CHEST 1 VIEW: CPT

## 2025-01-16 PROCEDURE — 2780000003 HC OR 278 NO HCPCS

## 2025-01-16 PROCEDURE — 75710 ARTERY X-RAYS ARM/LEG: CPT | Mod: RT | Performed by: RADIOLOGY

## 2025-01-16 PROCEDURE — 2500000001 HC RX 250 WO HCPCS SELF ADMINISTERED DRUGS (ALT 637 FOR MEDICARE OP)

## 2025-01-16 PROCEDURE — 80053 COMPREHEN METABOLIC PANEL: CPT | Performed by: NURSE PRACTITIONER

## 2025-01-16 PROCEDURE — 71045 X-RAY EXAM CHEST 1 VIEW: CPT | Performed by: RADIOLOGY

## 2025-01-16 PROCEDURE — 96361 HYDRATE IV INFUSION ADD-ON: CPT | Mod: 59

## 2025-01-16 PROCEDURE — 2500000001 HC RX 250 WO HCPCS SELF ADMINISTERED DRUGS (ALT 637 FOR MEDICARE OP): Performed by: NURSE PRACTITIONER

## 2025-01-16 PROCEDURE — 77001 FLUOROGUIDE FOR VEIN DEVICE: CPT | Mod: RT | Performed by: RADIOLOGY

## 2025-01-16 PROCEDURE — 93010 ELECTROCARDIOGRAM REPORT: CPT | Performed by: NURSE PRACTITIONER

## 2025-01-16 PROCEDURE — 37244 VASC EMBOLIZE/OCCLUDE BLEED: CPT | Performed by: RADIOLOGY

## 2025-01-16 PROCEDURE — 85025 COMPLETE CBC W/AUTO DIFF WBC: CPT | Performed by: NURSE PRACTITIONER

## 2025-01-16 PROCEDURE — 2500000004 HC RX 250 GENERAL PHARMACY W/ HCPCS (ALT 636 FOR OP/ED): Performed by: RADIOLOGY

## 2025-01-16 PROCEDURE — 82270 OCCULT BLOOD FECES: CPT | Performed by: NURSE PRACTITIONER

## 2025-01-16 PROCEDURE — C1894 INTRO/SHEATH, NON-LASER: HCPCS

## 2025-01-16 PROCEDURE — 86850 RBC ANTIBODY SCREEN: CPT | Performed by: NURSE PRACTITIONER

## 2025-01-16 PROCEDURE — P9612 CATHETERIZE FOR URINE SPEC: HCPCS

## 2025-01-16 PROCEDURE — 93005 ELECTROCARDIOGRAM TRACING: CPT

## 2025-01-16 PROCEDURE — C1887 CATHETER, GUIDING: HCPCS

## 2025-01-16 PROCEDURE — 99152 MOD SED SAME PHYS/QHP 5/>YRS: CPT | Performed by: RADIOLOGY

## 2025-01-16 PROCEDURE — 99285 EMERGENCY DEPT VISIT HI MDM: CPT | Mod: 25 | Performed by: EMERGENCY MEDICINE

## 2025-01-16 PROCEDURE — 86923 COMPATIBILITY TEST ELECTRIC: CPT

## 2025-01-16 RX ORDER — MIDAZOLAM HYDROCHLORIDE 1 MG/ML
INJECTION, SOLUTION INTRAMUSCULAR; INTRAVENOUS
Status: COMPLETED | OUTPATIENT
Start: 2025-01-16 | End: 2025-01-16

## 2025-01-16 RX ORDER — NAPROXEN SODIUM 220 MG/1
81 TABLET, FILM COATED ORAL DAILY
Status: ON HOLD | COMMUNITY
End: 2025-01-19

## 2025-01-16 RX ORDER — CHOLECALCIFEROL (VITAMIN D3) 25 MCG
2000 TABLET ORAL DAILY
Status: DISCONTINUED | OUTPATIENT
Start: 2025-01-16 | End: 2025-01-19 | Stop reason: HOSPADM

## 2025-01-16 RX ORDER — NAPROXEN SODIUM 220 MG/1
81 TABLET, FILM COATED ORAL DAILY
Status: DISCONTINUED | OUTPATIENT
Start: 2025-01-16 | End: 2025-01-19 | Stop reason: HOSPADM

## 2025-01-16 RX ORDER — NYSTATIN 100000 U/G
1 CREAM TOPICAL 3 TIMES DAILY
Status: DISCONTINUED | OUTPATIENT
Start: 2025-01-16 | End: 2025-01-19 | Stop reason: HOSPADM

## 2025-01-16 RX ORDER — SODIUM CHLORIDE, SODIUM LACTATE, POTASSIUM CHLORIDE, CALCIUM CHLORIDE 600; 310; 30; 20 MG/100ML; MG/100ML; MG/100ML; MG/100ML
100 INJECTION, SOLUTION INTRAVENOUS CONTINUOUS
Status: ACTIVE | OUTPATIENT
Start: 2025-01-16 | End: 2025-01-17

## 2025-01-16 RX ORDER — ONDANSETRON HYDROCHLORIDE 2 MG/ML
4 INJECTION, SOLUTION INTRAVENOUS EVERY 8 HOURS PRN
Status: DISCONTINUED | OUTPATIENT
Start: 2025-01-16 | End: 2025-01-19 | Stop reason: HOSPADM

## 2025-01-16 RX ORDER — FERROUS SULFATE 325(65) MG
325 TABLET ORAL EVERY OTHER DAY
COMMUNITY

## 2025-01-16 RX ORDER — ONDANSETRON 4 MG/1
4 TABLET, FILM COATED ORAL EVERY 8 HOURS PRN
Status: DISCONTINUED | OUTPATIENT
Start: 2025-01-16 | End: 2025-01-19 | Stop reason: HOSPADM

## 2025-01-16 RX ORDER — NYSTATIN 100000 U/G
1 CREAM TOPICAL 3 TIMES DAILY
COMMUNITY

## 2025-01-16 RX ORDER — POLYETHYLENE GLYCOL 3350 17 G/17G
17 POWDER, FOR SOLUTION ORAL DAILY PRN
Status: DISCONTINUED | OUTPATIENT
Start: 2025-01-16 | End: 2025-01-19 | Stop reason: HOSPADM

## 2025-01-16 RX ORDER — AMITRIPTYLINE HYDROCHLORIDE 10 MG/1
10 TABLET, FILM COATED ORAL NIGHTLY
Status: DISCONTINUED | OUTPATIENT
Start: 2025-01-16 | End: 2025-01-19 | Stop reason: HOSPADM

## 2025-01-16 RX ORDER — FENTANYL CITRATE 50 UG/ML
INJECTION, SOLUTION INTRAMUSCULAR; INTRAVENOUS
Status: COMPLETED | OUTPATIENT
Start: 2025-01-16 | End: 2025-01-16

## 2025-01-16 RX ORDER — ACETAMINOPHEN 160 MG/5ML
650 SOLUTION ORAL EVERY 4 HOURS PRN
Status: DISCONTINUED | OUTPATIENT
Start: 2025-01-16 | End: 2025-01-19 | Stop reason: HOSPADM

## 2025-01-16 RX ORDER — POLYETHYLENE GLYCOL 3350, SODIUM CHLORIDE, SODIUM BICARBONATE, POTASSIUM CHLORIDE 420; 11.2; 5.72; 1.48 G/4L; G/4L; G/4L; G/4L
4000 POWDER, FOR SOLUTION ORAL ONCE
Status: COMPLETED | OUTPATIENT
Start: 2025-01-16 | End: 2025-01-16

## 2025-01-16 RX ORDER — LIDOCAINE HYDROCHLORIDE 10 MG/ML
INJECTION, SOLUTION EPIDURAL; INFILTRATION; INTRACAUDAL; PERINEURAL
Status: COMPLETED | OUTPATIENT
Start: 2025-01-16 | End: 2025-01-16

## 2025-01-16 RX ORDER — DIPHENHYDRAMINE HCL 25 MG
25 TABLET ORAL EVERY 8 HOURS PRN
COMMUNITY

## 2025-01-16 RX ORDER — FERROUS SULFATE 325(65) MG
65 TABLET ORAL EVERY OTHER DAY
Status: DISCONTINUED | OUTPATIENT
Start: 2025-01-17 | End: 2025-01-19 | Stop reason: HOSPADM

## 2025-01-16 RX ORDER — PANTOPRAZOLE SODIUM 40 MG/1
40 TABLET, DELAYED RELEASE ORAL NIGHTLY
Status: DISCONTINUED | OUTPATIENT
Start: 2025-01-16 | End: 2025-01-19 | Stop reason: HOSPADM

## 2025-01-16 RX ORDER — ALBUTEROL SULFATE 0.83 MG/ML
2.5 SOLUTION RESPIRATORY (INHALATION) EVERY 6 HOURS PRN
Status: DISCONTINUED | OUTPATIENT
Start: 2025-01-16 | End: 2025-01-19 | Stop reason: HOSPADM

## 2025-01-16 RX ORDER — ACETAMINOPHEN 325 MG/1
650 TABLET ORAL EVERY 4 HOURS PRN
Status: DISCONTINUED | OUTPATIENT
Start: 2025-01-16 | End: 2025-01-19 | Stop reason: HOSPADM

## 2025-01-16 RX ADMIN — SODIUM CHLORIDE 500 ML: 9 INJECTION, SOLUTION INTRAVENOUS at 09:32

## 2025-01-16 RX ADMIN — POLYETHYLENE GLYCOL 3350, SODIUM SULFATE ANHYDROUS, SODIUM BICARBONATE, SODIUM CHLORIDE, POTASSIUM CHLORIDE 4000 ML: 236; 22.74; 6.74; 5.86; 2.97 POWDER, FOR SOLUTION ORAL at 19:55

## 2025-01-16 RX ADMIN — MIDAZOLAM 0.5 MG: 1 INJECTION INTRAMUSCULAR; INTRAVENOUS at 15:49

## 2025-01-16 RX ADMIN — LIDOCAINE HYDROCHLORIDE 3 ML: 10 INJECTION, SOLUTION EPIDURAL; INFILTRATION; INTRACAUDAL; PERINEURAL at 15:56

## 2025-01-16 RX ADMIN — SODIUM CHLORIDE, POTASSIUM CHLORIDE, SODIUM LACTATE AND CALCIUM CHLORIDE 1000 ML: 600; 310; 30; 20 INJECTION, SOLUTION INTRAVENOUS at 14:12

## 2025-01-16 RX ADMIN — FENTANYL CITRATE 25 MCG: 50 INJECTION, SOLUTION INTRAMUSCULAR; INTRAVENOUS at 15:49

## 2025-01-16 RX ADMIN — SODIUM CHLORIDE, POTASSIUM CHLORIDE, SODIUM LACTATE AND CALCIUM CHLORIDE 100 ML/HR: 600; 310; 30; 20 INJECTION, SOLUTION INTRAVENOUS at 17:04

## 2025-01-16 RX ADMIN — AMITRIPTYLINE HYDROCHLORIDE 10 MG: 10 TABLET, FILM COATED ORAL at 21:32

## 2025-01-16 RX ADMIN — PANTOPRAZOLE SODIUM 40 MG: 40 TABLET, DELAYED RELEASE ORAL at 21:32

## 2025-01-16 RX ADMIN — MIDAZOLAM 0.5 MG: 1 INJECTION INTRAMUSCULAR; INTRAVENOUS at 16:28

## 2025-01-16 RX ADMIN — IOHEXOL 90 ML: 350 INJECTION, SOLUTION INTRAVENOUS at 10:23

## 2025-01-16 RX ADMIN — IOHEXOL 70 ML: 350 INJECTION, SOLUTION INTRAVENOUS at 16:28

## 2025-01-16 RX ADMIN — Medication 3 L/MIN: at 15:40

## 2025-01-16 SDOH — SOCIAL STABILITY: SOCIAL INSECURITY: WITHIN THE LAST YEAR, HAVE YOU BEEN AFRAID OF YOUR PARTNER OR EX-PARTNER?: NO

## 2025-01-16 SDOH — SOCIAL STABILITY: SOCIAL INSECURITY: HAS ANYONE EVER THREATENED TO HURT YOUR FAMILY OR YOUR PETS?: NO

## 2025-01-16 SDOH — SOCIAL STABILITY: SOCIAL INSECURITY
WITHIN THE LAST YEAR, HAVE YOU BEEN RAPED OR FORCED TO HAVE ANY KIND OF SEXUAL ACTIVITY BY YOUR PARTNER OR EX-PARTNER?: NO

## 2025-01-16 SDOH — ECONOMIC STABILITY: HOUSING INSECURITY: AT ANY TIME IN THE PAST 12 MONTHS, WERE YOU HOMELESS OR LIVING IN A SHELTER (INCLUDING NOW)?: NO

## 2025-01-16 SDOH — ECONOMIC STABILITY: FOOD INSECURITY: WITHIN THE PAST 12 MONTHS, YOU WORRIED THAT YOUR FOOD WOULD RUN OUT BEFORE YOU GOT THE MONEY TO BUY MORE.: NEVER TRUE

## 2025-01-16 SDOH — ECONOMIC STABILITY: HOUSING INSECURITY: IN THE PAST 12 MONTHS, HOW MANY TIMES HAVE YOU MOVED WHERE YOU WERE LIVING?: 0

## 2025-01-16 SDOH — ECONOMIC STABILITY: HOUSING INSECURITY: IN THE LAST 12 MONTHS, WAS THERE A TIME WHEN YOU WERE NOT ABLE TO PAY THE MORTGAGE OR RENT ON TIME?: NO

## 2025-01-16 SDOH — ECONOMIC STABILITY: FOOD INSECURITY: WITHIN THE PAST 12 MONTHS, THE FOOD YOU BOUGHT JUST DIDN'T LAST AND YOU DIDN'T HAVE MONEY TO GET MORE.: NEVER TRUE

## 2025-01-16 SDOH — SOCIAL STABILITY: SOCIAL INSECURITY
WITHIN THE LAST YEAR, HAVE YOU BEEN KICKED, HIT, SLAPPED, OR OTHERWISE PHYSICALLY HURT BY YOUR PARTNER OR EX-PARTNER?: NO

## 2025-01-16 SDOH — SOCIAL STABILITY: SOCIAL INSECURITY: ABUSE: ADULT

## 2025-01-16 SDOH — SOCIAL STABILITY: SOCIAL INSECURITY: HAVE YOU HAD THOUGHTS OF HARMING ANYONE ELSE?: NO

## 2025-01-16 SDOH — ECONOMIC STABILITY: INCOME INSECURITY: IN THE PAST 12 MONTHS HAS THE ELECTRIC, GAS, OIL, OR WATER COMPANY THREATENED TO SHUT OFF SERVICES IN YOUR HOME?: NO

## 2025-01-16 SDOH — SOCIAL STABILITY: SOCIAL INSECURITY: WITHIN THE LAST YEAR, HAVE YOU BEEN HUMILIATED OR EMOTIONALLY ABUSED IN OTHER WAYS BY YOUR PARTNER OR EX-PARTNER?: NO

## 2025-01-16 SDOH — ECONOMIC STABILITY: TRANSPORTATION INSECURITY: IN THE PAST 12 MONTHS, HAS LACK OF TRANSPORTATION KEPT YOU FROM MEDICAL APPOINTMENTS OR FROM GETTING MEDICATIONS?: NO

## 2025-01-16 SDOH — SOCIAL STABILITY: SOCIAL INSECURITY: DO YOU FEEL ANYONE HAS EXPLOITED OR TAKEN ADVANTAGE OF YOU FINANCIALLY OR OF YOUR PERSONAL PROPERTY?: NO

## 2025-01-16 SDOH — ECONOMIC STABILITY: FOOD INSECURITY: HOW HARD IS IT FOR YOU TO PAY FOR THE VERY BASICS LIKE FOOD, HOUSING, MEDICAL CARE, AND HEATING?: NOT HARD AT ALL

## 2025-01-16 SDOH — SOCIAL STABILITY: SOCIAL INSECURITY: DOES ANYONE TRY TO KEEP YOU FROM HAVING/CONTACTING OTHER FRIENDS OR DOING THINGS OUTSIDE YOUR HOME?: NO

## 2025-01-16 SDOH — SOCIAL STABILITY: SOCIAL INSECURITY: WERE YOU ABLE TO COMPLETE ALL THE BEHAVIORAL HEALTH SCREENINGS?: YES

## 2025-01-16 SDOH — SOCIAL STABILITY: SOCIAL INSECURITY: DO YOU FEEL UNSAFE GOING BACK TO THE PLACE WHERE YOU ARE LIVING?: NO

## 2025-01-16 SDOH — SOCIAL STABILITY: SOCIAL INSECURITY: HAVE YOU HAD ANY THOUGHTS OF HARMING ANYONE ELSE?: NO

## 2025-01-16 SDOH — SOCIAL STABILITY: SOCIAL INSECURITY: ARE THERE ANY APPARENT SIGNS OF INJURIES/BEHAVIORS THAT COULD BE RELATED TO ABUSE/NEGLECT?: NO

## 2025-01-16 SDOH — SOCIAL STABILITY: SOCIAL INSECURITY: ARE YOU OR HAVE YOU BEEN THREATENED OR ABUSED PHYSICALLY, EMOTIONALLY, OR SEXUALLY BY ANYONE?: NO

## 2025-01-16 ASSESSMENT — PAIN SCALES - GENERAL

## 2025-01-16 ASSESSMENT — ACTIVITIES OF DAILY LIVING (ADL)
LACK_OF_TRANSPORTATION: NO
ADEQUATE_TO_COMPLETE_ADL: YES
HEARING - LEFT EAR: HEARING AID
DRESSING YOURSELF: NEEDS ASSISTANCE
ASSISTIVE_DEVICE: WALKER
PATIENT'S MEMORY ADEQUATE TO SAFELY COMPLETE DAILY ACTIVITIES?: YES
BATHING: NEEDS ASSISTANCE
GROOMING: NEEDS ASSISTANCE
FEEDING YOURSELF: NEEDS ASSISTANCE
LACK_OF_TRANSPORTATION: NO
JUDGMENT_ADEQUATE_SAFELY_COMPLETE_DAILY_ACTIVITIES: YES
WALKS IN HOME: NEEDS ASSISTANCE
TOILETING: NEEDS ASSISTANCE
HEARING - RIGHT EAR: HEARING AID

## 2025-01-16 ASSESSMENT — ENCOUNTER SYMPTOMS
ABDOMINAL DISTENTION: 0
PSYCHIATRIC NEGATIVE: 1
CONSTITUTIONAL NEGATIVE: 1
RESPIRATORY NEGATIVE: 1
BLOOD IN STOOL: 1
DIARRHEA: 0
NAUSEA: 0
ABDOMINAL PAIN: 0
CARDIOVASCULAR NEGATIVE: 1
LIGHT-HEADEDNESS: 1
VOMITING: 0

## 2025-01-16 ASSESSMENT — COGNITIVE AND FUNCTIONAL STATUS - GENERAL
PERSONAL GROOMING: A LITTLE
DRESSING REGULAR LOWER BODY CLOTHING: A LITTLE
DRESSING REGULAR UPPER BODY CLOTHING: A LITTLE
CLIMB 3 TO 5 STEPS WITH RAILING: A LOT
TOILETING: A LITTLE
WALKING IN HOSPITAL ROOM: A LITTLE
MOBILITY SCORE: 17
PATIENT BASELINE BEDBOUND: NO
DAILY ACTIVITIY SCORE: 19
TURNING FROM BACK TO SIDE WHILE IN FLAT BAD: A LITTLE
MOVING TO AND FROM BED TO CHAIR: A LITTLE
HELP NEEDED FOR BATHING: A LITTLE
MOVING FROM LYING ON BACK TO SITTING ON SIDE OF FLAT BED WITH BEDRAILS: A LITTLE
STANDING UP FROM CHAIR USING ARMS: A LITTLE

## 2025-01-16 ASSESSMENT — COLUMBIA-SUICIDE SEVERITY RATING SCALE - C-SSRS
1. IN THE PAST MONTH, HAVE YOU WISHED YOU WERE DEAD OR WISHED YOU COULD GO TO SLEEP AND NOT WAKE UP?: NO
6. HAVE YOU EVER DONE ANYTHING, STARTED TO DO ANYTHING, OR PREPARED TO DO ANYTHING TO END YOUR LIFE?: NO
2. HAVE YOU ACTUALLY HAD ANY THOUGHTS OF KILLING YOURSELF?: NO

## 2025-01-16 ASSESSMENT — LIFESTYLE VARIABLES
AUDIT-C TOTAL SCORE: 1
TOTAL SCORE: 0
EVER HAD A DRINK FIRST THING IN THE MORNING TO STEADY YOUR NERVES TO GET RID OF A HANGOVER: NO
HOW OFTEN DO YOU HAVE A DRINK CONTAINING ALCOHOL: MONTHLY OR LESS
AUDIT-C TOTAL SCORE: 1
SKIP TO QUESTIONS 9-10: 1
HAVE YOU EVER FELT YOU SHOULD CUT DOWN ON YOUR DRINKING: NO
HOW MANY STANDARD DRINKS CONTAINING ALCOHOL DO YOU HAVE ON A TYPICAL DAY: 1 OR 2
HAVE PEOPLE ANNOYED YOU BY CRITICIZING YOUR DRINKING: NO
HOW OFTEN DO YOU HAVE 6 OR MORE DRINKS ON ONE OCCASION: NEVER
EVER FELT BAD OR GUILTY ABOUT YOUR DRINKING: NO

## 2025-01-16 ASSESSMENT — PAIN - FUNCTIONAL ASSESSMENT
PAIN_FUNCTIONAL_ASSESSMENT: 0-10

## 2025-01-16 ASSESSMENT — PATIENT HEALTH QUESTIONNAIRE - PHQ9
2. FEELING DOWN, DEPRESSED OR HOPELESS: NOT AT ALL
SUM OF ALL RESPONSES TO PHQ9 QUESTIONS 1 & 2: 0
1. LITTLE INTEREST OR PLEASURE IN DOING THINGS: NOT AT ALL

## 2025-01-16 NOTE — ED NOTES
1011 Randi from blood bank called 1 unit of blood ready for patient  Nurse Cheryl is aware     Rosy Bernabe, EMT  01/16/25 1015

## 2025-01-16 NOTE — Clinical Note
Nicole Alatorre unable to find active bleed at this time. 5 Syrian sheath removed and mynx closure device used. Manual pressure held x3min.

## 2025-01-16 NOTE — Clinical Note
Right groin without bleeding or hematoma. Dressed with 4x4 and tegaderm. Right foot warm and DP pulse dopplered and marked. Pt tolerated well. Denies pain or nausea. Returned to room for recovery. Bedside groin and pulse check done with RN.

## 2025-01-16 NOTE — POST-PROCEDURE NOTE
Interventional Radiology Brief Postprocedure Note    Attending: Rancho Alatorre MD      Assistant: none    Diagnosis: Positive GI bleed on CTA, middle transverse colon    Description of procedure: SMA and middle colic angiograms, with tertiary branches at the site of bleed on the CTA. NO bleed elicited. No embolization performed in this scenario.  Could consider repeat angiogram only if the patient becomes unstable.    Anesthesia:  MAC Moderate    Complications: None    Estimated Blood Loss:  10 cc's    Medications  As of 01/16/25 1635      sodium chloride 0.9 % bolus 500 mL (mL/hr) Total volume:  Not documented* Dosing weight:  72.6   *Total volume has not been documented. View each administration to see the amount administered.     Date/Time Rate/Dose/Volume Action       01/16/25  0932 500 mL - 500 mL/hr (over 60 min) New Bag      1106  (over 60 min) Stopped               iohexol (OMNIPaque) 350 mg iodine/mL solution 90 mL (mL) Total volume:  90 mL Dosing weight:  72.6      Date/Time Rate/Dose/Volume Action       01/16/25  1023 90 mL Given               iohexol (OMNIPaque) 350 mg iodine/mL solution 70 mL (mL) Total volume:  70 mL Dosing weight:  87.8      Date/Time Rate/Dose/Volume Action       01/16/25  1628 70 mL Given               aspirin chewable tablet 81 mg (mg) Total dose:  Cannot be calculated* Dosing weight:  72.6   *Administration dose not documented     Date/Time Rate/Dose/Volume Action       01/16/25  1236 *Not included in total Held by provider      1240 *Not included in total Automatically Held               cholecalciferol (Vitamin D-3) tablet 2,000 Units (Units) Total dose:  0 Units* Dosing weight:  72.6   *Administration not included in total     Date/Time Rate/Dose/Volume Action       01/16/25  1401 *2,000 Units Missed               lactated Ringer's bolus 1,000 mL (mL/hr) Total volume:  Not documented* Dosing weight:  72.6   *Total volume has not been documented. View each administration to  see the amount administered.     Date/Time Rate/Dose/Volume Action       01/16/25  1412 1,000 mL - 999 mL/hr (over 60 min) New Bag      1528  (over 60 min) Stopped               oxygen (O2) therapy (L/min) Total volume:  Not documented*   *Total volume has not been documented. View each administration to see the amount administered.     Date/Time Rate/Dose/Volume Action       01/16/25  1540 3 L/min - 180,000 mL/hr Given               fentaNYL PF (Sublimaze) injection (mcg) Total dose:  25 mcg      Date/Time Rate/Dose/Volume Action       01/16/25  1549 25 mcg Given               midazolam (Versed) injection (mg) Total dose:  1 mg      Date/Time Rate/Dose/Volume Action       01/16/25  1549 0.5 mg Given      1628 0.5 mg Given               lidocaine PF (Xylocaine) 10 mg/mL (1 %) injection (mL) Total volume:  3 mL      Date/Time Rate/Dose/Volume Action       01/16/25  1556 3 mL Given                   No specimens collected      See detailed result report with images in PACS.    The patient tolerated the procedure well without incident or complication and is in stable condition.

## 2025-01-16 NOTE — NURSING NOTE
Patient returned from IR s/p embolization. R fem site is dry and intact. R pedal pulse able to be palpated. VSS. Patient to remain on bedrest and flat until 1830 per IR.

## 2025-01-16 NOTE — H&P
History Of Present Illness  Natasha Lundberg is a 89 y.o. female with pertinent past medical history of previous GI bleed, HTN, HLD, pulmonary embolus (no longer on eliquis), cardiac arrest, recurrent falls, COVID-19, iron deficiency anemia due to chronic blood loss, GERD, hx rectus sheath hematoma 3/2024 presents to [facility] ER, from  [facility], who presents to the emergency department complaining of lightheadedness when getting up and with position change, went to the bathroom and noticed blood in the toilet, she states she did not look at it but her caretaker noticed it and indicated it was blood and sent her to the ED. She states she is unsure if it was bright red/maroon. States the home health aide said there was a clot in the toilet, denies any flank pain, urinary urgency, frequency, burning.  Denies any abdominal pain, nausea, vomiting, diarrhea.  She states she went to the bathroom in the ED and nursing told her it was all blood. She again states she didn't look after she went. Patient occult positive in ED, hemoglobin 7.3, type and screen collected, transfusing 1 uPRBC in ED, GI placed on consult.      Past Medical History  She has a past medical history of Abdominal pain, unspecified abdominal location (03/10/2024), MATEO (acute kidney injury) (CMS-HCC) (09/17/2024), Bilateral leg edema (10/10/2023), Cardiac arrest, COVID-19 (09/10/2023), Gastroesophageal reflux disease without esophagitis (04/24/2023), GI bleed, Humeral fracture (09/10/2023), Iron deficiency anemia due to chronic blood loss (04/24/2023), Lower GI bleed (09/17/2024), Other pulmonary embolism without acute cor pulmonale (04/24/2023), PE (pulmonary thromboembolism) (Multi), Pneumonia (12/20/2023), Primary hypertension (04/24/2023), Rectus sheath hematoma, SIRS due to infectious process with acute organ dysfunction (12/20/2023), Unspecified fracture of right femur, subsequent encounter for closed fracture with routine  healing (04/24/2023), and Upper respiratory tract infection (03/05/2024).    Surgical History  She has a past surgical history that includes CT angio abdomen pelvis w and or wo IV IV contrast (09/23/2023); Total knee arthroplasty; Femur fracture surgery; and Parathyroidectomy.     Social History  She reports that she has quit smoking. Her smoking use included cigarettes. She has never used smokeless tobacco. She reports current alcohol use. She reports that she does not use drugs.    Family History  Family History   Problem Relation Name Age of Onset    COPD Mother      Lung cancer Father      Esophageal cancer Sister          Allergies  Patient has no known allergies.    Review of Systems   Constitutional: Negative.    HENT: Negative.     Respiratory: Negative.     Cardiovascular: Negative.    Gastrointestinal:  Positive for blood in stool. Negative for abdominal distention, abdominal pain, diarrhea, nausea and vomiting.   Genitourinary: Negative.    Skin: Negative.    Neurological:  Positive for light-headedness.   Psychiatric/Behavioral: Negative.        ROS: 12 systems reviewed and negative except per HPI above     Physical Exam by System:     Constitutional: Well developed, awake/alert/oriented x3, no distress, alert and cooperative   ENMT: mucous membranes moist   Head/Neck: Neck supple   Respiratory/Thorax: Patent airways, CTAB, normal breath sounds with good chest expansion, thorax symmetric   Cardiovascular: Regular, rate and rhythm, no murmurs, 2+ equal pulses of the extremities, normal S 1and S 2   Gastrointestinal: Nondistended, soft, non-tender, no rebound tenderness or guarding, no masses palpable, no organomegaly, +BS, no bruits   Musculoskeletal: ROM intact, no joint swelling, normal strength   Extremities: normal extremities, no cyanosis edema, contusions or wounds, no clubbing   Neurological: alert and oriented x3, intact senses, motor, response and reflexes, normal strength       Last Recorded  "Vitals  Blood pressure 131/63, pulse 97, temperature 36.7 °C (98.1 °F), temperature source Temporal, resp. rate 17, height 1.549 m (5' 1\"), weight 72.6 kg (160 lb), SpO2 96%.    Relevant Results  Results for orders placed or performed during the hospital encounter of 01/16/25 (from the past 24 hours)   CBC and Auto Differential   Result Value Ref Range    WBC 11.7 (H) 4.4 - 11.3 x10*3/uL    nRBC 0.0 0.0 - 0.0 /100 WBCs    RBC 2.74 (L) 4.00 - 5.20 x10*6/uL    Hemoglobin 7.3 (L) 12.0 - 16.0 g/dL    Hematocrit 24.6 (L) 36.0 - 46.0 %    MCV 90 80 - 100 fL    MCH 26.6 26.0 - 34.0 pg    MCHC 29.7 (L) 32.0 - 36.0 g/dL    RDW 17.3 (H) 11.5 - 14.5 %    Platelets 301 150 - 450 x10*3/uL    Neutrophils % 61.1 40.0 - 80.0 %    Immature Granulocytes %, Automated 0.4 0.0 - 0.9 %    Lymphocytes % 22.0 13.0 - 44.0 %    Monocytes % 9.1 2.0 - 10.0 %    Eosinophils % 6.3 0.0 - 6.0 %    Basophils % 1.1 0.0 - 2.0 %    Neutrophils Absolute 7.14 (H) 1.60 - 5.50 x10*3/uL    Immature Granulocytes Absolute, Automated 0.05 0.00 - 0.50 x10*3/uL    Lymphocytes Absolute 2.57 0.80 - 3.00 x10*3/uL    Monocytes Absolute 1.06 (H) 0.05 - 0.80 x10*3/uL    Eosinophils Absolute 0.73 (H) 0.00 - 0.40 x10*3/uL    Basophils Absolute 0.13 (H) 0.00 - 0.10 x10*3/uL   Comprehensive metabolic panel   Result Value Ref Range    Glucose 123 (H) 74 - 99 mg/dL    Sodium 137 136 - 145 mmol/L    Potassium 4.3 3.5 - 5.3 mmol/L    Chloride 103 98 - 107 mmol/L    Bicarbonate 24 21 - 32 mmol/L    Anion Gap 14 10 - 20 mmol/L    Urea Nitrogen 34 (H) 6 - 23 mg/dL    Creatinine 1.54 (H) 0.50 - 1.05 mg/dL    eGFR 33 (L) >60 mL/min/1.73m*2    Calcium 8.1 (L) 8.6 - 10.3 mg/dL    Albumin 3.6 3.4 - 5.0 g/dL    Alkaline Phosphatase 57 33 - 136 U/L    Total Protein 6.5 6.4 - 8.2 g/dL    AST 12 9 - 39 U/L    Bilirubin, Total 0.3 0.0 - 1.2 mg/dL    ALT 6 (L) 7 - 45 U/L   Troponin I, High Sensitivity, Initial   Result Value Ref Range    Troponin I, High Sensitivity 8 0 - 13 ng/L   Light " Blue Top   Result Value Ref Range    Extra Tube Hold for add-ons.    ECG 12 lead   Result Value Ref Range    Ventricular Rate 97 BPM    Atrial Rate 97 BPM    UT Interval 236 ms    QRS Duration 98 ms    QT Interval 368 ms    QTC Calculation(Bazett) 467 ms    P Axis 21 degrees    R Axis -32 degrees    T Axis 100 degrees    QRS Count 16 beats    Q Onset 224 ms    P Onset 106 ms    P Offset 166 ms    T Offset 408 ms    QTC Fredericia 431 ms   Prepare RBC: 1 Units   Result Value Ref Range    PRODUCT CODE A8745J63     Unit Number [id number]     Unit ABO A     Unit RH NEG     XM INTEP COMP     Dispense Status IS     Blood Expiration Date 1/20/2025 11:59:00 PM EST     PRODUCT BLOOD TYPE 0600     UNIT VOLUME 500    Troponin, High Sensitivity, 1 Hour   Result Value Ref Range    Troponin I, High Sensitivity 6 0 - 13 ng/L   Type and screen   Result Value Ref Range    ABO TYPE A     Rh TYPE POS     ANTIBODY SCREEN NEG    Urinalysis with Reflex Culture and Microscopic   Result Value Ref Range    Color, Urine Light-Yellow Light-Yellow, Yellow, Dark-Yellow    Appearance, Urine Turbid (N) Clear    Specific Gravity, Urine 1.016 1.005 - 1.035    pH, Urine 5.5 5.0, 5.5, 6.0, 6.5, 7.0, 7.5, 8.0    Protein, Urine NEGATIVE NEGATIVE, 10 (TRACE), 20 (TRACE) mg/dL    Glucose, Urine Normal Normal mg/dL    Blood, Urine NEGATIVE NEGATIVE    Ketones, Urine NEGATIVE NEGATIVE mg/dL    Bilirubin, Urine NEGATIVE NEGATIVE    Urobilinogen, Urine Normal Normal mg/dL    Nitrite, Urine NEGATIVE NEGATIVE    Leukocyte Esterase, Urine 500 Brady/uL (A) NEGATIVE   Microscopic Only, Urine   Result Value Ref Range    WBC, Urine >50 (A) 1-5, NONE /HPF    RBC, Urine 1-2 NONE, 1-2, 3-5 /HPF    Squamous Epithelial Cells, Urine 1-9 (SPARSE) Reference range not established. /HPF   Occult Blood, Stool    Specimen: Stool   Result Value Ref Range    Occult Blood, Stool X1 Positive (A) Negative      Scheduled medications    Continuous medications    PRN medications        CT angio abdomen pelvis w and or wo IV IV contrast    Result Date: 1/16/2025  Interpreted By:  Steffen Dubon, STUDY: CT ANGIO ABDOMEN PELVIS W AND/OR WO IV IV CONTRAST;  1/16/2025 10:38 am   INDICATION: Signs/Symptoms:gi bleed.   COMPARISON: None.   ACCESSION NUMBER(S): [id number]   ORDERING CLINICIAN: MODESTA MCMANUS   TECHNIQUE: Thin-section axial images of the abdomen and pelvis before and following intravenous administration of 90 mL of Omnipaque 350 contrast. Postcontrast images obtained in the arterial and venous phases.     CT Dose Reduction Employed: Yes, iterative reconstruction   For optimization of anatomic evaluation, multiplanar reconstruction, maximum intensity projections, and advanced 3-D off-line postprocessing were performed on a dedicated stand-alone workstation by the interpreting physician.   FINDINGS: VASCULATURE: There is a focus of arterial phase contrast extravasation that persistent slightly progresses on venous phase at mid transverse colon. This is associated with extensive diverticulosis. No additional areas of contrast extravasation into GI tract identified.     Abdominal aorta: No abdominal aortic aneurysm or dissection. Mild atherosclerosis. Celiac artery: Unremarkable Superior mesenteric artery: Unremarkable Right renal artery: Single artery, unremarkable. Left renal artery: Single artery with mild stenosis proximally Inferior mesenteric artery: Unremarkable. Common iliac, external iliac, common femoral, and visualized superficial femoral artery are unremarkable bilaterally.   The portal vein is patent. The splenic vein is patent. The superior mesenteric vein is patent. The IVC is patent.     ABDOMEN AND PELVIS:   Lower chest: Fibrotic changes lung bases.   Liver: No gross abnormality   Biliary tree: No intrahepatic biliary dilatation, the common bile duct grossly unremarkable   Gallbladder: No gross abnormality   Pancreas: No gross abnormality   Spleen: No gross abnormality    Adrenal: The adrenal appears unremarkable   Kidneys: Symmetrical enhancement bilaterally is present. .   Peritoneum and retroperitoneum: No free fluid, free air and/or gross masses   Lymph nodes: No lymphadenopathy   Bowel: No abnormally dilated bowel segments are seen. No significant inflammatory changes are present. Extensive diverticulosis through out majority of the colon. Moderate hiatal hernia.   Bladder: No gross finding   Pelvis: 2.2 cm calcified fibroid.   Musculoskeletal: Advanced degenerative changes lumbar spine.       Focus of active lower GI bleeding demonstrated at mid transverse colon, associated with extensive diverticulosis.   MACRO:   Critical Finding:  There is active hemorrhage. Notification was initiated on 1/16/2025 at 11:01 am by  Steffen Dubon.  (**-OCF-**) Instructions:         Signed by: Steffen Dubon 1/16/2025 11:02 AM Dictation workstation:   [id number]    ECG 12 lead    Result Date: 1/16/2025  Sinus rhythm with 1st degree AV block Left axis deviation Left ventricular hypertrophy with repolarization abnormality ( R in aVL , Cushman product ) Abnormal ECG When compared with ECG of 10-MAR-2024 12:38, Non-specific change in ST segment in Lateral leads    XR chest 1 view    Result Date: 1/16/2025  Interpreted By:  Radu Ornelas, STUDY: XR CHEST 1 VIEW;  1/16/2025 8:43 am   INDICATION: Signs/Symptoms:near syncope.   COMPARISON: 12/20/2023   ACCESSION NUMBER(S): [id number]   ORDERING CLINICIAN: MODESTA MCMANUS   FINDINGS: CHEST/LUNGS: The cardiac and mediastinal silhouettes are unchanged in size and configuration. There is interstitial prominence which is similar to the previous examination. Areas of atelectasis/scarring at the lung bases. No definite new areas of consolidation. No sizable effusion or evidence of a pneumothorax.   UPPER ABDOMEN: No remarkable upper abdominal findings.   OSSEOUS STRUCTURES: No acute changes.       Stable interstitial prominence as well as some areas of  scarring/atelectasis, predominantly at the lung bases.   MACRO: None.   Signed by: Radu Ornelas 1/16/2025 8:53 AM Dictation workstation:   [id number]         Assessment/Plan     GI bleed, with history of GI bleeds  Anemia, from acute blood loss and history of iron deficiency anemia  MATEO, likely from blood loss  History of hypertension    Plan:    -Admit to acute care inpatient with tele  -CT abd pelvis Focus of active lower GI bleeding demonstrated at mid transverse  colon, associated with extensive diverticulosis.There is active hemorrhage.   -Type and screen completed  -Hemoglobin 7.3 baseline 10.3-7.9, re order H and H one hour post transfusion  -1 unit PRBC ordered for transfusion  -Gastroenterology placed on consult  -IR on consult   -Maintain NPO  -cont  ml hour  -Cr 1.54, monitor with am labs   -avoid nephrotoxic medications  -trend H and H  -am labs including cbc, cmp, mag, phos  -resume home medications when can resume PO intake  -dvtp: avoid pharmacological prophylaxis with acute bleed, use SCD's  -POC discussed with patient and attending  -dispo: likely require > 2 midnight stays to adequately treat and safe dc plan back to facility    Code status obtained from previous admits and ER documentation.       I spent >50 minutes in the professional and overall care of this patient.    SIGNATURE: LUKAS Siu-VINICIUS PATIENT NAME: Natasha Lundberg   DATE: January 16, 2025 MRN: [id number]   TIME: 11:33 AM    This is a shared visit. I have reviewed the Advanced Practice Provider's encounter note, approve the Advanced Practice Provider's documentation, and provide the following additional information from my personal encounter.      89 YOF presenting with LGIB, multiple episodes of hematochezia, CTA abd/pelvis showed active diverticular bleed. HDS but persistently tachycardic, and notes dizziness upon standing. Given 1 unit pRBCs in ED. IVFB. Plan for IR guided embolization. Will transfuse for HgB  <7.0    Complexity high. Anticipate > 2 MN stays    Ryder Garcia, DO  Internal Medicine

## 2025-01-16 NOTE — PRE-PROCEDURE NOTE
Interventional Radiology Preprocedure Note    Indication for procedure: The primary encounter diagnosis was Lightheadedness. Diagnoses of Gastrointestinal hemorrhage with melena and Anemia, unspecified type were also pertinent to this visit. CTA shows transverse colon bleed, diverticular. She presents for angiogram with possible embolization.    Relevant review of systems: NA    Relevant Labs:   Lab Results   Component Value Date    CREATININE 1.54 (H) 01/16/2025    EGFR 33 (L) 01/16/2025    INR 1.2 (H) 09/17/2024    PROTIME 13.1 (H) 09/17/2024       Planned Sedation/Anesthesia: Moderate    Airway assessment: normal    Directed physical examination:    Awake and alert, oriented  No acute distress  Regular rate, rhythm  Breathing non-labored    Mallampati: II (hard and soft palate, upper portion of tonsils and uvula visible)    ASA Score: ASA 2 - Patient with mild systemic disease with no functional limitations    Benefits, risks and alternatives of procedure and planned sedation have been discussed with the patient and/or their representative. All questions answered and they agree to proceed.

## 2025-01-16 NOTE — CARE PLAN
Patient admitted today for a lower GI bleed. Patient to IR for embolization, no active bleeding noted. R fem site remains dry and intact. Patient had two bright red Bms on shift. Hgb remains stable at 7.6. VSS. Safety maintained.     Problem: Pain - Adult  Goal: Verbalizes/displays adequate comfort level or baseline comfort level  Outcome: Progressing     Problem: Safety - Adult  Goal: Free from fall injury  Outcome: Met     Problem: Discharge Planning  Goal: Discharge to home or other facility with appropriate resources  Outcome: Progressing     Problem: Chronic Conditions and Co-morbidities  Goal: Patient's chronic conditions and co-morbidity symptoms are monitored and maintained or improved  Outcome: Progressing     Problem: Fall/Injury  Goal: Verbalize understanding of personal risk factors for fall in the hospital  Outcome: Met  Goal: Use assistive devices by end of the shift  Outcome: Progressing

## 2025-01-16 NOTE — CONSULTS
GI consult Note      Patient Natasha Lundberg PCP Matthew Jones DO    MRN [id number]  Admission Date 1/16/2025      Chief Complaint/Reason for Admission:  Natasha is a 89 y.o. female who presented today with lightheadedness and lower GI bleed, which is the reason for the GI consult.    Subjective    Subjective   History of Presenting Illness  Ms. Natasha Lundberg is a 89 y.o. female with a PMHx of diverticulosis of the colon with previous GI bleed in 2023, HTN, HLD, previous PE not on AC, cardiac arrest, recurrent falls, KORI with suspected chronic blood loss, GERD, history of rectus sheath hematoma on 3/2024 who presented to ED from her nursing facility after nursing staff noticed clotted blood in the toilet for morning bowel movements.  Patient stated that she has been complaining of lightheadedness for the 24-48 hours prior.  No abdominal pain, pain with defecation, upper or lower other GI symptoms.    Last colonoscopy was in September 2023 for similar presentation with Dr. Sawyer noting severe diverticulosis throughout the entire colon with impacted diverticulum, evidence of past bleeding from diverticular opening. Blood in transverse colon, external hemorrhoids.  Was managed conservatively.    In ED, she was hemodynamically stable.  Labs revealed leukocytosis 11.7, Hgb 7.3 from baseline of around 10, HCT 24.6, MCV 90.  Creatinine 1.54 from baseline around 1.   CT abdomen and pelvis with contrast revealed active lower GI bleed at mid transverse colon associated with extensive diverticulosis.    DRG revealed clotted maroon blood.    She was started on LR infusion, transfused 1 unit of PRBC in ED.      Past Medical History  Active Ambulatory Problems     Diagnosis Date Noted    Difficulty in walking 04/24/2023    Primary hypertension 04/24/2023    Neutrophilic leukocytosis 12/20/2023    Migraines 09/12/2024    Rectal bleeding 09/17/2024    Diverticulosis of colon without diverticulitis 09/17/2024     Resolved Ambulatory Problems      Diagnosis Date Noted    Iron deficiency anemia due to chronic blood loss 04/24/2023    Other pulmonary embolism without acute cor pulmonale 04/24/2023    Gastroesophageal reflux disease without esophagitis 04/24/2023    Muscle weakness (generalized) 04/24/2023    Unspecified fracture of right femur, subsequent encounter for closed fracture with routine healing 04/24/2023    Fall 04/24/2023    Early dry stage nonexudative age-related macular degeneration of both eyes 04/24/2023    COVID-19 09/10/2023    Humeral fracture 09/10/2023    Bilateral leg edema 10/10/2023    Closed fracture of upper end of humerus 09/01/2023    SIRS due to infectious process with acute organ dysfunction 12/20/2023    Pneumonia 12/20/2023    Upper respiratory tract infection 03/05/2024    Abdominal pain, unspecified abdominal location 03/10/2024    MATEO (acute kidney injury) (CMS-[facility]) 09/17/2024    Lower GI bleed 09/17/2024     Past Medical History:   Diagnosis Date    Cardiac arrest     GI bleed     PE (pulmonary thromboembolism) (Multi)     Rectus sheath hematoma        Home Medication:  Prior to Admission medications    Medication Sig Start Date End Date Taking? Authorizing Provider   amitriptyline (Elavil) 10 mg tablet Take 1 tablet (10 mg) by mouth once daily at bedtime.   Yes Historical Provider, MD   aspirin 81 mg chewable tablet Chew 1 tablet (81 mg) once daily.   Yes Historical Provider, MD   calcium citrate (Calcitrate) 200 mg (950 mg) tablet Take 3 tablets (600 mg) by mouth once daily.   Yes Historical Provider, MD   cholecalciferol (Vitamin D-3) 50 MCG (2000 UT) tablet Take 1 tablet (50 mcg) by mouth once daily.   Yes Historical Provider, MD   diphenhydrAMINE-acetaminophen (Tylenol PM)  mg per tablet Take 2 tablets by mouth once daily in the evening.   Yes Historical Provider, MD   ferrous sulfate, 325 mg ferrous sulfate, tablet Take 1 tablet (325 mg) by mouth every other day.   Yes Historical Provider, MD   nystatin  "(Mycostatin) cream Apply 1 Application topically 3 times a day. Under breasts   Yes Historical Provider, MD   pantoprazole (ProtoNix) 40 mg EC tablet Take 1 tablet (40 mg) by mouth once daily at bedtime.   Yes Historical Provider, MD   acetaminophen (Tylenol) 500 mg tablet Take 2 tablets (1,000 mg) by mouth every 8 hours if needed for mild pain (1 - 3).    Historical Provider, MD   albuterol (ProAir HFA) 90 mcg/actuation inhaler Inhale 2 puffs every 6 hours if needed for shortness of breath.    Historical Provider, MD   aspirin ([place]) 81 mg capsule Take 81 mg by mouth once daily.  Patient not taking: Reported on 1/16/2025 5/7/24 5/7/25  Matthew Jones,    benzonatate (Tessalon) 100 mg capsule Take 1 capsule (100 mg) by mouth every 8 hours if needed for cough.    Historical Provider, MD   diphenhydrAMINE (Sominex) 25 mg tablet Take 1 tablet (25 mg) by mouth every 8 hours if needed for itching (rash).    Historical Provider, MD   oxyCODONE (Roxicodone) 5 mg immediate release tablet Take 1 tablet (5 mg) by mouth every 6 hours if needed (breakthrough pain).    Historical Provider, MD   polyethylene glycol (Glycolax, Miralax) 17 gram packet Take 17 g by mouth once daily as needed (for constipation).    Historical Provider, MD   furosemide (Lasix) 20 mg tablet Take 1 tablet (20 mg) by mouth once daily as needed (swelling).  1/16/25  Historical Provider, MD        Allergies:  No Known Allergies     Review of System:  Pertinent positives and negatives as per history of present illness. Remainder of 10 point review of systems is negative.    Objective    Objective   Vital Signs:  Visit Vitals  /62   Pulse 97   Temp 36.7 °C (98.1 °F) (Temporal)   Resp 18   Ht 1.549 m (5' 1\")   Wt 72.6 kg (160 lb)   SpO2 98%   BMI 30.23 kg/m²   OB Status Postmenopausal   Smoking Status Former   BSA 1.77 m²        Physical Examination:    Constitutional: A&Ox4, NAD, resting comfortable   Head and Face: Mild pallor, atraumatic, " normocephalic   Eyes: Normal external exam, EOMI  ENT: Normal external inspection of ears and nose. Oropharynx normal.  Cardiovascular: RRR, S1/S2, no murmurs, rubs, or gallops, radial pulses +2  Pulmonary: CTAB, no respiratory distress, no wheezing, rales or rhonchi, on RA  Abdomen: +BS, soft, non-tender, nondistended, no guarding rigidity or rebound tenderness, no masses noted.  DRG with dark maroon clotted blood.  MSK: Negative for edema, No joint swelling, normal movements of all extremities, however patient was unable to ambulate due to weakness.  Neuro: No focal deficits, normal motor function, normal sensation, follows all commands  Skin-mild pallor.  Psychiatric: Judgment intact. Appropriate mood, affect and behavior    Laboratory Data:    Results from last 7 days   Lab Units 01/16/25  0832   WBC AUTO x10*3/uL 11.7*   RBC AUTO x10*6/uL 2.74*   HEMOGLOBIN g/dL 7.3*     Results from last 7 days   Lab Units 01/16/25  0832   SODIUM mmol/L 137   POTASSIUM mmol/L 4.3   CHLORIDE mmol/L 103   CO2 mmol/L 24   BUN mg/dL 34*   CREATININE mg/dL 1.54*   CALCIUM mg/dL 8.1*   BILIRUBIN TOTAL mg/dL 0.3   ALT U/L 6*   AST U/L 12       Imaging:  CT angio abdomen pelvis w and or wo IV IV contrast    Result Date: 1/16/2025  Interpreted By:  Steffen Dubon, STUDY: CT ANGIO ABDOMEN PELVIS W AND/OR WO IV IV CONTRAST;  1/16/2025 10:38 am   INDICATION: Signs/Symptoms:gi bleed.   COMPARISON: None.   ACCESSION NUMBER(S): [id number]   ORDERING CLINICIAN: MODESTA MCMANUS   TECHNIQUE: Thin-section axial images of the abdomen and pelvis before and following intravenous administration of 90 mL of Omnipaque 350 contrast. Postcontrast images obtained in the arterial and venous phases.     CT Dose Reduction Employed: Yes, iterative reconstruction   For optimization of anatomic evaluation, multiplanar reconstruction, maximum intensity projections, and advanced 3-D off-line postprocessing were performed on a dedicated stand-alone workstation  by the interpreting physician.   FINDINGS: VASCULATURE: There is a focus of arterial phase contrast extravasation that persistent slightly progresses on venous phase at mid transverse colon. This is associated with extensive diverticulosis. No additional areas of contrast extravasation into GI tract identified.     Abdominal aorta: No abdominal aortic aneurysm or dissection. Mild atherosclerosis. Celiac artery: Unremarkable Superior mesenteric artery: Unremarkable Right renal artery: Single artery, unremarkable. Left renal artery: Single artery with mild stenosis proximally Inferior mesenteric artery: Unremarkable. Common iliac, external iliac, common femoral, and visualized superficial femoral artery are unremarkable bilaterally.   The portal vein is patent. The splenic vein is patent. The superior mesenteric vein is patent. The IVC is patent.     ABDOMEN AND PELVIS:   Lower chest: Fibrotic changes lung bases.   Liver: No gross abnormality   Biliary tree: No intrahepatic biliary dilatation, the common bile duct grossly unremarkable   Gallbladder: No gross abnormality   Pancreas: No gross abnormality   Spleen: No gross abnormality   Adrenal: The adrenal appears unremarkable   Kidneys: Symmetrical enhancement bilaterally is present. .   Peritoneum and retroperitoneum: No free fluid, free air and/or gross masses   Lymph nodes: No lymphadenopathy   Bowel: No abnormally dilated bowel segments are seen. No significant inflammatory changes are present. Extensive diverticulosis through out majority of the colon. Moderate hiatal hernia.   Bladder: No gross finding   Pelvis: 2.2 cm calcified fibroid.   Musculoskeletal: Advanced degenerative changes lumbar spine.       Focus of active lower GI bleeding demonstrated at mid transverse colon, associated with extensive diverticulosis.   MACRO:   Critical Finding:  There is active hemorrhage. Notification was initiated on 1/16/2025 at 11:01 am by  Steffen Dubon.  (**-OCF-**)  Instructions:         Signed by: Steffen Dubon 1/16/2025 11:02 AM Dictation workstation:   [id number]    ECG 12 lead    Result Date: 1/16/2025  Sinus rhythm with 1st degree AV block Left axis deviation Left ventricular hypertrophy with repolarization abnormality ( R in aVL , [place] product ) Abnormal ECG When compared with ECG of 10-MAR-2024 12:38, Non-specific change in ST segment in Lateral leads    XR chest 1 view    Result Date: 1/16/2025  Interpreted By:  Radu Ornelas, STUDY: XR CHEST 1 VIEW;  1/16/2025 8:43 am   INDICATION: Signs/Symptoms:near syncope.   COMPARISON: 12/20/2023   ACCESSION NUMBER(S): [id number]   ORDERING CLINICIAN: MODESTA MCMANUS   FINDINGS: CHEST/LUNGS: The cardiac and mediastinal silhouettes are unchanged in size and configuration. There is interstitial prominence which is similar to the previous examination. Areas of atelectasis/scarring at the lung bases. No definite new areas of consolidation. No sizable effusion or evidence of a pneumothorax.   UPPER ABDOMEN: No remarkable upper abdominal findings.   OSSEOUS STRUCTURES: No acute changes.       Stable interstitial prominence as well as some areas of scarring/atelectasis, predominantly at the lung bases.   MACRO: None.   Signed by: Radu Ornelas 1/16/2025 8:53 AM Dictation workstation:   [id number]      Medications:  Scheduled medications  amitriptyline, 10 mg, oral, Nightly  [Held by provider] aspirin, 81 mg, oral, Daily  cholecalciferol, 2,000 Units, oral, Daily  [START ON 1/17/2025] ferrous sulfate (325 mg ferrous sulfate), 65 mg of iron, oral, Every other day  nystatin, 1 Application, Topical, TID  pantoprazole, 40 mg, oral, Nightly      Continuous medications  lactated Ringer's, 100 mL/hr      PRN medications  PRN medications: acetaminophen **OR** acetaminophen, albuterol, ondansetron **OR** ondansetron, polyethylene glycol    Assessment    Assessment & Plan   Ms. Natasha Lundberg is a 89 y.o. female who presents with lightheadedness and  lower GI bleed.  Assessment & Plan  Lightheadedness    # Hematochezia, likely diverticular bleed  # Diverticulosis with history of previous diverticular bleed  # Hemorrhoids  # Acute on chronic anemia  # KORI   # PE not on AC  This is likely a recurrence of patient's diverticular bleed.  The fact that her most recent colonoscopy on 9/24/2023 with no concerns for malignancy at that time and findings consistent of previously diagnosed diverticulosis is suggesting no indication for colonoscopy at this time.    Plan:  -Continue supportive care  -Monitor vitally and hemodynamically  -Monitor CBC daily and maintain Hgb>7  -No indication for colonoscopy or GI intervention as stated above.  -If bleeding persists, consider embolization with IR.  -Currently on n.p.o., currently on clear liquid diet later with advancement eventually to low fiber once signs of bleed subsides and laboratory evaluation suggests stability of H/H.  -Avoid NSAIDs      The rest per the primary team.    Thank you for the consult. Will follow-up.      Emergency Contact: Extended Emergency Contact Information  Primary Emergency Contact: SWETHA LUGO  Home Phone: [phone]  Relation: Power of   Preferred language: English   needed? No  Secondary Emergency Contact: JOSE BLAKE  Home Phone: [phone]  Relation: Relative  Preferred language: English   needed? No    Patient seen and discussed with the attending.  Signature: Claudia Tena MD  Date: January 16, 2025  This note has been transcribed using Dragon voice recognition system and there is a possibility of unintentional typing misprints.  Any information found to be copied from previous providers is done in the best interest of the patient to provide accurate, quality, and continuity of care.

## 2025-01-16 NOTE — NURSING NOTE
Patient arrived to ccu room 2111. VSS. Patient complains of mild dizziness upon ambulating to bedside commode. Patient had one BM of bright dylan red blood, Dr Garcia and IR notified. Call light within reach. Repeat H&H drawn.

## 2025-01-16 NOTE — ED PROVIDER NOTES
Emergency Department Encounter  [facility] EMERGENCY MEDICINE    Patient: Natasha Lundberg  MRN: [id number]  : 1935  Date of Evaluation: 2025  ED Provider: JACQUI Turcios      Chief Complaint       Chief Complaint   Patient presents with    Dizziness    Blood in Urine     Creek    (Location/Symptom, Timing/Onset, Context/Setting, Quality, Duration, Modifying Factors, Severity) Note limiting factors.   Limitations to History: None  Historian: Self  Records reviewed: EMR inpatient and outpatient notes, Care Everywhere      Natasha Lundberg is a 89 y.o. female from the [facility], who presents to the emergency department complaining of lightheadedness when getting up and with position change, went to the bathroom and noticed blood in the toilet, unsure if it was from her urination versus having a bowel movement.  States the home health aide said there was a clot in the toilet, denies any flank pain, urinary urgency, frequency, burning.  Denies any abdominal pain, nausea, vomiting, diarrhea.  Denies any loss of consciousness, does not take any anticoagulants.    ROS:     Review of Systems  14 systems reviewed and otherwise acutely negative except as in the Creek.          Past History     Past Medical History:   Diagnosis Date    Abdominal pain, unspecified abdominal location 03/10/2024    MATEO (acute kidney injury) (CMS-HCC) 2024    Bilateral leg edema 10/10/2023    Cardiac arrest     2/2 PE    COVID-19 09/10/2023    Gastroesophageal reflux disease without esophagitis 2023    GI bleed     Humeral fracture 09/10/2023    Iron deficiency anemia due to chronic blood loss 2023    Lower GI bleed 2024    Other pulmonary embolism without acute cor pulmonale 2023    PE (pulmonary thromboembolism) (Multi)     Pneumonia 2023    Primary hypertension 2023    Rectus sheath hematoma     SIRS due to infectious process with acute organ dysfunction 2023    Unspecified  fracture of right femur, subsequent encounter for closed fracture with routine healing 04/24/2023    Upper respiratory tract infection 03/05/2024     Past Surgical History:   Procedure Laterality Date    CT ABDOMEN PELVIS ANGIOGRAM W AND/OR WO IV CONTRAST  09/23/2023    CT ABDOMEN PELVIS ANGIOGRAM W AND/OR WO IV CONTRAST 9/23/2023 [facility] CT    FEMUR FRACTURE SURGERY      PARATHYROIDECTOMY      TOTAL KNEE ARTHROPLASTY       Social History     Socioeconomic History    Marital status: Single   Tobacco Use    Smoking status: Former     Types: Cigarettes    Smokeless tobacco: Never   Substance and Sexual Activity    Alcohol use: Yes     Comment: social    Drug use: Never     Social Drivers of Health     Financial Resource Strain: Low Risk  (9/17/2024)    Overall Financial Resource Strain (CARDIA)     Difficulty of Paying Living Expenses: Not hard at all   Transportation Needs: No Transportation Needs (9/17/2024)    PRAPARE - Transportation     Lack of Transportation (Medical): No     Lack of Transportation (Non-Medical): No   Housing Stability: Low Risk  (9/17/2024)    Housing Stability Vital Sign     Unable to Pay for Housing in the Last Year: No     Number of Times Moved in the Last Year: 1     Homeless in the Last Year: No       Medications/Allergies     Previous Medications    ACETAMINOPHEN (TYLENOL) 500 MG TABLET    Take 2 tablets (1,000 mg) by mouth every 8 hours if needed for mild pain (1 - 3).    ALBUTEROL (PROAIR HFA) 90 MCG/ACTUATION INHALER    Inhale 2 puffs every 6 hours if needed for shortness of breath.    AMITRIPTYLINE (ELAVIL) 10 MG TABLET    Take 1 tablet (10 mg) by mouth once daily at bedtime.    ASPIRIN (VAZALORE) 81 MG CAPSULE    Take 81 mg by mouth once daily.    ASPIRIN 81 MG CHEWABLE TABLET    Chew 1 tablet (81 mg) once daily.    BENZONATATE (TESSALON) 100 MG CAPSULE    Take 1 capsule (100 mg) by mouth every 8 hours if needed for cough.    CALCIUM CITRATE (CALCITRATE) 200 MG (950 MG) TABLET    Take 3  tablets (600 mg) by mouth once daily.    CHOLECALCIFEROL (VITAMIN D-3) 50 MCG (2000 UT) TABLET    Take 1 tablet (50 mcg) by mouth once daily.    DIPHENHYDRAMINE (SOMINEX) 25 MG TABLET    Take 1 tablet (25 mg) by mouth every 8 hours if needed for itching (rash).    DIPHENHYDRAMINE-ACETAMINOPHEN (TYLENOL PM)  MG PER TABLET    Take 2 tablets by mouth once daily in the evening.    FERROUS SULFATE, 325 MG FERROUS SULFATE, TABLET    Take 1 tablet (325 mg) by mouth every other day.    NYSTATIN (MYCOSTATIN) CREAM    Apply 1 Application topically 3 times a day. Under breasts    OXYCODONE (ROXICODONE) 5 MG IMMEDIATE RELEASE TABLET    Take 1 tablet (5 mg) by mouth every 6 hours if needed (breakthrough pain).    PANTOPRAZOLE (PROTONIX) 40 MG EC TABLET    Take 1 tablet (40 mg) by mouth once daily at bedtime.    POLYETHYLENE GLYCOL (GLYCOLAX, MIRALAX) 17 GRAM PACKET    Take 17 g by mouth once daily as needed (for constipation).     No Known Allergies     Physical Exam       ED Triage Vitals [01/16/25 0821]   Temperature Pulse Resp BP   36.2 °C (97.2 °F) -- -- --      SpO2 Temp Source Heart Rate Source Patient Position   -- Temporal Monitor --      BP Location FiO2 (%)     -- --         Physical Exam    GENERAL:  The patient appears nourished and normally developed. Vital signs as documented.     HEENT:  Head normocephalic, atraumatic, EOMs intact, PERRLA, Mucous membranes moist. Nares patent without copious rhinorrhea.  No lymphadenopathy.    PULMONARY:  Lungs are clear to auscultation, without any respiratory distress. Able to speak full sentences, no accessory muscle use    CARDIAC:   Normal rate. No murmurs, rubs or gallops    ABDOMEN:  Soft, non distended, non tender, BS positive x 4 quadrants, No rebound or guarding, no peritoneal signs, no CVA tenderness, no masses or organomegaly    Rectal: External exam unremarkable, digital exam with dark maroon blood, malodorous    MUSCULOSKELETAL:   Able to ambulate, Non  edematous, with no obvious deformities. Pulses intact distal    SKIN:   Good color, with no significant rashes.  No pallor.    NEURO:  No obvious neurological deficits, normal sensation and strength bilaterally.  Able to follow commands, NIH 0, CN 2-12 intact.        Diagnostics   Labs:  Labs Reviewed   OCCULT BLOOD X1, STOOL - Abnormal       Result Value    Occult Blood, Stool X1 Positive (*)    CBC WITH AUTO DIFFERENTIAL - Abnormal    WBC 11.7 (*)     nRBC 0.0      RBC 2.74 (*)     Hemoglobin 7.3 (*)     Hematocrit 24.6 (*)     MCV 90      MCH 26.6      MCHC 29.7 (*)     RDW 17.3 (*)     Platelets 301      Neutrophils % 61.1      Immature Granulocytes %, Automated 0.4      Lymphocytes % 22.0      Monocytes % 9.1      Eosinophils % 6.3      Basophils % 1.1      Neutrophils Absolute 7.14 (*)     Immature Granulocytes Absolute, Automated 0.05      Lymphocytes Absolute 2.57      Monocytes Absolute 1.06 (*)     Eosinophils Absolute 0.73 (*)     Basophils Absolute 0.13 (*)    COMPREHENSIVE METABOLIC PANEL - Abnormal    Glucose 123 (*)     Sodium 137      Potassium 4.3      Chloride 103      Bicarbonate 24      Anion Gap 14      Urea Nitrogen 34 (*)     Creatinine 1.54 (*)     eGFR 33 (*)     Calcium 8.1 (*)     Albumin 3.6      Alkaline Phosphatase 57      Total Protein 6.5      AST 12      Bilirubin, Total 0.3      ALT 6 (*)    URINALYSIS WITH REFLEX CULTURE AND MICROSCOPIC - Abnormal    Color, Urine Light-Yellow      Appearance, Urine Turbid (*)     Specific Gravity, Urine 1.016      pH, Urine 5.5      Protein, Urine NEGATIVE      Glucose, Urine Normal      Blood, Urine NEGATIVE      Ketones, Urine NEGATIVE      Bilirubin, Urine NEGATIVE      Urobilinogen, Urine Normal      Nitrite, Urine NEGATIVE      Leukocyte Esterase, Urine 500 Brady/uL (*)    MICROSCOPIC ONLY, URINE - Abnormal    WBC, Urine >50 (*)     RBC, Urine 1-2      Squamous Epithelial Cells, Urine 1-9 (SPARSE)     SERIAL TROPONIN-INITIAL - Normal    Troponin  I, High Sensitivity 8      Narrative:     Less than 99th percentile of normal range cutoff-  Female and children under 18 years old <14 ng/L; Male <21 ng/L: Negative  Repeat testing should be performed if clinically indicated.     Female and children under 18 years old 14-50 ng/L; Male 21-50 ng/L:  Consistent with possible cardiac damage and possible increased clinical   risk. Serial measurements may help to assess extent of myocardial damage.     >50 ng/L: Consistent with cardiac damage, increased clinical risk and  myocardial infarction. Serial measurements may help assess extent of   myocardial damage.      NOTE: Children less than 1 year old may have higher baseline troponin   levels and results should be interpreted in conjunction with the overall   clinical context.     NOTE: Troponin I testing is performed using a different   testing methodology at [facility] than at other   [facility]. Direct result comparisons should only   be made within the same method.   SERIAL TROPONIN, 1 HOUR - Normal    Troponin I, High Sensitivity 6      Narrative:     Less than 99th percentile of normal range cutoff-  Female and children under 18 years old <14 ng/L; Male <21 ng/L: Negative  Repeat testing should be performed if clinically indicated.     Female and children under 18 years old 14-50 ng/L; Male 21-50 ng/L:  Consistent with possible cardiac damage and possible increased clinical   risk. Serial measurements may help to assess extent of myocardial damage.     >50 ng/L: Consistent with cardiac damage, increased clinical risk and  myocardial infarction. Serial measurements may help assess extent of   myocardial damage.      NOTE: Children less than 1 year old may have higher baseline troponin   levels and results should be interpreted in conjunction with the overall   clinical context.     NOTE: Troponin I testing is performed using a different   testing methodology at [facility] than at  other   [facility]. Direct result comparisons should only   be made within the same method.   URINE CULTURE   TROPONIN SERIES- (INITIAL, 1 HR)    Narrative:     The following orders were created for panel order Troponin I Series, High Sensitivity (0, 1 HR).  Procedure                               Abnormality         Status                     ---------                               -----------         ------                     Troponin I, High Sensiti...[[id number]]  Normal              Final result               Troponin, High Sensitivi...[[id number]]  Normal              Final result                 Please view results for these tests on the individual orders.   TYPE AND SCREEN    ABO TYPE A      Rh TYPE POS      ANTIBODY SCREEN NEG     URINALYSIS WITH REFLEX CULTURE AND MICROSCOPIC    Narrative:     The following orders were created for panel order Urinalysis with Reflex Culture and Microscopic.  Procedure                               Abnormality         Status                     ---------                               -----------         ------                     Urinalysis with Reflex C...[[id number]]  Abnormal            Final result               Extra Urine Gray Tube[[id number]]                            In process                   Please view results for these tests on the individual orders.   EXTRA URINE GRAY TUBE   PREPARE RBC    PRODUCT CODE A6633G12      Unit Number [id number]      Unit ABO A      Unit RH NEG      XM INTEP COMP      Dispense Status IS      Blood Expiration Date 1/20/2025 11:59:00 PM EST      PRODUCT BLOOD TYPE 0600      UNIT VOLUME 500       Radiographs:  CT angio abdomen pelvis w and or wo IV IV contrast   Final Result   Focus of active lower GI bleeding demonstrated at mid transverse   colon, associated with extensive diverticulosis.        MACRO:        Critical Finding:  There is active hemorrhage. Notification was   initiated on 1/16/2025 at 11:01 am by  Steffen  "Jagdish.  (**-OCF-**)   Instructions:                       Signed by: Steffen Dubon 1/16/2025 11:02 AM   Dictation workstation:   [id number]      XR chest 1 view   Final Result   Stable interstitial prominence as well as some areas of   scarring/atelectasis, predominantly at the lung bases.        MACRO:   None.        Signed by: Radu Ornelas 1/16/2025 8:53 AM   Dictation workstation:   [id number]      Consult to Interventional Radiology    (Results Pending)       EKG: Independently reviewed by this provider at 828  Indication: Lightheadedness  Rate: 97  Rhythm: Sinus rhythm with first-degree AV block  Interval: SC interval 236, QRS 98, , QTc 467  Axis: Left axis deviation  ST Segment: ST segment depression in 1 and aVL  Comparison: Change when compared to EKG from March 10, 2024        Assessment   In brief, Natasha Lundberg is a 89 y.o. female who presented to the emergency department for lightheadedness, blood in the toilet    Plan   IV, lab work, EKG, imaging    Differentials   GI bleed: Upper versus lower  Hematuria  Anemia  Hemorrhoid    ED Course     Diagnoses as of 01/16/25 1204   Lightheadedness   Gastrointestinal hemorrhage with melena   Anemia, unspecified type       Visit Vitals  /63   Pulse 97   Temp 36.7 °C (98.1 °F) (Temporal)   Resp 17   Ht 1.549 m (5' 1\")   Wt 72.6 kg (160 lb)   SpO2 96%   BMI 30.23 kg/m²   OB Status Postmenopausal   Smoking Status Former   BSA 1.77 m²       Medications   sodium chloride 0.9 % bolus 500 mL (0 mL intravenous Stopped 1/16/25 1106)   iohexol (OMNIPaque) 350 mg iodine/mL solution 90 mL (90 mL intravenous Given 1/16/25 1023)       Plan of care discussed, patient is stable appearing, denies any symptoms when she is at rest, is not hypotensive or tachycardic, lab work obtained and reviewed showing hemoglobin at 7.3 where patient's baseline is approximately 8-9 for her hemoglobin.  Does not take any anticoagulants.  Rectal exam concerning for lower GI bleed.  Sent " for CT angio, read from CT angio shows positive bleeding from transverse colon due to diverticulosis, consulted gastroenterology and interventional radiology for possible intervention, patient was consented for blood transfusion, given 1 unit of blood since she is symptomatic of her anemia, also with active lower GI bleeding.  Case discussed with and seen by ED attending, plan is for admission      Final Impression      1. Lightheadedness    2. Gastrointestinal hemorrhage with melena    3. Anemia, unspecified type          DISPOSITION  Disposition: Admit to telemetry  Patient condition is stable    Comment: Please note this report has been produced using speech recognition software and may contain errors related to that system including errors in grammar, punctuation, and spelling, as well as words and phrases that may be inappropriate.  If there are any questions or concerns please feel free to contact the dictating provider for clarification.    JACQUI Turcios APRN-CNP  01/16/25 4086

## 2025-01-17 LAB
ALBUMIN SERPL BCP-MCNC: 3.1 G/DL (ref 3.4–5)
ALP SERPL-CCNC: 47 U/L (ref 33–136)
ALT SERPL W P-5'-P-CCNC: 5 U/L (ref 7–45)
ANION GAP SERPL CALC-SCNC: 15 MMOL/L (ref 10–20)
AST SERPL W P-5'-P-CCNC: 11 U/L (ref 9–39)
BACTERIA UR CULT: NO GROWTH
BILIRUB SERPL-MCNC: 0.5 MG/DL (ref 0–1.2)
BLOOD EXPIRATION DATE: NORMAL
BNP SERPL-MCNC: 72 PG/ML (ref 0–99)
BUN SERPL-MCNC: 26 MG/DL (ref 6–23)
CALCIUM SERPL-MCNC: 7.8 MG/DL (ref 8.6–10.3)
CHLORIDE SERPL-SCNC: 106 MMOL/L (ref 98–107)
CO2 SERPL-SCNC: 22 MMOL/L (ref 21–32)
CREAT SERPL-MCNC: 1.26 MG/DL (ref 0.5–1.05)
DISPENSE STATUS: NORMAL
EGFRCR SERPLBLD CKD-EPI 2021: 41 ML/MIN/1.73M*2
ERYTHROCYTE [DISTWIDTH] IN BLOOD BY AUTOMATED COUNT: 17.3 % (ref 11.5–14.5)
ERYTHROCYTE [DISTWIDTH] IN BLOOD BY AUTOMATED COUNT: 17.4 % (ref 11.5–14.5)
GLUCOSE SERPL-MCNC: 105 MG/DL (ref 74–99)
HCT VFR BLD AUTO: 20.9 % (ref 36–46)
HCT VFR BLD AUTO: 29.4 % (ref 36–46)
HGB BLD-MCNC: 6.4 G/DL (ref 12–16)
HGB BLD-MCNC: 8.9 G/DL (ref 12–16)
MAGNESIUM SERPL-MCNC: 1.72 MG/DL (ref 1.6–2.4)
MCH RBC QN AUTO: 27.9 PG (ref 26–34)
MCH RBC QN AUTO: 29 PG (ref 26–34)
MCHC RBC AUTO-ENTMCNC: 30.3 G/DL (ref 32–36)
MCHC RBC AUTO-ENTMCNC: 30.6 G/DL (ref 32–36)
MCV RBC AUTO: 91 FL (ref 80–100)
MCV RBC AUTO: 96 FL (ref 80–100)
NRBC BLD-RTO: 0 /100 WBCS (ref 0–0)
NRBC BLD-RTO: 0 /100 WBCS (ref 0–0)
PHOSPHATE SERPL-MCNC: 3.3 MG/DL (ref 2.5–4.9)
PLATELET # BLD AUTO: 190 X10*3/UL (ref 150–450)
PLATELET # BLD AUTO: 215 X10*3/UL (ref 150–450)
POTASSIUM SERPL-SCNC: 4.2 MMOL/L (ref 3.5–5.3)
PRODUCT BLOOD TYPE: 6200
PRODUCT BLOOD TYPE: 6200
PRODUCT BLOOD TYPE: NORMAL
PRODUCT CODE: NORMAL
PROT SERPL-MCNC: 5.5 G/DL (ref 6.4–8.2)
RBC # BLD AUTO: 2.29 X10*6/UL (ref 4–5.2)
RBC # BLD AUTO: 3.07 X10*6/UL (ref 4–5.2)
SODIUM SERPL-SCNC: 139 MMOL/L (ref 136–145)
UNIT ABO: NORMAL
UNIT NUMBER: NORMAL
UNIT RH: NORMAL
UNIT VOLUME: 350
WBC # BLD AUTO: 12.5 X10*3/UL (ref 4.4–11.3)
WBC # BLD AUTO: 13.4 X10*3/UL (ref 4.4–11.3)
XM INTEP: NORMAL

## 2025-01-17 PROCEDURE — 2500000001 HC RX 250 WO HCPCS SELF ADMINISTERED DRUGS (ALT 637 FOR MEDICARE OP): Performed by: NURSE PRACTITIONER

## 2025-01-17 PROCEDURE — 83735 ASSAY OF MAGNESIUM: CPT

## 2025-01-17 PROCEDURE — P9016 RBC LEUKOCYTES REDUCED: HCPCS

## 2025-01-17 PROCEDURE — 85027 COMPLETE CBC AUTOMATED: CPT

## 2025-01-17 PROCEDURE — 85027 COMPLETE CBC AUTOMATED: CPT | Performed by: STUDENT IN AN ORGANIZED HEALTH CARE EDUCATION/TRAINING PROGRAM

## 2025-01-17 PROCEDURE — 36415 COLL VENOUS BLD VENIPUNCTURE: CPT | Performed by: STUDENT IN AN ORGANIZED HEALTH CARE EDUCATION/TRAINING PROGRAM

## 2025-01-17 PROCEDURE — 1100000001 HC PRIVATE ROOM DAILY

## 2025-01-17 PROCEDURE — 99232 SBSQ HOSP IP/OBS MODERATE 35: CPT | Performed by: INTERNAL MEDICINE

## 2025-01-17 PROCEDURE — 84100 ASSAY OF PHOSPHORUS: CPT

## 2025-01-17 PROCEDURE — 83880 ASSAY OF NATRIURETIC PEPTIDE: CPT | Performed by: STUDENT IN AN ORGANIZED HEALTH CARE EDUCATION/TRAINING PROGRAM

## 2025-01-17 PROCEDURE — 80053 COMPREHEN METABOLIC PANEL: CPT

## 2025-01-17 PROCEDURE — 36430 TRANSFUSION BLD/BLD COMPNT: CPT

## 2025-01-17 PROCEDURE — 2500000004 HC RX 250 GENERAL PHARMACY W/ HCPCS (ALT 636 FOR OP/ED): Performed by: NURSE PRACTITIONER

## 2025-01-17 PROCEDURE — 99232 SBSQ HOSP IP/OBS MODERATE 35: CPT | Performed by: NURSE PRACTITIONER

## 2025-01-17 PROCEDURE — 36415 COLL VENOUS BLD VENIPUNCTURE: CPT

## 2025-01-17 RX ADMIN — SODIUM CHLORIDE, POTASSIUM CHLORIDE, SODIUM LACTATE AND CALCIUM CHLORIDE 100 ML/HR: 600; 310; 30; 20 INJECTION, SOLUTION INTRAVENOUS at 15:16

## 2025-01-17 RX ADMIN — FERROUS SULFATE TAB 325 MG (65 MG ELEMENTAL FE) 325 MG: 325 (65 FE) TAB at 12:00

## 2025-01-17 RX ADMIN — Medication 2000 UNITS: at 12:00

## 2025-01-17 RX ADMIN — NYSTATIN CREAM 1 APPLICATION: 100000 CREAM TOPICAL at 22:07

## 2025-01-17 RX ADMIN — ACETAMINOPHEN 650 MG: 325 TABLET ORAL at 12:01

## 2025-01-17 RX ADMIN — AMITRIPTYLINE HYDROCHLORIDE 10 MG: 10 TABLET, FILM COATED ORAL at 22:07

## 2025-01-17 RX ADMIN — ACETAMINOPHEN 650 MG: 325 TABLET ORAL at 20:17

## 2025-01-17 RX ADMIN — PANTOPRAZOLE SODIUM 40 MG: 40 TABLET, DELAYED RELEASE ORAL at 22:06

## 2025-01-17 RX ADMIN — SODIUM CHLORIDE, POTASSIUM CHLORIDE, SODIUM LACTATE AND CALCIUM CHLORIDE 100 ML/HR: 600; 310; 30; 20 INJECTION, SOLUTION INTRAVENOUS at 02:55

## 2025-01-17 SDOH — ECONOMIC STABILITY: TRANSPORTATION INSECURITY: IN THE PAST 12 MONTHS, HAS LACK OF TRANSPORTATION KEPT YOU FROM MEDICAL APPOINTMENTS OR FROM GETTING MEDICATIONS?: NO

## 2025-01-17 SDOH — ECONOMIC STABILITY: INCOME INSECURITY: IN THE PAST 12 MONTHS HAS THE ELECTRIC, GAS, OIL, OR WATER COMPANY THREATENED TO SHUT OFF SERVICES IN YOUR HOME?: NO

## 2025-01-17 SDOH — ECONOMIC STABILITY: HOUSING INSECURITY: IN THE LAST 12 MONTHS, WAS THERE A TIME WHEN YOU WERE NOT ABLE TO PAY THE MORTGAGE OR RENT ON TIME?: NO

## 2025-01-17 SDOH — ECONOMIC STABILITY: HOUSING INSECURITY: IN THE PAST 12 MONTHS, HOW MANY TIMES HAVE YOU MOVED WHERE YOU WERE LIVING?: 0

## 2025-01-17 SDOH — ECONOMIC STABILITY: FOOD INSECURITY: WITHIN THE PAST 12 MONTHS, YOU WORRIED THAT YOUR FOOD WOULD RUN OUT BEFORE YOU GOT THE MONEY TO BUY MORE.: NEVER TRUE

## 2025-01-17 SDOH — ECONOMIC STABILITY: FOOD INSECURITY: HOW HARD IS IT FOR YOU TO PAY FOR THE VERY BASICS LIKE FOOD, HOUSING, MEDICAL CARE, AND HEATING?: NOT HARD AT ALL

## 2025-01-17 SDOH — ECONOMIC STABILITY: FOOD INSECURITY: WITHIN THE PAST 12 MONTHS, THE FOOD YOU BOUGHT JUST DIDN'T LAST AND YOU DIDN'T HAVE MONEY TO GET MORE.: NEVER TRUE

## 2025-01-17 SDOH — ECONOMIC STABILITY: HOUSING INSECURITY: AT ANY TIME IN THE PAST 12 MONTHS, WERE YOU HOMELESS OR LIVING IN A SHELTER (INCLUDING NOW)?: NO

## 2025-01-17 ASSESSMENT — PAIN DESCRIPTION - LOCATION: LOCATION: HEAD

## 2025-01-17 ASSESSMENT — PAIN - FUNCTIONAL ASSESSMENT
PAIN_FUNCTIONAL_ASSESSMENT: 0-10

## 2025-01-17 ASSESSMENT — PAIN SCALES - GENERAL
PAINLEVEL_OUTOF10: 0 - NO PAIN
PAINLEVEL_OUTOF10: 0 - NO PAIN
PAINLEVEL_OUTOF10: 9
PAINLEVEL_OUTOF10: 3
PAINLEVEL_OUTOF10: 0 - NO PAIN
PAINLEVEL_OUTOF10: 0 - NO PAIN

## 2025-01-17 ASSESSMENT — COGNITIVE AND FUNCTIONAL STATUS - GENERAL
HELP NEEDED FOR BATHING: A LITTLE
DRESSING REGULAR UPPER BODY CLOTHING: A LITTLE
DAILY ACTIVITIY SCORE: 20
DRESSING REGULAR LOWER BODY CLOTHING: A LITTLE
TURNING FROM BACK TO SIDE WHILE IN FLAT BAD: A LITTLE
STANDING UP FROM CHAIR USING ARMS: A LITTLE
MOBILITY SCORE: 18
CLIMB 3 TO 5 STEPS WITH RAILING: A LOT
MOVING TO AND FROM BED TO CHAIR: A LITTLE
TOILETING: A LITTLE
WALKING IN HOSPITAL ROOM: A LITTLE

## 2025-01-17 ASSESSMENT — ACTIVITIES OF DAILY LIVING (ADL)
LACK_OF_TRANSPORTATION: NO
LACK_OF_TRANSPORTATION: NO

## 2025-01-17 NOTE — CARE PLAN
Patient remained in SR-ST and on RA, VSS. Bowel prep started last night, about 3/4 of 4L container drank so far. Frequent large bloody bowel movements throughout shift, BM is starting to become more clear this AM. 2 units of PRBC given with second unit infusing currently. No c/o pain or sob. Pt reports dizziness with exertion. Safety maintained. Call light within reach.   Problem: Pain - Adult  Goal: Verbalizes/displays adequate comfort level or baseline comfort level  Outcome: Progressing     Problem: Discharge Planning  Goal: Discharge to home or other facility with appropriate resources  Outcome: Progressing     Problem: Chronic Conditions and Co-morbidities  Goal: Patient's chronic conditions and co-morbidity symptoms are monitored and maintained or improved  Outcome: Progressing     Problem: Safety - Adult  Goal: Free from fall injury  Outcome: Progressing     Problem: Skin  Goal: Participates in plan/prevention/treatment measures  Outcome: Progressing  Goal: Prevent/manage excess moisture  Outcome: Progressing  Goal: Prevent/minimize sheer/friction injuries  Outcome: Progressing  Goal: Promote/optimize nutrition  Outcome: Progressing  Goal: Promote skin healing  Outcome: Progressing

## 2025-01-17 NOTE — PROGRESS NOTES
"Subjective  Patient sitting up in bed in NAD.  Patient completed partial bowel prep to assist with flushing blood from colon.  Resolution of bleeding noted this a.m. Now having brown liquid stools.  Hemoglobin incremented well with 2 unit transfusion.  Plan to continue conservative management per patient preference given no further overt signs of bleeding and imaging/ most recent colonoscopy noting severe pancolonic diverticulosis.     Objective  Blood pressure 106/77, pulse 98, temperature 36.9 °C (98.4 °F), temperature source Temporal, resp. rate 18, height 1.549 m (5' 1\"), weight 83.3 kg (183 lb 9.6 oz), SpO2 96%.    Physical Exam  Constitutional: Alert, pleasant and interactive, in NAD  Eyes: PERRL, sclera clear, no conjunctival injection  Skin: Warm and dry, no rash or ecchymosis  ENMT: Mucous membranes moist, no lesions noted  Resp: CTAB, even and unlabored  CV: RRR, normal S1, S2, no m,r,g  GI: +BS, soft, round, NT, no rebound tenderness or guarding, no palpable masses or organomegaly  Extremities: Extremities warm, no edema, contusions, wounds or cyanosis  Neuro: Alert and oriented x3  Psych: Appropriate mood and behavior    Medications  Scheduled medications  amitriptyline, 10 mg, oral, Nightly  [Held by provider] aspirin, 81 mg, oral, Daily  cholecalciferol, 2,000 Units, oral, Daily  ferrous sulfate (325 mg ferrous sulfate), 65 mg of iron, oral, Every other day  nystatin, 1 Application, Topical, TID  pantoprazole, 40 mg, oral, Nightly      Continuous medications  lactated Ringer's, 100 mL/hr, Last Rate: 100 mL/hr (01/17/25 0255)      PRN medications  PRN medications: acetaminophen **OR** acetaminophen, albuterol, ondansetron **OR** ondansetron, polyethylene glycol     Labs  Lab Results   Component Value Date    WBC 13.4 (H) 01/17/2025    HGB 8.9 (L) 01/17/2025    HCT 29.4 (L) 01/17/2025    MCV 96 01/17/2025     01/17/2025     Lab Results   Component Value Date    GLUCOSE 105 (H) 01/17/2025    " CALCIUM 7.8 (L) 01/17/2025     01/17/2025    K 4.2 01/17/2025    CO2 22 01/17/2025     01/17/2025    BUN 26 (H) 01/17/2025    CREATININE 1.26 (H) 01/17/2025     Lab Results   Component Value Date    ALT 5 (L) 01/17/2025    AST 11 01/17/2025    ALKPHOS 47 01/17/2025    BILITOT 0.5 01/17/2025     Lab Results   Component Value Date    IRON 49 09/17/2024    TIBC 272 09/17/2024    FERRITIN 38 09/17/2024     Lab Results   Component Value Date    INR 1.2 (H) 09/17/2024    INR 1.1 09/16/2024    INR 2.9 (H) 12/20/2023    PROTIME 13.1 (H) 09/17/2024    PROTIME 12.0 09/16/2024    PROTIME 33.1 (H) 12/20/2023     Radiology  CT angio A/P with and without IV contrast 1/16/2025 noting:  IMPRESSION:  Focus of active lower GI bleeding demonstrated at mid transverse  colon, associated with extensive diverticulosis.    Critical Finding:  There is active hemorrhage. Notification was  initiated on 1/16/2025 at 11:01 am by  Steffen Dubon.  (**-OCF-**)  Instructions:     Signed by: Steffen Dubon 1/16/2025 11:02 AM  Dictation workstation:   [id number]    Assessment  Natasha Lundberg is a 89 y.o. female with PMH of HTN, PE (no longer on AC), iron deficiency anemia, GERD, intraoperative cardiac arrest 2/2023, severe diffuse diverticulosis presenting with hematochezia. Seen by our service 9/2023 as well as 9/2024 also for concerns for GI bleed. Colonoscopy 2023 noting severe pan colonic diverticulosis.  Current imaging noting the same.  Patient did require transfusion with appropriate incrementation.  Slow bowel prep for colonic flushing partially completed with resolution of bleeding.  Patient having liquid brown stools.    # hematochezia- resolving  # blood loss anemia  # iron deficiency  # GERD  # external hemorrhoids    Plan:  - continue supportive care  - keep NPO clear liquids - no red dyes today  - can advance to low fiber tomorrow if hgb stable with no further bleeding  - continue to trend hgb daily unless pt becomes symptomatic  or decompensates  - transfuse to maintain hgb >7  - continue to monitor for and document overt signs of bleeding  - no current plan for repeat colonoscopy given resolution of bleeding, pt preference     Plan has been discussed with Dr. Mitchell. GI will continue to follow.     Alma Mora, APRN/CNP

## 2025-01-17 NOTE — CARE PLAN
Problem: Pain - Adult  Goal: Verbalizes/displays adequate comfort level or baseline comfort level  Outcome: Progressing     Problem: Discharge Planning  Goal: Discharge to home or other facility with appropriate resources  Outcome: Progressing     Problem: Chronic Conditions and Co-morbidities  Goal: Patient's chronic conditions and co-morbidity symptoms are monitored and maintained or improved  Outcome: Progressing     Problem: Safety - Adult  Goal: Free from fall injury  Outcome: Progressing     Problem: Fall/Injury  Goal: Not fall by end of shift  Outcome: Progressing  Goal: Be free from injury by end of the shift  Outcome: Progressing  Goal: Verbalize understanding of risk factor reduction measures to prevent injury from fall in the home  Outcome: Progressing  Goal: Use assistive devices by end of the shift  Outcome: Progressing  Goal: Pace activities to prevent fatigue by end of the shift  Outcome: Progressing     Problem: Skin  Goal: Participates in plan/prevention/treatment measures  Outcome: Progressing  Goal: Prevent/manage excess moisture  Outcome: Progressing  Goal: Prevent/minimize sheer/friction injuries  Outcome: Progressing  Goal: Promote/optimize nutrition  Outcome: Progressing  Goal: Promote skin healing  Outcome: Progressing   The patient's goals for the shift include      The clinical goals for the shift include patient will maintain a hgb greater than 7 this shift.  Am hgb after second unit of blood was 8.9.

## 2025-01-17 NOTE — PROGRESS NOTES
Natasha Lundberg is a 89 y.o. female on day 1 of admission presenting with Lightheadedness.    Subjective   Assuming care of patient from Dr. Garcia; patient's niece, also the [facility]-POA, was at the bedside on initial rounding, she was actually just leaving the hospital to be able to go to work.  Overnight, there was issues with bleeding and she did get 2 units of packed red blood cells with appropriate incrementation.  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    Objective   Physical Exam  Constitutional: Well developed, awake/alert/oriented x3, no distress, alert and cooperative   ENMT: mucous membranes moist   Head/Neck: Neck supple   Respiratory/Thorax: Patent airways, CTAB, normal breath sounds with good chest expansion, thorax symmetric   Cardiovascular: Regular, rate and rhythm, no murmurs, 2+ equal pulses of the extremities, normal S 1and S 2   Gastrointestinal: Nondistended, soft, non-tender, no rebound tenderness or guarding, no masses palpable, no organomegaly, +BS, no bruits   Musculoskeletal: ROM intact, no joint swelling, normal strength   Extremities: normal extremities, no cyanosis edema, contusions or wounds, no clubbing   Neurological: alert and oriented x3, intact senses, motor, response and reflexes, normal strength     Last Recorded Vitals  Blood pressure 106/77, pulse 98, temperature 36.9 °C (98.4 °F), temperature source Temporal, resp. rate 18, height 1.549 m  "(5' 1\"), weight 83.3 kg (183 lb 9.6 oz), SpO2 96%.  Intake/Output last 3 Shifts:  I/O last 3 completed shifts:  In: 4101.7 (49.3 mL/kg) [P.O.:3000; Blood:1101.7]  Out: 850 (10.2 mL/kg) [Urine:350 (0.1 mL/kg/hr); Stool:500]  Weight: 83.3 kg     Relevant Results  Scheduled medications  amitriptyline, 10 mg, oral, Nightly  [Held by provider] aspirin, 81 mg, oral, Daily  cholecalciferol, 2,000 Units, oral, Daily  ferrous sulfate (325 mg ferrous sulfate), 65 mg of iron, oral, Every other day  nystatin, 1 Application, Topical, TID  pantoprazole, 40 mg, oral, Nightly      Continuous medications  lactated Ringer's, 100 mL/hr, Last Rate: 100 mL/hr (01/17/25 0255)      PRN medications  PRN medications: acetaminophen **OR** acetaminophen, albuterol, ondansetron **OR** ondansetron, polyethylene glycol    Results for orders placed or performed during the hospital encounter of 01/16/25 (from the past 24 hours)   Comprehensive metabolic panel   Result Value Ref Range    Glucose 105 (H) 74 - 99 mg/dL    Sodium 139 136 - 145 mmol/L    Potassium 4.2 3.5 - 5.3 mmol/L    Chloride 106 98 - 107 mmol/L    Bicarbonate 22 21 - 32 mmol/L    Anion Gap 15 10 - 20 mmol/L    Urea Nitrogen 26 (H) 6 - 23 mg/dL    Creatinine 1.26 (H) 0.50 - 1.05 mg/dL    eGFR 41 (L) >60 mL/min/1.73m*2    Calcium 7.8 (L) 8.6 - 10.3 mg/dL    Albumin 3.1 (L) 3.4 - 5.0 g/dL    Alkaline Phosphatase 47 33 - 136 U/L    Total Protein 5.5 (L) 6.4 - 8.2 g/dL    AST 11 9 - 39 U/L    Bilirubin, Total 0.5 0.0 - 1.2 mg/dL    ALT 5 (L) 7 - 45 U/L   Magnesium   Result Value Ref Range    Magnesium 1.72 1.60 - 2.40 mg/dL   Phosphorus   Result Value Ref Range    Phosphorus 3.3 2.5 - 4.9 mg/dL   CBC   Result Value Ref Range    WBC 12.5 (H) 4.4 - 11.3 x10*3/uL    nRBC 0.0 0.0 - 0.0 /100 WBCs    RBC 2.29 (L) 4.00 - 5.20 x10*6/uL    Hemoglobin 6.4 (LL) 12.0 - 16.0 g/dL    Hematocrit 20.9 (L) 36.0 - 46.0 %    MCV 91 80 - 100 fL    MCH 27.9 26.0 - 34.0 pg    MCHC 30.6 (L) 32.0 - 36.0 g/dL "    RDW 17.4 (H) 11.5 - 14.5 %    Platelets 215 150 - 450 x10*3/uL   B-type Natriuretic Peptide   Result Value Ref Range    BNP 72 0 - 99 pg/mL   Prepare RBC: 3 Units   Result Value Ref Range    PRODUCT CODE U8991W70     Unit Number [id number]     Unit ABO A     Unit RH POS     XM INTEP COMP     Dispense Status TR     Blood Expiration Date 1/22/2025 11:59:00 PM EST     PRODUCT BLOOD TYPE      UNIT VOLUME 350     PRODUCT CODE B0194T71     Unit Number [id number]     Unit ABO A     Unit RH POS     XM INTEP COMP     Dispense Status TR     Blood Expiration Date 1/31/2025 11:59:00 PM EST     PRODUCT BLOOD TYPE 6200     UNIT VOLUME 350     PRODUCT CODE M5058V78     Unit Number [id number]     Unit ABO A     Unit RH POS     XM INTEP COMP     Dispense Status XM     Blood Expiration Date 1/28/2025 11:59:00 PM EST     PRODUCT BLOOD TYPE 6200     UNIT VOLUME 350    CBC   Result Value Ref Range    WBC 13.4 (H) 4.4 - 11.3 x10*3/uL    nRBC 0.0 0.0 - 0.0 /100 WBCs    RBC 3.07 (L) 4.00 - 5.20 x10*6/uL    Hemoglobin 8.9 (L) 12.0 - 16.0 g/dL    Hematocrit 29.4 (L) 36.0 - 46.0 %    MCV 96 80 - 100 fL    MCH 29.0 26.0 - 34.0 pg    MCHC 30.3 (L) 32.0 - 36.0 g/dL    RDW 17.3 (H) 11.5 - 14.5 %    Platelets 190 150 - 450 x10*3/uL     IR embolization    Result Date: 1/17/2025  Interpreted By:  Rancho Alatorre, STUDY: IR EMBOLIZATION; 1/16/2025 4:24 pm   INDICATION: Signs/Symptoms:gi bleed.   COMPARISON: CT angio 01/16/2025   ACCESSION NUMBER(S): [id number]   ORDERING CLINICIAN: MODESTA MCMANUS   TECHNIQUE: : Rancho Alatorre MD   CONSENT: The patient / patient's POA / next of kin was informed of the nature of the proposed procedure. The purposes, alternatives, risks, and benefits were explained and discussed. All questions were answered and consent was obtained.   RADIATION EXPOSURE: Dose: 657 mGy   SEDATION: Moderate conscious intravenous sedation services (supervision of administration, induction, and maintenance)  "were provided by the physician performing the procedure with intravenous fentanyl and versed. Total intra-service sedation time from 1121-2559 hours (42 minutes). The physician was assisted by an independent trained observer, an interventional radiology nurse, in the continuous monitoring of the patient level of consciousness and physiologic status.   MEDICATION/CONTRAST: 60 mL Omnipaque 350   TIME OUT: A time out was performed immediately prior to the procedure start with interventional team, correctly identifying the patient using multiple identifiers and ensuring the appropriate procedure and anatomy (including laterality, if applicable) were identified. The procedure consent form and all relevant laboratory and imaging tests were reviewed. The need for antibiotic administration or patient or procedure specific safety precautions or equipment was reviewed.   COMPLICATIONS: None immediate   FINDINGS: With the patient in the supine position, the groins were scrubbed and draped in the usual aseptic manner. The skin and subcutaneous tissue overlying the right common femoral artery were infiltrated with 2% lidocaine for local anesthetic. Ultrasound demonstrated patency of the right common femoral artery was then accessed with a micropuncture needle under direct sonographic guidance. A hard copy ultrasound image was stored to PACS.  A 0.018 wire was advanced through the needle into the artery. The needle was exchanged for 5 Trin[place] transitional catheter. The wire and inner dilator were removed and 0.035 Bentson wire was advanced to the artery. The transitional catheter was exchanged for a 5 Trinidadian vascular sheath.   A 5 Trinidadian Sos Omni catheter was advanced over the Bentson wire and used to select the superior mesenteric artery. Digital subtraction angiography was performed. An STC microcatheter was advanced over a 0.016 \"fathom wire coaxially through the macro catheter into the middle colic artery and a corresponding " arteriogram was obtained. The catheter was further sub selectively advanced in the middle colic artery to a select the location at the site of the previously identified bleed and a corresponding arteriogram was obtained.   Angiography was performed through the common femoral artery sheath and showed appropriate anatomy and position for use of a closure device. The atraumatic Mynx control device was advanced through the catheter. Steps were performed according to 's directions, with successful deployment of the Mynx arteriotomy closure device. Hemostasis was achieved by the closure device and manual compression.   ANGIOGRAPHIC FINDINGS: 1. Superior mesenteric artery angiogram does not show active extravasation within the SMA distribution. Site potential bleed in the mid transverse colon which is a round focus of contrast, does not behave in the later phase as an active bleed, but additional study was pursued due to its location corresponding to the CTA findings. 2. Middle colic artery angiogram without evidence of active contrast extravasation or other signs of active bleeding. Additionally, the finding present on the superior mesenteric artery angiogram is not elicited on more sub selective exam. 3. Secondary branch of the middle colic artery angiogram in the area of concern based on the prior CTA fails to elicit any evidence of active bleeding.       Superior mesenteric artery angiogram and sub selective middle colic artery angiogram to study the site of active bleed on the same-day CTA. No extravasation on the current exam and therefore no embolization performed.   Signed by: Rancho Alatorre 1/17/2025 1:57 PM Dictation workstation:   [id number]    ECG 12 lead    Result Date: 1/16/2025  Sinus rhythm with 1st degree AV block Left axis deviation Left ventricular hypertrophy with repolarization abnormality ( R in aVL , [place] product ) Abnormal ECG When compared with ECG of 10-MAR-2024 12:38,  Non-specific change in ST segment in Lateral leads Confirmed by Arthur Chandra ([id number]) on 1/16/2025 6:38:55 PM    CT angio abdomen pelvis w and or wo IV IV contrast    Result Date: 1/16/2025  Interpreted By:  Steffen Dubon, STUDY: CT ANGIO ABDOMEN PELVIS W AND/OR WO IV IV CONTRAST;  1/16/2025 10:38 am   INDICATION: Signs/Symptoms:gi bleed.   COMPARISON: None.   ACCESSION NUMBER(S): [id number]   ORDERING CLINICIAN: MODESTA MCMANUS   TECHNIQUE: Thin-section axial images of the abdomen and pelvis before and following intravenous administration of 90 mL of Omnipaque 350 contrast. Postcontrast images obtained in the arterial and venous phases.     CT Dose Reduction Employed: Yes, iterative reconstruction   For optimization of anatomic evaluation, multiplanar reconstruction, maximum intensity projections, and advanced 3-D off-line postprocessing were performed on a dedicated stand-alone workstation by the interpreting physician.   FINDINGS: VASCULATURE: There is a focus of arterial phase contrast extravasation that persistent slightly progresses on venous phase at mid transverse colon. This is associated with extensive diverticulosis. No additional areas of contrast extravasation into GI tract identified.     Abdominal aorta: No abdominal aortic aneurysm or dissection. Mild atherosclerosis. Celiac artery: Unremarkable Superior mesenteric artery: Unremarkable Right renal artery: Single artery, unremarkable. Left renal artery: Single artery with mild stenosis proximally Inferior mesenteric artery: Unremarkable. Common iliac, external iliac, common femoral, and visualized superficial femoral artery are unremarkable bilaterally.   The portal vein is patent. The splenic vein is patent. The superior mesenteric vein is patent. The IVC is patent.     ABDOMEN AND PELVIS:   Lower chest: Fibrotic changes lung bases.   Liver: No gross abnormality   Biliary tree: No intrahepatic biliary dilatation, the common bile duct grossly  unremarkable   Gallbladder: No gross abnormality   Pancreas: No gross abnormality   Spleen: No gross abnormality   Adrenal: The adrenal appears unremarkable   Kidneys: Symmetrical enhancement bilaterally is present. .   Peritoneum and retroperitoneum: No free fluid, free air and/or gross masses   Lymph nodes: No lymphadenopathy   Bowel: No abnormally dilated bowel segments are seen. No significant inflammatory changes are present. Extensive diverticulosis through out majority of the colon. Moderate hiatal hernia.   Bladder: No gross finding   Pelvis: 2.2 cm calcified fibroid.   Musculoskeletal: Advanced degenerative changes lumbar spine.       Focus of active lower GI bleeding demonstrated at mid transverse colon, associated with extensive diverticulosis.   MACRO:   Critical Finding:  There is active hemorrhage. Notification was initiated on 1/16/2025 at 11:01 am by  Steffen Dubon.  (**-OCF-**) Instructions:         Signed by: Steffen Dubon 1/16/2025 11:02 AM Dictation workstation:   [id number]    XR chest 1 view    Result Date: 1/16/2025  Interpreted By:  Radu Ornelas, STUDY: XR CHEST 1 VIEW;  1/16/2025 8:43 am   INDICATION: Signs/Symptoms:near syncope.   COMPARISON: 12/20/2023   ACCESSION NUMBER(S): [id number]   ORDERING CLINICIAN: MODESTA MCMANUS   FINDINGS: CHEST/LUNGS: The cardiac and mediastinal silhouettes are unchanged in size and configuration. There is interstitial prominence which is similar to the previous examination. Areas of atelectasis/scarring at the lung bases. No definite new areas of consolidation. No sizable effusion or evidence of a pneumothorax.   UPPER ABDOMEN: No remarkable upper abdominal findings.   OSSEOUS STRUCTURES: No acute changes.       Stable interstitial prominence as well as some areas of scarring/atelectasis, predominantly at the lung bases.   MACRO: None.   Signed by: Radu Ornelas 1/16/2025 8:53 AM Dictation workstation:   [id number]       Assessment/Plan   This is a very  pleasant 89-year-old lady with a known history of GI bleed, HTN, HLD, pulmonary embolus (no longer on eliquis), cardiac arrest, recurrent falls, COVID-19, iron deficiency anemia due to chronic blood loss, GERD, hx rectus sheath hematoma 3/2024 presents with recurrent bleeding congruent with diverticular bleed, appears to be self resolving.  Assessment & Plan  Lightheadedness      Plan:  - Patient stable for transfer to acute telemetry, to remain inpatient  - Did have bleeding overnight and incremented appropriately  - Hold off on colonoscopy after discussion today  - Advance diet as tolerated  - Plan of care was discussed with the [facility]PO[facility] at the bedside  -Will continue to monitor and anticipate she could be a candidate for discharge as soon as tomorrow    Diet: Advance to full  VTE PPx: Hold pharmacologic  Code: DNR/DNI-CCA          I spent 35 minutes in the professional and overall care of this patient.    Sánchez Odell MD

## 2025-01-17 NOTE — PROGRESS NOTES
01/17/25 1431   Discharge Planning   Living Arrangements Spouse/significant other   Support Systems Children;Family members   Assistance Needed uses a walker/wheelchair to get around   Type of Residence Assisted living   Do you have animals or pets at home? No   Care Facility Name [facility]   Who is requesting discharge planning? Provider   Home or Post Acute Services None   Expected Discharge Disposition Home   Does the patient need discharge transport arranged? No   Financial Resource Strain   How hard is it for you to pay for the very basics like food, housing, medical care, and heating? Not hard   Housing Stability   In the last 12 months, was there a time when you were not able to pay the mortgage or rent on time? N   In the past 12 months, how many times have you moved where you were living? 0   At any time in the past 12 months, were you homeless or living in a shelter (including now)? N   Transportation Needs   In the past 12 months, has lack of transportation kept you from medical appointments or from getting medications? no   In the past 12 months, has lack of transportation kept you from meetings, work, or from getting things needed for daily living? No   Patient Choice   Patient / Family choosing to utilize agency / facility established prior to hospitalization Yes   Stroke Family Assessment   Stroke Family Assessment Needed No   Intensity of Service   Intensity of Service 0-30 min     Met with patient at bedside. Introduced self and role in hospital. Verified address and that patient lives @ [facility]. PCP is Dr. Matthew Jones. Facility manages patient's medications, helps with showers 2 times per week,and assists with dressing. Patient uses a walker or a wheelchair to get around and has 3 meals per day at facility. Patient feels like she is able to manage her health and would like to return to [facility] on discharge. CT team to follow patient  for discharge needs.

## 2025-01-18 LAB
ANION GAP SERPL CALC-SCNC: 11 MMOL/L (ref 10–20)
BUN SERPL-MCNC: 15 MG/DL (ref 6–23)
CALCIUM SERPL-MCNC: 7.8 MG/DL (ref 8.6–10.3)
CHLORIDE SERPL-SCNC: 107 MMOL/L (ref 98–107)
CO2 SERPL-SCNC: 25 MMOL/L (ref 21–32)
CREAT SERPL-MCNC: 1.08 MG/DL (ref 0.5–1.05)
EGFRCR SERPLBLD CKD-EPI 2021: 50 ML/MIN/1.73M*2
ERYTHROCYTE [DISTWIDTH] IN BLOOD BY AUTOMATED COUNT: 17.6 % (ref 11.5–14.5)
ERYTHROCYTE [DISTWIDTH] IN BLOOD BY AUTOMATED COUNT: 17.6 % (ref 11.5–14.5)
GLUCOSE SERPL-MCNC: 89 MG/DL (ref 74–99)
HCT VFR BLD AUTO: 23.9 % (ref 36–46)
HCT VFR BLD AUTO: 25.9 % (ref 36–46)
HGB BLD-MCNC: 7.9 G/DL (ref 12–16)
HGB BLD-MCNC: 8.5 G/DL (ref 12–16)
MAGNESIUM SERPL-MCNC: 1.72 MG/DL (ref 1.6–2.4)
MCH RBC QN AUTO: 29.7 PG (ref 26–34)
MCH RBC QN AUTO: 29.7 PG (ref 26–34)
MCHC RBC AUTO-ENTMCNC: 32.8 G/DL (ref 32–36)
MCHC RBC AUTO-ENTMCNC: 33.1 G/DL (ref 32–36)
MCV RBC AUTO: 90 FL (ref 80–100)
MCV RBC AUTO: 91 FL (ref 80–100)
NRBC BLD-RTO: 0 /100 WBCS (ref 0–0)
NRBC BLD-RTO: 0 /100 WBCS (ref 0–0)
PLATELET # BLD AUTO: 197 X10*3/UL (ref 150–450)
PLATELET # BLD AUTO: 217 X10*3/UL (ref 150–450)
POTASSIUM SERPL-SCNC: 3.7 MMOL/L (ref 3.5–5.3)
RBC # BLD AUTO: 2.66 X10*6/UL (ref 4–5.2)
RBC # BLD AUTO: 2.86 X10*6/UL (ref 4–5.2)
SODIUM SERPL-SCNC: 139 MMOL/L (ref 136–145)
WBC # BLD AUTO: 10.5 X10*3/UL (ref 4.4–11.3)
WBC # BLD AUTO: 9.1 X10*3/UL (ref 4.4–11.3)

## 2025-01-18 PROCEDURE — 85027 COMPLETE CBC AUTOMATED: CPT | Performed by: INTERNAL MEDICINE

## 2025-01-18 PROCEDURE — 36415 COLL VENOUS BLD VENIPUNCTURE: CPT | Performed by: INTERNAL MEDICINE

## 2025-01-18 PROCEDURE — 80048 BASIC METABOLIC PNL TOTAL CA: CPT | Performed by: INTERNAL MEDICINE

## 2025-01-18 PROCEDURE — 2500000001 HC RX 250 WO HCPCS SELF ADMINISTERED DRUGS (ALT 637 FOR MEDICARE OP): Performed by: INTERNAL MEDICINE

## 2025-01-18 PROCEDURE — 99232 SBSQ HOSP IP/OBS MODERATE 35: CPT | Performed by: INTERNAL MEDICINE

## 2025-01-18 PROCEDURE — 1100000001 HC PRIVATE ROOM DAILY

## 2025-01-18 PROCEDURE — 83735 ASSAY OF MAGNESIUM: CPT | Performed by: INTERNAL MEDICINE

## 2025-01-18 RX ORDER — ACETAMINOPHEN 500 MG
5 TABLET ORAL NIGHTLY PRN
Status: DISCONTINUED | OUTPATIENT
Start: 2025-01-18 | End: 2025-01-19 | Stop reason: HOSPADM

## 2025-01-18 RX ADMIN — Medication 2000 UNITS: at 08:23

## 2025-01-18 RX ADMIN — AMITRIPTYLINE HYDROCHLORIDE 10 MG: 10 TABLET, FILM COATED ORAL at 20:37

## 2025-01-18 RX ADMIN — PANTOPRAZOLE SODIUM 40 MG: 40 TABLET, DELAYED RELEASE ORAL at 20:37

## 2025-01-18 RX ADMIN — Medication 5 MG: at 23:28

## 2025-01-18 RX ADMIN — NYSTATIN CREAM 1 APPLICATION: 100000 CREAM TOPICAL at 23:01

## 2025-01-18 RX ADMIN — ACETAMINOPHEN 650 MG: 325 TABLET ORAL at 23:28

## 2025-01-18 RX ADMIN — ACETAMINOPHEN 650 MG: 325 TABLET ORAL at 08:23

## 2025-01-18 RX ADMIN — NYSTATIN CREAM 1 APPLICATION: 100000 CREAM TOPICAL at 15:28

## 2025-01-18 RX ADMIN — NYSTATIN CREAM 1 APPLICATION: 100000 CREAM TOPICAL at 08:23

## 2025-01-18 ASSESSMENT — COGNITIVE AND FUNCTIONAL STATUS - GENERAL
DRESSING REGULAR LOWER BODY CLOTHING: A LITTLE
MOVING TO AND FROM BED TO CHAIR: A LITTLE
DRESSING REGULAR UPPER BODY CLOTHING: A LITTLE
DRESSING REGULAR LOWER BODY CLOTHING: A LITTLE
TOILETING: A LITTLE
WALKING IN HOSPITAL ROOM: A LITTLE
MOBILITY SCORE: 18
MOVING TO AND FROM BED TO CHAIR: A LITTLE
TURNING FROM BACK TO SIDE WHILE IN FLAT BAD: A LITTLE
MOVING FROM LYING ON BACK TO SITTING ON SIDE OF FLAT BED WITH BEDRAILS: A LITTLE
DRESSING REGULAR UPPER BODY CLOTHING: A LITTLE
CLIMB 3 TO 5 STEPS WITH RAILING: A LOT
CLIMB 3 TO 5 STEPS WITH RAILING: A LOT
TURNING FROM BACK TO SIDE WHILE IN FLAT BAD: A LITTLE
TOILETING: A LITTLE
HELP NEEDED FOR BATHING: A LITTLE
WALKING IN HOSPITAL ROOM: A LITTLE
DAILY ACTIVITIY SCORE: 20
MOBILITY SCORE: 17
DAILY ACTIVITIY SCORE: 20
STANDING UP FROM CHAIR USING ARMS: A LITTLE
HELP NEEDED FOR BATHING: A LITTLE
STANDING UP FROM CHAIR USING ARMS: A LITTLE

## 2025-01-18 ASSESSMENT — PAIN - FUNCTIONAL ASSESSMENT
PAIN_FUNCTIONAL_ASSESSMENT: 0-10
PAIN_FUNCTIONAL_ASSESSMENT: 0-10

## 2025-01-18 ASSESSMENT — PAIN SCALES - GENERAL
PAINLEVEL_OUTOF10: 7
PAINLEVEL_OUTOF10: 0 - NO PAIN
PAINLEVEL_OUTOF10: 4

## 2025-01-18 NOTE — CARE PLAN
The patient's goals for the shift include      The clinical goals for the shift include Patient will remain safe this shift.      Problem: Pain - Adult  Goal: Verbalizes/displays adequate comfort level or baseline comfort level  Outcome: Progressing     Problem: Discharge Planning  Goal: Discharge to home or other facility with appropriate resources  Outcome: Progressing     Problem: Chronic Conditions and Co-morbidities  Goal: Patient's chronic conditions and co-morbidity symptoms are monitored and maintained or improved  Outcome: Progressing     Problem: Fall/Injury  Goal: Not fall by end of shift  Outcome: Progressing  Goal: Be free from injury by end of the shift  Outcome: Progressing  Goal: Verbalize understanding of risk factor reduction measures to prevent injury from fall in the home  Outcome: Progressing  Goal: Use assistive devices by end of the shift  Outcome: Progressing  Goal: Pace activities to prevent fatigue by end of the shift  Outcome: Progressing     Problem: Safety - Adult  Goal: Free from fall injury  Outcome: Progressing     Problem: Skin  Goal: Participates in plan/prevention/treatment measures  Outcome: Progressing  Goal: Prevent/manage excess moisture  Outcome: Progressing  Goal: Prevent/minimize sheer/friction injuries  Outcome: Progressing  Goal: Promote/optimize nutrition  Outcome: Progressing  Goal: Promote skin healing  Outcome: Progressing

## 2025-01-18 NOTE — CARE PLAN
Problem: Pain - Adult  Goal: Verbalizes/displays adequate comfort level or baseline comfort level  Outcome: Progressing     Problem: Discharge Planning  Goal: Discharge to home or other facility with appropriate resources  Outcome: Progressing     Problem: Chronic Conditions and Co-morbidities  Goal: Patient's chronic conditions and co-morbidity symptoms are monitored and maintained or improved  Outcome: Progressing     Problem: Fall/Injury  Goal: Not fall by end of shift  Outcome: Progressing  Goal: Be free from injury by end of the shift  Outcome: Progressing  Goal: Verbalize understanding of risk factor reduction measures to prevent injury from fall in the home  Outcome: Progressing  Goal: Use assistive devices by end of the shift  Outcome: Progressing  Goal: Pace activities to prevent fatigue by end of the shift  Outcome: Progressing     Problem: Safety - Adult  Goal: Free from fall injury  Outcome: Progressing     Problem: Skin  Goal: Participates in plan/prevention/treatment measures  1/18/2025 1839 by Jinny Badillo RN  Outcome: Progressing  1/18/2025 0840 by Jinny Badillo RN  Flowsheets (Taken 1/18/2025 0840)  Participates in plan/prevention/treatment measures: Elevate heels  Goal: Prevent/manage excess moisture  1/18/2025 1839 by Jinny Badillo RN  Outcome: Progressing  1/18/2025 0840 by Jinny Badillo RN  Flowsheets (Taken 1/18/2025 0840)  Prevent/manage excess moisture: Cleanse incontinence/protect with barrier cream  Goal: Prevent/minimize sheer/friction injuries  1/18/2025 1839 by Jinny Badillo RN  Outcome: Progressing  1/18/2025 0840 by Jinny Badillo RN  Flowsheets (Taken 1/18/2025 0840)  Prevent/minimize sheer/friction injuries: Turn/reposition every 2 hours/use positioning/transfer devices  Goal: Promote/optimize nutrition  Outcome: Progressing  Goal: Promote skin healing  Outcome: Progressing         1230- Patient had a medium loose bloody stool. Dr Odell notified.    EOS-  No acute changes throughout the shift. Patient up in chair thorughout the shift. Tylenol given this morning for headache with decrease in headache. Niece at bedside this evening. Patient denies any shortness of breath. Patient diet advance to low fiber and tolerating with no N/V/abdominal pain. Patient only had the one bowel movement during the shift. Safety maintained and call light within reach.

## 2025-01-18 NOTE — PROGRESS NOTES
"Natasha Lundberg is a 89 y.o. female on day 2 of admission presenting with Lightheadedness.    Subjective   Patient was feeling improved overall, she actually wanted to go back to [facility] says she was having brown bowel movements, although shortly after writer finished the formal interview and examination, the patient had her call light and told the bedside nurse that she had another episode of some red blood in her stools.    Objective   Physical Exam  Constitutional: Well developed, awake/alert/oriented x3, no distress, alert and cooperative   ENMT: mucous membranes moist   Head/Neck: Neck supple   Respiratory/Thorax: Patent airways, CTAB, normal breath sounds with good chest expansion, thorax symmetric   Cardiovascular: Regular, rate and rhythm, no murmurs, 2+ equal pulses of the extremities, normal S 1and S 2   Gastrointestinal: Nondistended, soft, non-tender, no rebound tenderness or guarding, no masses palpable, no organomegaly, +BS, no bruits   Musculoskeletal: ROM intact, no joint swelling, normal strength   Extremities: normal extremities, no cyanosis edema, contusions or wounds, no clubbing   Neurological: alert and oriented x3, intact senses, motor, response and reflexes, normal strength     Last Recorded Vitals  Blood pressure 141/78, pulse 95, temperature 36.1 °C (97 °F), temperature source Temporal, resp. rate 16, height 1.549 m (5' 1\"), weight 83.3 kg (183 lb 9.6 oz), SpO2 96%.  Intake/Output last 3 Shifts:  I/O last 3 completed shifts:  In: 6330 (76 mL/kg) [P.O.:3000; I.V.:2628.3 (31.6 mL/kg); Blood:701.7]  Out: 850 (10.2 mL/kg) [Urine:350 (0.1 mL/kg/hr); Stool:500]  Weight: 83.3 kg     Relevant Results  Scheduled medications  amitriptyline, 10 mg, oral, Nightly  [Held by provider] aspirin, 81 mg, oral, Daily  cholecalciferol, 2,000 Units, oral, Daily  ferrous sulfate (325 mg ferrous sulfate), 65 mg of iron, oral, Every other day  nystatin, 1 Application, Topical, TID  pantoprazole, 40 mg, oral, " Nightly      Continuous medications       PRN medications  PRN medications: acetaminophen **OR** acetaminophen, albuterol, ondansetron **OR** ondansetron, polyethylene glycol    Results for orders placed or performed during the hospital encounter of 01/16/25 (from the past 24 hours)   CBC   Result Value Ref Range    WBC 9.1 4.4 - 11.3 x10*3/uL    nRBC 0.0 0.0 - 0.0 /100 WBCs    RBC 2.66 (L) 4.00 - 5.20 x10*6/uL    Hemoglobin 7.9 (L) 12.0 - 16.0 g/dL    Hematocrit 23.9 (L) 36.0 - 46.0 %    MCV 90 80 - 100 fL    MCH 29.7 26.0 - 34.0 pg    MCHC 33.1 32.0 - 36.0 g/dL    RDW 17.6 (H) 11.5 - 14.5 %    Platelets 197 150 - 450 x10*3/uL   Magnesium   Result Value Ref Range    Magnesium 1.72 1.60 - 2.40 mg/dL   Basic Metabolic Panel   Result Value Ref Range    Glucose 89 74 - 99 mg/dL    Sodium 139 136 - 145 mmol/L    Potassium 3.7 3.5 - 5.3 mmol/L    Chloride 107 98 - 107 mmol/L    Bicarbonate 25 21 - 32 mmol/L    Anion Gap 11 10 - 20 mmol/L    Urea Nitrogen 15 6 - 23 mg/dL    Creatinine 1.08 (H) 0.50 - 1.05 mg/dL    eGFR 50 (L) >60 mL/min/1.73m*2    Calcium 7.8 (L) 8.6 - 10.3 mg/dL     IR embolization    Result Date: 1/17/2025  Interpreted By:  Rancho Alatorre, STUDY: IR EMBOLIZATION; 1/16/2025 4:24 pm   INDICATION: Signs/Symptoms:gi bleed.   COMPARISON: CT angio 01/16/2025   ACCESSION NUMBER(S): [id number]   ORDERING CLINICIAN: MODESTA MCMANUS   TECHNIQUE: : Rancho Alatorre MD   CONSENT: The patient / patient's POA / next of kin was informed of the nature of the proposed procedure. The purposes, alternatives, risks, and benefits were explained and discussed. All questions were answered and consent was obtained.   RADIATION EXPOSURE: Dose: 657 mGy   SEDATION: Moderate conscious intravenous sedation services (supervision of administration, induction, and maintenance) were provided by the physician performing the procedure with intravenous fentanyl and versed. Total intra-service sedation time from 7227-8009  "hours (42 minutes). The physician was assisted by an independent trained observer, an interventional radiology nurse, in the continuous monitoring of the patient level of consciousness and physiologic status.   MEDICATION/CONTRAST: 60 mL Omnipaque 350   TIME OUT: A time out was performed immediately prior to the procedure start with interventional team, correctly identifying the patient using multiple identifiers and ensuring the appropriate procedure and anatomy (including laterality, if applicable) were identified. The procedure consent form and all relevant laboratory and imaging tests were reviewed. The need for antibiotic administration or patient or procedure specific safety precautions or equipment was reviewed.   COMPLICATIONS: None immediate   FINDINGS: With the patient in the supine position, the groins were scrubbed and draped in the usual aseptic manner. The skin and subcutaneous tissue overlying the right common femoral artery were infiltrated with 2% lidocaine for local anesthetic. Ultrasound demonstrated patency of the right common femoral artery was then accessed with a micropuncture needle under direct sonographic guidance. A hard copy ultrasound image was stored to PACS.  A 0.018 wire was advanced through the needle into the artery. The needle was exchanged for 5 [place] transitional catheter. The wire and inner dilator were removed and 0.035 Bentson wire was advanced to the artery. The transitional catheter was exchanged for a 5 [place] vascular sheath.   A 5 [place] Sos Omni catheter was advanced over the Bentson wire and used to select the superior mesenteric artery. Digital subtraction angiography was performed. An STC microcatheter was advanced over a 0.016 \"fathom wire coaxially through the macro catheter into the middle colic artery and a corresponding arteriogram was obtained. The catheter was further sub selectively advanced in the middle colic artery to a select the location at the site of " the previously identified bleed and a corresponding arteriogram was obtained.   Angiography was performed through the common femoral artery sheath and showed appropriate anatomy and position for use of a closure device. The atraumatic Mynx control device was advanced through the catheter. Steps were performed according to 's directions, with successful deployment of the Mynx arteriotomy closure device. Hemostasis was achieved by the closure device and manual compression.   ANGIOGRAPHIC FINDINGS: 1. Superior mesenteric artery angiogram does not show active extravasation within the SMA distribution. Site potential bleed in the mid transverse colon which is a round focus of contrast, does not behave in the later phase as an active bleed, but additional study was pursued due to its location corresponding to the CTA findings. 2. Middle colic artery angiogram without evidence of active contrast extravasation or other signs of active bleeding. Additionally, the finding present on the superior mesenteric artery angiogram is not elicited on more sub selective exam. 3. Secondary branch of the middle colic artery angiogram in the area of concern based on the prior CTA fails to elicit any evidence of active bleeding.       Superior mesenteric artery angiogram and sub selective middle colic artery angiogram to study the site of active bleed on the same-day CTA. No extravasation on the current exam and therefore no embolization performed.   Signed by: Rancho Alatorre 1/17/2025 1:57 PM Dictation workstation:   [id number]    ECG 12 lead    Result Date: 1/16/2025  Sinus rhythm with 1st degree AV block Left axis deviation Left ventricular hypertrophy with repolarization abnormality ( R in aVL , Gerardo product ) Abnormal ECG When compared with ECG of 10-MAR-2024 12:38, Non-specific change in ST segment in Lateral leads Confirmed by Arthur Chandra ([id number]) on 1/16/2025 6:38:55 PM    CT angio abdomen pelvis w and or wo IV  IV contrast    Result Date: 1/16/2025  Interpreted By:  Steffen Dubon, STUDY: CT ANGIO ABDOMEN PELVIS W AND/OR WO IV IV CONTRAST;  1/16/2025 10:38 am   INDICATION: Signs/Symptoms:gi bleed.   COMPARISON: None.   ACCESSION NUMBER(S): [id number]   ORDERING CLINICIAN: MODESTA MCMANUS   TECHNIQUE: Thin-section axial images of the abdomen and pelvis before and following intravenous administration of 90 mL of Omnipaque 350 contrast. Postcontrast images obtained in the arterial and venous phases.     CT Dose Reduction Employed: Yes, iterative reconstruction   For optimization of anatomic evaluation, multiplanar reconstruction, maximum intensity projections, and advanced 3-D off-line postprocessing were performed on a dedicated stand-alone workstation by the interpreting physician.   FINDINGS: VASCULATURE: There is a focus of arterial phase contrast extravasation that persistent slightly progresses on venous phase at mid transverse colon. This is associated with extensive diverticulosis. No additional areas of contrast extravasation into GI tract identified.     Abdominal aorta: No abdominal aortic aneurysm or dissection. Mild atherosclerosis. Celiac artery: Unremarkable Superior mesenteric artery: Unremarkable Right renal artery: Single artery, unremarkable. Left renal artery: Single artery with mild stenosis proximally Inferior mesenteric artery: Unremarkable. Common iliac, external iliac, common femoral, and visualized superficial femoral artery are unremarkable bilaterally.   The portal vein is patent. The splenic vein is patent. The superior mesenteric vein is patent. The IVC is patent.     ABDOMEN AND PELVIS:   Lower chest: Fibrotic changes lung bases.   Liver: No gross abnormality   Biliary tree: No intrahepatic biliary dilatation, the common bile duct grossly unremarkable   Gallbladder: No gross abnormality   Pancreas: No gross abnormality   Spleen: No gross abnormality   Adrenal: The adrenal appears unremarkable    Kidneys: Symmetrical enhancement bilaterally is present. .   Peritoneum and retroperitoneum: No free fluid, free air and/or gross masses   Lymph nodes: No lymphadenopathy   Bowel: No abnormally dilated bowel segments are seen. No significant inflammatory changes are present. Extensive diverticulosis through out majority of the colon. Moderate hiatal hernia.   Bladder: No gross finding   Pelvis: 2.2 cm calcified fibroid.   Musculoskeletal: Advanced degenerative changes lumbar spine.       Focus of active lower GI bleeding demonstrated at mid transverse colon, associated with extensive diverticulosis.   MACRO:   Critical Finding:  There is active hemorrhage. Notification was initiated on 1/16/2025 at 11:01 am by  Steffen Dubon.  (**-OCF-**) Instructions:         Signed by: Steffen Dubon 1/16/2025 11:02 AM Dictation workstation:   [id number]    XR chest 1 view    Result Date: 1/16/2025  Interpreted By:  Radu Ornelas, STUDY: XR CHEST 1 VIEW;  1/16/2025 8:43 am   INDICATION: Signs/Symptoms:near syncope.   COMPARISON: 12/20/2023   ACCESSION NUMBER(S): [id number]   ORDERING CLINICIAN: MODESTA MCMANUS   FINDINGS: CHEST/LUNGS: The cardiac and mediastinal silhouettes are unchanged in size and configuration. There is interstitial prominence which is similar to the previous examination. Areas of atelectasis/scarring at the lung bases. No definite new areas of consolidation. No sizable effusion or evidence of a pneumothorax.   UPPER ABDOMEN: No remarkable upper abdominal findings.   OSSEOUS STRUCTURES: No acute changes.       Stable interstitial prominence as well as some areas of scarring/atelectasis, predominantly at the lung bases.   MACRO: None.   Signed by: Radu Ornelas 1/16/2025 8:53 AM Dictation workstation:   [id number]       Assessment/Plan   This is a very pleasant 89-year-old lady with a known history of GI bleed, HTN, HLD, pulmonary embolus (no longer on eliquis), cardiac arrest, recurrent falls, COVID-19, iron  deficiency anemia due to chronic blood loss, GERD, hx rectus sheath hematoma 3/2024 presents with recurrent bleeding congruent with diverticular bleed, appears to be self resolving.  Assessment & Plan  Lightheadedness      Plan:  - Patient stable to remain on acute level of care with telemetry  - Appreciate GI seeing the patient, she did advance her diet  - Will repeat CBC and monitor her symptoms and blood counts, we are holding off on colonoscopy as documented yesterday  - Depending on her clinical progress, anticipate she could be discharged soon as tomorrow  - Will continue to monitor and anticipate she could be a candidate for discharge as soon as tomorrow    Diet: soft  VTE PPx: Hold pharmacologic  Code: DNR/DNI-CCA        I spent 35 minutes in the professional and overall care of this patient.    Sánchez Odell MD

## 2025-01-18 NOTE — NURSING NOTE
Patient transferred to [address] [place] [address], room 3018, at this time.  Patient stable and resting quietly.

## 2025-01-18 NOTE — PROGRESS NOTES
"Natasha Lundberg is a 89 y.o. female on day 2 of admission presenting with Lightheadedness.    Subjective   Transfused 2u total this admission.  No BM today, brown BM yesterday.         Objective     Physical Exam    Last Recorded Vitals  Blood pressure 141/78, pulse 95, temperature 36.1 °C (97 °F), temperature source Temporal, resp. rate 16, height 1.549 m (5' 1\"), weight 83.3 kg (183 lb 9.6 oz), SpO2 96%.  Intake/Output last 3 Shifts:  I/O last 3 completed shifts:  In: 6330 (76 mL/kg) [P.O.:3000; I.V.:2628.3 (31.6 mL/kg); Blood:701.7]  Out: 850 (10.2 mL/kg) [Urine:350 (0.1 mL/kg/hr); Stool:500]  Weight: 83.3 kg     Relevant Results        Hb 6.4 -> 7.9 after 2u               Assessment/Plan   Assessment & Plan  Lightheadedness    89 yo F with diverticular bleeding which appears resolved.  No scope this admission b/c colonoscopy in 9/23 negative for CRC.    -Ok to advance diet   -would get CT angio stat in future for recurrent bleeding and take to angiogram w/in 90 minutes next time             Nelson Mitchell MD      "

## 2025-01-19 VITALS
HEART RATE: 92 BPM | RESPIRATION RATE: 16 BRPM | DIASTOLIC BLOOD PRESSURE: 82 MMHG | SYSTOLIC BLOOD PRESSURE: 166 MMHG | HEIGHT: 61 IN | WEIGHT: 183.6 LBS | BODY MASS INDEX: 34.66 KG/M2 | TEMPERATURE: 96.8 F | OXYGEN SATURATION: 99 %

## 2025-01-19 LAB
ANION GAP SERPL CALC-SCNC: 10 MMOL/L (ref 10–20)
BUN SERPL-MCNC: 13 MG/DL (ref 6–23)
CALCIUM SERPL-MCNC: 7.9 MG/DL (ref 8.6–10.3)
CHLORIDE SERPL-SCNC: 107 MMOL/L (ref 98–107)
CO2 SERPL-SCNC: 26 MMOL/L (ref 21–32)
CREAT SERPL-MCNC: 1.16 MG/DL (ref 0.5–1.05)
EGFRCR SERPLBLD CKD-EPI 2021: 46 ML/MIN/1.73M*2
ERYTHROCYTE [DISTWIDTH] IN BLOOD BY AUTOMATED COUNT: 18 % (ref 11.5–14.5)
GLUCOSE SERPL-MCNC: 90 MG/DL (ref 74–99)
HCT VFR BLD AUTO: 24.5 % (ref 36–46)
HGB BLD-MCNC: 7.6 G/DL (ref 12–16)
MAGNESIUM SERPL-MCNC: 1.9 MG/DL (ref 1.6–2.4)
MCH RBC QN AUTO: 29 PG (ref 26–34)
MCHC RBC AUTO-ENTMCNC: 31 G/DL (ref 32–36)
MCV RBC AUTO: 94 FL (ref 80–100)
NRBC BLD-RTO: 0 /100 WBCS (ref 0–0)
PLATELET # BLD AUTO: 234 X10*3/UL (ref 150–450)
POTASSIUM SERPL-SCNC: 3.7 MMOL/L (ref 3.5–5.3)
RBC # BLD AUTO: 2.62 X10*6/UL (ref 4–5.2)
SODIUM SERPL-SCNC: 139 MMOL/L (ref 136–145)
WBC # BLD AUTO: 7.7 X10*3/UL (ref 4.4–11.3)

## 2025-01-19 PROCEDURE — 36415 COLL VENOUS BLD VENIPUNCTURE: CPT | Performed by: INTERNAL MEDICINE

## 2025-01-19 PROCEDURE — 83735 ASSAY OF MAGNESIUM: CPT | Performed by: INTERNAL MEDICINE

## 2025-01-19 PROCEDURE — 2500000001 HC RX 250 WO HCPCS SELF ADMINISTERED DRUGS (ALT 637 FOR MEDICARE OP): Performed by: INTERNAL MEDICINE

## 2025-01-19 PROCEDURE — 99239 HOSP IP/OBS DSCHRG MGMT >30: CPT | Performed by: INTERNAL MEDICINE

## 2025-01-19 PROCEDURE — 85027 COMPLETE CBC AUTOMATED: CPT | Performed by: INTERNAL MEDICINE

## 2025-01-19 PROCEDURE — 80048 BASIC METABOLIC PNL TOTAL CA: CPT | Performed by: INTERNAL MEDICINE

## 2025-01-19 RX ORDER — NAPROXEN SODIUM 220 MG/1
81 TABLET, FILM COATED ORAL DAILY
Start: 2025-01-27

## 2025-01-19 RX ADMIN — ACETAMINOPHEN 650 MG: 325 TABLET ORAL at 09:08

## 2025-01-19 RX ADMIN — Medication 2000 UNITS: at 09:08

## 2025-01-19 RX ADMIN — NYSTATIN CREAM 1 APPLICATION: 100000 CREAM TOPICAL at 14:49

## 2025-01-19 RX ADMIN — NYSTATIN CREAM 1 APPLICATION: 100000 CREAM TOPICAL at 09:08

## 2025-01-19 RX ADMIN — FERROUS SULFATE TAB 325 MG (65 MG ELEMENTAL FE) 325 MG: 325 (65 FE) TAB at 09:08

## 2025-01-19 ASSESSMENT — COGNITIVE AND FUNCTIONAL STATUS - GENERAL
TOILETING: A LITTLE
STANDING UP FROM CHAIR USING ARMS: A LITTLE
MOVING TO AND FROM BED TO CHAIR: A LITTLE
CLIMB 3 TO 5 STEPS WITH RAILING: A LOT
DRESSING REGULAR LOWER BODY CLOTHING: A LITTLE
TURNING FROM BACK TO SIDE WHILE IN FLAT BAD: A LITTLE
MOBILITY SCORE: 17
DAILY ACTIVITIY SCORE: 20
WALKING IN HOSPITAL ROOM: A LITTLE
MOVING FROM LYING ON BACK TO SITTING ON SIDE OF FLAT BED WITH BEDRAILS: A LITTLE
HELP NEEDED FOR BATHING: A LITTLE
DRESSING REGULAR UPPER BODY CLOTHING: A LITTLE

## 2025-01-19 ASSESSMENT — PAIN SCALES - GENERAL: PAINLEVEL_OUTOF10: 0 - NO PAIN

## 2025-01-19 ASSESSMENT — PAIN - FUNCTIONAL ASSESSMENT: PAIN_FUNCTIONAL_ASSESSMENT: 0-10

## 2025-01-19 NOTE — DISCHARGE SUMMARY
"Discharge Diagnosis  Lightheadedness    Issues Requiring Follow-Up  It was a pleasure to meet you in the hospital  You were diagnosed with having bleeding from your rectum  This happened due to something called \"colonic diverticular bleed\"  This is of process of bleeding that comes from diverticula, or outpouchings in your colon  You have had this type of medical issue before  Fortunately, this type of bleeding \"self resolves\", or goes away on its own but does require monitoring and support in a hospital  While you were in the hospital you are seen by the gastroenterology consulting service  Initial consideration was given to a colonoscopy if your bleeding did not stop, although you did require a blood product transfusion, your numbers were stable and you stopped having bleeding episodes and you were deemed cleared to discharge from the hospital  You may continue to take your usual medications as previously prescribed, although it is recommended that you hold your low-dose aspirin for another week  You should plan on following with your doctor at [facility], Dr. Jones, in the next week after discharge    Test Results Pending At Discharge  Pending Labs       No current pending labs.          Hospital Course  This is a very pleasant 89-year-old lady with a known history of GI bleed, HTN, HLD, pulmonary embolus (no longer on eliquis), cardiac arrest, recurrent falls, COVID-19, iron deficiency anemia due to chronic blood loss, GERD, hx rectus sheath hematoma 3/2024 presents with recurrent bleeding congruent with diverticular bleed requiring a total of 2 units of packed red blood cell transfusion; GI service was consulted and initially considered a colonoscopy, the patient was actually prep for 1, her bleeding had resolved, she did have 1 episode on the evening of 1/18/2025 of additional bleeding which was thought to be residual, as there was no further bleeding and brown stools noted thereafter and she was " determined to be an appropriate candidate for discharge back to her living facility with no medication changes.  She was given the above-mentioned instructions for discharge.    Greater than 30 minutes was spent facilitating this patients discharge from the hospital which included examining the patient, reconciling medications, and making arrangements for future care.    Sánchez Odell MD  [facility]  Internal Medicine    This document was generated in whole or in part using the Dragon One medical voice recognition software and there may be some incorrect words/wording, spelling, or punctuation errors that were not corrected prior to finalization in the medical record.    Pertinent Physical Exam At Time of Discharge  Physical Exam  Constitutional: Well developed, awake/alert/oriented x3, no distress, alert and cooperative   ENMT: mucous membranes moist   Head/Neck: Neck supple   Respiratory/Thorax: Patent airways, CTAB, normal breath sounds with good chest expansion, thorax symmetric   Cardiovascular: Regular, rate and rhythm, no murmurs, 2+ equal pulses of the extremities, normal S 1and S 2   Gastrointestinal: Nondistended, soft, non-tender, no rebound tenderness or guarding, no masses palpable, no organomegaly, +BS, no bruits   Musculoskeletal: ROM intact, no joint swelling, normal strength   Extremities: normal extremities, no cyanosis edema, contusions or wounds, no clubbing   Neurological: alert and oriented x3, intact senses, motor, response and reflexes, normal strength       Home Medications     Medication List      ASK your doctor about these medications     acetaminophen 500 mg tablet; Commonly known as: Tylenol   amitriptyline 10 mg tablet; Commonly known as: Elavil   benzonatate 100 mg capsule; Commonly known as: Tessalon   calcium citrate 200 mg (950 mg) tablet; Commonly known as: Calcitrate   cholecalciferol 50 MCG (2000 UT) tablet; Commonly known as: Vitamin D-3   diphenhydrAMINE 25 mg  tablet; Commonly known as: Sominex   diphenhydrAMINE-acetaminophen  mg per tablet; Commonly known as:   Tylenol PM   ferrous sulfate (325 mg ferrous sulfate) tablet   nystatin cream; Commonly known as: Mycostatin   oxyCODONE 5 mg immediate release tablet; Commonly known as: Roxicodone   pantoprazole 40 mg EC tablet; Commonly known as: ProtoNix   polyethylene glycol 17 gram packet; Commonly known as: Glycolax, Miralax   ProAir HFA 90 mcg/actuation inhaler; Generic drug: albuterol   * aspirin 81 mg chewable tablet   * [place] 81 mg capsule; Generic drug: aspirin; Take 81 mg by mouth   once daily.  * This list has 2 medication(s) that are the same as other medications   prescribed for you. Read the directions carefully, and ask your doctor or   other care provider to review them with you.       Outpatient Follow-Up  No future appointments.    Sánchez Odell MD

## 2025-01-19 NOTE — CARE PLAN
Problem: Pain - Adult  Goal: Verbalizes/displays adequate comfort level or baseline comfort level  Outcome: Adequate for Discharge     Problem: Discharge Planning  Goal: Discharge to home or other facility with appropriate resources  Outcome: Adequate for Discharge     Problem: Chronic Conditions and Co-morbidities  Goal: Patient's chronic conditions and co-morbidity symptoms are monitored and maintained or improved  Outcome: Adequate for Discharge     Problem: Fall/Injury  Goal: Not fall by end of shift  Outcome: Adequate for Discharge  Goal: Be free from injury by end of the shift  Outcome: Adequate for Discharge  Goal: Verbalize understanding of risk factor reduction measures to prevent injury from fall in the home  Outcome: Adequate for Discharge  Goal: Use assistive devices by end of the shift  Outcome: Adequate for Discharge  Goal: Pace activities to prevent fatigue by end of the shift  Outcome: Adequate for Discharge     Problem: Safety - Adult  Goal: Free from fall injury  Outcome: Adequate for Discharge     Problem: Skin  Goal: Participates in plan/prevention/treatment measures  1/19/2025 1727 by Jinny Badillo RN  Outcome: Adequate for Discharge  1/19/2025 0750 by Jinny Badillo RN  Flowsheets (Taken 1/19/2025 0750)  Participates in plan/prevention/treatment measures: Elevate heels  Goal: Prevent/manage excess moisture  1/19/2025 1727 by Jinny Badillo RN  Outcome: Adequate for Discharge  1/19/2025 0750 by Jinny Badillo RN  Flowsheets (Taken 1/19/2025 0750)  Prevent/manage excess moisture: Cleanse incontinence/protect with barrier cream  Goal: Prevent/minimize sheer/friction injuries  1/19/2025 1727 by Jinny Badillo RN  Outcome: Adequate for Discharge  1/19/2025 0750 by Jinny Badillo RN  Flowsheets (Taken 1/19/2025 0750)  Prevent/minimize sheer/friction injuries: Turn/reposition every 2 hours/use positioning/transfer devices  Goal: Promote/optimize nutrition  Outcome: Adequate for  Discharge  Goal: Promote skin healing  Outcome: Adequate for Discharge

## 2025-01-19 NOTE — CARE PLAN
The patient's goals for the shift include      The clinical goals for the shift include see plan of care      Problem: Pain - Adult  Goal: Verbalizes/displays adequate comfort level or baseline comfort level  Outcome: Progressing     Problem: Discharge Planning  Goal: Discharge to home or other facility with appropriate resources  Outcome: Progressing     Problem: Chronic Conditions and Co-morbidities  Goal: Patient's chronic conditions and co-morbidity symptoms are monitored and maintained or improved  Outcome: Progressing     Problem: Fall/Injury  Goal: Not fall by end of shift  Outcome: Progressing  Goal: Be free from injury by end of the shift  Outcome: Progressing  Goal: Verbalize understanding of risk factor reduction measures to prevent injury from fall in the home  Outcome: Progressing  Goal: Use assistive devices by end of the shift  Outcome: Progressing  Goal: Pace activities to prevent fatigue by end of the shift  Outcome: Progressing     Problem: Safety - Adult  Goal: Free from fall injury  Outcome: Progressing     Problem: Skin  Goal: Participates in plan/prevention/treatment measures  Outcome: Progressing  Goal: Prevent/manage excess moisture  Outcome: Progressing  Goal: Prevent/minimize sheer/friction injuries  Outcome: Progressing  Goal: Promote/optimize nutrition  Outcome: Progressing  Goal: Promote skin healing  Outcome: Progressing     Medicated with tylenol and melatonin for sleep and was able to rest comfortably overnight. No bowel movements. Her bed alarm is on,call bell is in reach. Safety is maintained.

## 2025-01-19 NOTE — NURSING NOTE
1430- Patient had a formed black bowel movement. Dr Odell made aware.    1720- No acute changes throughout the shift. Patient up to chair throughout the shift. Patient had some knee pain this morning relieved with tylenol. No complaints of N/V. No complaints of shortness of breath. IV taken out and all belongings returned to the patient. Physicians here to pick patient up to take back to [facility] Attempted to call report to [facility] and message left with Madie the  to have the nurse call me for report when she had a moment. Kim (POA) updated that patient was on her way back. Safety maintained and call light within reach.

## 2025-01-19 NOTE — DISCHARGE INSTRUCTIONS
"It was a pleasure to meet you in the hospital  You were diagnosed with having bleeding from your rectum  This happened due to something called \"colonic diverticular bleed\"  This is of process of bleeding that comes from diverticula, or outpouchings in your colon  You have had this type of medical issue before  Fortunately, this type of bleeding \"self resolves\", or goes away on its own but does require monitoring and support in a hospital  While you were in the hospital you are seen by the gastroenterology consulting service  Initial consideration was given to a colonoscopy if your bleeding did not stop, although you did require a blood product transfusion, your numbers were stable and you stopped having bleeding episodes and you were deemed cleared to discharge from the hospital  You may continue to take your usual medications as previously prescribed, although it is recommended that you hold your low-dose aspirin for another week  You should plan on following with your doctor at [facility], Dr. Jones, in the next week after discharge  "

## 2025-01-20 LAB
BLOOD EXPIRATION DATE: NORMAL
DISPENSE STATUS: NORMAL
PRODUCT BLOOD TYPE: 6200
PRODUCT BLOOD TYPE: 6200
PRODUCT BLOOD TYPE: NORMAL
PRODUCT CODE: NORMAL
UNIT ABO: NORMAL
UNIT NUMBER: NORMAL
UNIT RH: NORMAL
UNIT VOLUME: 350
XM INTEP: NORMAL

## 2025-02-05 ENCOUNTER — NURSING HOME VISIT (OUTPATIENT)
Dept: POST ACUTE CARE | Facility: EXTERNAL LOCATION | Age: 89
End: 2025-02-05
Payer: MEDICARE

## 2025-02-05 DIAGNOSIS — R42 LIGHTHEADEDNESS: ICD-10-CM

## 2025-02-05 DIAGNOSIS — J18.9 PNEUMONIA DUE TO INFECTIOUS ORGANISM, UNSPECIFIED LATERALITY, UNSPECIFIED PART OF LUNG: ICD-10-CM

## 2025-02-05 DIAGNOSIS — R26.2 DIFFICULTY IN WALKING: ICD-10-CM

## 2025-02-05 DIAGNOSIS — R06.02 SHORTNESS OF BREATH: ICD-10-CM

## 2025-02-05 DIAGNOSIS — E87.70 HYPERVOLEMIA, UNSPECIFIED HYPERVOLEMIA TYPE: ICD-10-CM

## 2025-02-05 DIAGNOSIS — I10 PRIMARY HYPERTENSION: Primary | ICD-10-CM

## 2025-02-05 PROCEDURE — 99350 HOME/RES VST EST HIGH MDM 60: CPT | Performed by: STUDENT IN AN ORGANIZED HEALTH CARE EDUCATION/TRAINING PROGRAM

## 2025-02-06 PROBLEM — R06.02 SHORTNESS OF BREATH: Status: ACTIVE | Noted: 2025-02-06

## 2025-02-06 PROBLEM — E87.70 FLUID OVERLOAD: Status: ACTIVE | Noted: 2025-02-06

## 2025-02-06 PROBLEM — J18.9 PNEUMONIA DUE TO INFECTIOUS ORGANISM: Status: ACTIVE | Noted: 2025-02-06

## 2025-02-06 PROBLEM — I50.43 ACUTE ON CHRONIC COMBINED SYSTOLIC AND DIASTOLIC CONGESTIVE HEART FAILURE: Status: ACTIVE | Noted: 2025-02-06

## 2025-02-06 ASSESSMENT — ENCOUNTER SYMPTOMS
ARTHRALGIAS: 1
WEAKNESS: 1
SHORTNESS OF BREATH: 1
CARDIOVASCULAR NEGATIVE: 1
PSYCHIATRIC NEGATIVE: 1
CHILLS: 1
FEVER: 1
FATIGUE: 1
GASTROINTESTINAL NEGATIVE: 1

## 2025-02-07 NOTE — PROGRESS NOTES
Subjective   Patient ID: Natasha Lundberg is a 89 y.o. female.      Patient seen and examined at bedside on sick visit.  Regards that she is not doing well today, endorses that she significantly feels well, has a low-grade fever, and does have some cough and congestion that is upcoming feels like it is productive in nature, otherwise, states that she does have a decrease in appetite.  Was tested for COVID and was subsequently negative.  Otherwise states no additional issues or concerns at this time.          Review of Systems   Constitutional:  Positive for chills, fatigue and fever.   HENT: Negative.     Respiratory:  Positive for shortness of breath.    Cardiovascular: Negative.    Gastrointestinal: Negative.    Musculoskeletal:  Positive for arthralgias.   Neurological:  Positive for weakness.   Psychiatric/Behavioral: Negative.         Objective Vitals Reviewed via facility EMR   Physical Exam  Constitutional:       General: She is not in acute distress.     Appearance: She is not ill-appearing.   Eyes:      Pupils: Pupils are equal, round, and reactive to light.   Cardiovascular:      Rate and Rhythm: Normal rate and regular rhythm.      Pulses: Normal pulses.      Heart sounds: No murmur heard.  Pulmonary:      Effort: No respiratory distress.      Comments: Dec breath sounds, wheezing  Abdominal:      General: Abdomen is flat. Bowel sounds are normal. There is no distension.   Musculoskeletal:      Right lower leg: No edema.      Left lower leg: No edema.   Skin:     General: Skin is warm and dry.   Neurological:      Mental Status: She is alert. Mental status is at baseline.      Cranial Nerves: No cranial nerve deficit.      Motor: Weakness present.   Psychiatric:         Mood and Affect: Mood normal.         Behavior: Behavior normal.         Assessment/Plan   Diagnoses and all orders for this visit:  Primary hypertension  Difficulty in walking  Lightheadedness  Pneumonia due to infectious organism, unspecified  laterality, unspecified part of lung  Shortness of breath  Hypervolemia, unspecified hypervolemia type      Patient seen and examined at bedside.  Overall medically stable however high suspicion for underlying pneumonia.  Recommend chest x-ray of Augmentin and azithromycin for further evaluation.  Recommend CBC CMP and continue supportive care.  Closely monitor respiratory status low threshold sent to the ED, otherwise no additional issues at this time.    Post visit    Reviewed chest x ray and noted fluid overalod and bilateral pleural effusions, pt is still stable with oxygenation via staff however will start lasix. as patient is having a fever, continue antibiotics. recommend cbc, BMP, BNP and echo. Cardio referral, staff updated with care plan    >60 minutes spent in managemnt of pt    Reviewed and approved by SELINA ESTES on 2/6/25 at 8:47 PM.

## 2025-03-04 ENCOUNTER — NURSING HOME VISIT (OUTPATIENT)
Dept: POST ACUTE CARE | Facility: EXTERNAL LOCATION | Age: 89
End: 2025-03-04
Payer: MEDICARE

## 2025-03-04 DIAGNOSIS — I50.43 ACUTE ON CHRONIC COMBINED SYSTOLIC AND DIASTOLIC CONGESTIVE HEART FAILURE: Primary | ICD-10-CM

## 2025-03-04 PROCEDURE — 99348 HOME/RES VST EST LOW MDM 30: CPT

## 2025-03-04 NOTE — ASSESSMENT & PLAN NOTE
C/b pleural effusions, peripheral edema. Condition is stable and currently asymptomatic and feeling well. We will continue therapy as prescribed, supportive care as indicated. Monitor respiratory status, can repeat CXR and increase lasix as indicated if there is worsening pleural effusion.

## 2025-03-04 NOTE — PROGRESS NOTES
Visit  Note   Subjective   Natasha Lundberg is a 89 y.o. female who is being seen and evaluated for multiple medical problems. Nursing notes, vital signs, and labs were reviewed in the local facility chart.    HPI     Ms Lundberg reports doing well with no current concerns today. She denies fevers/body aches, chest pain, shortness of breath, excess leg swelling. She does have a mild cough at times, but nothing bothersome to her. She feels well, denies questions or other needs today.     Objective   There were no vitals taken for this visit.  Physical Exam  Constitutional:       Appearance: She is not ill-appearing.   Eyes:      General: No scleral icterus.     Conjunctiva/sclera: Conjunctivae normal.   Cardiovascular:      Rate and Rhythm: Normal rate and regular rhythm.   Pulmonary:      Effort: Pulmonary effort is normal.      Breath sounds: Rales (bilateral basilar rales noted) present. No wheezing or rhonchi.   Musculoskeletal:      Right lower leg: Edema (trace) present.      Left lower leg: Edema (+1) present.   Skin:     General: Skin is warm and dry.      Coloration: Skin is not jaundiced.   Neurological:      General: No focal deficit present.      Mental Status: She is alert.   Psychiatric:         Mood and Affect: Mood normal.         Assessment & Plan  Acute on chronic combined systolic and diastolic congestive heart failure  C/b pleural effusions, peripheral edema. Condition is stable and currently asymptomatic and feeling well. We will continue therapy as prescribed, supportive care as indicated. Monitor respiratory status, can repeat CXR and increase lasix as indicated if there is worsening pleural effusion.                  Mag Gonzalez DO  Family Medicine Resident, PGY2     03/04/25 2:53 PM     Patient seen in conjunction with resident physician overall medically stable, with regards to loss of her , mood is overall stable and she is feeling well.  She does have intermittent shortness of breath,  however would be concerned about overdiuresis due to advanced age and potential side effects of aggressive diuresis.  Otherwise, continue supportive care, fall precautions discussed care plan with staff.  No issues noted.

## 2025-04-01 ENCOUNTER — NURSING HOME VISIT (OUTPATIENT)
Dept: POST ACUTE CARE | Facility: EXTERNAL LOCATION | Age: 89
End: 2025-04-01
Payer: MEDICARE

## 2025-04-01 DIAGNOSIS — F43.20 GRIEF REACTION: ICD-10-CM

## 2025-04-01 DIAGNOSIS — I10 PRIMARY HYPERTENSION: ICD-10-CM

## 2025-04-01 DIAGNOSIS — R26.2 DIFFICULTY IN WALKING: ICD-10-CM

## 2025-04-01 DIAGNOSIS — R42 LIGHTHEADEDNESS: ICD-10-CM

## 2025-04-01 DIAGNOSIS — I50.43 ACUTE ON CHRONIC COMBINED SYSTOLIC AND DIASTOLIC CONGESTIVE HEART FAILURE: Primary | ICD-10-CM

## 2025-04-01 PROCEDURE — 99348 HOME/RES VST EST LOW MDM 30: CPT | Performed by: STUDENT IN AN ORGANIZED HEALTH CARE EDUCATION/TRAINING PROGRAM

## 2025-04-01 ASSESSMENT — ENCOUNTER SYMPTOMS
MUSCULOSKELETAL NEGATIVE: 1
NEUROLOGICAL NEGATIVE: 1
CONSTITUTIONAL NEGATIVE: 1
GASTROINTESTINAL NEGATIVE: 1
CARDIOVASCULAR NEGATIVE: 1
RESPIRATORY NEGATIVE: 1
PSYCHIATRIC NEGATIVE: 1

## 2025-04-02 NOTE — PROGRESS NOTES
Subjective   Patient ID: Natasha Lundberg is a 89 y.o. female.    Patient seen and examined on routine evaluation.  She regards that she is overall doing well, unfortunately recently lost her  though however he was on hospice.  With regards to this, states that she is grieving appropriately, and does have a good days and bad days but overall is stable.  Otherwise, endorses no acute issues or concerns.  States she overall feels well otherwise.         Review of Systems   Constitutional: Negative.    HENT: Negative.     Respiratory: Negative.     Cardiovascular: Negative.    Gastrointestinal: Negative.    Musculoskeletal: Negative.    Neurological: Negative.    Psychiatric/Behavioral: Negative.         Objective Vitals Reviewed via facility EMR   Physical Exam  Constitutional:       General: She is not in acute distress.     Appearance: She is not ill-appearing.   Eyes:      Pupils: Pupils are equal, round, and reactive to light.   Cardiovascular:      Rate and Rhythm: Normal rate and regular rhythm.      Pulses: Normal pulses.      Heart sounds: No murmur heard.  Pulmonary:      Effort: No respiratory distress.      Breath sounds: No wheezing.   Abdominal:      General: Abdomen is flat. Bowel sounds are normal. There is no distension.   Musculoskeletal:      Right lower leg: No edema.      Left lower leg: No edema.   Skin:     General: Skin is warm and dry.   Neurological:      Mental Status: She is alert. Mental status is at baseline.      Cranial Nerves: No cranial nerve deficit.      Motor: Weakness present.   Psychiatric:         Mood and Affect: Mood normal.         Behavior: Behavior normal.         Assessment/Plan   Diagnoses and all orders for this visit:  Acute on chronic combined systolic and diastolic congestive heart failure  Primary hypertension  Difficulty in walking  Lightheadedness  Grief reaction      Patient seen and examined on routine evaluation.  Overall medically stable, appropriately grieving  however will need to closely monitor symptoms to ensure that they do not get worse.  Otherwise medically she is overall stable, continue optimization of activities of daily living as well as activities within the community as she does seem to be sleeping through more of these.  May need to consider having staff invite the patient to activities every day to ensure that she is going as she did do this previously when her  was alive.  Otherwise, no additional issues at this time continue supportive care.     Reviewed and approved by SELINA ESTES on 4/1/25 at 10:14 PM.

## 2025-04-16 ENCOUNTER — NURSING HOME VISIT (OUTPATIENT)
Dept: POST ACUTE CARE | Facility: EXTERNAL LOCATION | Age: 89
End: 2025-04-16
Payer: MEDICARE

## 2025-04-16 DIAGNOSIS — D72.828 NEUTROPHILIC LEUKOCYTOSIS: ICD-10-CM

## 2025-04-16 DIAGNOSIS — E87.70 HYPERVOLEMIA, UNSPECIFIED HYPERVOLEMIA TYPE: ICD-10-CM

## 2025-04-16 DIAGNOSIS — I10 PRIMARY HYPERTENSION: Primary | ICD-10-CM

## 2025-04-16 DIAGNOSIS — R26.2 DIFFICULTY IN WALKING: ICD-10-CM

## 2025-04-16 DIAGNOSIS — E86.0 DEHYDRATION: ICD-10-CM

## 2025-04-16 PROCEDURE — 99348 HOME/RES VST EST LOW MDM 30: CPT | Performed by: STUDENT IN AN ORGANIZED HEALTH CARE EDUCATION/TRAINING PROGRAM

## 2025-04-16 ASSESSMENT — ENCOUNTER SYMPTOMS
MUSCULOSKELETAL NEGATIVE: 1
PSYCHIATRIC NEGATIVE: 1
RESPIRATORY NEGATIVE: 1
GASTROINTESTINAL NEGATIVE: 1
CARDIOVASCULAR NEGATIVE: 1
NEUROLOGICAL NEGATIVE: 1
CONSTITUTIONAL NEGATIVE: 1

## 2025-04-17 NOTE — PROGRESS NOTES
Subjective   Patient ID: Natasha Lundberg is a 89 y.o. female.    Patient seen on sick visit and follow-up.  She regards that she is overall doing well however review of blood work show that patient has slight dehydration, as well as a slight white count.  She endorses that she does not have any infectious type of symptoms, but does have some frequency of urination.  Otherwise, has been taking Lasix daily, at 40 mg.  She states no issues with fluid overload.  Otherwise no additional issues.         Review of Systems   Constitutional: Negative.    HENT: Negative.     Respiratory: Negative.     Cardiovascular: Negative.    Gastrointestinal: Negative.    Musculoskeletal: Negative.    Neurological: Negative.    Psychiatric/Behavioral: Negative.         Objective Vitals Reviewed via facility EMR    Physical Exam  Constitutional:       General: She is not in acute distress.     Appearance: She is not ill-appearing.   Eyes:      Pupils: Pupils are equal, round, and reactive to light.   Cardiovascular:      Rate and Rhythm: Normal rate and regular rhythm.      Pulses: Normal pulses.      Heart sounds: No murmur heard.  Pulmonary:      Effort: No respiratory distress.      Breath sounds: No wheezing.   Abdominal:      General: Abdomen is flat. Bowel sounds are normal. There is no distension.   Musculoskeletal:      Right lower leg: No edema.      Left lower leg: No edema.   Skin:     General: Skin is warm and dry.   Neurological:      Mental Status: She is alert. Mental status is at baseline.      Cranial Nerves: No cranial nerve deficit.      Motor: Weakness present.   Psychiatric:         Mood and Affect: Mood normal.         Behavior: Behavior normal.         Assessment/Plan   Diagnoses and all orders for this visit:  Primary hypertension  Hypervolemia, unspecified hypervolemia type  Neutrophilic leukocytosis  Difficulty in walking  Dehydration    Patient seen and examined at bedside.  Review of labs shows likely clinical  improvement of creatinine.  Due to this, we will titrate down the patient's Lasix to 20 mg from 40 mg daily.  Closely monitor fluid status.  I do not suspect infectious etiology but check UA due to patient's dysuria.  Otherwise no additional issues continue supportive care.     Reviewed and approved by SELINA ESTES on 4/16/25 at 9:47 PM.

## 2025-06-17 ENCOUNTER — NURSING HOME VISIT (OUTPATIENT)
Dept: POST ACUTE CARE | Facility: EXTERNAL LOCATION | Age: 89
End: 2025-06-17
Payer: MEDICARE

## 2025-06-17 DIAGNOSIS — I50.43 ACUTE ON CHRONIC COMBINED SYSTOLIC AND DIASTOLIC CONGESTIVE HEART FAILURE: Primary | ICD-10-CM

## 2025-06-17 DIAGNOSIS — I10 PRIMARY HYPERTENSION: ICD-10-CM

## 2025-06-17 DIAGNOSIS — F43.20 GRIEF REACTION: ICD-10-CM

## 2025-06-17 DIAGNOSIS — E87.70 HYPERVOLEMIA, UNSPECIFIED HYPERVOLEMIA TYPE: ICD-10-CM

## 2025-06-17 DIAGNOSIS — R05.2 SUBACUTE COUGH: ICD-10-CM

## 2025-06-17 PROCEDURE — 99349 HOME/RES VST EST MOD MDM 40: CPT | Performed by: STUDENT IN AN ORGANIZED HEALTH CARE EDUCATION/TRAINING PROGRAM

## 2025-06-18 PROBLEM — R05.2 SUBACUTE COUGH: Status: ACTIVE | Noted: 2025-06-18

## 2025-06-18 ASSESSMENT — ENCOUNTER SYMPTOMS
WEAKNESS: 1
FATIGUE: 1
COUGH: 1
GASTROINTESTINAL NEGATIVE: 1
MUSCULOSKELETAL NEGATIVE: 1
CARDIOVASCULAR NEGATIVE: 1
PSYCHIATRIC NEGATIVE: 1

## 2025-06-19 NOTE — PROGRESS NOTES
Subjective   Patient ID: Natasha Lundberg is a 89 y.o. female.    Patient seen and examined on follow-up and sick visit.  Recently, has been having persistent issues with cough and congestion.  Due to this, chest x-ray was conducted, which showed vascular congestion and concerns for underlying CHF.  Patient endorses no constitutional symptoms but states that her cough is overall bothersome.  Otherwise, is unsure when she last saw cardiologist, and is unsure when she last got her echocardiogram.  Otherwise, states no acute issues or concerns.  Overall feels well otherwise.         Review of Systems   Constitutional:  Positive for fatigue.   HENT: Negative.     Respiratory:  Positive for cough.    Cardiovascular: Negative.    Gastrointestinal: Negative.    Musculoskeletal: Negative.    Neurological:  Positive for weakness.   Psychiatric/Behavioral: Negative.         Objective Vitals Reviewed via facility EMR   Physical Exam  Constitutional:       General: She is not in acute distress.     Appearance: She is not ill-appearing.   Eyes:      Pupils: Pupils are equal, round, and reactive to light.   Cardiovascular:      Rate and Rhythm: Normal rate and regular rhythm.      Pulses: Normal pulses.      Heart sounds: No murmur heard.  Pulmonary:      Effort: No respiratory distress.      Breath sounds: No wheezing.      Comments: Dec breath osunds  Abdominal:      General: Abdomen is flat. Bowel sounds are normal. There is no distension.   Musculoskeletal:      Right lower leg: No edema.      Left lower leg: No edema.   Skin:     General: Skin is warm and dry.   Neurological:      Mental Status: She is alert. Mental status is at baseline.      Cranial Nerves: No cranial nerve deficit.      Motor: Weakness present.   Psychiatric:         Mood and Affect: Mood normal.         Behavior: Behavior normal.         Assessment/Plan   Diagnoses and all orders for this visit:  Acute on chronic combined systolic and diastolic congestive heart  failure  Primary hypertension  Hypervolemia, unspecified hypervolemia type  Grief reaction  Subacute cough    Patient seen examined at bedside.  Does seem euvolemic on exam however does have significant congestion.  I suspect that patient has gained most of her fluid abdominally with regards to her fluid status.  Will obtain echocardiogram and advise follow-up with cardiology.  Will try to place patient on SGLT2 to help with fluid status.  Otherwise, continue supportive care and optimization of fluid status.  Will obtain CBC CMP TSH and magnesium levels.  Closely monitor vitals and labs.     Reviewed and approved by SELINA ESTES on 6/18/25 at 8:26 PM.

## 2025-07-01 ENCOUNTER — OFFICE VISIT (OUTPATIENT)
Dept: CARDIOLOGY | Facility: CLINIC | Age: 89
End: 2025-07-01
Payer: MEDICARE

## 2025-07-01 VITALS
HEART RATE: 110 BPM | WEIGHT: 183 LBS | OXYGEN SATURATION: 95 % | BODY MASS INDEX: 34.55 KG/M2 | SYSTOLIC BLOOD PRESSURE: 110 MMHG | HEIGHT: 61 IN | DIASTOLIC BLOOD PRESSURE: 80 MMHG

## 2025-07-01 DIAGNOSIS — R94.31 ABNORMAL ELECTROCARDIOGRAM (ECG) (EKG): ICD-10-CM

## 2025-07-01 DIAGNOSIS — I10 PRIMARY HYPERTENSION: Primary | ICD-10-CM

## 2025-07-01 PROCEDURE — 99212 OFFICE O/P EST SF 10 MIN: CPT | Mod: 25

## 2025-07-01 PROCEDURE — 1123F ACP DISCUSS/DSCN MKR DOCD: CPT | Performed by: INTERNAL MEDICINE

## 2025-07-01 PROCEDURE — 99214 OFFICE O/P EST MOD 30 MIN: CPT | Performed by: INTERNAL MEDICINE

## 2025-07-01 PROCEDURE — 1036F TOBACCO NON-USER: CPT | Performed by: INTERNAL MEDICINE

## 2025-07-01 PROCEDURE — 3074F SYST BP LT 130 MM HG: CPT | Performed by: INTERNAL MEDICINE

## 2025-07-01 PROCEDURE — 3079F DIAST BP 80-89 MM HG: CPT | Performed by: INTERNAL MEDICINE

## 2025-07-01 PROCEDURE — 93010 ELECTROCARDIOGRAM REPORT: CPT | Performed by: INTERNAL MEDICINE

## 2025-07-01 PROCEDURE — 1160F RVW MEDS BY RX/DR IN RCRD: CPT | Performed by: INTERNAL MEDICINE

## 2025-07-01 PROCEDURE — 93005 ELECTROCARDIOGRAM TRACING: CPT | Performed by: INTERNAL MEDICINE

## 2025-07-01 PROCEDURE — 1159F MED LIST DOCD IN RCRD: CPT | Performed by: INTERNAL MEDICINE

## 2025-07-01 RX ORDER — HYDROXYZINE HYDROCHLORIDE 25 MG/1
25 TABLET, FILM COATED ORAL 2 TIMES DAILY
COMMUNITY

## 2025-07-01 RX ORDER — FUROSEMIDE 40 MG/1
40 TABLET ORAL DAILY
COMMUNITY

## 2025-07-01 NOTE — PROGRESS NOTES
"Chief Complaint:   No chief complaint on file.     History Of Present Illness:    Natasha Lundberg \"Tanika\" is a 89 y.o. female with a history of hypertension, provoked PE on DOAC February 2023 after femur fracture, GI bleed, and abnormal ECG here for outpatient evaluation.    The patient resides at [facility].  Her primary care physician ordered a chest x-ray because of complaints of coughing.  This apparently demonstrated vascular congestion.  She was started on Jardiance 10 mg daily along with furosemide 40 mg daily.    The patient is accompanied by her niece.  The niece states that she believes that the cough has improved.  She is still having intermittent episodes of coughing.    The patient believes that the coughing started about 3 months ago.  She denies any chest pain or palpitations.  She does sleep in a recliner.  She says that she cannot sleep flat but does not know why she cannot sleep flat.  She has not experienced any significant leg swelling.    Patient was living in [place] in 2023 when she had a femur fracture.  Apparently surgery was attempted but the niece says that the patient had a cardiac arrest while on the table.  Surgery was stopped and reportedly performed 4 weeks later.    The patient was seen at [facility] in September 2023 when she was admitted for a GI bleed.  The notes at that time describe that the patient had a provoked PE in February 2023 after a femur fracture.  It is unclear whether the \"cardiac arrest\" that the niece is referring to was due to the pulmonary embolism or not.  The patient and niece state that she never had a heart catheterization.    There is a note from NP in April 2023 that states that the patient had postoperative pulmonary embolism and developed PEA/cardiac arrest and acute metabolic encephalopathy.  She was apparently discharged on Eliquis at that time.    When she was admitted to [facility] in September 2023, due to the GI " "bleed, the recommendation was that no further DOAC was required for her provoked DVT/PE.    Was admitted to [facility] in January 2025 for lightheadedness.  Was diagnosed with a colonic diverticular bleed.  Prior to colonoscopy the bleed resolved on its own.    ECG 1/16/2025: Sinus rhythm with first-degree AV block.  Left axis deviation.  LVH with repolarization abnormality (R wave amplitude in aVL).     Allergies:  Patient has no known allergies.    Outpatient Medications:  Current Outpatient Medications   Medication Instructions    acetaminophen (TYLENOL) 1,000 mg, Every 8 hours PRN    amitriptyline (ELAVIL) 10 mg, Nightly    aspirin 81 mg, oral, Daily    benzonatate (TESSALON) 100 mg, Every 8 hours PRN    calcium citrate (Calcitrate) 200 mg (950 mg) tablet 3 tablets, Daily    cholecalciferol (VITAMIN D-3) 50 mcg, Daily    diphenhydrAMINE (SOMINEX) 25 mg, Every 8 hours PRN    diphenhydrAMINE-acetaminophen (Tylenol PM)  mg per tablet 2 tablets, Every evening    ferrous sulfate 325 mg, Every other day    nystatin (Mycostatin) cream 1 Application, 3 times daily    pantoprazole (PROTONIX) 40 mg, Nightly       Last Recorded Vitals:  Visit Vitals  /80 (BP Location: Right arm, Patient Position: Sitting)   Pulse 110   Ht (!) 1.549 m (5' 1\")   Wt 83 kg (183 lb)   SpO2 95%   BMI 34.58 kg/m²   OB Status Postmenopausal   Smoking Status Former   BSA 1.89 m²      LASTWT(3):   Wt Readings from Last 3 Encounters:   07/01/25 83 kg (183 lb)   01/17/25 83.3 kg (183 lb 9.6 oz)   09/16/24 68 kg (150 lb)       Physical Exam:  In general: alert and in no acute distress.   HEENT: Carotid upstrokes normal with no bruits. JVP is mildly elevated.   Pulmonary: Rales in the lower third of the lung fields bilaterally.  Cardiovascular: S1, S2, regular. No appreciable murmurs, rubs or gallops.   Lower extremities: Warm. 2+ distal pulses.  Trivial left lower extremity edema.     Last Labs:  CBC -  Recent Labs     " "01/19/25  0636 01/18/25  1502 01/18/25  0546   WBC 7.7 10.5 9.1   HGB 7.6* 8.5* 7.9*   HCT 24.5* 25.9* 23.9*    217 197   MCV 94 91 90       CMP -  Recent Labs     01/19/25  0636 01/18/25  0546 01/17/25  0204    139 139   K 3.7 3.7 4.2    107 106   CO2 26 25 22   ANIONGAP 10 11 15   BUN 13 15 26*   CREATININE 1.16* 1.08* 1.26*   EGFR 46* 50* 41*   MG 1.90 1.72 1.72     Recent Labs     01/17/25  0204 01/16/25  0832 09/16/24  2346   ALBUMIN 3.1* 3.6 3.7   ALKPHOS 47 57 65   ALT 5* 6* 6*   AST 11 12 13   BILITOT 0.5 0.3 0.3       LIPID PANEL -   No results for input(s): \"CHOL\", \"LDLCALC\", \"LDLF\", \"HDL\", \"TRIG\" in the last 8760 hours.    Recent Labs     01/17/25  0205   BNP 72           Assessment/Plan   1) pulmonary edema: Evidence of rales on exam and presumed pulmonary congestion by chest x-ray.  Neck vein mildly elevated as well.  Most likely etiology is heart failure however it is unclear whether this is systolic or diastolic.  Does not appear to be any significant valvular disease by exam.    Echocardiogram has been ordered.  In the meantime she has been started on Jardiance which would be appropriate for either diastolic or systolic heart failure.  She is also placed on furosemide 40 mg once daily.    Apparently her shortness of breath has improved on this regimen.  For now I would continue with this regimen until echocardiogram is performed.    If there is evidence of LV systolic dysfunction then we can consider the initiation of MRA, beta-blocker, or ACE inhibitor/ARB/ARNI.  In the event of normal LV systolic function then we would continue with the SGLT2 inhibitor as well as a loop diuretic.    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    3) GI bleed: Most recent blood work from January 2025 still demonstrated anemia.  I do not have recent labs from nursing home.    4) abnormal ECG: Left hypertrophy with repolarization abnormality and inferior infarct " pattern unchanged from January 2025.  Await echocardiogram results.    5) follow-up: 6 to 8-week follow-up with NP.      Wyatt Odell MD

## 2025-07-10 ENCOUNTER — HOSPITAL ENCOUNTER (OUTPATIENT)
Dept: CARDIOLOGY | Facility: HOSPITAL | Age: 89
Discharge: HOME | End: 2025-07-10
Payer: MEDICARE

## 2025-07-10 DIAGNOSIS — R94.31 ABNORMAL EKG: ICD-10-CM

## 2025-07-10 DIAGNOSIS — R60.1 GENERALIZED EDEMA: ICD-10-CM

## 2025-07-10 PROCEDURE — 2500000004 HC RX 250 GENERAL PHARMACY W/ HCPCS (ALT 636 FOR OP/ED): Performed by: STUDENT IN AN ORGANIZED HEALTH CARE EDUCATION/TRAINING PROGRAM

## 2025-07-10 PROCEDURE — 93325 DOPPLER ECHO COLOR FLOW MAPG: CPT

## 2025-07-10 RX ADMIN — PERFLUTREN 2 ML OF DILUTION: 6.52 INJECTION, SUSPENSION INTRAVENOUS at 15:13

## 2025-07-11 LAB
AORTIC VALVE PEAK VELOCITY: 0.97 M/S
AV PEAK GRADIENT: 4 MMHG
EJECTION FRACTION APICAL 4 CHAMBER: 54.2
EJECTION FRACTION: 55 %
LEFT VENTRICLE INTERNAL DIMENSION DIASTOLE: 3.73 CM (ref 3.5–6)
LV EJECTION FRACTION BIPLANE: 55 %

## 2025-07-17 ENCOUNTER — NURSING HOME VISIT (OUTPATIENT)
Dept: POST ACUTE CARE | Facility: EXTERNAL LOCATION | Age: 89
End: 2025-07-17
Payer: MEDICARE

## 2025-07-17 DIAGNOSIS — R26.2 DIFFICULTY IN WALKING: ICD-10-CM

## 2025-07-17 DIAGNOSIS — R42 LIGHTHEADEDNESS: ICD-10-CM

## 2025-07-17 DIAGNOSIS — I50.43 ACUTE ON CHRONIC COMBINED SYSTOLIC AND DIASTOLIC CONGESTIVE HEART FAILURE: Primary | ICD-10-CM

## 2025-07-17 DIAGNOSIS — F43.20 GRIEF REACTION: ICD-10-CM

## 2025-07-17 DIAGNOSIS — I50.30 HEART FAILURE WITH PRESERVED EJECTION FRACTION, UNSPECIFIED HF CHRONICITY: ICD-10-CM

## 2025-07-17 PROCEDURE — 99348 HOME/RES VST EST LOW MDM 30: CPT | Performed by: STUDENT IN AN ORGANIZED HEALTH CARE EDUCATION/TRAINING PROGRAM

## 2025-07-18 PROBLEM — I50.30 (HFPEF) HEART FAILURE WITH PRESERVED EJECTION FRACTION: Status: ACTIVE | Noted: 2025-07-18

## 2025-07-18 ASSESSMENT — ENCOUNTER SYMPTOMS
GASTROINTESTINAL NEGATIVE: 1
MUSCULOSKELETAL NEGATIVE: 1
CARDIOVASCULAR NEGATIVE: 1
RESPIRATORY NEGATIVE: 1
PSYCHIATRIC NEGATIVE: 1
WEAKNESS: 1
FATIGUE: 1

## 2025-07-18 NOTE — PROGRESS NOTES
Subjective   Patient ID: Tanika Lundberg is a 89 y.o. female.    Patient seen and examined on routine evaluation.  but that she is overall doing well, fluid status is a lot stable.  Otherwise, there is very concerned about underlying mood and depression.  She regards that this is overall stable, Does intermittently feel down at times, but does not think she needs any medications for this.  Otherwise, is doing well at the facility, states no acute issues or concerns.         Review of Systems   Constitutional:  Positive for fatigue.   HENT: Negative.     Respiratory: Negative.     Cardiovascular: Negative.    Gastrointestinal: Negative.    Musculoskeletal: Negative.    Neurological:  Positive for weakness.   Psychiatric/Behavioral: Negative.         Objective Vitals Reviewed via facility EMR   Physical Exam  Constitutional:       General: She is not in acute distress.     Appearance: She is not ill-appearing.      Comments: Slight flat affect but stabvle     Eyes:      Pupils: Pupils are equal, round, and reactive to light.       Cardiovascular:      Rate and Rhythm: Normal rate and regular rhythm.      Pulses: Normal pulses.      Heart sounds: No murmur heard.  Pulmonary:      Effort: No respiratory distress.      Breath sounds: No wheezing.   Abdominal:      General: Abdomen is flat. Bowel sounds are normal. There is no distension.     Musculoskeletal:      Right lower leg: No edema.      Left lower leg: No edema.      Comments: euvolemic     Skin:     General: Skin is warm and dry.     Neurological:      Mental Status: She is alert. Mental status is at baseline.      Cranial Nerves: No cranial nerve deficit.      Motor: No weakness.     Psychiatric:         Mood and Affect: Mood normal.         Behavior: Behavior normal.         Assessment/Plan   Diagnoses and all orders for this visit:  Acute on chronic combined systolic and diastolic congestive heart failure  Heart failure with preserved ejection fraction,  unspecified HF chronicity  Grief reaction  Difficulty in walking  Lightheadedness    Patient seen and examined at bedside.  Overall medically stable, continue optimization with activities of daily living.  Otherwise continue supportive care.  Monitor mentation and mood.  Overall no other changes in medications at this time.     Reviewed and approved by SELINA ESTES on 7/18/25 at 2:28 PM.

## 2025-08-12 ENCOUNTER — APPOINTMENT (OUTPATIENT)
Dept: CARDIOLOGY | Facility: CLINIC | Age: 89
End: 2025-08-12
Payer: MEDICARE